# Patient Record
Sex: MALE | Race: WHITE | NOT HISPANIC OR LATINO
[De-identification: names, ages, dates, MRNs, and addresses within clinical notes are randomized per-mention and may not be internally consistent; named-entity substitution may affect disease eponyms.]

---

## 2018-03-14 ENCOUNTER — RESULT REVIEW (OUTPATIENT)
Age: 63
End: 2018-03-14

## 2018-03-14 ENCOUNTER — OUTPATIENT (OUTPATIENT)
Dept: OUTPATIENT SERVICES | Facility: HOSPITAL | Age: 63
LOS: 1 days | End: 2018-03-14
Payer: COMMERCIAL

## 2018-03-14 DIAGNOSIS — K92.2 GASTROINTESTINAL HEMORRHAGE, UNSPECIFIED: ICD-10-CM

## 2018-03-14 PROCEDURE — 88305 TISSUE EXAM BY PATHOLOGIST: CPT

## 2018-03-14 PROCEDURE — 88312 SPECIAL STAINS GROUP 1: CPT

## 2018-03-14 PROCEDURE — 88312 SPECIAL STAINS GROUP 1: CPT | Mod: 26

## 2018-03-14 PROCEDURE — 88305 TISSUE EXAM BY PATHOLOGIST: CPT | Mod: 26

## 2018-03-14 PROCEDURE — 43239 EGD BIOPSY SINGLE/MULTIPLE: CPT

## 2018-03-16 LAB — SURGICAL PATHOLOGY FINAL REPORT - CH: SIGNIFICANT CHANGE UP

## 2018-03-19 ENCOUNTER — OUTPATIENT (OUTPATIENT)
Dept: OUTPATIENT SERVICES | Facility: HOSPITAL | Age: 63
LOS: 1 days | End: 2018-03-19
Payer: COMMERCIAL

## 2018-03-19 ENCOUNTER — RESULT REVIEW (OUTPATIENT)
Age: 63
End: 2018-03-19

## 2018-03-19 DIAGNOSIS — K92.2 GASTROINTESTINAL HEMORRHAGE, UNSPECIFIED: ICD-10-CM

## 2018-03-19 PROCEDURE — 88305 TISSUE EXAM BY PATHOLOGIST: CPT | Mod: 26

## 2018-03-19 PROCEDURE — 45380 COLONOSCOPY AND BIOPSY: CPT

## 2018-03-19 PROCEDURE — 88305 TISSUE EXAM BY PATHOLOGIST: CPT

## 2018-03-21 PROBLEM — Z00.00 ENCOUNTER FOR PREVENTIVE HEALTH EXAMINATION: Status: ACTIVE | Noted: 2018-03-21

## 2018-03-21 LAB — SURGICAL PATHOLOGY FINAL REPORT - CH: SIGNIFICANT CHANGE UP

## 2018-03-26 ENCOUNTER — APPOINTMENT (OUTPATIENT)
Dept: SURGERY | Facility: CLINIC | Age: 63
End: 2018-03-26
Payer: COMMERCIAL

## 2018-03-26 VITALS
WEIGHT: 189 LBS | BODY MASS INDEX: 26.46 KG/M2 | TEMPERATURE: 98.6 F | HEART RATE: 89 BPM | DIASTOLIC BLOOD PRESSURE: 71 MMHG | HEIGHT: 71 IN | SYSTOLIC BLOOD PRESSURE: 117 MMHG

## 2018-03-26 PROCEDURE — 99203 OFFICE O/P NEW LOW 30 MIN: CPT

## 2018-04-02 ENCOUNTER — APPOINTMENT (OUTPATIENT)
Dept: SURGERY | Facility: CLINIC | Age: 63
End: 2018-04-02
Payer: COMMERCIAL

## 2018-04-02 VITALS
SYSTOLIC BLOOD PRESSURE: 112 MMHG | WEIGHT: 187 LBS | TEMPERATURE: 98.6 F | HEART RATE: 98 BPM | BODY MASS INDEX: 26.18 KG/M2 | OXYGEN SATURATION: 95 % | HEIGHT: 71 IN | DIASTOLIC BLOOD PRESSURE: 71 MMHG

## 2018-04-02 PROCEDURE — 99213 OFFICE O/P EST LOW 20 MIN: CPT

## 2018-06-25 ENCOUNTER — APPOINTMENT (OUTPATIENT)
Dept: SURGERY | Facility: CLINIC | Age: 63
End: 2018-06-25
Payer: COMMERCIAL

## 2018-06-25 VITALS
SYSTOLIC BLOOD PRESSURE: 108 MMHG | OXYGEN SATURATION: 95 % | BODY MASS INDEX: 26.18 KG/M2 | HEART RATE: 131 BPM | TEMPERATURE: 98.9 F | HEIGHT: 71 IN | WEIGHT: 187 LBS | DIASTOLIC BLOOD PRESSURE: 69 MMHG

## 2018-06-25 PROCEDURE — 99213 OFFICE O/P EST LOW 20 MIN: CPT

## 2018-07-24 ENCOUNTER — APPOINTMENT (OUTPATIENT)
Dept: SURGICAL ONCOLOGY | Facility: CLINIC | Age: 63
End: 2018-07-24
Payer: COMMERCIAL

## 2018-07-24 VITALS
HEIGHT: 71 IN | HEART RATE: 129 BPM | DIASTOLIC BLOOD PRESSURE: 67 MMHG | SYSTOLIC BLOOD PRESSURE: 112 MMHG | TEMPERATURE: 98.5 F | BODY MASS INDEX: 27.58 KG/M2 | WEIGHT: 197 LBS

## 2018-07-24 DIAGNOSIS — F17.200 NICOTINE DEPENDENCE, UNSPECIFIED, UNCOMPLICATED: ICD-10-CM

## 2018-07-24 DIAGNOSIS — K46.9 UNSPECIFIED ABDOMINAL HERNIA W/OUT OBSTRUCTION OR GANGRENE: ICD-10-CM

## 2018-07-24 DIAGNOSIS — Z83.3 FAMILY HISTORY OF DIABETES MELLITUS: ICD-10-CM

## 2018-07-24 DIAGNOSIS — Z82.49 FAMILY HISTORY OF ISCHEMIC HEART DISEASE AND OTHER DISEASES OF THE CIRCULATORY SYSTEM: ICD-10-CM

## 2018-07-24 DIAGNOSIS — Z81.8 FAMILY HISTORY OF OTHER MENTAL AND BEHAVIORAL DISORDERS: ICD-10-CM

## 2018-07-24 PROCEDURE — 99205 OFFICE O/P NEW HI 60 MIN: CPT

## 2018-08-08 ENCOUNTER — OUTPATIENT (OUTPATIENT)
Dept: OUTPATIENT SERVICES | Facility: HOSPITAL | Age: 63
LOS: 1 days | End: 2018-08-08

## 2018-08-08 VITALS
TEMPERATURE: 98 F | WEIGHT: 199.96 LBS | DIASTOLIC BLOOD PRESSURE: 64 MMHG | HEART RATE: 96 BPM | HEIGHT: 71 IN | RESPIRATION RATE: 18 BRPM | OXYGEN SATURATION: 96 % | SYSTOLIC BLOOD PRESSURE: 104 MMHG

## 2018-08-08 DIAGNOSIS — Z98.890 OTHER SPECIFIED POSTPROCEDURAL STATES: Chronic | ICD-10-CM

## 2018-08-08 DIAGNOSIS — C15.9 MALIGNANT NEOPLASM OF ESOPHAGUS, UNSPECIFIED: ICD-10-CM

## 2018-08-08 LAB
ALBUMIN SERPL ELPH-MCNC: 4.2 G/DL — SIGNIFICANT CHANGE UP (ref 3.3–5)
ALP SERPL-CCNC: 100 U/L — SIGNIFICANT CHANGE UP (ref 40–120)
ALT FLD-CCNC: 13 U/L — SIGNIFICANT CHANGE UP (ref 4–41)
AST SERPL-CCNC: 18 U/L — SIGNIFICANT CHANGE UP (ref 4–40)
BILIRUB SERPL-MCNC: 0.4 MG/DL — SIGNIFICANT CHANGE UP (ref 0.2–1.2)
BLD GP AB SCN SERPL QL: NEGATIVE — SIGNIFICANT CHANGE UP
BUN SERPL-MCNC: 16 MG/DL — SIGNIFICANT CHANGE UP (ref 7–23)
CALCIUM SERPL-MCNC: 9.7 MG/DL — SIGNIFICANT CHANGE UP (ref 8.4–10.5)
CHLORIDE SERPL-SCNC: 101 MMOL/L — SIGNIFICANT CHANGE UP (ref 98–107)
CO2 SERPL-SCNC: 24 MMOL/L — SIGNIFICANT CHANGE UP (ref 22–31)
CREAT SERPL-MCNC: 0.85 MG/DL — SIGNIFICANT CHANGE UP (ref 0.5–1.3)
GLUCOSE SERPL-MCNC: 99 MG/DL — SIGNIFICANT CHANGE UP (ref 70–99)
HCT VFR BLD CALC: 41.8 % — SIGNIFICANT CHANGE UP (ref 39–50)
HGB BLD-MCNC: 13.8 G/DL — SIGNIFICANT CHANGE UP (ref 13–17)
MCHC RBC-ENTMCNC: 30.5 PG — SIGNIFICANT CHANGE UP (ref 27–34)
MCHC RBC-ENTMCNC: 33 % — SIGNIFICANT CHANGE UP (ref 32–36)
MCV RBC AUTO: 92.5 FL — SIGNIFICANT CHANGE UP (ref 80–100)
NRBC # FLD: 0 — SIGNIFICANT CHANGE UP
PLATELET # BLD AUTO: 316 K/UL — SIGNIFICANT CHANGE UP (ref 150–400)
PMV BLD: 9.5 FL — SIGNIFICANT CHANGE UP (ref 7–13)
POTASSIUM SERPL-MCNC: 4.2 MMOL/L — SIGNIFICANT CHANGE UP (ref 3.5–5.3)
POTASSIUM SERPL-SCNC: 4.2 MMOL/L — SIGNIFICANT CHANGE UP (ref 3.5–5.3)
PROT SERPL-MCNC: 7.7 G/DL — SIGNIFICANT CHANGE UP (ref 6–8.3)
RBC # BLD: 4.52 M/UL — SIGNIFICANT CHANGE UP (ref 4.2–5.8)
RBC # FLD: 12.9 % — SIGNIFICANT CHANGE UP (ref 10.3–14.5)
RH IG SCN BLD-IMP: POSITIVE — SIGNIFICANT CHANGE UP
SODIUM SERPL-SCNC: 138 MMOL/L — SIGNIFICANT CHANGE UP (ref 135–145)
WBC # BLD: 7.88 K/UL — SIGNIFICANT CHANGE UP (ref 3.8–10.5)
WBC # FLD AUTO: 7.88 K/UL — SIGNIFICANT CHANGE UP (ref 3.8–10.5)

## 2018-08-08 RX ORDER — SODIUM CHLORIDE 9 MG/ML
1000 INJECTION, SOLUTION INTRAVENOUS
Qty: 0 | Refills: 0 | Status: DISCONTINUED | OUTPATIENT
Start: 2018-08-17 | End: 2018-08-20

## 2018-08-08 NOTE — H&P PST ADULT - LYMPHATIC
posterior cervical L/axillary L/posterior cervical R/anterior cervical L/anterior cervical R/supraclavicular L/supraclavicular R/axillary R

## 2018-08-08 NOTE — H&P PST ADULT - PROBLEM SELECTOR PLAN 1
Patient scheduled robotic assisted JOANNA Raghu esophagogastrectomy and cholecystectomy on 8/17/18.   Pre op and Hibiclens instructions given and explained.  Avoid NSAIDs and OTC supplements.   Patient and verbalized understanding  medical consult appreciated in the chart

## 2018-08-08 NOTE — H&P PST ADULT - HISTORY OF PRESENT ILLNESS
62 y/o male smoker with hx of esophagus CA s/p chemo and radiation on  5/18 presents to PST for scheduled robotic assisted JOANNA Raghu esophagogastrectomy and cholecystectomy on 8/17/18.

## 2018-08-08 NOTE — H&P PST ADULT - RS GEN PE MLT RESP DETAILS PC
airway patent/breath sounds equal airway patent/wheezes/respirations non-labored airway patent/respirations non-labored/hx of wheezes left upper

## 2018-08-08 NOTE — H&P PST ADULT - NEGATIVE GASTROINTESTINAL SYMPTOMS
no diarrhea/no nausea/no abdominal pain/no vomiting/no melena/no constipation no constipation/no diarrhea/no vomiting/abdominal budge/no abdominal pain/no melena/no nausea

## 2018-08-08 NOTE — H&P PST ADULT - MUSCULOSKELETAL
details… detailed exam no joint swelling/no joint erythema/no joint warmth/ROM intact/normal strength

## 2018-08-14 ENCOUNTER — APPOINTMENT (OUTPATIENT)
Dept: THORACIC SURGERY | Facility: CLINIC | Age: 63
End: 2018-08-14
Payer: COMMERCIAL

## 2018-08-14 VITALS
SYSTOLIC BLOOD PRESSURE: 101 MMHG | WEIGHT: 196 LBS | HEIGHT: 71 IN | RESPIRATION RATE: 16 BRPM | BODY MASS INDEX: 27.44 KG/M2 | OXYGEN SATURATION: 100 % | HEART RATE: 98 BPM | DIASTOLIC BLOOD PRESSURE: 65 MMHG

## 2018-08-14 PROBLEM — C15.9 MALIGNANT NEOPLASM OF ESOPHAGUS, UNSPECIFIED: Chronic | Status: ACTIVE | Noted: 2018-08-08

## 2018-08-14 PROCEDURE — 99205 OFFICE O/P NEW HI 60 MIN: CPT

## 2018-08-14 RX ORDER — OXYCODONE AND ACETAMINOPHEN 5; 325 MG/1; MG/1
5-325 TABLET ORAL
Refills: 0 | Status: DISCONTINUED | COMMUNITY
End: 2018-08-14

## 2018-08-14 RX ORDER — ACETAMINOPHEN 325 MG/1
325 TABLET, FILM COATED ORAL
Refills: 0 | Status: ACTIVE | COMMUNITY

## 2018-08-15 ENCOUNTER — APPOINTMENT (OUTPATIENT)
Dept: PULMONOLOGY | Facility: CLINIC | Age: 63
End: 2018-08-15
Payer: COMMERCIAL

## 2018-08-15 ENCOUNTER — TRANSCRIPTION ENCOUNTER (OUTPATIENT)
Age: 63
End: 2018-08-15

## 2018-08-15 PROCEDURE — 94726 PLETHYSMOGRAPHY LUNG VOLUMES: CPT

## 2018-08-15 PROCEDURE — 94060 EVALUATION OF WHEEZING: CPT

## 2018-08-15 PROCEDURE — 94729 DIFFUSING CAPACITY: CPT

## 2018-08-16 ENCOUNTER — TRANSCRIPTION ENCOUNTER (OUTPATIENT)
Age: 63
End: 2018-08-16

## 2018-08-17 ENCOUNTER — RESULT REVIEW (OUTPATIENT)
Age: 63
End: 2018-08-17

## 2018-08-17 ENCOUNTER — APPOINTMENT (OUTPATIENT)
Dept: SURGICAL ONCOLOGY | Facility: HOSPITAL | Age: 63
End: 2018-08-17

## 2018-08-17 ENCOUNTER — APPOINTMENT (OUTPATIENT)
Dept: THORACIC SURGERY | Facility: HOSPITAL | Age: 63
End: 2018-08-17
Payer: COMMERCIAL

## 2018-08-17 ENCOUNTER — INPATIENT (INPATIENT)
Facility: HOSPITAL | Age: 63
LOS: 9 days | Discharge: HOME | End: 2018-08-27
Attending: THORACIC SURGERY (CARDIOTHORACIC VASCULAR SURGERY) | Admitting: THORACIC SURGERY (CARDIOTHORACIC VASCULAR SURGERY)
Payer: COMMERCIAL

## 2018-08-17 VITALS
DIASTOLIC BLOOD PRESSURE: 72 MMHG | HEART RATE: 95 BPM | SYSTOLIC BLOOD PRESSURE: 115 MMHG | WEIGHT: 199.96 LBS | TEMPERATURE: 98 F | HEIGHT: 71 IN | OXYGEN SATURATION: 97 % | RESPIRATION RATE: 16 BRPM

## 2018-08-17 DIAGNOSIS — Z98.890 OTHER SPECIFIED POSTPROCEDURAL STATES: Chronic | ICD-10-CM

## 2018-08-17 DIAGNOSIS — C15.9 MALIGNANT NEOPLASM OF ESOPHAGUS, UNSPECIFIED: ICD-10-CM

## 2018-08-17 LAB
BASE EXCESS BLDA CALC-SCNC: -0.5 MMOL/L — SIGNIFICANT CHANGE UP
BASE EXCESS BLDA CALC-SCNC: -1.1 MMOL/L — SIGNIFICANT CHANGE UP
BASE EXCESS BLDA CALC-SCNC: -1.6 MMOL/L — SIGNIFICANT CHANGE UP
BASE EXCESS BLDA CALC-SCNC: -2.6 MMOL/L — SIGNIFICANT CHANGE UP
BUN SERPL-MCNC: 8 MG/DL — SIGNIFICANT CHANGE UP (ref 7–23)
CA-I BLDA-SCNC: 1.11 MMOL/L — LOW (ref 1.15–1.29)
CA-I BLDA-SCNC: 1.14 MMOL/L — LOW (ref 1.15–1.29)
CA-I BLDA-SCNC: 1.18 MMOL/L — SIGNIFICANT CHANGE UP (ref 1.15–1.29)
CALCIUM SERPL-MCNC: 8 MG/DL — LOW (ref 8.4–10.5)
CHLORIDE SERPL-SCNC: 103 MMOL/L — SIGNIFICANT CHANGE UP (ref 98–107)
CO2 SERPL-SCNC: 20 MMOL/L — LOW (ref 22–31)
CREAT SERPL-MCNC: 0.84 MG/DL — SIGNIFICANT CHANGE UP (ref 0.5–1.3)
GLUCOSE BLDA-MCNC: 134 MG/DL — HIGH (ref 70–99)
GLUCOSE BLDA-MCNC: 163 MG/DL — HIGH (ref 70–99)
GLUCOSE BLDA-MCNC: 171 MG/DL — HIGH (ref 70–99)
GLUCOSE BLDA-MCNC: 173 MG/DL — HIGH (ref 70–99)
GLUCOSE SERPL-MCNC: 175 MG/DL — HIGH (ref 70–99)
HCO3 BLDA-SCNC: 22 MMOL/L — SIGNIFICANT CHANGE UP (ref 22–26)
HCO3 BLDA-SCNC: 23 MMOL/L — SIGNIFICANT CHANGE UP (ref 22–26)
HCO3 BLDA-SCNC: 23 MMOL/L — SIGNIFICANT CHANGE UP (ref 22–26)
HCO3 BLDA-SCNC: 24 MMOL/L — SIGNIFICANT CHANGE UP (ref 22–26)
HCT VFR BLD CALC: 39.9 % — SIGNIFICANT CHANGE UP (ref 39–50)
HCT VFR BLDA CALC: 38.3 % — LOW (ref 39–51)
HCT VFR BLDA CALC: 38.5 % — LOW (ref 39–51)
HCT VFR BLDA CALC: 38.8 % — LOW (ref 39–51)
HCT VFR BLDA CALC: 41.3 % — SIGNIFICANT CHANGE UP (ref 39–51)
HGB BLD-MCNC: 13 G/DL — SIGNIFICANT CHANGE UP (ref 13–17)
HGB BLDA-MCNC: 12.4 G/DL — LOW (ref 13–17)
HGB BLDA-MCNC: 12.5 G/DL — LOW (ref 13–17)
HGB BLDA-MCNC: 12.6 G/DL — LOW (ref 13–17)
HGB BLDA-MCNC: 13.5 G/DL — SIGNIFICANT CHANGE UP (ref 13–17)
MCHC RBC-ENTMCNC: 30.7 PG — SIGNIFICANT CHANGE UP (ref 27–34)
MCHC RBC-ENTMCNC: 32.6 % — SIGNIFICANT CHANGE UP (ref 32–36)
MCV RBC AUTO: 94.3 FL — SIGNIFICANT CHANGE UP (ref 80–100)
NRBC # FLD: 0 — SIGNIFICANT CHANGE UP
PCO2 BLDA: 43 MMHG — SIGNIFICANT CHANGE UP (ref 35–48)
PCO2 BLDA: 44 MMHG — SIGNIFICANT CHANGE UP (ref 35–48)
PCO2 BLDA: 45 MMHG — SIGNIFICANT CHANGE UP (ref 35–48)
PCO2 BLDA: 46 MMHG — SIGNIFICANT CHANGE UP (ref 35–48)
PH BLDA: 7.33 PH — LOW (ref 7.35–7.45)
PH BLDA: 7.33 PH — LOW (ref 7.35–7.45)
PH BLDA: 7.35 PH — SIGNIFICANT CHANGE UP (ref 7.35–7.45)
PH BLDA: 7.35 PH — SIGNIFICANT CHANGE UP (ref 7.35–7.45)
PLATELET # BLD AUTO: 301 K/UL — SIGNIFICANT CHANGE UP (ref 150–400)
PMV BLD: 9.3 FL — SIGNIFICANT CHANGE UP (ref 7–13)
PO2 BLDA: 133 MMHG — HIGH (ref 83–108)
PO2 BLDA: 173 MMHG — HIGH (ref 83–108)
PO2 BLDA: 80 MMHG — LOW (ref 83–108)
PO2 BLDA: 82 MMHG — LOW (ref 83–108)
POTASSIUM BLDA-SCNC: 4 MMOL/L — SIGNIFICANT CHANGE UP (ref 3.4–4.5)
POTASSIUM BLDA-SCNC: 4.2 MMOL/L — SIGNIFICANT CHANGE UP (ref 3.4–4.5)
POTASSIUM BLDA-SCNC: 4.3 MMOL/L — SIGNIFICANT CHANGE UP (ref 3.4–4.5)
POTASSIUM BLDA-SCNC: 4.3 MMOL/L — SIGNIFICANT CHANGE UP (ref 3.4–4.5)
POTASSIUM SERPL-MCNC: 4.7 MMOL/L — SIGNIFICANT CHANGE UP (ref 3.5–5.3)
POTASSIUM SERPL-SCNC: 4.7 MMOL/L — SIGNIFICANT CHANGE UP (ref 3.5–5.3)
RBC # BLD: 4.23 M/UL — SIGNIFICANT CHANGE UP (ref 4.2–5.8)
RBC # FLD: 12.6 % — SIGNIFICANT CHANGE UP (ref 10.3–14.5)
RH IG SCN BLD-IMP: POSITIVE — SIGNIFICANT CHANGE UP
SAO2 % BLDA: 96 % — SIGNIFICANT CHANGE UP (ref 95–99)
SAO2 % BLDA: 96.2 % — SIGNIFICANT CHANGE UP (ref 95–99)
SAO2 % BLDA: 98.7 % — SIGNIFICANT CHANGE UP (ref 95–99)
SAO2 % BLDA: 99.2 % — HIGH (ref 95–99)
SODIUM BLDA-SCNC: 131 MMOL/L — LOW (ref 136–146)
SODIUM BLDA-SCNC: 133 MMOL/L — LOW (ref 136–146)
SODIUM BLDA-SCNC: 134 MMOL/L — LOW (ref 136–146)
SODIUM BLDA-SCNC: 136 MMOL/L — SIGNIFICANT CHANGE UP (ref 136–146)
SODIUM SERPL-SCNC: 136 MMOL/L — SIGNIFICANT CHANGE UP (ref 135–145)
WBC # BLD: 13.19 K/UL — HIGH (ref 3.8–10.5)
WBC # FLD AUTO: 13.19 K/UL — HIGH (ref 3.8–10.5)

## 2018-08-17 PROCEDURE — 47610 REMOVAL OF GALLBLADDER: CPT | Mod: 82

## 2018-08-17 PROCEDURE — 71045 X-RAY EXAM CHEST 1 VIEW: CPT | Mod: 26

## 2018-08-17 PROCEDURE — 88305 TISSUE EXAM BY PATHOLOGIST: CPT | Mod: 26

## 2018-08-17 PROCEDURE — 44015 INSERT NEEDLE CATH BOWEL: CPT

## 2018-08-17 PROCEDURE — 88304 TISSUE EXAM BY PATHOLOGIST: CPT | Mod: 26

## 2018-08-17 PROCEDURE — 88302 TISSUE EXAM BY PATHOLOGIST: CPT | Mod: 26

## 2018-08-17 PROCEDURE — 43118 PARTIAL REMOVAL OF ESOPHAGUS: CPT

## 2018-08-17 PROCEDURE — 38747 REMOVE ABDOMINAL LYMPH NODES: CPT | Mod: 82,59

## 2018-08-17 PROCEDURE — S2900 ROBOTIC SURGICAL SYSTEM: CPT | Mod: NC

## 2018-08-17 PROCEDURE — 43117 PARTIAL REMOVAL OF ESOPHAGUS: CPT

## 2018-08-17 PROCEDURE — 47610 REMOVAL OF GALLBLADDER: CPT

## 2018-08-17 PROCEDURE — 43800 PYLOROPLASTY: CPT

## 2018-08-17 PROCEDURE — 99222 1ST HOSP IP/OBS MODERATE 55: CPT

## 2018-08-17 PROCEDURE — 88309 TISSUE EXAM BY PATHOLOGIST: CPT | Mod: 26

## 2018-08-17 PROCEDURE — 38747 REMOVE ABDOMINAL LYMPH NODES: CPT | Mod: 59

## 2018-08-17 PROCEDURE — 44050 REDUCE BOWEL OBSTRUCTION: CPT | Mod: 82

## 2018-08-17 PROCEDURE — 43800 PYLOROPLASTY: CPT | Mod: 82

## 2018-08-17 PROCEDURE — 31622 DX BRONCHOSCOPE/WASH: CPT

## 2018-08-17 PROCEDURE — 44015 INSERT NEEDLE CATH BOWEL: CPT | Mod: 82

## 2018-08-17 PROCEDURE — 43117 PARTIAL REMOVAL OF ESOPHAGUS: CPT | Mod: 82

## 2018-08-17 RX ORDER — HEPARIN SODIUM 5000 [USP'U]/ML
5000 INJECTION INTRAVENOUS; SUBCUTANEOUS EVERY 12 HOURS
Qty: 0 | Refills: 0 | Status: DISCONTINUED | OUTPATIENT
Start: 2018-08-17 | End: 2018-08-23

## 2018-08-17 RX ORDER — HEPARIN SODIUM 5000 [USP'U]/ML
5000 INJECTION INTRAVENOUS; SUBCUTANEOUS EVERY 8 HOURS
Qty: 0 | Refills: 0 | Status: DISCONTINUED | OUTPATIENT
Start: 2018-08-17 | End: 2018-08-17

## 2018-08-17 RX ORDER — PHENYLEPHRINE HYDROCHLORIDE 10 MG/ML
0.2 INJECTION INTRAVENOUS
Qty: 40 | Refills: 0 | Status: DISCONTINUED | OUTPATIENT
Start: 2018-08-17 | End: 2018-08-20

## 2018-08-17 RX ORDER — SODIUM CHLORIDE 9 MG/ML
250 INJECTION, SOLUTION INTRAVENOUS ONCE
Qty: 0 | Refills: 0 | Status: COMPLETED | OUTPATIENT
Start: 2018-08-17 | End: 2018-08-17

## 2018-08-17 RX ORDER — ONDANSETRON 8 MG/1
4 TABLET, FILM COATED ORAL EVERY 6 HOURS
Qty: 0 | Refills: 0 | Status: DISCONTINUED | OUTPATIENT
Start: 2018-08-17 | End: 2018-08-20

## 2018-08-17 RX ORDER — NALOXONE HYDROCHLORIDE 4 MG/.1ML
0.1 SPRAY NASAL
Qty: 0 | Refills: 0 | Status: DISCONTINUED | OUTPATIENT
Start: 2018-08-17 | End: 2018-08-20

## 2018-08-17 RX ADMIN — SODIUM CHLORIDE 500 MILLILITER(S): 9 INJECTION, SOLUTION INTRAVENOUS at 18:40

## 2018-08-17 RX ADMIN — HEPARIN SODIUM 5000 UNIT(S): 5000 INJECTION INTRAVENOUS; SUBCUTANEOUS at 18:40

## 2018-08-17 RX ADMIN — SODIUM CHLORIDE 30 MILLILITER(S): 9 INJECTION, SOLUTION INTRAVENOUS at 07:27

## 2018-08-17 NOTE — BRIEF OPERATIVE NOTE - POST-OP DX
Calculus of gallbladder without cholecystitis without obstruction  08/17/2018    Shashank Plascencia  Malignant neoplasm of esophagus, unspecified location  08/17/2018    Shashank Plascencia  Umbilical hernia without obstruction or gangrene  08/17/2018    Shashank Plascencia

## 2018-08-17 NOTE — BRIEF OPERATIVE NOTE - POST-OP DX
Calculus of gallbladder without cholecystitis without obstruction  08/17/2018    Shashank Plascencia  Malignant neoplasm of esophagus, unspecified location  08/17/2018    Shashank Plascencia  Umbilical hernia without obstruction or gangrene  08/17/2018    Shashank Plascencia Yes

## 2018-08-17 NOTE — BRIEF OPERATIVE NOTE - PROCEDURE
<<-----Click on this checkbox to enter Procedure Umbilical hernia repair  08/17/2018    Active  SPUPOVAC  Exploratory laparotomy  08/17/2018    Active  SPUPOVAMENA  Pyloroplasty  08/17/2018    Active  SPUPOVAC  Jejunostomy feeding tube placement  08/17/2018    [facility]

## 2018-08-17 NOTE — PROGRESS NOTE ADULT - SUBJECTIVE AND OBJECTIVE BOX
63 smoker M with hx of esophagus CA s/p chemo and radiation on  5/18 presents to [facility] for scheduled robotic assisted MIRZA Raghu esophagogastrectomy and cholecystectomy on 8/17/18.     [id number]:   Mirza Raghu - Ex lap, Umbilical hernia repair, Pyloroplasty, Jejunostomy feeding tube placement     Past Medical History:  Malignant neoplasm of esophagus, unspecified.     Past Surgical History:  History of bilateral inguinal hernia repair.     Social History:  · Marital Status		  · Occupation	musician	  · Lives With	children; spouse	     Substance Use History:  · Substance Use	caffeine  denies street drugs	  · Caffeine Type	tea	  · Caffeine Amount/Frequency	1-2 cups/cans per day	     Alcohol Use History:  · Have you ever consumed alcohol	never	     Tobacco Usage:  · Tobacco Usage: Current every day smoker	  · Tobacco Type: cigarettes	  · Number of Packs per Day: 1	  · Number of yrs: 52	  · Pack yrs: 52	  · Longest Period Tobacco-Free: current 4 cig	     Passive Smoke Exposure:  · Passive Smoke Exposure	No	         Allergies:  	No Known Allergies:     Home Medications:   * Patient Currently Takes Medications as of 08-Aug-2018 13:59 documented in Structured Notes  · 	Percocet 5/325 oral tablet: Last Dose Taken:  , 1 tab(s) orally every 6 hours, As Needed  · 	medical marijuana: Last Dose Taken:  , inhaler(s) inhaled 2 times a day,     MEDICATIONS  (STANDING):  HYDROmorphone (10 MICROgram(s)/mL) + BUpivacaine 0.0625% in 0.9% Sodium Chloride PCEA 250 milliLiter(s) Epidural PCA Continuous  lactated ringers. 1000 milliLiter(s) (30 mL/Hr) IV Continuous <Continuous>    MEDICATIONS  (PRN):  HYDROmorphone (10 MICROgram(s)/mL) + BUpivacaine 0.0625% in 0.9% Sodium Chloride PCEA Rescue Clinician  Bolus 5 milliLiter(s) Epidural every 15 minutes PRN for Pain Score greater than 6  naloxone Injectable 0.1 milliGRAM(s) IV Push every 3 minutes PRN For ANY of the following changes in patient status:  A. RR LESS THAN 10 breaths per minute, B. Oxygen saturation LESS THAN 90%, C. Sedation score of 6  ondansetron Injectable 4 milliGRAM(s) IV Push every 6 hours PRN Nausea    ICU Vital Signs Last 24 Hrs  T(C): 36.6 (17 Aug 2018 06:52), Max: 36.6 (17 Aug 2018 06:52)  T(F): 97.8 (17 Aug 2018 06:52), Max: 97.8 (17 Aug 2018 06:52)  HR: 95 (17 Aug 2018 06:52) (95 - 95)  BP: 115/72 (17 Aug 2018 06:52) (115/72 - 115/72)  RR: 16 (17 Aug 2018 06:52) (16 - 16)  SpO2: 97% (17 Aug 2018 06:52) (97% - 97%)      Physical exam:                                                   Gen:                  WD WN in NAD   Neuro:              Alert & Oriented X 3, no focal deficits                          Cardiovascular:   S1 & S2, regular                           Respiratory:        Good B/L Air entry is fair and decreased on both sides R>L, has bilateral conducted sounds / rhonchi /rales                          GI:                      Hypoactive bowel sounds, Soft, nondistended and nontender,                           Ext:                     No cyanosis or edema,     Labs:                                                                [id number]  WBC            7.88  HH               13.8/ 41.8  PLT               316    Na              138  K                 4.2  Chloride       101  Bicarb           24  BUN              16  Creat             0.85  Ca                 9.7    TP                 7.7  Alb                4.2  Tbil               0.4  Alk Phos        100  AST                18  ALT                13    eGFR              93          ABG - ( 17 Aug 2018 11:35 )  pH, Arterial: 7.35  pH, Blood: x     /  pCO2: 44    /  pO2: 173   / HCO3: 23    / Base Excess: -1.1  /  SaO2: 99.2        Plan:  63 smoker M with hx of esophagus CA s/p chemo and radiation on  5/18 presents to PST for scheduled robotic assisted MIRZA Raghu esophagogastrectomy and cholecystectomy on 8/17/18.   [id number]:   Mirza Raghu - Ex lap, Umbilical hernia repair, Pyloroplasty, Jejunostomy feeding tube placement                               Neuro:                                         Pain control with PCA /Tylenol IV                             Cardiovascular:                                          Continue hemodynamic monitoring.                                         Not on any pressors                                         Continue cardiovascular / antihypertensive medications                            Respiratory:                                         Pt is on  nasal canula                                          Comfortable, not in any distress.                                         Use incentive spirometry ASAP                                         Monitor chest tube output                                         Chest tube to suction	                                         Continue bronchodilators, pulmonary toilet                            GI                                         Continue GI prophylaxis with Protonix                                         Continue Zofran / Reglan for nausea - PRN	                                         NPO                                  Renal:                                         Continue IVF                                         Monitor I/Os and electrolytes                                                 Hematologic / Oncology:                                         SQH & SCDs for VTE prophylaxis                                         Monitor chest tube output. No signs of active bleeding.                                          Follow CBC in AM                           Infectious disease:                                          No signs of infection. Monitor for fever / leukocytosis.                                          All surgical incision / chest tube  sites look clean                            Endocrine:                                           Continue Finger stick glucose checks with coverage    All available pertinent clinical, laboratory, radiographic, hemodynamic, echocardiographic, respiratory data, microbiologic data and chart were reviewed and analyzed frequently. GI and DVT prophylaxis, glycemic control, head of bed elevation and skin care issues were addressed.  Patient seen, examined and plan discussed with CT Surgery / CTICU team during rounds.    I have spent 45 minutes of critical care time with this patient between 5 pm and  7 pm.      Antione Rosenbaum MD

## 2018-08-17 NOTE — BRIEF OPERATIVE NOTE - PROCEDURE
<<-----Click on this checkbox to enter Procedure EGD (esophagogastroduodenoscopy)  08/17/2018    Active  AD  Mirza Raghu esophagectomy  08/17/2018  robotic assisted  Active  AD

## 2018-08-17 NOTE — BRIEF OPERATIVE NOTE - PRE-OP DX
Calculus of gallbladder without cholecystitis without obstruction  08/17/2018    Shashank Plascencia  Malignant neoplasm of esophagus, unspecified location  08/17/2018    Shashank Plascencia  Umbilical hernia without obstruction and without gangrene  08/17/2018    Shashank Plascencia

## 2018-08-18 LAB
ALBUMIN SERPL ELPH-MCNC: 3 G/DL — LOW (ref 3.3–5)
ALP SERPL-CCNC: 67 U/L — SIGNIFICANT CHANGE UP (ref 40–120)
ALT FLD-CCNC: 20 U/L — SIGNIFICANT CHANGE UP (ref 4–41)
APTT BLD: 26.8 SEC — LOW (ref 27.5–37.4)
AST SERPL-CCNC: 41 U/L — HIGH (ref 4–40)
BILIRUB SERPL-MCNC: 0.8 MG/DL — SIGNIFICANT CHANGE UP (ref 0.2–1.2)
BUN SERPL-MCNC: 9 MG/DL — SIGNIFICANT CHANGE UP (ref 7–23)
CA-I BLD-SCNC: 1.1 MMOL/L — SIGNIFICANT CHANGE UP (ref 1.03–1.23)
CALCIUM SERPL-MCNC: 8 MG/DL — LOW (ref 8.4–10.5)
CHLORIDE SERPL-SCNC: 103 MMOL/L — SIGNIFICANT CHANGE UP (ref 98–107)
CO2 SERPL-SCNC: 24 MMOL/L — SIGNIFICANT CHANGE UP (ref 22–31)
CREAT SERPL-MCNC: 0.86 MG/DL — SIGNIFICANT CHANGE UP (ref 0.5–1.3)
GLUCOSE SERPL-MCNC: 120 MG/DL — HIGH (ref 70–99)
HCT VFR BLD CALC: 35.4 % — LOW (ref 39–50)
HGB BLD-MCNC: 11.7 G/DL — LOW (ref 13–17)
INR BLD: 1.14 — SIGNIFICANT CHANGE UP (ref 0.88–1.17)
MAGNESIUM SERPL-MCNC: 1.7 MG/DL — SIGNIFICANT CHANGE UP (ref 1.6–2.6)
MCHC RBC-ENTMCNC: 30.3 PG — SIGNIFICANT CHANGE UP (ref 27–34)
MCHC RBC-ENTMCNC: 33.1 % — SIGNIFICANT CHANGE UP (ref 32–36)
MCV RBC AUTO: 91.7 FL — SIGNIFICANT CHANGE UP (ref 80–100)
NRBC # FLD: 0 — SIGNIFICANT CHANGE UP
PHOSPHATE SERPL-MCNC: 3 MG/DL — SIGNIFICANT CHANGE UP (ref 2.5–4.5)
PLATELET # BLD AUTO: 257 K/UL — SIGNIFICANT CHANGE UP (ref 150–400)
PMV BLD: 9.5 FL — SIGNIFICANT CHANGE UP (ref 7–13)
POTASSIUM SERPL-MCNC: 4 MMOL/L — SIGNIFICANT CHANGE UP (ref 3.5–5.3)
POTASSIUM SERPL-SCNC: 4 MMOL/L — SIGNIFICANT CHANGE UP (ref 3.5–5.3)
PROT SERPL-MCNC: 5.5 G/DL — LOW (ref 6–8.3)
PROTHROM AB SERPL-ACNC: 13.1 SEC — SIGNIFICANT CHANGE UP (ref 9.8–13.1)
RBC # BLD: 3.86 M/UL — LOW (ref 4.2–5.8)
RBC # FLD: 12.4 % — SIGNIFICANT CHANGE UP (ref 10.3–14.5)
SODIUM SERPL-SCNC: 138 MMOL/L — SIGNIFICANT CHANGE UP (ref 135–145)
WBC # BLD: 8.84 K/UL — SIGNIFICANT CHANGE UP (ref 3.8–10.5)
WBC # FLD AUTO: 8.84 K/UL — SIGNIFICANT CHANGE UP (ref 3.8–10.5)

## 2018-08-18 PROCEDURE — 99233 SBSQ HOSP IP/OBS HIGH 50: CPT

## 2018-08-18 PROCEDURE — 71045 X-RAY EXAM CHEST 1 VIEW: CPT | Mod: 26

## 2018-08-18 PROCEDURE — 74018 RADEX ABDOMEN 1 VIEW: CPT | Mod: 26

## 2018-08-18 RX ORDER — MAGNESIUM SULFATE 500 MG/ML
2 VIAL (ML) INJECTION ONCE
Qty: 0 | Refills: 0 | Status: COMPLETED | OUTPATIENT
Start: 2018-08-18 | End: 2018-08-18

## 2018-08-18 RX ORDER — SODIUM CHLORIDE 9 MG/ML
250 INJECTION, SOLUTION INTRAVENOUS
Qty: 0 | Refills: 0 | Status: COMPLETED | OUTPATIENT
Start: 2018-08-18 | End: 2018-08-18

## 2018-08-18 RX ORDER — METOPROLOL TARTRATE 50 MG
2.5 TABLET ORAL ONCE
Qty: 0 | Refills: 0 | Status: COMPLETED | OUTPATIENT
Start: 2018-08-18 | End: 2018-08-18

## 2018-08-18 RX ORDER — ACETAMINOPHEN 500 MG
1000 TABLET ORAL ONCE
Qty: 0 | Refills: 0 | Status: COMPLETED | OUTPATIENT
Start: 2018-08-18 | End: 2018-08-22

## 2018-08-18 RX ADMIN — SODIUM CHLORIDE 1000 MILLILITER(S): 9 INJECTION, SOLUTION INTRAVENOUS at 16:00

## 2018-08-18 RX ADMIN — Medication 2.5 MILLIGRAM(S): at 21:05

## 2018-08-18 RX ADMIN — SODIUM CHLORIDE 125 MILLILITER(S): 9 INJECTION, SOLUTION INTRAVENOUS at 07:15

## 2018-08-18 RX ADMIN — Medication 50 GRAM(S): at 08:25

## 2018-08-18 RX ADMIN — HEPARIN SODIUM 5000 UNIT(S): 5000 INJECTION INTRAVENOUS; SUBCUTANEOUS at 06:57

## 2018-08-18 RX ADMIN — SODIUM CHLORIDE 1000 MILLILITER(S): 9 INJECTION, SOLUTION INTRAVENOUS at 15:00

## 2018-08-18 RX ADMIN — SODIUM CHLORIDE 125 MILLILITER(S): 9 INJECTION, SOLUTION INTRAVENOUS at 19:30

## 2018-08-18 RX ADMIN — HEPARIN SODIUM 5000 UNIT(S): 5000 INJECTION INTRAVENOUS; SUBCUTANEOUS at 18:47

## 2018-08-18 NOTE — PROGRESS NOTE ADULT - SUBJECTIVE AND OBJECTIVE BOX
Anesthesia Pain Management Service: Day __ of Epidural    SUBJECTIVE: Patient doing well with PCEA and no problems.  Pain Scale Score:   Refer to charted pain scores    THERAPY:  [x ] Epidural Bupivacaine 0.0625% and Hydromorphone  		[ ] 10 micrograms/mL	[ ] 5 micrograms/mL  [ ] Epidural Bupivacaine 0.0625% and Fentanyl - 2 micrograms/mL  [ ] Epidural Ropivacaine 0.1% plain – 1 mg/mL  [ ] Patient Controlled Regional Anesthesia (PCRA) Ropivacaine  		[ ] 0.2%			[ ] 0.1%    Demand dose __3_ lockout __15_ (minutes) Continuous Rate ___ Total: _112.0__ Daily      MEDICATIONS  (STANDING):  heparin  Injectable 5000 Unit(s) SubCutaneous every 12 hours  HYDROmorphone (10 MICROgram(s)/mL) + BUpivacaine 0.0625% in 0.9% Sodium Chloride PCEA 250 milliLiter(s) Epidural PCA Continuous  lactated ringers. 1000 milliLiter(s) (125 mL/Hr) IV Continuous <Continuous>  phenylephrine    Infusion 0.2 MICROgram(s)/kG/Min (6.803 mL/Hr) IV Continuous <Continuous>    MEDICATIONS  (PRN):  HYDROmorphone (10 MICROgram(s)/mL) + BUpivacaine 0.0625% in 0.9% Sodium Chloride PCEA Rescue Clinician  Bolus 5 milliLiter(s) Epidural every 15 minutes PRN for Pain Score greater than 6  naloxone Injectable 0.1 milliGRAM(s) IV Push every 3 minutes PRN For ANY of the following changes in patient status:  A. RR LESS THAN 10 breaths per minute, B. Oxygen saturation LESS THAN 90%, C. Sedation score of 6  ondansetron Injectable 4 milliGRAM(s) IV Push every 6 hours PRN Nausea      OBJECTIVE:    Assessment of Catheter Site:	[ ] Left	[ ] Right  [x ] Epidural 	[ ] Femoral	      [ ] Saphenous   [ ] Supraclavicular   [ ] Other:    [x ] Dressing intact	[x ] Site non-tender	[ x] Site without erythema, discharge, edema  [x ] Epidural tubing and connection checked	[x] Gross neurological exam within normal limits  [ ] Catheter removed – tip intact		[x ] Afebrile	[ ] Febrile: ___    PT/INR - ( 18 Aug 2018 04:00 )   PT: 13.1 SEC;   INR: 1.14          PTT - ( 18 Aug 2018 04:00 )  PTT:26.8 SEC                      11.7   8.84  )-----------( 257      ( 18 Aug 2018 04:00 )             35.4     Vital Signs Last 24 Hrs  T(C): 36.9 (08-18-18 @ 08:00), Max: 37.5 (08-18-18 @ 04:00)  T(F): 98.4 (08-18-18 @ 08:00), Max: 99.5 (08-18-18 @ 04:00)  HR: 120 (08-18-18 @ 09:00) (72 - 126)  BP: 122/71 (08-18-18 @ 09:00) (87/62 - 122/71)  BP(mean): 80 (08-18-18 @ 09:00) (67 - 110)  RR: 25 (08-18-18 @ 09:00) (15 - 25)  SpO2: 97% (08-18-18 @ 09:00) (93% - 100%)      Sedation Score:	[x ] Alert	[ ] Drowsy	[ ] Arousable	[ ] Asleep	[ ] Unresponsive    Side Effects:	[x ] None	[ ] Nausea	[ ] Vomiting	[ ] Pruritus  		[ ] Weakness		[ ] Numbness	[ ] Other:    ASSESSMENT/ PLAN:    Therapy to  be:	[x ] Continue   [ ] Discontinued   [ ] Change to prn Analgesics    Documentation and Verification of current medications:  [ X ] Done	[ ] Not done, not eligible, reason:    Comments: Doing OK with epidural and may continue.

## 2018-08-18 NOTE — PROGRESS NOTE ADULT - SUBJECTIVE AND OBJECTIVE BOX
Post op surgery note    Subjective: pt was seen and examined.  Pt reports that pain is managed with medication. No nausea or vomiting.       Objective     Vital Signs Last 24 Hrs  T(C): 37.1 (18 Aug 2018 00:00), Max: 37.1 (18 Aug 2018 00:00)  T(F): 98.7 (18 Aug 2018 00:00), Max: 98.7 (18 Aug 2018 00:00)  HR: 102 (18 Aug 2018 00:00) (72 - 102)  BP: 115/65 (17 Aug 2018 16:35) (87/62 - 115/72)  BP(mean): 77 (17 Aug 2018 16:35) (67 - 77)  RR: 16 (18 Aug 2018 00:00) (15 - 21)  SpO2: 97% (18 Aug 2018 00:00) (95% - 100%)    I&O's Detail    17 Aug 2018 07:01  -  18 Aug 2018 00:45  --------------------------------------------------------  IN:    Enteral Tube Flush: 40 mL    Enteral Tube Flush: 40 mL    Lactated Ringers IV Bolus: 250 mL    lactated ringers.: 1250 mL  Total IN: 1580 mL    OUT:    Chest Tube: 40 mL    Chest Tube: 250 mL    Indwelling Catheter - Urethral: 385 mL    Nasoenteral Tube: 180 mL  Total OUT: 855 mL    Total NET: 725 mL          MEDICATIONS  (STANDING):  heparin  Injectable 5000 Unit(s) SubCutaneous every 12 hours  HYDROmorphone (10 MICROgram(s)/mL) + BUpivacaine 0.0625% in 0.9% Sodium Chloride PCEA 250 milliLiter(s) Epidural PCA Continuous  lactated ringers. 1000 milliLiter(s) (125 mL/Hr) IV Continuous <Continuous>  phenylephrine    Infusion 0.2 MICROgram(s)/kG/Min (6.803 mL/Hr) IV Continuous <Continuous>    MEDICATIONS  (PRN):  HYDROmorphone (10 MICROgram(s)/mL) + BUpivacaine 0.0625% in 0.9% Sodium Chloride PCEA Rescue Clinician  Bolus 5 milliLiter(s) Epidural every 15 minutes PRN for Pain Score greater than 6  naloxone Injectable 0.1 milliGRAM(s) IV Push every 3 minutes PRN For ANY of the following changes in patient status:  A. RR LESS THAN 10 breaths per minute, B. Oxygen saturation LESS THAN 90%, C. Sedation score of 6  ondansetron Injectable 4 milliGRAM(s) IV Push every 6 hours PRN Nausea      LABS:                        13.0   13.19 )-----------( 301      ( 17 Aug 2018 17:00 )             39.9     08-17    136  |  103  |  8   ----------------------------<  175<H>  4.7   |  20<L>  |  0.84    Ca    8.0<L>      17 Aug 2018 17:00          Physical exam  General: pt appears comfortable in bed  Respiratory: non labored breathing  Abdominal: soft

## 2018-08-18 NOTE — PROGRESS NOTE ADULT - SUBJECTIVE AND OBJECTIVE BOX
63 smoker M with hx of esophagus CA s/p chemo and radiation on  5/18 presents to [facility] for scheduled robotic assisted MIRZA Raghu esophagogastrectomy and cholecystectomy on 8/17/18.     [id number]:   Mirza Raghu - Ex lap, Umbilical hernia repair, Pyloroplasty, Jejunostomy feeding tube placement     Past Medical History:  Malignant neoplasm of esophagus, unspecified.     Past Surgical History:  History of bilateral inguinal hernia repair.         Allergies:  	No Known Allergies:     Home Medications:   * Patient Currently Takes Medications as of 08-Aug-2018 13:59 documented in Structured Notes  · 	Percocet 5/325 oral tablet: Last Dose Taken:  , 1 tab(s) orally every 6 hours, As Needed  · 	medical marijuana: Last Dose Taken:  , inhaler(s) inhaled 2 times a day,     MEDICATIONS  (STANDING):  heparin  Injectable 5000 Unit(s) SubCutaneous every 12 hours  HYDROmorphone (10 MICROgram(s)/mL) + BUpivacaine 0.0625% in 0.9% Sodium Chloride PCEA 250 milliLiter(s) Epidural PCA Continuous  lactated ringers. 1000 milliLiter(s) (125 mL/Hr) IV Continuous <Continuous>  phenylephrine    Infusion 0.2 MICROgram(s)/kG/Min (6.803 mL/Hr) IV Continuous <Continuous>    MEDICATIONS  (PRN):  HYDROmorphone (10 MICROgram(s)/mL) + BUpivacaine 0.0625% in 0.9% Sodium Chloride PCEA Rescue Clinician  Bolus 5 milliLiter(s) Epidural every 15 minutes PRN for Pain Score greater than 6  naloxone Injectable 0.1 milliGRAM(s) IV Push every 3 minutes PRN For ANY of the following changes in patient status:  A. RR LESS THAN 10 breaths per minute, B. Oxygen saturation LESS THAN 90%, C. Sedation score of 6  ondansetron Injectable 4 milliGRAM(s) IV Push every 6 hours PRN Nausea    ICU Vital Signs Last 24 Hrs  T(C): 36.9 (18 Aug 2018 08:00), Max: 37.5 (18 Aug 2018 04:00)  T(F): 98.4 (18 Aug 2018 08:00), Max: 99.5 (18 Aug 2018 04:00)  HR: 109 (18 Aug 2018 11:00) (72 - 126)  BP: 110/84 (18 Aug 2018 11:00) (87/62 - 122/71)  BP(mean): 91 (18 Aug 2018 11:00) (67 - 110)  ABP: 104/72 (18 Aug 2018 08:00) (100/55 - 137/70)  ABP(mean): 83 (18 Aug 2018 08:00) (70 - 90)  RR: 20 (18 Aug 2018 11:00) (15 - 25)  SpO2: 96% (18 Aug 2018 11:00) (93% - 100%)      Physical exam:                                                   Gen:                  WD WN in NAD   Neuro:              Alert & Oriented X 3, no focal deficits                          Cardiovascular:   S1 & S2, regular                           Respiratory:        Good B/L Air entry is fair and decreased on both sides R>L, has bilateral conducted sounds / rhonchi /rales                          GI:                      Hypoactive bowel sounds, Soft, nondistended and nontender,                           Ext:                     No cyanosis or edema,       I&O's Summary    17 Aug 2018 07:01  -  18 Aug 2018 07:00  --------------------------------------------------------  IN: 2410 mL / OUT: 1975 mL / NET: 435 mL    18 Aug 2018 07:01  -  18 Aug 2018 11:59  --------------------------------------------------------  IN: 665 mL / OUT: 205 mL / NET: 460 mL    Labs:                                                                           11.7   8.84  )-----------( 257      ( 18 Aug 2018 04:00 )             35.4                            08-18    138  |  103  |  9   ----------------------------<  120<H>  4.0   |  24  |  0.86    Ca    8.0<L>      18 Aug 2018 04:00  Phos  3.0     08-18  Mg     1.7     08-18    TPro  5.5<L>  /  Alb  3.0<L>  /  TBili  0.8  /  DBili  x   /  AST  41<H>  /  ALT  20  /  AlkPhos  67  08-18    LIVER FUNCTIONS - ( 18 Aug 2018 04:00 )  Alb: 3.0 g/dL / Pro: 5.5 g/dL / ALK PHOS: 67 u/L / ALT: 20 u/L / AST: 41 u/L / GGT: x                                PT/INR - ( 18 Aug 2018 04:00 )   PT: 13.1 SEC;   INR: 1.14     PTT - ( 18 Aug 2018 04:00 )  PTT:26.8 SEC          ABG - ( 17 Aug 2018 17:00 )  pH, Arterial: 7.33  pH, Blood: x     /  pCO2: 45    /  pO2: 80    / HCO3: 23    / Base Excess: -1.6  /  SaO2: 96.2      CXR  [id number]   INTERPRETATION:   There are 2 right-sided chest tubes. Enteric tube is seen, with the   distal tip at the level of gastroesophageal junction. Recommend   advancement.  Patient is status post cholecystectomy.  Hypoinflated lungs. Trace left pleural effusion.   Cardiac size cannot be accurately assessed in this projection. Visualized   osseous structures are unremarkable. Degenerative changes of the spine.  There is subcutaneous emphysema in the soft tissues along the right chest   wall, likely related chest tube.  IMPRESSION:  Enteric tube is seen, with the distal tip at the level of   gastroesophageal junction.       Plan:  63 smoker M with hx of esophagus CA s/p chemo and radiation on  5/18 presents to PST for scheduled robotic assisted MIRZA Raghu esophagogastrectomy and cholecystectomy on 8/17/18.   [id number]:   Mirza Raghu - Ex lap, Umbilical hernia repair, Pyloroplasty, Jejunostomy feeding tube placement                            Neuro:                                         Pain control with PCA /Tylenol IV                             Cardiovascular:                                          Continue hemodynamic monitoring.                                         Not on any pressors                                         Continue cardiovascular / antihypertensive medications                            Respiratory:                                         Pt is on  nasal canula                                          Comfortable, not in any distress.                                         Use incentive spirometry ASAP                                         Monitor chest tube output                                         Chest tube to suction	                                         Continue bronchodilators, pulmonary toilet                            GI                                         Continue GI prophylaxis with Protonix                                         Continue Zofran / Reglan for nausea - PRN	                                         NPO     X-ray study to confirm J-tube placement and begin tube feeding if j-tube in place                                   Renal:                                         Continue IVF                                         Monitor I/Os and electrolytes                                                 Hematologic / Oncology:                                         SQH & SCDs for VTE prophylaxis                                         Monitor chest tube output. No signs of active bleeding.                                          Follow CBC in AM                           Infectious disease:                                          No signs of infection. Monitor for fever / leukocytosis.                                          All surgical incision / chest tube  sites look clean                            Endocrine:                                           Continue Finger stick glucose checks with coverage    All available pertinent clinical, laboratory, radiographic, hemodynamic, echocardiographic, respiratory data, microbiologic data and chart were reviewed and analyzed frequently. GI and DVT prophylaxis, glycemic control, head of bed elevation and skin care issues were addressed.  Patient seen, examined and plan discussed with CT Surgery / CTICU team during rounds.    I have spent 45 minutes of critical care time with this patient between 7 am and  2359 pm.    Antione Rosenbaum MD

## 2018-08-18 NOTE — PROGRESS NOTE ADULT - SUBJECTIVE AND OBJECTIVE BOX
CC: esophageal cancer  HPI: 63 M w/esophageal ca s/p CRT and Mirza Raghu Esophagectomy  24/Overnight events: Patient seen and examined on morning rounds, POD1 doing well. Out of bed to chair, pain adequately controlled, breathing and saturating well on room air. Making appropriate urine. Chest tube drainage minimal. Otherwise no acute events overnight.      Objective:  Vital Signs Last 24 Hrs  T(C): 36.9 (18 Aug 2018 08:00), Max: 37.5 (18 Aug 2018 04:00)  T(F): 98.4 (18 Aug 2018 08:00), Max: 99.5 (18 Aug 2018 04:00)  HR: 120 (18 Aug 2018 09:00) (72 - 126)  BP: 122/71 (18 Aug 2018 09:00) (87/62 - 122/71)  BP(mean): 80 (18 Aug 2018 09:00) (67 - 110)  RR: 25 (18 Aug 2018 09:00) (15 - 25)  SpO2: 97% (18 Aug 2018 09:00) (93% - 100%)    Physical Exam:  General: NAD, sitting in chair comfortably  Respiratory: breathing and saturating well on 2L NC  Abdomen: softly distended, appropriately tender; dressing remains covered POD1, clean, with minimal striekthrough      I&O's Detail    17 Aug 2018 07:01  -  18 Aug 2018 07:00  --------------------------------------------------------  IN:    Enteral Tube Flush: 80 mL    Enteral Tube Flush: 80 mL    Lactated Ringers IV Bolus: 250 mL    lactated ringers.: 2000 mL  Total IN: 2410 mL    OUT:    Chest Tube: 150 mL    Chest Tube: 330 mL    Indwelling Catheter - Urethral: 865 mL    Nasoenteral Tube: 630 mL  Total OUT: 1975 mL    Total NET: 435 mL      18 Aug 2018 07:01  -  18 Aug 2018 09:42  --------------------------------------------------------  IN:    Enteral Tube Flush: 20 mL    Enteral Tube Flush: 20 mL    lactated ringers.: 250 mL  Total IN: 290 mL    OUT:    Chest Tube: 30 mL    Chest Tube: 40 mL    Indwelling Catheter - Urethral: 75 mL  Total OUT: 145 mL    Total NET: 145 mL        Daily     Daily     LABS:                        11.7   8.84  )-----------( 257      ( 18 Aug 2018 04:00 )             35.4     08-18    138  |  103  |  9   ----------------------------<  120<H>  4.0   |  24  |  0.86    Ca    8.0<L>      18 Aug 2018 04:00  Phos  3.0     08-18  Mg     1.7     08-18    TPro  5.5<L>  /  Alb  3.0<L>  /  TBili  0.8  /  DBili  x   /  AST  41<H>  /  ALT  20  /  AlkPhos  67  08-18    PT/INR - ( 18 Aug 2018 04:00 )   PT: 13.1 SEC;   INR: 1.14          PTT - ( 18 Aug 2018 04:00 )  PTT:26.8 SEC      RADIOLOGY & ADDITIONAL STUDIES:

## 2018-08-19 LAB
APTT BLD: 26.3 SEC — LOW (ref 27.5–37.4)
BUN SERPL-MCNC: 11 MG/DL — SIGNIFICANT CHANGE UP (ref 7–23)
CA-I BLD-SCNC: 1.07 MMOL/L — SIGNIFICANT CHANGE UP (ref 1.03–1.23)
CALCIUM SERPL-MCNC: 8.3 MG/DL — LOW (ref 8.4–10.5)
CHLORIDE SERPL-SCNC: 106 MMOL/L — SIGNIFICANT CHANGE UP (ref 98–107)
CO2 SERPL-SCNC: 25 MMOL/L — SIGNIFICANT CHANGE UP (ref 22–31)
CREAT SERPL-MCNC: 0.88 MG/DL — SIGNIFICANT CHANGE UP (ref 0.5–1.3)
GLUCOSE SERPL-MCNC: 130 MG/DL — HIGH (ref 70–99)
HCT VFR BLD CALC: 33.4 % — LOW (ref 39–50)
HGB BLD-MCNC: 10.8 G/DL — LOW (ref 13–17)
INR BLD: 1.19 — HIGH (ref 0.88–1.17)
MAGNESIUM SERPL-MCNC: 2.3 MG/DL — SIGNIFICANT CHANGE UP (ref 1.6–2.6)
MCHC RBC-ENTMCNC: 29.9 PG — SIGNIFICANT CHANGE UP (ref 27–34)
MCHC RBC-ENTMCNC: 32.3 % — SIGNIFICANT CHANGE UP (ref 32–36)
MCV RBC AUTO: 92.5 FL — SIGNIFICANT CHANGE UP (ref 80–100)
NRBC # FLD: 0 — SIGNIFICANT CHANGE UP
PHOSPHATE SERPL-MCNC: 1.9 MG/DL — LOW (ref 2.5–4.5)
PLATELET # BLD AUTO: 227 K/UL — SIGNIFICANT CHANGE UP (ref 150–400)
PMV BLD: 9.5 FL — SIGNIFICANT CHANGE UP (ref 7–13)
POTASSIUM SERPL-MCNC: 4 MMOL/L — SIGNIFICANT CHANGE UP (ref 3.5–5.3)
POTASSIUM SERPL-SCNC: 4 MMOL/L — SIGNIFICANT CHANGE UP (ref 3.5–5.3)
PROTHROM AB SERPL-ACNC: 13.3 SEC — HIGH (ref 9.8–13.1)
RBC # BLD: 3.61 M/UL — LOW (ref 4.2–5.8)
RBC # FLD: 12.8 % — SIGNIFICANT CHANGE UP (ref 10.3–14.5)
SODIUM SERPL-SCNC: 140 MMOL/L — SIGNIFICANT CHANGE UP (ref 135–145)
WBC # BLD: 11.43 K/UL — HIGH (ref 3.8–10.5)
WBC # FLD AUTO: 11.43 K/UL — HIGH (ref 3.8–10.5)

## 2018-08-19 PROCEDURE — 71045 X-RAY EXAM CHEST 1 VIEW: CPT | Mod: 26

## 2018-08-19 PROCEDURE — 99233 SBSQ HOSP IP/OBS HIGH 50: CPT

## 2018-08-19 RX ORDER — SODIUM CHLORIDE 9 MG/ML
250 INJECTION, SOLUTION INTRAVENOUS ONCE
Qty: 0 | Refills: 0 | Status: COMPLETED | OUTPATIENT
Start: 2018-08-19 | End: 2018-08-19

## 2018-08-19 RX ADMIN — HEPARIN SODIUM 5000 UNIT(S): 5000 INJECTION INTRAVENOUS; SUBCUTANEOUS at 06:00

## 2018-08-19 RX ADMIN — HEPARIN SODIUM 5000 UNIT(S): 5000 INJECTION INTRAVENOUS; SUBCUTANEOUS at 19:00

## 2018-08-19 RX ADMIN — SODIUM CHLORIDE 750 MILLILITER(S): 9 INJECTION, SOLUTION INTRAVENOUS at 18:58

## 2018-08-19 RX ADMIN — SODIUM CHLORIDE 125 MILLILITER(S): 9 INJECTION, SOLUTION INTRAVENOUS at 18:53

## 2018-08-19 NOTE — PROGRESS NOTE ADULT - SUBJECTIVE AND OBJECTIVE BOX
Team D Surgery Progress Note     SUBJECTIVE / 24H EVENTS  Patient seen and examined on morning rounds. No acute events overnight. Pain well controlled. No nausea / vomiting.     OBJECTIVE:    VITAL SIGNS:  T(C): 37.2 (08-19-18 @ 00:00), Max: 37.2 (08-19-18 @ 00:00)  HR: 100 (08-19-18 @ 02:00) (99 - 128)  BP: 104/55 (08-19-18 @ 02:00) (90/62 - 126/87)  RR: 22 (08-19-18 @ 02:00) (20 - 28)  SpO2: 96% (08-19-18 @ 02:00) (92% - 97%)    PHYSICAL EXAM:  Gen: NAD  LS: Respirations unlabored.   GI: Soft. Appropriately tender    08-18-18 @ 07:01  -  08-19-18 @ 07:00  --------------------------------------------------------  IN:    Enteral Tube Flush: 100 mL    Enteral Tube Flush: 100 mL    lactated ringers.: 2375 mL    ns in tub fed  rdigqo50: 110 mL  Total IN: 2685 mL    OUT:    Chest Tube: 50 mL    Chest Tube: 50 mL    Indwelling Catheter - Urethral: 790 mL  Total OUT: 890 mL    Total NET: 1795 mL      LAB VALUES:  08-19    140  |  106  |  11  ----------------------------<  130<H>  4.0   |  25  |  0.88    Ca    8.3<L>      19 Aug 2018 03:25  Phos  1.9     08-19  Mg     2.3     08-19    TPro  5.5<L>  /  Alb  3.0<L>  /  TBili  0.8  /  DBili  x   /  AST  41<H>  /  ALT  20  /  AlkPhos  67  08-18                               10.8   11.43 )-----------( 227      ( 19 Aug 2018 03:25 )             33.4     LIVER FUNCTIONS - ( 18 Aug 2018 04:00 )  Alb: 3.0 g/dL / Pro: 5.5 g/dL / ALK PHOS: 67 u/L / ALT: 20 u/L / AST: 41 u/L / GGT: x           PT/INR - ( 19 Aug 2018 03:25 )   PT: 13.3 SEC;   INR: 1.19          PTT - ( 19 Aug 2018 03:25 )  PTT:26.3 SEC  ABG - ( 17 Aug 2018 17:00 )  pH, Arterial: 7.33  pH, Blood: x     /  pCO2: 45    /  pO2: 80    / HCO3: 23    / Base Excess: -1.6  /  SaO2: 96.2          MICROBIOLOGY:    No new microbiology data for review.     RADIOLOGY:        MEDICATIONS  (STANDING):  heparin  Injectable 5000 Unit(s) SubCutaneous every 12 hours  HYDROmorphone (10 MICROgram(s)/mL) + BUpivacaine 0.0625% in 0.9% Sodium Chloride PCEA 250 milliLiter(s) Epidural PCA Continuous  lactated ringers. 1000 milliLiter(s) (125 mL/Hr) IV Continuous <Continuous>  phenylephrine    Infusion 0.2 MICROgram(s)/kG/Min (6.803 mL/Hr) IV Continuous <Continuous>    MEDICATIONS  (PRN):  acetaminophen  IVPB. 1000 milliGRAM(s) IV Intermittent once PRN Moderate Pain (4 - 6)  HYDROmorphone (10 MICROgram(s)/mL) + BUpivacaine 0.0625% in 0.9% Sodium Chloride PCEA Rescue Clinician  Bolus 5 milliLiter(s) Epidural every 15 minutes PRN for Pain Score greater than 6  naloxone Injectable 0.1 milliGRAM(s) IV Push every 3 minutes PRN For ANY of the following changes in patient status:  A. RR LESS THAN 10 breaths per minute, B. Oxygen saturation LESS THAN 90%, C. Sedation score of 6  ondansetron Injectable 4 milliGRAM(s) IV Push every 6 hours PRN Nausea Team D Surgery Progress Note     SUBJECTIVE / 24H EVENTS  Patient seen and examined on morning rounds. No acute events overnight. Pain well controlled. No nausea / vomiting.     OBJECTIVE:    VITAL SIGNS:  T(C): 37.2 (08-19-18 @ 00:00), Max: 37.2 (08-19-18 @ 00:00)  HR: 100 (08-19-18 @ 02:00) (99 - 128)  BP: 104/55 (08-19-18 @ 02:00) (90/62 - 126/87)  RR: 22 (08-19-18 @ 02:00) (20 - 28)  SpO2: 96% (08-19-18 @ 02:00) (92% - 97%)    PHYSICAL EXAM:  Gen: NAD  LS: Respirations unlabored.   GI: Soft. Appropriately tender    08-18-18 @ 07:01  -  08-19-18 @ 07:00  --------------------------------------------------------  IN:    Enteral Tube Flush: 100 mL    Enteral Tube Flush: 100 mL    lactated ringers.: 2375 mL    ns in tub fed  [id number]: 110 mL  Total IN: 2685 mL    OUT:    Chest Tube: 50 mL    Chest Tube: 50 mL    Indwelling Catheter - Urethral: 790 mL  Total OUT: 890 mL    Total NET: 1795 mL      LAB VALUES:  08-19    140  |  106  |  11  ----------------------------<  130<H>  4.0   |  25  |  0.88    Ca    8.3<L>      19 Aug 2018 03:25  Phos  1.9     08-19  Mg     2.3     08-19    TPro  5.5<L>  /  Alb  3.0<L>  /  TBili  0.8  /  DBili  x   /  AST  41<H>  /  ALT  20  /  AlkPhos  67  08-18                               10.8   11.43 )-----------( 227      ( 19 Aug 2018 03:25 )             33.4     LIVER FUNCTIONS - ( 18 Aug 2018 04:00 )  Alb: 3.0 g/dL / Pro: 5.5 g/dL / ALK PHOS: 67 u/L / ALT: 20 u/L / AST: 41 u/L / GGT: x           PT/INR - ( 19 Aug 2018 03:25 )   PT: 13.3 SEC;   INR: 1.19          PTT - ( 19 Aug 2018 03:25 )  PTT:26.3 SEC  ABG - ( 17 Aug 2018 17:00 )  pH, Arterial: 7.33  pH, Blood: x     /  pCO2: 45    /  pO2: 80    / HCO3: 23    / Base Excess: -1.6  /  SaO2: 96.2        MICROBIOLOGY:    No new microbiology data for review.     RADIOLOGY:    < from: Xray Abdomen 1 View PORTABLE -Urgent (08.18.18 @ 12:50) >  IMPRESSION:  Contrast is seen within the small bowel. No definite   extravasation.Foci of contrast adjacent to syringe is indeterminate but   likely external to patient.     Left lower quadrant ribbon like opacity is indeterminate and may be   external to the patient.     Repeat radiograph for complete evaluation.    < end of copied text >      MEDICATIONS  (STANDING):  heparin  Injectable 5000 Unit(s) SubCutaneous every 12 hours  HYDROmorphone (10 MICROgram(s)/mL) + BUpivacaine 0.0625% in 0.9% Sodium Chloride PCEA 250 milliLiter(s) Epidural PCA Continuous  lactated ringers. 1000 milliLiter(s) (125 mL/Hr) IV Continuous <Continuous>  phenylephrine    Infusion 0.2 MICROgram(s)/kG/Min (6.803 mL/Hr) IV Continuous <Continuous>    MEDICATIONS  (PRN):  acetaminophen  IVPB. 1000 milliGRAM(s) IV Intermittent once PRN Moderate Pain (4 - 6)  HYDROmorphone (10 MICROgram(s)/mL) + BUpivacaine 0.0625% in 0.9% Sodium Chloride PCEA Rescue Clinician  Bolus 5 milliLiter(s) Epidural every 15 minutes PRN for Pain Score greater than 6  naloxone Injectable 0.1 milliGRAM(s) IV Push every 3 minutes PRN For ANY of the following changes in patient status:  A. RR LESS THAN 10 breaths per minute, B. Oxygen saturation LESS THAN 90%, C. Sedation score of 6  ondansetron Injectable 4 milliGRAM(s) IV Push every 6 hours PRN Nausea

## 2018-08-19 NOTE — PROGRESS NOTE ADULT - SUBJECTIVE AND OBJECTIVE BOX
Anesthesia Pain Management Service: Day 2__ of Epidural    SUBJECTIVE: Patient doing well with PCEA and no problems.  Pain Scale Score:   Refer to charted pain scores    THERAPY:  [x ] Epidural Bupivacaine 0.0625% and Hydromorphone  		[ ] 10 micrograms/mL	[ ] 5 micrograms/mL  [ ] Epidural Bupivacaine 0.0625% and Fentanyl - 2 micrograms/mL  [ ] Epidural Ropivacaine 0.1% plain – 1 mg/mL  [ ] Patient Controlled Regional Anesthesia (PCRA) Ropivacaine  		[ ] 0.2%			[ ] 0.1%    Demand dose __3 lockout __15_ (minutes) Continuous Rate 0___ Total: _98__ Daily      MEDICATIONS  (STANDING):  heparin  Injectable 5000 Unit(s) SubCutaneous every 12 hours  HYDROmorphone (10 MICROgram(s)/mL) + BUpivacaine 0.0625% in 0.9% Sodium Chloride PCEA 250 milliLiter(s) Epidural PCA Continuous  lactated ringers. 1000 milliLiter(s) (125 mL/Hr) IV Continuous <Continuous>  phenylephrine    Infusion 0.2 MICROgram(s)/kG/Min (6.803 mL/Hr) IV Continuous <Continuous>    MEDICATIONS  (PRN):  acetaminophen  IVPB. 1000 milliGRAM(s) IV Intermittent once PRN Moderate Pain (4 - 6)  HYDROmorphone (10 MICROgram(s)/mL) + BUpivacaine 0.0625% in 0.9% Sodium Chloride PCEA Rescue Clinician  Bolus 5 milliLiter(s) Epidural every 15 minutes PRN for Pain Score greater than 6  naloxone Injectable 0.1 milliGRAM(s) IV Push every 3 minutes PRN For ANY of the following changes in patient status:  A. RR LESS THAN 10 breaths per minute, B. Oxygen saturation LESS THAN 90%, C. Sedation score of 6  ondansetron Injectable 4 milliGRAM(s) IV Push every 6 hours PRN Nausea      OBJECTIVE:    Assessment of Catheter Site:	[ ] Left	[ ] Right  [x ] Epidural 	[ ] Femoral	      [ ] Saphenous   [ ] Supraclavicular   [ ] Other:    [x ] Dressing intact	[x ] Site non-tender	[ x] Site without erythema, discharge, edema  [x ] Epidural tubing and connection checked	[x] Gross neurological exam within normal limits  [ ] Catheter removed – tip intact		[x ] Afebrile	[ ] Febrile: ___    PT/INR - ( 19 Aug 2018 03:25 )   PT: 13.3 SEC;   INR: 1.19          PTT - ( 19 Aug 2018 03:25 )  PTT:26.3 SEC                      10.8   11.43 )-----------( 227      ( 19 Aug 2018 03:25 )             33.4     Vital Signs Last 24 Hrs  T(C): 37.2 (08-19-18 @ 00:00), Max: 37.2 (08-19-18 @ 00:00)  T(F): 98.9 (08-19-18 @ 00:00), Max: 98.9 (08-19-18 @ 00:00)  HR: 100 (08-19-18 @ 02:00) (99 - 128)  BP: 104/55 (08-19-18 @ 02:00) (90/62 - 126/87)  BP(mean): 64 (08-19-18 @ 02:00) (60 - 94)  RR: 22 (08-19-18 @ 02:00) (20 - 28)  SpO2: 96% (08-19-18 @ 02:00) (92% - 97%)      Sedation Score:	[x ] Alert	[ ] Drowsy	[ ] Arousable	[ ] Asleep	[ ] Unresponsive    Side Effects:	[x ] None	[ ] Nausea	[ ] Vomiting	[ ] Pruritus  		[ ] Weakness		[ ] Numbness	[ ] Other:    ASSESSMENT/ PLAN:    Therapy to  be:	[x ] Continue   [ ] Discontinued   [ ] Change to prn Analgesics    Documentation and Verification of current medications:  [ X ] Done	[ ] Not done, not eligible, reason:    Comments: Doing OK with epidural and may continue.

## 2018-08-19 NOTE — PROGRESS NOTE ADULT - SUBJECTIVE AND OBJECTIVE BOX
63 smoker M with hx of esophagus CA s/p chemo and radiation on  5/18 presents to [facility] for scheduled robotic assisted MIRZA Raghu esophagogastrectomy and cholecystectomy on 8/17/18.     [id number]:   Mirza Raghu - Ex lap, Umbilical hernia repair, Pyloroplasty, Jejunostomy feeding tube placement     Past Medical History:  Malignant neoplasm of esophagus, unspecified.     Past Surgical History:  History of bilateral inguinal hernia repair.         Allergies:  	No Known Allergies:     Home Medications:   * Patient Currently Takes Medications as of 08-Aug-2018 13:59 documented in Structured Notes  · 	Percocet 5/325 oral tablet: Last Dose Taken:  , 1 tab(s) orally every 6 hours, As Needed  · 	medical marijuana: Last Dose Taken:  , inhaler(s) inhaled 2 times a day,     MEDICATIONS  (STANDING):  heparin  Injectable 5000 Unit(s) SubCutaneous every 12 hours  HYDROmorphone (10 MICROgram(s)/mL) + BUpivacaine 0.0625% in 0.9% Sodium Chloride PCEA 250 milliLiter(s) Epidural PCA Continuous  lactated ringers. 1000 milliLiter(s) (125 mL/Hr) IV Continuous <Continuous>  phenylephrine    Infusion 0.2 MICROgram(s)/kG/Min (6.803 mL/Hr) IV Continuous <Continuous>    MEDICATIONS  (PRN):  acetaminophen  IVPB. 1000 milliGRAM(s) IV Intermittent once PRN Moderate Pain (4 - 6)  HYDROmorphone (10 MICROgram(s)/mL) + BUpivacaine 0.0625% in 0.9% Sodium Chloride PCEA Rescue Clinician  Bolus 5 milliLiter(s) Epidural every 15 minutes PRN for Pain Score greater than 6  naloxone Injectable 0.1 milliGRAM(s) IV Push every 3 minutes PRN For ANY of the following changes in patient status:  A. RR LESS THAN 10 breaths per minute, B. Oxygen saturation LESS THAN 90%, C. Sedation score of 6  ondansetron Injectable 4 milliGRAM(s) IV Push every 6 hours PRN Nausea      ICU Vital Signs Last 24 Hrs  T(C): 37.2 (19 Aug 2018 00:00), Max: 37.2 (19 Aug 2018 00:00)  T(F): 98.9 (19 Aug 2018 00:00), Max: 98.9 (19 Aug 2018 00:00)  HR: 100 (19 Aug 2018 02:00) (99 - 128)  BP: 104/55 (19 Aug 2018 02:00) (90/62 - 126/87)  BP(mean): 64 (19 Aug 2018 02:00) (60 - 94)  RR: 22 (19 Aug 2018 02:00) (20 - 28)  SpO2: 96% (19 Aug 2018 02:00) (92% - 97%)      Physical exam:                                                   Gen:                  WD WN in NAD   Neuro:              Alert & Oriented X 3, no focal deficits                          Cardiovascular:   S1 & S2, regular                           Respiratory:        Good B/L Air entry is fair and decreased on both sides R>L, has bilateral conducted sounds / rhonchi /rales                          GI:                      Hypoactive bowel sounds, Soft, nondistended and nontender,                           Ext:                     No cyanosis or edema,       I&O's Summary    18 Aug 2018 07:01  -  19 Aug 2018 07:00  --------------------------------------------------------  IN: 2685 mL / OUT: 890 mL / NET: 1795 mL        Labs:                                                                           10.8   11.43 )-----------( 227      ( 19 Aug 2018 03:25 )             33.4                            08-19    140  |  106  |  11  ----------------------------<  130<H>  4.0   |  25  |  0.88    Ca    8.3<L>      19 Aug 2018 03:25  Phos  1.9     08-19  Mg     2.3     08-19    TPro  5.5<L>  /  Alb  3.0<L>  /  TBili  0.8  /  DBili  x   /  AST  41<H>  /  ALT  20  /  AlkPhos  67  08-18    LIVER FUNCTIONS - ( 18 Aug 2018 04:00 )  Alb: 3.0 g/dL / Pro: 5.5 g/dL / ALK PHOS: 67 u/L / ALT: 20 u/L / AST: 41 u/L / GGT: x                                PT/INR - ( 19 Aug 2018 03:25 )   PT: 13.3 SEC;   INR: 1.19     PTT - ( 19 Aug 2018 03:25 )  PTT:26.3 SEC          ABG - ( 17 Aug 2018 17:00 )  pH, Arterial: 7.33  pH, Blood: x     /  pCO2: 45    /  pO2: 80    / HCO3: 23    / Base Excess: -1.6  /  SaO2: 96.2        CXR  [id number]  INTERPRETATION:   Patient is status post cholecystectomy. Surgical staples are seen   overlying the mid abdomen.  Contrast is seen within the small bowel.  Nondilated air-filled loops of bowel are visualized. Nonobstructive bowel   gas pattern. Adjacent ribbon like opacity. Visualized osseous structures   demonstrate no acute abnormality. Degenerative changes of the spine.  Left-sided pleural effusion. Otherwise, visualized lung bases are clear.  IMPRESSION:  Contrast is seen within the small bowel. No definite   extravasation. Foci of contrast adjacent to syringe is indeterminate but   likely external to patient.   Left lower quadrant ribbon like opacity is indeterminate and may be   external to the patient.   Repeat radiograph for complete evaluation.    Plan:  63 smoker M with hx of esophagus CA s/p chemo and radiation on  5/18 presents to [facility] for scheduled robotic assisted MIRZA Raghu esophagogastrectomy and cholecystectomy on 8/17/18.   [id number]:   [place] Raghu - Ex lap, Umbilical hernia repair, Pyloroplasty, Jejunostomy feeding tube placement                            Neuro:                                         Pain control                            Cardiovascular:                                          Continue hemodynamic monitoring.                                         Not on any pressors                                         Continue cardiovascular / antihypertensive medications                            Respiratory:                                         Pt is on  nasal canula                                          Comfortable, not in any distress.                                         Use incentive spirometry ASAP                                         Monitor chest tube output                                         Chest tube to suction	                                         Continue bronchodilators, pulmonary toilet                            GI                                         Continue GI prophylaxis with Protonix                                         Continue Zofran / Reglan for nausea - PRN	                                         NPO     X-ray study to confirm J-tube placement and begin tube feeding if j-tube in place                                   Renal:                                         Continue IVF                                         Monitor I/Os and electrolytes                                                 Hematologic / Oncology:                                         SQH & SCDs for VTE prophylaxis                                         Monitor chest tube output. No signs of active bleeding.                                          Follow CBC in AM                           Infectious disease:                                          No signs of infection. Monitor for fever / leukocytosis.                                          All surgical incision / chest tube  sites look clean                            Endocrine:                                           Continue Finger stick glucose checks with coverage    All available pertinent clinical, laboratory, radiographic, hemodynamic, echocardiographic, respiratory data, microbiologic data and chart were reviewed and analyzed frequently. GI and DVT prophylaxis, glycemic control, head of bed elevation and skin care issues were addressed.  Patient seen, examined and plan discussed with CT Surgery / CTICU team during rounds.    Antione Rosenbaum MD

## 2018-08-20 LAB
APTT BLD: 30.2 SEC — SIGNIFICANT CHANGE UP (ref 27.5–37.4)
BUN SERPL-MCNC: 10 MG/DL — SIGNIFICANT CHANGE UP (ref 7–23)
CA-I BLD-SCNC: 1.07 MMOL/L — SIGNIFICANT CHANGE UP (ref 1.03–1.23)
CALCIUM SERPL-MCNC: 9.2 MG/DL — SIGNIFICANT CHANGE UP (ref 8.4–10.5)
CHLORIDE SERPL-SCNC: 104 MMOL/L — SIGNIFICANT CHANGE UP (ref 98–107)
CO2 SERPL-SCNC: 25 MMOL/L — SIGNIFICANT CHANGE UP (ref 22–31)
CREAT SERPL-MCNC: 0.72 MG/DL — SIGNIFICANT CHANGE UP (ref 0.5–1.3)
GLUCOSE SERPL-MCNC: 115 MG/DL — HIGH (ref 70–99)
HCT VFR BLD CALC: 45.1 % — SIGNIFICANT CHANGE UP (ref 39–50)
HGB BLD-MCNC: 14.6 G/DL — SIGNIFICANT CHANGE UP (ref 13–17)
INR BLD: 0.97 — SIGNIFICANT CHANGE UP (ref 0.88–1.17)
MAGNESIUM SERPL-MCNC: 2.3 MG/DL — SIGNIFICANT CHANGE UP (ref 1.6–2.6)
MCHC RBC-ENTMCNC: 29.9 PG — SIGNIFICANT CHANGE UP (ref 27–34)
MCHC RBC-ENTMCNC: 32.4 % — SIGNIFICANT CHANGE UP (ref 32–36)
MCV RBC AUTO: 92.2 FL — SIGNIFICANT CHANGE UP (ref 80–100)
NRBC # FLD: 0 — SIGNIFICANT CHANGE UP
PHOSPHATE SERPL-MCNC: 1.8 MG/DL — LOW (ref 2.5–4.5)
PLATELET # BLD AUTO: 263 K/UL — SIGNIFICANT CHANGE UP (ref 150–400)
PMV BLD: 9.5 FL — SIGNIFICANT CHANGE UP (ref 7–13)
POTASSIUM SERPL-MCNC: 4 MMOL/L — SIGNIFICANT CHANGE UP (ref 3.5–5.3)
POTASSIUM SERPL-SCNC: 4 MMOL/L — SIGNIFICANT CHANGE UP (ref 3.5–5.3)
PROTHROM AB SERPL-ACNC: 11.2 SEC — SIGNIFICANT CHANGE UP (ref 9.8–13.1)
RBC # BLD: 4.89 M/UL — SIGNIFICANT CHANGE UP (ref 4.2–5.8)
RBC # FLD: 12.7 % — SIGNIFICANT CHANGE UP (ref 10.3–14.5)
SODIUM SERPL-SCNC: 143 MMOL/L — SIGNIFICANT CHANGE UP (ref 135–145)
WBC # BLD: 10.54 K/UL — HIGH (ref 3.8–10.5)
WBC # FLD AUTO: 10.54 K/UL — HIGH (ref 3.8–10.5)

## 2018-08-20 PROCEDURE — 71045 X-RAY EXAM CHEST 1 VIEW: CPT | Mod: 26

## 2018-08-20 PROCEDURE — 99233 SBSQ HOSP IP/OBS HIGH 50: CPT

## 2018-08-20 RX ORDER — HYDROMORPHONE HYDROCHLORIDE 2 MG/ML
30 INJECTION INTRAMUSCULAR; INTRAVENOUS; SUBCUTANEOUS
Qty: 0 | Refills: 0 | Status: DISCONTINUED | OUTPATIENT
Start: 2018-08-20 | End: 2018-08-24

## 2018-08-20 RX ORDER — FUROSEMIDE 40 MG
10 TABLET ORAL ONCE
Qty: 0 | Refills: 0 | Status: COMPLETED | OUTPATIENT
Start: 2018-08-20 | End: 2018-08-20

## 2018-08-20 RX ORDER — NALOXONE HYDROCHLORIDE 4 MG/.1ML
0.1 SPRAY NASAL
Qty: 0 | Refills: 0 | Status: DISCONTINUED | OUTPATIENT
Start: 2018-08-20 | End: 2018-08-27

## 2018-08-20 RX ORDER — POTASSIUM PHOSPHATE, MONOBASIC POTASSIUM PHOSPHATE, DIBASIC 236; 224 MG/ML; MG/ML
15 INJECTION, SOLUTION INTRAVENOUS ONCE
Qty: 0 | Refills: 0 | Status: COMPLETED | OUTPATIENT
Start: 2018-08-20 | End: 2018-08-20

## 2018-08-20 RX ORDER — IPRATROPIUM/ALBUTEROL SULFATE 18-103MCG
3 AEROSOL WITH ADAPTER (GRAM) INHALATION EVERY 6 HOURS
Qty: 0 | Refills: 0 | Status: DISCONTINUED | OUTPATIENT
Start: 2018-08-20 | End: 2018-08-21

## 2018-08-20 RX ORDER — ONDANSETRON 8 MG/1
4 TABLET, FILM COATED ORAL EVERY 6 HOURS
Qty: 0 | Refills: 0 | Status: DISCONTINUED | OUTPATIENT
Start: 2018-08-20 | End: 2018-08-27

## 2018-08-20 RX ORDER — DEXTROSE MONOHYDRATE, SODIUM CHLORIDE, AND POTASSIUM CHLORIDE 50; .745; 4.5 G/1000ML; G/1000ML; G/1000ML
1000 INJECTION, SOLUTION INTRAVENOUS
Qty: 0 | Refills: 0 | Status: DISCONTINUED | OUTPATIENT
Start: 2018-08-20 | End: 2018-08-24

## 2018-08-20 RX ORDER — HYDROMORPHONE HYDROCHLORIDE 2 MG/ML
0.5 INJECTION INTRAMUSCULAR; INTRAVENOUS; SUBCUTANEOUS ONCE
Qty: 0 | Refills: 0 | Status: DISCONTINUED | OUTPATIENT
Start: 2018-08-20 | End: 2018-08-20

## 2018-08-20 RX ORDER — SODIUM CHLORIDE 9 MG/ML
250 INJECTION INTRAMUSCULAR; INTRAVENOUS; SUBCUTANEOUS ONCE
Qty: 0 | Refills: 0 | Status: COMPLETED | OUTPATIENT
Start: 2018-08-20 | End: 2018-08-20

## 2018-08-20 RX ADMIN — DEXTROSE MONOHYDRATE, SODIUM CHLORIDE, AND POTASSIUM CHLORIDE 80 MILLILITER(S): 50; .745; 4.5 INJECTION, SOLUTION INTRAVENOUS at 10:00

## 2018-08-20 RX ADMIN — HYDROMORPHONE HYDROCHLORIDE 0.5 MILLIGRAM(S): 2 INJECTION INTRAMUSCULAR; INTRAVENOUS; SUBCUTANEOUS at 09:30

## 2018-08-20 RX ADMIN — HYDROMORPHONE HYDROCHLORIDE 0.5 MILLIGRAM(S): 2 INJECTION INTRAMUSCULAR; INTRAVENOUS; SUBCUTANEOUS at 09:45

## 2018-08-20 RX ADMIN — Medication 10 MILLIGRAM(S): at 13:45

## 2018-08-20 RX ADMIN — HYDROMORPHONE HYDROCHLORIDE 30 MILLILITER(S): 2 INJECTION INTRAMUSCULAR; INTRAVENOUS; SUBCUTANEOUS at 19:38

## 2018-08-20 RX ADMIN — HEPARIN SODIUM 5000 UNIT(S): 5000 INJECTION INTRAVENOUS; SUBCUTANEOUS at 19:04

## 2018-08-20 RX ADMIN — SODIUM CHLORIDE 500 MILLILITER(S): 9 INJECTION INTRAMUSCULAR; INTRAVENOUS; SUBCUTANEOUS at 20:45

## 2018-08-20 RX ADMIN — HYDROMORPHONE HYDROCHLORIDE 30 MILLILITER(S): 2 INJECTION INTRAMUSCULAR; INTRAVENOUS; SUBCUTANEOUS at 16:44

## 2018-08-20 RX ADMIN — HEPARIN SODIUM 5000 UNIT(S): 5000 INJECTION INTRAVENOUS; SUBCUTANEOUS at 05:15

## 2018-08-20 RX ADMIN — POTASSIUM PHOSPHATE, MONOBASIC POTASSIUM PHOSPHATE, DIBASIC 62.5 MILLIMOLE(S): 236; 224 INJECTION, SOLUTION INTRAVENOUS at 09:30

## 2018-08-20 NOTE — DIETITIAN INITIAL EVALUATION ADULT. - OTHER INFO
Nutrition assessment initiated for critical care LOS. Pt. alert, oriented, s/p EGD, [place] Raghu esophagectomy, Umbilical hernia repair, Exploratory Laparotomy, Pyloroplasty, Feeding jejunostomy placement . Pt. reports  loosing 56 # and gaining it prior to admission.

## 2018-08-20 NOTE — PROGRESS NOTE ADULT - SUBJECTIVE AND OBJECTIVE BOX
ANGIE ZAMORA            [id number]         No Known Allergies                 HPI:  62 y/o male smoker with hx of esophagus CA s/p chemo and radiation on  5/18 presents to [facility] for scheduled robotic assisted JOANNA Raghu esophagogastrectomy and cholecystectomy on 8/17/18. (08 Aug 2018 13:51)      Procedure: Nyack Raghu Esophagogastrectomy and cholecystectomy / J-tube   08/17/2018      Issues:   Esophageal Cancer  Postop pain                   PAST MEDICAL & SURGICAL HISTORY:  Malignant neoplasm of esophagus, unspecified  History of bilateral inguinal hernia repair        ICU Vital Signs Last 24 Hrs  T(C): 37.1 (20 Aug 2018 04:00), Max: 37.2 (19 Aug 2018 19:00)  T(F): 98.7 (20 Aug 2018 04:00), Max: 98.9 (19 Aug 2018 19:00)  HR: 102 (20 Aug 2018 07:00) (90 - 114)  BP: 124/63 (20 Aug 2018 06:00) (69/49 - 124/63)  BP(mean): 75 (20 Aug 2018 06:00) (54 - 78)  ABP: --  ABP(mean): --  RR: 21 (20 Aug 2018 07:00) (16 - 26)  SpO2: 97% (20 Aug 2018 07:00) (92% - 98%)    I&O's Detail    19 Aug 2018 07:01  -  20 Aug 2018 07:00  --------------------------------------------------------  IN:    Enteral Tube Flush: 120 mL    Enteral Tube Flush: 120 mL    Lactated Ringers IV Bolus: 250 mL    lactated ringers.: 2750 mL    ns in tub fed  birdhg20: 370 mL  Total IN: 3610 mL    OUT:    Chest Tube: 250 mL    Chest Tube: 80 mL    Nasoenteral Tube: 400 mL    Voided: 1125 mL  Total OUT: 1855 mL    Total NET: 1755 mL        CAPILLARY BLOOD GLUCOSE          Home Medications:  medical marijuana: inhaler(s) inhaled 2 times a day,  (17 Aug 2018 07:07)  Percocet 5/325 oral tablet: 1 tab(s) orally every 6 hours, As Needed (17 Aug 2018 07:07)      MEDICATIONS  (STANDING):  dextrose 5% + sodium chloride 0.45% with potassium chloride 20 mEq/L 1000 milliLiter(s) (80 mL/Hr) IV Continuous <Continuous>  heparin  Injectable 5000 Unit(s) SubCutaneous every 12 hours  HYDROmorphone  Injectable 0.5 milliGRAM(s) IV Push once  HYDROmorphone (10 MICROgram(s)/mL) + BUpivacaine 0.0625% in 0.9% Sodium Chloride PCEA 250 milliLiter(s) Epidural PCA Continuous  phenylephrine    Infusion 0.2 MICROgram(s)/kG/Min (6.803 mL/Hr) IV Continuous <Continuous>  potassium phosphate IVPB 15 milliMole(s) IV Intermittent once    MEDICATIONS  (PRN):  acetaminophen  IVPB. 1000 milliGRAM(s) IV Intermittent once PRN Moderate Pain (4 - 6)  HYDROmorphone (10 MICROgram(s)/mL) + BUpivacaine 0.0625% in 0.9% Sodium Chloride PCEA Rescue Clinician  Bolus 5 milliLiter(s) Epidural every 15 minutes PRN for Pain Score greater than 6  naloxone Injectable 0.1 milliGRAM(s) IV Push every 3 minutes PRN For ANY of the following changes in patient status:  A. RR LESS THAN 10 breaths per minute, B. Oxygen saturation LESS THAN 90%, C. Sedation score of 6  ondansetron Injectable 4 milliGRAM(s) IV Push every 6 hours PRN Nausea          Physical exam:                             General:               Pt is awake, alert, c/o pain, not in any distress                                                  Neuro:                  Nonfocal                             Cardiovascular:   S1 & S2, regular                           Respiratory:         Air entry is fair and equal on both sides, has bilateral conducted sounds                           GI:                          Soft, nondistended and nontender, Bowel sounds absent                            Ext:                        No cyanosis or edema     Labs:                                                                           14.6   10.54 )-----------( 263      ( 20 Aug 2018 04:30 )             45.1             08-20    143  |  104  |  10  ----------------------------<  115<H>  4.0   |  25  |  0.72    Ca    9.2      20 Aug 2018 04:30  Phos  1.8     08-20  Mg     2.3     08-20                    PT/INR - ( 20 Aug 2018 04:30 )   PT: 11.2 SEC;   INR: 0.97          PTT - ( 20 Aug 2018 04:30 )  PTT:30.2 SEC      CXR:    < from: Xray Chest 1 View- PORTABLE-Routine (08.20.18 @ 07:13) >  Heart size and the mediastinum cannot be accurately evaluated on this   projection. Line enteric tube unchanged in position. Right pleural tube   again noted tip obscured by the enteric tube. Previous right chest tube   with tip overlying the apex has been removed. No pneumothorax seen.  Trace right pleural effusion and right subcutaneous emphysema are not   significantly changed. Previous small right midlung opacity has resolved.  Slight increase in left basilar and retrocardiac opacity with poor   definition of left hemidiaphragm. Increased patchy left lower lung   opacity. Left upper lung is clear.  Epidural line seen.          Plan:    General: 63yMale s/p Nyack Raghu Esophagogastrectomy / J-tube on 08/17/2018  progressing well, OOB in chair, experiencing  pain with deep breathing.                             Neuro:                                         Pain control with  PCEA / IV Tylenol                            Cardiovascular:                                          Continue hemodynamic monitoring.                                        Continue IVF D5/.45NS with 20K @80cc/hr                                        Avoid vasopressor agents                            Respiratory:                                         Pt is on 2L  nasal canula,                                           Appears to be in pain but not in distress.                                         Using incentive spirometry                                          Monitor chest tube output                                         Chest tube to suction                                                                Continue bronchodilators, pulmonary toilet                            GI                                         NPO                                         Continue GI prophylaxis with Protonix                                         Continue Zofran / Reglan for nausea - PRN                                         NGT to low continuous wall suction                                            Flush both NGT and J-tube with 20cc of sterile water Q 4hrs.  	                                                                 Renal:                                         Continue  D5/.45NS with 20K @80cc/hr                                         Monitor I/Os and electrolytes                                         Cantu                                                  Hem/ Onc:                                                                                  Monitor chest tube output &  signs of bleeding.                                          Follow CBC in AM                           Infectious disease:                                            No signs of infection. Monitor for fever / leukocytosis.                                          All surgical incision / chest tube  sites look clean                            Endocrine                                             Continue Accu-Checks with coverage    Pt is on SQ Heparin and Venodyne boots for DVT prophylaxis.     Pertinent clinical, laboratory, radiographic, hemodynamic, echocardiographic, respiratory data, microbiologic data and chart were reviewed and analyzed frequently throughout the course of the day and night  Patient seen, examined and plan discussed with CT Surgeon Dr. Hebert / CTICU team during rounds.    Pt's status discussed with family at bedside, updated status                Ilia Morillo MD ANGIE ZAMORA            [id number]         No Known Allergies                 HPI:  62 y/o male smoker with hx of esophagus CA s/p chemo and radiation on  5/18 presents to [facility] for scheduled robotic assisted MIRZA Raghu esophagogastrectomy and cholecystectomy on 8/17/18. (08 Aug 2018 13:51)      Procedure: Hutchins Raghu Esophagogastrectomy and cholecystectomy / J-tube   08/17/2018      Issues:   Shortness of breath  Esophageal Cancer  Postop pain                   PAST MEDICAL & SURGICAL HISTORY:  Malignant neoplasm of esophagus, unspecified  History of bilateral inguinal hernia repair        ICU Vital Signs Last 24 Hrs  T(C): 37.1 (20 Aug 2018 04:00), Max: 37.2 (19 Aug 2018 19:00)  T(F): 98.7 (20 Aug 2018 04:00), Max: 98.9 (19 Aug 2018 19:00)  HR: 102 (20 Aug 2018 07:00) (90 - 114)  BP: 124/63 (20 Aug 2018 06:00) (69/49 - 124/63)  BP(mean): 75 (20 Aug 2018 06:00) (54 - 78)  ABP: --  ABP(mean): --  RR: 21 (20 Aug 2018 07:00) (16 - 26)  SpO2: 97% (20 Aug 2018 07:00) (92% - 98%)    I&O's Detail    19 Aug 2018 07:01  -  20 Aug 2018 07:00  --------------------------------------------------------  IN:    Enteral Tube Flush: 120 mL    Enteral Tube Flush: 120 mL    Lactated Ringers IV Bolus: 250 mL    lactated ringers.: 2750 mL    ns in tub fed  zmdand77: 370 mL  Total IN: 3610 mL    OUT:    Chest Tube: 250 mL    Chest Tube: 80 mL    Nasoenteral Tube: 400 mL    Voided: 1125 mL  Total OUT: 1855 mL    Total NET: 1755 mL        CAPILLARY BLOOD GLUCOSE          Home Medications:  medical marijuana: inhaler(s) inhaled 2 times a day,  (17 Aug 2018 07:07)  Percocet 5/325 oral tablet: 1 tab(s) orally every 6 hours, As Needed (17 Aug 2018 07:07)      MEDICATIONS  (STANDING):  dextrose 5% + sodium chloride 0.45% with potassium chloride 20 mEq/L 1000 milliLiter(s) (80 mL/Hr) IV Continuous <Continuous>  heparin  Injectable 5000 Unit(s) SubCutaneous every 12 hours  HYDROmorphone  Injectable 0.5 milliGRAM(s) IV Push once  HYDROmorphone (10 MICROgram(s)/mL) + BUpivacaine 0.0625% in 0.9% Sodium Chloride PCEA 250 milliLiter(s) Epidural PCA Continuous  phenylephrine    Infusion 0.2 MICROgram(s)/kG/Min (6.803 mL/Hr) IV Continuous <Continuous>  potassium phosphate IVPB 15 milliMole(s) IV Intermittent once    MEDICATIONS  (PRN):  acetaminophen  IVPB. 1000 milliGRAM(s) IV Intermittent once PRN Moderate Pain (4 - 6)  HYDROmorphone (10 MICROgram(s)/mL) + BUpivacaine 0.0625% in 0.9% Sodium Chloride PCEA Rescue Clinician  Bolus 5 milliLiter(s) Epidural every 15 minutes PRN for Pain Score greater than 6  naloxone Injectable 0.1 milliGRAM(s) IV Push every 3 minutes PRN For ANY of the following changes in patient status:  A. RR LESS THAN 10 breaths per minute, B. Oxygen saturation LESS THAN 90%, C. Sedation score of 6  ondansetron Injectable 4 milliGRAM(s) IV Push every 6 hours PRN Nausea          Physical exam:                             General:               Pt is awake, alert, c/o pain, not in any distress                                                  Neuro:                  Nonfocal                             Cardiovascular:   S1 & S2, regular                           Respiratory:         Air entry is fair and equal on both sides, has bilateral conducted sounds                           GI:                          Soft, nondistended and nontender, Bowel sounds absent                            Ext:                        No cyanosis or edema     Labs:                                                                           14.6   10.54 )-----------( 263      ( 20 Aug 2018 04:30 )             45.1             08-20    143  |  104  |  10  ----------------------------<  115<H>  4.0   |  25  |  0.72    Ca    9.2      20 Aug 2018 04:30  Phos  1.8     08-20  Mg     2.3     08-20                    PT/INR - ( 20 Aug 2018 04:30 )   PT: 11.2 SEC;   INR: 0.97          PTT - ( 20 Aug 2018 04:30 )  PTT:30.2 SEC      CXR:    < from: Xray Chest 1 View- PORTABLE-Routine (08.20.18 @ 07:13) >  Heart size and the mediastinum cannot be accurately evaluated on this   projection. Line enteric tube unchanged in position. Right pleural tube   again noted tip obscured by the enteric tube. Previous right chest tube   with tip overlying the apex has been removed. No pneumothorax seen.  Trace right pleural effusion and right subcutaneous emphysema are not   significantly changed. Previous small right midlung opacity has resolved.  Slight increase in left basilar and retrocardiac opacity with poor   definition of left hemidiaphragm. Increased patchy left lower lung   opacity. Left upper lung is clear.  Epidural line seen.          Plan:    General: 63yMale s/p Mirza Raghu Esophagogastrectomy / J-tube on 08/17/2018  progressing well, OOB in chair, experiencing  pain with deep breathing.                             Neuro:                                         Pain control with  PCEA / IV Tylenol                            Cardiovascular:                                          Continue hemodynamic monitoring.                                        Continue IVF D5/.45NS with 20K @80cc/hr                                        Avoid vasopressor agents                            Respiratory:                                         Pt is on 2L  nasal canula, c/o shortness of breath. Continue bronchodilators, pulmonary toilet & Lasix 10mg  x 1                                         Appears to be in pain but not in distress. PCEA got disconnected & d/cd by pain service. Currently on PCA                                         Using incentive spirometry                                          Monitor chest tube output                                         Chest tube to water seal                                                                                           GI                                         NPO                                         Continue GI prophylaxis with Protonix                                         Continue Zofran / Reglan for nausea - PRN                                         NGT to low continuous wall suction                                            Flush both NGT and J-tube with 20cc of sterile water Q 4hrs.  	                                                                 Renal:                                         Continue  D5/.45NS with 20K @80cc/hr                                         Monitor I/Os and electrolytes                                         Cantu                                                  Hem/ Onc:                                                                                  Monitor chest tube output &  signs of bleeding.                                          Follow CBC in AM                           Infectious disease:                                            No signs of infection. Monitor for fever / leukocytosis.                                          All surgical incision / chest tube  sites look clean                            Endocrine                                             Continue Accu-Checks with coverage    Pt is on SQ Heparin and Venodyne boots for DVT prophylaxis.     Pertinent clinical, laboratory, radiographic, hemodynamic, echocardiographic, respiratory data, microbiologic data and chart were reviewed and analyzed frequently throughout the course of the day and night  Patient seen, examined and plan discussed with CT Surgeon Dr. Hebert / CTICU team during rounds.    Pt's status discussed with family at bedside, updated status                Ilia Morillo MD

## 2018-08-20 NOTE — PROGRESS NOTE ADULT - SUBJECTIVE AND OBJECTIVE BOX
Anesthesia Pain Management Service: Day __ of Epidural    SUBJECTIVE:  Epidural disconnected overnight.  Patient reports relief with IV dilaudid when epidural disconnected.  Pain Scale Score:   Refer to charted pain scores    THERAPY:  [x ] Epidural Bupivacaine 0.0625% and Hydromorphone  		[X ] 10 micrograms/mL	[ ] 5 micrograms/mL  [ ] Epidural Bupivacaine 0.0625% and Fentanyl - 2 micrograms/mL  [ ] Epidural Ropivacaine 0.1% plain – 1 mg/mL  [ ] Patient Controlled Regional Anesthesia (PCRA) Ropivacaine  		[ ] 0.2%			[ ] 0.1%    Demand dose __3_ lockout __15_ (minutes) Continuous Rate ___ Total: __110 ml  Daily      MEDICATIONS  (STANDING):  dextrose 5% + sodium chloride 0.45% with potassium chloride 20 mEq/L 1000 milliLiter(s) (80 mL/Hr) IV Continuous <Continuous>  furosemide   Injectable 10 milliGRAM(s) IV Push once  heparin  Injectable 5000 Unit(s) SubCutaneous every 12 hours  HYDROmorphone PCA (1 mG/mL) 30 milliLiter(s) PCA Continuous PCA Continuous  phenylephrine    Infusion 0.2 MICROgram(s)/kG/Min (6.803 mL/Hr) IV Continuous <Continuous>    MEDICATIONS  (PRN):  acetaminophen  IVPB. 1000 milliGRAM(s) IV Intermittent once PRN Moderate Pain (4 - 6)  ALBUTerol/ipratropium for Nebulization 3 milliLiter(s) Nebulizer every 6 hours PRN Shortness of Breath and/or Wheezing  naloxone Injectable 0.1 milliGRAM(s) IV Push every 3 minutes PRN For ANY of the following changes in patient status:  A. RR LESS THAN 10 breaths per minute, B. Oxygen saturation LESS THAN 90%, C. Sedation score of 6  ondansetron Injectable 4 milliGRAM(s) IV Push every 6 hours PRN Nausea      OBJECTIVE:    Assessment of Catheter Site:	[ ] Left	[ ] Right  [x ] Epidural 	[ ] Femoral	      [ ] Saphenous   [ ] Supraclavicular   [ ] Other:    [x ] Dressing intact	[x ] Site non-tender	[ x] Site without erythema, discharge, edema  [x ] Epidural tubing and connection checked	[x] Gross neurological exam within normal limits  [X ] Catheter removed – tip intact		[ ] Afebrile	  [ ] Febrile: ___       [ X] see Temp under VS below)    PT/INR - ( 20 Aug 2018 04:30 )   PT: 11.2 SEC;   INR: 0.97          PTT - ( 20 Aug 2018 04:30 )  PTT:30.2 SEC                      14.6   10.54 )-----------( 263      ( 20 Aug 2018 04:30 )             45.1     Vital Signs Last 24 Hrs  T(C): 36.7 (08-20-18 @ 12:00), Max: 37.2 (08-19-18 @ 19:00)  T(F): 98 (08-20-18 @ 12:00), Max: 98.9 (08-19-18 @ 19:00)  HR: 116 (08-20-18 @ 13:00) (90 - 116)  BP: 144/81 (08-20-18 @ 13:00) (69/49 - 144/81)  BP(mean): 96 (08-20-18 @ 13:00) (54 - 96)  RR: 23 (08-20-18 @ 13:00) (16 - 26)  SpO2: 95% (08-20-18 @ 13:00) (92% - 98%)      Sedation Score:	[x ] Alert	[ ] Drowsy	[ ] Arousable	[ ] Asleep	[ ] Unresponsive    Side Effects:	[x ] None	[ ] Nausea	[ ] Vomiting	[ ] Pruritus  		[ ] Weakness		[ ] Numbness	[ ] Other:    ASSESSMENT/ PLAN:    Therapy to  be:	[ ] Continue   [ X] Discontinued   [ X] Change to prn Analgesics    Documentation and Verification of current medications:  [ X ] Done	[ ] Not done, not eligible, reason:    Comments: Epidural discontinued, removed with tip intact.  Start dilaudid PCA.     Progress Note written now but Patient was seen earlier. Anesthesia Pain Management Service: Day _3_ of Epidural    SUBJECTIVE:  Epidural disconnected overnight.  Patient reports relief with IV dilaudid when epidural disconnected.  Pain Scale Score:   Refer to charted pain scores    THERAPY:  [x ] Epidural Bupivacaine 0.0625% and Hydromorphone  		[X ] 10 micrograms/mL	[ ] 5 micrograms/mL  [ ] Epidural Bupivacaine 0.0625% and Fentanyl - 2 micrograms/mL  [ ] Epidural Ropivacaine 0.1% plain – 1 mg/mL  [ ] Patient Controlled Regional Anesthesia (PCRA) Ropivacaine  		[ ] 0.2%			[ ] 0.1%    Demand dose __3_ lockout __15_ (minutes) Continuous Rate _6__ Total: __110 ml  Daily      MEDICATIONS  (STANDING):  dextrose 5% + sodium chloride 0.45% with potassium chloride 20 mEq/L 1000 milliLiter(s) (80 mL/Hr) IV Continuous <Continuous>  furosemide   Injectable 10 milliGRAM(s) IV Push once  heparin  Injectable 5000 Unit(s) SubCutaneous every 12 hours  HYDROmorphone PCA (1 mG/mL) 30 milliLiter(s) PCA Continuous PCA Continuous  phenylephrine    Infusion 0.2 MICROgram(s)/kG/Min (6.803 mL/Hr) IV Continuous <Continuous>    MEDICATIONS  (PRN):  acetaminophen  IVPB. 1000 milliGRAM(s) IV Intermittent once PRN Moderate Pain (4 - 6)  ALBUTerol/ipratropium for Nebulization 3 milliLiter(s) Nebulizer every 6 hours PRN Shortness of Breath and/or Wheezing  naloxone Injectable 0.1 milliGRAM(s) IV Push every 3 minutes PRN For ANY of the following changes in patient status:  A. RR LESS THAN 10 breaths per minute, B. Oxygen saturation LESS THAN 90%, C. Sedation score of 6  ondansetron Injectable 4 milliGRAM(s) IV Push every 6 hours PRN Nausea      OBJECTIVE:    Assessment of Catheter Site:	[ ] Left	[ ] Right  [x ] Epidural 	[ ] Femoral	      [ ] Saphenous   [ ] Supraclavicular   [ ] Other:    [x ] Dressing intact	[x ] Site non-tender	[ x] Site without erythema, discharge, edema  [x ] Epidural tubing and connection checked	[x] Gross neurological exam within normal limits  [X ] Catheter removed – tip intact		[ ] Afebrile	  [ ] Febrile: ___       [ X] see Temp under VS below)    PT/INR - ( 20 Aug 2018 04:30 )   PT: 11.2 SEC;   INR: 0.97          PTT - ( 20 Aug 2018 04:30 )  PTT:30.2 SEC                      14.6   10.54 )-----------( 263      ( 20 Aug 2018 04:30 )             45.1     Vital Signs Last 24 Hrs  T(C): 36.7 (08-20-18 @ 12:00), Max: 37.2 (08-19-18 @ 19:00)  T(F): 98 (08-20-18 @ 12:00), Max: 98.9 (08-19-18 @ 19:00)  HR: 116 (08-20-18 @ 13:00) (90 - 116)  BP: 144/81 (08-20-18 @ 13:00) (69/49 - 144/81)  BP(mean): 96 (08-20-18 @ 13:00) (54 - 96)  RR: 23 (08-20-18 @ 13:00) (16 - 26)  SpO2: 95% (08-20-18 @ 13:00) (92% - 98%)      Sedation Score:	[x ] Alert	[ ] Drowsy	[ ] Arousable	[ ] Asleep	[ ] Unresponsive    Side Effects:	[x ] None	[ ] Nausea	[ ] Vomiting	[ ] Pruritus  		[ ] Weakness		[ ] Numbness	[ ] Other:    ASSESSMENT/ PLAN:    Therapy to  be:	[ ] Continue   [ X] Discontinued   [ X] Change to prn Analgesics    Documentation and Verification of current medications:  [ X ] Done	[ ] Not done, not eligible, reason:    Comments: Epidural discontinued, removed with tip intact.  Start dilaudid PCA.     Progress Note written now but Patient was seen earlier.

## 2018-08-20 NOTE — DIETITIAN INITIAL EVALUATION ADULT. - DIET TYPE
NPO with tube feedings/Jevity 1.2 @ 10 ml/hr increase 10ml every 24 hrs to the goal of 55 ml/hr 24hrs

## 2018-08-20 NOTE — PROGRESS NOTE ADULT - SUBJECTIVE AND OBJECTIVE BOX
Vital Signs Last 24 Hrs  T(C): 37.1 (20 Aug 2018 04:00), Max: 37.2 (19 Aug 2018 19:00)  T(F): 98.7 (20 Aug 2018 04:00), Max: 98.9 (19 Aug 2018 19:00)  HR: 102 (20 Aug 2018 07:00) (90 - 114)  BP: 124/63 (20 Aug 2018 06:00) (69/49 - 124/63)  BP(mean): 75 (20 Aug 2018 06:00) (54 - 78)  RR: 21 (20 Aug 2018 07:00) (16 - 26)  SpO2: 97% (20 Aug 2018 07:00) (92% - 99%)    I&O's Detail    19 Aug 2018 07:01  -  20 Aug 2018 07:00  --------------------------------------------------------  IN:    Enteral Tube Flush: 120 mL    Enteral Tube Flush: 120 mL    Lactated Ringers IV Bolus: 250 mL    lactated ringers.: 2750 mL    ns in tub fed  lmeusp83: 370 mL  Total IN: 3610 mL    OUT:    Chest Tube: 250 mL    Chest Tube: 80 mL    Nasoenteral Tube: 400 mL    Voided: 1125 mL  Total OUT: 1855 mL    Total NET: 1755 mL                                14.6   10.54 )-----------( 263      ( 20 Aug 2018 04:30 )             45.1       08-20    143  |  104  |  10  ----------------------------<  115<H>  4.0   |  25  |  0.72    Ca    9.2      20 Aug 2018 04:30  Phos  1.8     08-20  Mg     2.3     08-20        PT/INR - ( 20 Aug 2018 04:30 )   PT: 11.2 SEC;   INR: 0.97          PTT - ( 20 Aug 2018 04:30 )  PTT:30.2 SEC    Tolerating tube feedings    PLAN:  Continue pulmonary toilet  continue tube feedings

## 2018-08-21 LAB
ALBUMIN SERPL ELPH-MCNC: 2.7 G/DL — LOW (ref 3.3–5)
ALBUMIN SERPL ELPH-MCNC: 2.8 G/DL — LOW (ref 3.3–5)
ALP SERPL-CCNC: 104 U/L — SIGNIFICANT CHANGE UP (ref 40–120)
ALP SERPL-CCNC: 108 U/L — SIGNIFICANT CHANGE UP (ref 40–120)
ALT FLD-CCNC: 15 U/L — SIGNIFICANT CHANGE UP (ref 4–41)
ALT FLD-CCNC: 17 U/L — SIGNIFICANT CHANGE UP (ref 4–41)
AST SERPL-CCNC: 22 U/L — SIGNIFICANT CHANGE UP (ref 4–40)
AST SERPL-CCNC: 23 U/L — SIGNIFICANT CHANGE UP (ref 4–40)
BILIRUB SERPL-MCNC: 0.8 MG/DL — SIGNIFICANT CHANGE UP (ref 0.2–1.2)
BILIRUB SERPL-MCNC: 0.9 MG/DL — SIGNIFICANT CHANGE UP (ref 0.2–1.2)
BUN SERPL-MCNC: 8 MG/DL — SIGNIFICANT CHANGE UP (ref 7–23)
BUN SERPL-MCNC: 8 MG/DL — SIGNIFICANT CHANGE UP (ref 7–23)
CALCIUM SERPL-MCNC: 8.2 MG/DL — LOW (ref 8.4–10.5)
CALCIUM SERPL-MCNC: 8.3 MG/DL — LOW (ref 8.4–10.5)
CHLORIDE SERPL-SCNC: 102 MMOL/L — SIGNIFICANT CHANGE UP (ref 98–107)
CHLORIDE SERPL-SCNC: 103 MMOL/L — SIGNIFICANT CHANGE UP (ref 98–107)
CK MB BLD-MCNC: 1 — SIGNIFICANT CHANGE UP (ref 0–2.5)
CK MB BLD-MCNC: 2.16 NG/ML — SIGNIFICANT CHANGE UP (ref 1–6.6)
CK SERPL-CCNC: 227 U/L — HIGH (ref 30–200)
CO2 SERPL-SCNC: 26 MMOL/L — SIGNIFICANT CHANGE UP (ref 22–31)
CO2 SERPL-SCNC: 28 MMOL/L — SIGNIFICANT CHANGE UP (ref 22–31)
CREAT SERPL-MCNC: 0.63 MG/DL — SIGNIFICANT CHANGE UP (ref 0.5–1.3)
CREAT SERPL-MCNC: 0.73 MG/DL — SIGNIFICANT CHANGE UP (ref 0.5–1.3)
GLUCOSE SERPL-MCNC: 176 MG/DL — HIGH (ref 70–99)
GLUCOSE SERPL-MCNC: 184 MG/DL — HIGH (ref 70–99)
HCT VFR BLD CALC: 34.9 % — LOW (ref 39–50)
HCT VFR BLD CALC: 36.6 % — LOW (ref 39–50)
HGB BLD-MCNC: 11.3 G/DL — LOW (ref 13–17)
HGB BLD-MCNC: 11.9 G/DL — LOW (ref 13–17)
MAGNESIUM SERPL-MCNC: 2.1 MG/DL — SIGNIFICANT CHANGE UP (ref 1.6–2.6)
MAGNESIUM SERPL-MCNC: 2.2 MG/DL — SIGNIFICANT CHANGE UP (ref 1.6–2.6)
MCHC RBC-ENTMCNC: 30 PG — SIGNIFICANT CHANGE UP (ref 27–34)
MCHC RBC-ENTMCNC: 30.3 PG — SIGNIFICANT CHANGE UP (ref 27–34)
MCHC RBC-ENTMCNC: 32.4 % — SIGNIFICANT CHANGE UP (ref 32–36)
MCHC RBC-ENTMCNC: 32.5 % — SIGNIFICANT CHANGE UP (ref 32–36)
MCV RBC AUTO: 92.6 FL — SIGNIFICANT CHANGE UP (ref 80–100)
MCV RBC AUTO: 93.1 FL — SIGNIFICANT CHANGE UP (ref 80–100)
NRBC # FLD: 0 — SIGNIFICANT CHANGE UP
NRBC # FLD: 0 — SIGNIFICANT CHANGE UP
PHOSPHATE SERPL-MCNC: 2.2 MG/DL — LOW (ref 2.5–4.5)
PHOSPHATE SERPL-MCNC: 2.5 MG/DL — SIGNIFICANT CHANGE UP (ref 2.5–4.5)
PLATELET # BLD AUTO: 266 K/UL — SIGNIFICANT CHANGE UP (ref 150–400)
PLATELET # BLD AUTO: 270 K/UL — SIGNIFICANT CHANGE UP (ref 150–400)
PMV BLD: 9.5 FL — SIGNIFICANT CHANGE UP (ref 7–13)
PMV BLD: 9.5 FL — SIGNIFICANT CHANGE UP (ref 7–13)
POTASSIUM SERPL-MCNC: 3.8 MMOL/L — SIGNIFICANT CHANGE UP (ref 3.5–5.3)
POTASSIUM SERPL-MCNC: 4.1 MMOL/L — SIGNIFICANT CHANGE UP (ref 3.5–5.3)
POTASSIUM SERPL-SCNC: 3.8 MMOL/L — SIGNIFICANT CHANGE UP (ref 3.5–5.3)
POTASSIUM SERPL-SCNC: 4.1 MMOL/L — SIGNIFICANT CHANGE UP (ref 3.5–5.3)
PREALB SERPL-MCNC: 8 MG/DL — LOW (ref 20–40)
PROT SERPL-MCNC: 5.9 G/DL — LOW (ref 6–8.3)
PROT SERPL-MCNC: 6 G/DL — SIGNIFICANT CHANGE UP (ref 6–8.3)
RBC # BLD: 3.77 M/UL — LOW (ref 4.2–5.8)
RBC # BLD: 3.93 M/UL — LOW (ref 4.2–5.8)
RBC # FLD: 12.5 % — SIGNIFICANT CHANGE UP (ref 10.3–14.5)
RBC # FLD: 12.5 % — SIGNIFICANT CHANGE UP (ref 10.3–14.5)
SODIUM SERPL-SCNC: 139 MMOL/L — SIGNIFICANT CHANGE UP (ref 135–145)
SODIUM SERPL-SCNC: 140 MMOL/L — SIGNIFICANT CHANGE UP (ref 135–145)
TROPONIN T, HIGH SENSITIVITY: 13 NG/L — SIGNIFICANT CHANGE UP (ref ?–14)
WBC # BLD: 13.4 K/UL — HIGH (ref 3.8–10.5)
WBC # BLD: 13.64 K/UL — HIGH (ref 3.8–10.5)
WBC # FLD AUTO: 13.4 K/UL — HIGH (ref 3.8–10.5)
WBC # FLD AUTO: 13.64 K/UL — HIGH (ref 3.8–10.5)

## 2018-08-21 PROCEDURE — 99233 SBSQ HOSP IP/OBS HIGH 50: CPT

## 2018-08-21 PROCEDURE — 71045 X-RAY EXAM CHEST 1 VIEW: CPT | Mod: 26

## 2018-08-21 RX ORDER — FUROSEMIDE 40 MG
20 TABLET ORAL ONCE
Qty: 0 | Refills: 0 | Status: COMPLETED | OUTPATIENT
Start: 2018-08-21 | End: 2018-08-21

## 2018-08-21 RX ORDER — METOPROLOL TARTRATE 50 MG
2.5 TABLET ORAL ONCE
Qty: 0 | Refills: 0 | Status: COMPLETED | OUTPATIENT
Start: 2018-08-21 | End: 2018-08-21

## 2018-08-21 RX ORDER — IPRATROPIUM BROMIDE 0.2 MG/ML
500 SOLUTION, NON-ORAL INHALATION EVERY 6 HOURS
Qty: 0 | Refills: 0 | Status: DISCONTINUED | OUTPATIENT
Start: 2018-08-21 | End: 2018-08-27

## 2018-08-21 RX ORDER — ALBUTEROL 90 UG/1
2 AEROSOL, METERED ORAL EVERY 4 HOURS
Qty: 0 | Refills: 0 | Status: DISCONTINUED | OUTPATIENT
Start: 2018-08-21 | End: 2018-08-23

## 2018-08-21 RX ORDER — MAGNESIUM SULFATE 500 MG/ML
2 VIAL (ML) INJECTION ONCE
Qty: 0 | Refills: 0 | Status: COMPLETED | OUTPATIENT
Start: 2018-08-21 | End: 2018-08-21

## 2018-08-21 RX ORDER — FUROSEMIDE 40 MG
10 TABLET ORAL ONCE
Qty: 0 | Refills: 0 | Status: COMPLETED | OUTPATIENT
Start: 2018-08-21 | End: 2018-08-21

## 2018-08-21 RX ORDER — POTASSIUM PHOSPHATE, MONOBASIC POTASSIUM PHOSPHATE, DIBASIC 236; 224 MG/ML; MG/ML
15 INJECTION, SOLUTION INTRAVENOUS ONCE
Qty: 0 | Refills: 0 | Status: COMPLETED | OUTPATIENT
Start: 2018-08-21 | End: 2018-08-21

## 2018-08-21 RX ORDER — DILTIAZEM HCL 120 MG
10 CAPSULE, EXT RELEASE 24 HR ORAL
Qty: 125 | Refills: 0 | Status: DISCONTINUED | OUTPATIENT
Start: 2018-08-21 | End: 2018-08-24

## 2018-08-21 RX ORDER — ACETAMINOPHEN 500 MG
1000 TABLET ORAL ONCE
Qty: 0 | Refills: 0 | Status: DISCONTINUED | OUTPATIENT
Start: 2018-08-21 | End: 2018-08-27

## 2018-08-21 RX ADMIN — HEPARIN SODIUM 5000 UNIT(S): 5000 INJECTION INTRAVENOUS; SUBCUTANEOUS at 05:24

## 2018-08-21 RX ADMIN — HYDROMORPHONE HYDROCHLORIDE 30 MILLILITER(S): 2 INJECTION INTRAMUSCULAR; INTRAVENOUS; SUBCUTANEOUS at 19:16

## 2018-08-21 RX ADMIN — HYDROMORPHONE HYDROCHLORIDE 30 MILLILITER(S): 2 INJECTION INTRAMUSCULAR; INTRAVENOUS; SUBCUTANEOUS at 07:39

## 2018-08-21 RX ADMIN — HEPARIN SODIUM 5000 UNIT(S): 5000 INJECTION INTRAVENOUS; SUBCUTANEOUS at 18:14

## 2018-08-21 RX ADMIN — Medication 2.5 MILLIGRAM(S): at 00:30

## 2018-08-21 RX ADMIN — Medication 0.5 MILLIGRAM(S): at 00:30

## 2018-08-21 RX ADMIN — Medication 50 GRAM(S): at 00:30

## 2018-08-21 RX ADMIN — Medication 20 MILLIGRAM(S): at 21:37

## 2018-08-21 RX ADMIN — Medication 500 MICROGRAM(S): at 09:55

## 2018-08-21 RX ADMIN — POTASSIUM PHOSPHATE, MONOBASIC POTASSIUM PHOSPHATE, DIBASIC 62.5 MILLIMOLE(S): 236; 224 INJECTION, SOLUTION INTRAVENOUS at 03:22

## 2018-08-21 RX ADMIN — Medication 500 MICROGRAM(S): at 15:05

## 2018-08-21 RX ADMIN — DEXTROSE MONOHYDRATE, SODIUM CHLORIDE, AND POTASSIUM CHLORIDE 20 MILLILITER(S): 50; .745; 4.5 INJECTION, SOLUTION INTRAVENOUS at 20:40

## 2018-08-21 RX ADMIN — Medication 10 MILLIGRAM(S): at 12:06

## 2018-08-21 RX ADMIN — Medication 5 MG/HR: at 20:39

## 2018-08-21 RX ADMIN — Medication 500 MICROGRAM(S): at 03:48

## 2018-08-21 RX ADMIN — Medication 500 MICROGRAM(S): at 21:20

## 2018-08-21 NOTE — PROGRESS NOTE ADULT - ASSESSMENT
63 M w/esophageal ca s/p CRT and Mirza Raghu Esophagectomy    - out of bed to chair, ambulate  - pain control  - DVT ppx  - care per CTICU  - recommend J-tube study prior to utilization    Shashank Lira PGY3  Team D, [id number]
63 M w/esophageal ca s/p CRT and Mirza Raghu Esophagectomy now POD2. Patient hemodynamically stable without pressors in the CTICU.     - out of bed to chair, ambulate  - pain control  - DVT ppx  - will f/u with CTICU team regarding possible overnight desaturation  - care per CTICU    URSULA Mosqueda PGY1  Pager: [phone]
63 M w/esophageal ca s/p CRT and Mirza Raghu Esophagectomy now POD2. Patient hemodynamically stable without pressors in the CTICU.     - out of bed to chair, ambulate  - pain control  - DVT ppx  - care per CTICU    Surgery, D-Team  Pager: [phone]

## 2018-08-21 NOTE — PROGRESS NOTE ADULT - SUBJECTIVE AND OBJECTIVE BOX
ANGIE ZAMORA                     [id number]    HPI:  64 y/o male smoker with hx of esophagus CA s/p chemo and radiation on  5/18 presents to [facility] for scheduled robotic assisted JOANNA Raghu esophagogastrectomy and cholecystectomy on 8/17/18. (08 Aug 2018 13:51)    Procedure: Kooskia Raghu Esophagogastrectomy and cholecystectomy / J-tube   08/17/2018      Issues:   Shortness of breath  Esophageal Cancer  Postop pain                     PAST MEDICAL & SURGICAL HISTORY:  Malignant neoplasm of esophagus, unspecified  History of bilateral inguinal hernia repair            VITAL SIGNS:  Vital Signs Last 24 Hrs  T(C): 36.8 (21 Aug 2018 12:00), Max: 37.2 (21 Aug 2018 04:00)  T(F): 98.2 (21 Aug 2018 12:00), Max: 99 (21 Aug 2018 04:00)  HR: 120 (21 Aug 2018 14:00) (95 - 127)  BP: 113/64 (21 Aug 2018 14:00) (95/57 - 139/59)  BP(mean): 74 (21 Aug 2018 14:00) (67 - 88)  RR: 21 (21 Aug 2018 14:00) (12 - 23)  SpO2: 96% (21 Aug 2018 14:00) (84% - 100%)    I/Os:   I&O's Detail    20 Aug 2018 07:01  -  21 Aug 2018 07:00  --------------------------------------------------------  IN:    dextrose 5% + sodium chloride 0.45% with potassium chloride 20 mEq/L: 1760 mL    Enteral Tube Flush: 120 mL    Enteral Tube Flush: 120 mL    IV PiggyBack: 500 mL    lactated ringers.: 250 mL    ns in tub fed  [id number]: 700 mL    Sodium Chloride 0.9% IV Bolus: 250 mL  Total IN: 3700 mL    OUT:    Chest Tube: 220 mL    Nasoenteral Tube: 1000 mL    Voided: 2050 mL  Total OUT: 3270 mL    Total NET: 430 mL      21 Aug 2018 07:01  -  21 Aug 2018 14:53  --------------------------------------------------------  IN:    dextrose 5% + sodium chloride 0.45% with potassium chloride 20 mEq/L: 440 mL    diltiazem Infusion: 15 mL    Enteral Tube Flush: 40 mL    Enteral Tube Flush: 40 mL    ns in tub fed  [id number]: 320 mL  Total IN: 855 mL    OUT:    Chest Tube: 20 mL    Voided: 800 mL  Total OUT: 820 mL    Total NET: 35 mL          CAPILLARY BLOOD GLUCOSE          =======================MEDICATIONS===================  MEDICATIONS  (STANDING):  acetaminophen  IVPB. 1000 milliGRAM(s) IV Intermittent once  dextrose 5% + sodium chloride 0.45% with potassium chloride 20 mEq/L 1000 milliLiter(s) (80 mL/Hr) IV Continuous <Continuous>  diltiazem Infusion 5 mG/Hr (5 mL/Hr) IV Continuous <Continuous>  heparin  Injectable 5000 Unit(s) SubCutaneous every 12 hours  HYDROmorphone PCA (1 mG/mL) 30 milliLiter(s) PCA Continuous PCA Continuous  ipratropium    for Nebulization 500 MICROGram(s) Nebulizer every 6 hours    MEDICATIONS  (PRN):  acetaminophen  IVPB. 1000 milliGRAM(s) IV Intermittent once PRN Moderate Pain (4 - 6)  ALBUTerol    90 MICROgram(s) HFA Inhaler 2 Puff(s) Inhalation every 4 hours PRN Shortness of Breath and/or Wheezing  naloxone Injectable 0.1 milliGRAM(s) IV Push every 3 minutes PRN For ANY of the following changes in patient status:  A. RR LESS THAN 10 breaths per minute, B. Oxygen saturation LESS THAN 90%, C. Sedation score of 6  ondansetron Injectable 4 milliGRAM(s) IV Push every 6 hours PRN Nausea        PHYSICAL EXAM============================  General:                         Awake, alert, not in any distress  Neuro:                            Moving all extremities to commands.   Respiratory:	Air entry fair and  bilateral conducted sounds                                      chest tube to suction-->water seal   CV:		Regular rate and rhythm. Normal S1/S2                                          Distal pulses present.  Abdomen:	                     Soft, non-distended. Bowel sounds present   Skin:		No rash.  Extremities:	Warm, no cyanosis or edema.  Palpable pulses    ============================LABS=========================                        11.9   13.40 )-----------( 270      ( 21 Aug 2018 03:20 )             36.6     08-21    139  |  102  |  8   ----------------------------<  176<H>  4.1   |  28  |  0.73    Ca    8.3<L>      21 Aug 2018 03:20  Phos  2.5     08-21  Mg     2.2     08-21    TPro  6.0  /  Alb  2.7<L>  /  TBili  0.8  /  DBili  x   /  AST  22  /  ALT  15  /  AlkPhos  108  08-21    LIVER FUNCTIONS - ( 21 Aug 2018 03:20 )  Alb: 2.7 g/dL / Pro: 6.0 g/dL / ALK PHOS: 108 u/L / ALT: 15 u/L / AST: 22 u/L / GGT: x           PT/INR - ( 20 Aug 2018 04:30 )   PT: 11.2 SEC;   INR: 0.97          PTT - ( 20 Aug 2018 04:30 )  PTT:30.2 SEC        ============================IMAGING STUDIES=========================  < from: Xray Chest 1 View- PORTABLE-Routine (08.21.18 @ 05:35) >    IMPRESSION:  Right basilar subsegmental atelectasis and trace right   pleural effusion.    Unchanged left basal and retrocardiac opacity which may be due to a small   pleural effusion with passive atelectasis, atelectasis of other cause,   and/or pneumonia in the appropriate clinical context.    A/P:    63yMale s/p Kooskia Raghu Esophagogastrectomy / J-tube on 08/17/2018  progressing well, OOB in chair, experiencing  pain with deep breathing.                             Neuro:                                         Pain control with  PCEA / IV Tylenol                            Cardiovascular:                                          Continue hemodynamic monitoring.                                        Continue IVF D5/.45NS with 20K                                         Avoid vasopressor agents                            Respiratory:                                         Pt is on 2L  nasal canula, c/o shortness of breath. Continue bronchodilators, pulmonary toilet & Lasix 10mg  x 1                                         Appears to be in pain but not in distress. PCEA got disconnected & d/cd by pain service. Currently on PCA                                         Using incentive spirometry                                          Monitor chest tube output                                         Chest tube to water seal                                                                                           GI                                         NPO, cont tube feeds, increase to goal.                                          Continue GI prophylaxis with Protonix                                         Continue Zofran / Reglan for nausea - PRN                                         NGT to low continuous wall suction                                            Flush both NGT and J-tube with 20cc of sterile water Q 4hrs.  	                                                                 Renal:                                         Continue  D5/.45NS with 20K                                          Monitor I/Os and electrolytes                                         Cantu                                                  Hem/ Onc:                                                                                  Monitor chest tube output &  signs of bleeding.                                          Follow CBC in AM                           Infectious disease:                                            No signs of infection. Monitor for fever / leukocytosis.                                          All surgical incision / chest tube  sites look clean                            Endocrine                                             Continue Accu-Checks with coverage    Pt is on SQ Heparin and Venodyne boots for DVT prophylaxis.     Pertinent clinical, laboratory, radiographic, hemodynamic, echocardiographic, respiratory data, microbiologic data and chart were reviewed and analyzed frequently throughout the course of the day and night  Patient seen, examined and plan discussed with CT Surgeon Dr. Hebert / CTICU team during rounds.    Pt's status discussed with family at bedside, updated status            Manny Singh DO, FACEP

## 2018-08-21 NOTE — PROGRESS NOTE ADULT - SUBJECTIVE AND OBJECTIVE BOX
Pt will require continuous tube feedings via pump upon discharge. Tube feedings cannot be given via bolus due to J tube. This is pt's only source of nutrition. Will remain NPO sp esophagectomy. This could be a permanent condition. Length of need with be greater than 3 months. There are no other possible substitutions or alternatives.

## 2018-08-21 NOTE — PROGRESS NOTE ADULT - SUBJECTIVE AND OBJECTIVE BOX
Vital Signs Last 24 Hrs  T(C): 37.2 (21 Aug 2018 04:00), Max: 37.2 (21 Aug 2018 04:00)  T(F): 99 (21 Aug 2018 04:00), Max: 99 (21 Aug 2018 04:00)  HR: 99 (21 Aug 2018 06:00) (93 - 127)  BP: 110/59 (21 Aug 2018 06:00) (95/68 - 144/81)  BP(mean): 72 (21 Aug 2018 06:00) (68 - 96)  RR: 20 (21 Aug 2018 06:00) (17 - 23)  SpO2: 100% (21 Aug 2018 06:00) (84% - 100%)    I&O's Detail    20 Aug 2018 07:01  -  21 Aug 2018 07:00  --------------------------------------------------------  IN:    dextrose 5% + sodium chloride 0.45% with potassium chloride 20 mEq/L: 1760 mL    Enteral Tube Flush: 120 mL    Enteral Tube Flush: 120 mL    IV PiggyBack: 500 mL    lactated ringers.: 250 mL    ns in tub fed  [id number]: 700 mL    Sodium Chloride 0.9% IV Bolus: 250 mL  Total IN: 3700 mL    OUT:    Chest Tube: 220 mL    Nasoenteral Tube: 1000 mL    Voided: 2050 mL  Total OUT: 3270 mL    Total NET: 430 mL                                11.9   13.40 )-----------( 270      ( 21 Aug 2018 03:20 )             36.6       08-21    139  |  102  |  8   ----------------------------<  176<H>  4.1   |  28  |  0.73    Ca    8.3<L>      21 Aug 2018 03:20  Phos  2.5     08-21  Mg     2.2     08-21    TPro  6.0  /  Alb  2.7<L>  /  TBili  0.8  /  DBili  x   /  AST  22  /  ALT  15  /  AlkPhos  108  08-21      PT/INR - ( 20 Aug 2018 04:30 )   PT: 11.2 SEC;   INR: 0.97          PTT - ( 20 Aug 2018 04:30 )  PTT:30.2 SEC    O2 sat 100% on 4 liters nasal O2 this AM    PLAN:    continue tube feedings  Swallow study per thoracic  Pulmonary toilet

## 2018-08-21 NOTE — PROGRESS NOTE ADULT - SUBJECTIVE AND OBJECTIVE BOX
Day _2__ of Anesthesia Pain Management Service        SUBJECTIVE: I'm doing fine     Pain Scale Score	At rest: _4__ 	With Activity: ___ 	[ ] Refer to charted pain scores    THERAPY:    [ ] IV PCA Morphine		[ ] 5 mg/mL	[ ] 1 mg/mL  [X] IV PCA Hydromorphone	[ ] 5 mg/mL	[X] 1 mg/mL  [ ] IV PCA Fentanyl		[ ] 50 micrograms/mL    Demand dose _0.2mg_ lockout _6_ (minutes) Continuous Rate _0_ Total: _4.8__  Daily      MEDICATIONS  (STANDING):  dextrose 5% + sodium chloride 0.45% with potassium chloride 20 mEq/L 1000 milliLiter(s) (80 mL/Hr) IV Continuous <Continuous>  heparin  Injectable 5000 Unit(s) SubCutaneous every 12 hours  HYDROmorphone PCA (1 mG/mL) 30 milliLiter(s) PCA Continuous PCA Continuous  ipratropium    for Nebulization 500 MICROGram(s) Nebulizer every 6 hours    MEDICATIONS  (PRN):  acetaminophen  IVPB. 1000 milliGRAM(s) IV Intermittent once PRN Moderate Pain (4 - 6)  ALBUTerol    90 MICROgram(s) HFA Inhaler 2 Puff(s) Inhalation every 4 hours PRN Shortness of Breath and/or Wheezing  naloxone Injectable 0.1 milliGRAM(s) IV Push every 3 minutes PRN For ANY of the following changes in patient status:  A. RR LESS THAN 10 breaths per minute, B. Oxygen saturation LESS THAN 90%, C. Sedation score of 6  ondansetron Injectable 4 milliGRAM(s) IV Push every 6 hours PRN Nausea      OBJECTIVE: A&Ox3, NAD, sitting up in chair    Sedation Score:	[X] Alert	[ ] Drowsy	[ ] Arousable	[ ] Asleep	[ ] Unresponsive    Side Effects:	[X] None	[ ] Nausea	[ ] Vomiting	[ ] Pruritus  		  [ ] Weakness		[ ] Numbness	[ ] Other:    PT/INR - ( 20 Aug 2018 04:30 )   PT: 11.2 SEC;   INR: 0.97          PTT - ( 20 Aug 2018 04:30 )  PTT:30.2 SEC                          11.9   13.40 )-----------( 270      ( 21 Aug 2018 03:20 )             36.6       08-21    139  |  102  |  8   ----------------------------<  176<H>  4.1   |  28  |  0.73    Ca    8.3<L>      21 Aug 2018 03:20  Phos  2.5     08-21  Mg     2.2     08-21    TPro  6.0  /  Alb  2.7<L>  /  TBili  0.8  /  DBili  x   /  AST  22  /  ALT  15  /  AlkPhos  108  08-21      ASSESSMENT/ PLAN    Therapy to  be:	[X] Continue   [ ] Discontinued   [ ] Change to prn Analgesics    Documentation and Verification of current medications:  [X] Done	[ ] Not done, not eligible  [ ] Not done, reason not given    [ ]  [place]  Reviewed and Copied to Chart    Comments: NPO with NGT

## 2018-08-21 NOTE — PROGRESS NOTE ADULT - SUBJECTIVE AND OBJECTIVE BOX
Anesthesia Pain Management Service    SUBJECTIVE: Pt doing well with IV PCA without problems reported.    Therapy:	  [ X] IV PCA	   [ ] Epidural           [ ] s/p Spinal Opoid              [ ] Postpartum infusion	  [ ] Patient controlled regional anesthesia (PCRA)    [ ] prn Analgesics    Allergies    No Known Allergies    Intolerances      MEDICATIONS  (STANDING):  acetaminophen  IVPB. 1000 milliGRAM(s) IV Intermittent once  dextrose 5% + sodium chloride 0.45% with potassium chloride 20 mEq/L 1000 milliLiter(s) (80 mL/Hr) IV Continuous <Continuous>  diltiazem Infusion 5 mG/Hr (5 mL/Hr) IV Continuous <Continuous>  heparin  Injectable 5000 Unit(s) SubCutaneous every 12 hours  HYDROmorphone PCA (1 mG/mL) 30 milliLiter(s) PCA Continuous PCA Continuous  ipratropium    for Nebulization 500 MICROGram(s) Nebulizer every 6 hours    MEDICATIONS  (PRN):  acetaminophen  IVPB. 1000 milliGRAM(s) IV Intermittent once PRN Moderate Pain (4 - 6)  ALBUTerol    90 MICROgram(s) HFA Inhaler 2 Puff(s) Inhalation every 4 hours PRN Shortness of Breath and/or Wheezing  naloxone Injectable 0.1 milliGRAM(s) IV Push every 3 minutes PRN For ANY of the following changes in patient status:  A. RR LESS THAN 10 breaths per minute, B. Oxygen saturation LESS THAN 90%, C. Sedation score of 6  ondansetron Injectable 4 milliGRAM(s) IV Push every 6 hours PRN Nausea      OBJECTIVE:   [X] No new signs     [ ] Other:    Side Effects:  [X ] None			[ ] Other:    Assessment of Catheter Site:		[ ] Intact		[ ] Other:    ASSESSMENT/PLAN  [ X] Continue current therapy    [ ] Therapy changed to:    [ ] IV PCA       [ ] Epidural     [ ] prn Analgesics     Comments:    Progress Note written now but Patient was seen earlier.

## 2018-08-21 NOTE — PROGRESS NOTE ADULT - SUBJECTIVE AND OBJECTIVE BOX
Team D Surgery Progress Note     SUBJECTIVE / 24H EVENTS  Patient seen and examined on morning rounds. Patient with SOB and apparent desaturation overnight, will f/u with CTICU team for update.    Pain controlled. No nausea / vomiting. OOBTC. TF@40.     OBJECTIVE:    VITAL SIGNS:  T(C): 37.2 (18 @ 04:00), Max: 37.2 (18 @ 04:00)  HR: 99 (18 @ 06:00) (93 - 127)  BP: 110/59 (18 @ 06:00) (95/68 - 144/81)  RR: 20 (18 @ 06:00) (17 - 24)  SpO2: 100% (18 @ 06:00) (84% - 100%)    Daily Weight in k.5 (21 Aug 2018 04:00)      PHYSICAL EXAM:  Gen: NAD  LS: Respirations unlabored.  GI: Soft. Mildly tender, mildly distended. Incision C/D/I.   Ext: Warm, well perfused      18 @ 07:01  -  18 @ 07:00  --------------------------------------------------------  IN:    Enteral Tube Flush: 120 mL    Enteral Tube Flush: 120 mL    Lactated Ringers IV Bolus: 250 mL    lactated ringers.: 2750 mL    ns in tub fed  [id number]: 370 mL  Total IN: 3610 mL    OUT:    Chest Tube: 250 mL    Chest Tube: 80 mL    Nasoenteral Tube: 400 mL    Voided: 1125 mL  Total OUT: 1855 mL    Total NET: 1755 mL      18 @ 07:01  -  18 @ 06:59  --------------------------------------------------------  IN:    dextrose 5% + sodium chloride 0.45% with potassium chloride 20 mEq/L: 1600 mL    Enteral Tube Flush: 120 mL    Enteral Tube Flush: 120 mL    IV PiggyBack: 500 mL    lactated ringers.: 250 mL    ns in tub fed  [id number]: 660 mL    Sodium Chloride 0.9% IV Bolus: 250 mL  Total IN: 3500 mL    OUT:    Chest Tube: 220 mL    Nasoenteral Tube: 1000 mL    Voided: 1800 mL  Total OUT: 3020 mL    Total NET: 480 mL      LAB VALUES:      139  |  102  |  8   ----------------------------<  176<H>  4.1   |  28  |  0.73    Ca    8.3<L>      21 Aug 2018 03:20  Phos  2.5       Mg     2.2         TPro  6.0  /  Alb  2.7<L>  /  TBili  0.8  /  DBili  x   /  AST  22  /  ALT  15  /  AlkPhos  108                                 11.9   13.40 )-----------( 270      ( 21 Aug 2018 03:20 )             36.6     LIVER FUNCTIONS - ( 21 Aug 2018 03:20 )  Alb: 2.7 g/dL / Pro: 6.0 g/dL / ALK PHOS: 108 u/L / ALT: 15 u/L / AST: 22 u/L / GGT: x           PT/INR - ( 20 Aug 2018 04:30 )   PT: 11.2 SEC;   INR: 0.97          PTT - ( 20 Aug 2018 04:30 )  PTT:30.2 SEC    CARDIAC MARKERS ( 21 Aug 2018 00:45 )  x     / x     / 227 u/L / 2.16 ng/mL / x          MICROBIOLOGY:    No new microbiology data for review.     RADIOLOGY:    < from: Xray Chest 1 View- PORTABLE-Routine (18 @ 07:13) >  IMPRESSION:  The right chest tube with the tip overlying the apex is been   removed. No pneumothorax. Other tubes as above.    Unchanged trace right pleural effusion and right subcutaneous emphysema.    Slight increase in left basilar and retrocardiac opacity and increased   patchy left lower lung opacity, findings which may be due to a   combination of a small left pleural effusion with passive atelectasis,   atelectasis of other cause, and/or pneumonia.    < end of copied text >      MEDICATIONS  (STANDING):  dextrose 5% + sodium chloride 0.45% with potassium chloride 20 mEq/L 1000 milliLiter(s) (80 mL/Hr) IV Continuous <Continuous>  heparin  Injectable 5000 Unit(s) SubCutaneous every 12 hours  HYDROmorphone PCA (1 mG/mL) 30 milliLiter(s) PCA Continuous PCA Continuous  ipratropium    for Nebulization 500 MICROGram(s) Nebulizer every 6 hours    MEDICATIONS  (PRN):  acetaminophen  IVPB. 1000 milliGRAM(s) IV Intermittent once PRN Moderate Pain (4 - 6)  ALBUTerol    90 MICROgram(s) HFA Inhaler 2 Puff(s) Inhalation every 4 hours PRN Shortness of Breath and/or Wheezing  naloxone Injectable 0.1 milliGRAM(s) IV Push every 3 minutes PRN For ANY of the following changes in patient status:  A. RR LESS THAN 10 breaths per minute, B. Oxygen saturation LESS THAN 90%, C. Sedation score of 6  ondansetron Injectable 4 milliGRAM(s) IV Push every 6 hours PRN Nausea

## 2018-08-22 ENCOUNTER — TRANSCRIPTION ENCOUNTER (OUTPATIENT)
Age: 63
End: 2018-08-22

## 2018-08-22 LAB
ALBUMIN SERPL ELPH-MCNC: 2.7 G/DL — LOW (ref 3.3–5)
ALP SERPL-CCNC: 108 U/L — SIGNIFICANT CHANGE UP (ref 40–120)
ALT FLD-CCNC: 17 U/L — SIGNIFICANT CHANGE UP (ref 4–41)
AST SERPL-CCNC: 20 U/L — SIGNIFICANT CHANGE UP (ref 4–40)
BASOPHILS # BLD AUTO: 0.05 K/UL — SIGNIFICANT CHANGE UP (ref 0–0.2)
BASOPHILS NFR BLD AUTO: 0.3 % — SIGNIFICANT CHANGE UP (ref 0–2)
BILIRUB SERPL-MCNC: 0.9 MG/DL — SIGNIFICANT CHANGE UP (ref 0.2–1.2)
BUN SERPL-MCNC: 9 MG/DL — SIGNIFICANT CHANGE UP (ref 7–23)
CALCIUM SERPL-MCNC: 8.4 MG/DL — SIGNIFICANT CHANGE UP (ref 8.4–10.5)
CHLORIDE SERPL-SCNC: 98 MMOL/L — SIGNIFICANT CHANGE UP (ref 98–107)
CO2 SERPL-SCNC: 30 MMOL/L — SIGNIFICANT CHANGE UP (ref 22–31)
CREAT SERPL-MCNC: 0.71 MG/DL — SIGNIFICANT CHANGE UP (ref 0.5–1.3)
EOSINOPHIL # BLD AUTO: 0.18 K/UL — SIGNIFICANT CHANGE UP (ref 0–0.5)
EOSINOPHIL NFR BLD AUTO: 1.2 % — SIGNIFICANT CHANGE UP (ref 0–6)
GLUCOSE SERPL-MCNC: 165 MG/DL — HIGH (ref 70–99)
HCT VFR BLD CALC: 34.6 % — LOW (ref 39–50)
HGB BLD-MCNC: 11.4 G/DL — LOW (ref 13–17)
IMM GRANULOCYTES # BLD AUTO: 0.08 # — SIGNIFICANT CHANGE UP
IMM GRANULOCYTES NFR BLD AUTO: 0.5 % — SIGNIFICANT CHANGE UP (ref 0–1.5)
LYMPHOCYTES # BLD AUTO: 0.85 K/UL — LOW (ref 1–3.3)
LYMPHOCYTES # BLD AUTO: 5.5 % — LOW (ref 13–44)
MAGNESIUM SERPL-MCNC: 2 MG/DL — SIGNIFICANT CHANGE UP (ref 1.6–2.6)
MCHC RBC-ENTMCNC: 30.9 PG — SIGNIFICANT CHANGE UP (ref 27–34)
MCHC RBC-ENTMCNC: 32.9 % — SIGNIFICANT CHANGE UP (ref 32–36)
MCV RBC AUTO: 93.8 FL — SIGNIFICANT CHANGE UP (ref 80–100)
MONOCYTES # BLD AUTO: 1.63 K/UL — HIGH (ref 0–0.9)
MONOCYTES NFR BLD AUTO: 10.5 % — SIGNIFICANT CHANGE UP (ref 2–14)
NEUTROPHILS # BLD AUTO: 12.79 K/UL — HIGH (ref 1.8–7.4)
NEUTROPHILS NFR BLD AUTO: 82 % — HIGH (ref 43–77)
NRBC # FLD: 0 — SIGNIFICANT CHANGE UP
PHOSPHATE SERPL-MCNC: 2.8 MG/DL — SIGNIFICANT CHANGE UP (ref 2.5–4.5)
PLATELET # BLD AUTO: 276 K/UL — SIGNIFICANT CHANGE UP (ref 150–400)
PMV BLD: 9.6 FL — SIGNIFICANT CHANGE UP (ref 7–13)
POTASSIUM SERPL-MCNC: 4.1 MMOL/L — SIGNIFICANT CHANGE UP (ref 3.5–5.3)
POTASSIUM SERPL-SCNC: 4.1 MMOL/L — SIGNIFICANT CHANGE UP (ref 3.5–5.3)
PROT SERPL-MCNC: 6 G/DL — SIGNIFICANT CHANGE UP (ref 6–8.3)
RBC # BLD: 3.69 M/UL — LOW (ref 4.2–5.8)
RBC # FLD: 12.6 % — SIGNIFICANT CHANGE UP (ref 10.3–14.5)
SODIUM SERPL-SCNC: 142 MMOL/L — SIGNIFICANT CHANGE UP (ref 135–145)
WBC # BLD: 15.58 K/UL — HIGH (ref 3.8–10.5)
WBC # FLD AUTO: 15.58 K/UL — HIGH (ref 3.8–10.5)

## 2018-08-22 PROCEDURE — 71045 X-RAY EXAM CHEST 1 VIEW: CPT | Mod: 26

## 2018-08-22 PROCEDURE — 99233 SBSQ HOSP IP/OBS HIGH 50: CPT

## 2018-08-22 RX ORDER — SODIUM CHLORIDE 0.65 %
1 AEROSOL, SPRAY (ML) NASAL THREE TIMES A DAY
Qty: 0 | Refills: 0 | Status: DISCONTINUED | OUTPATIENT
Start: 2018-08-22 | End: 2018-08-27

## 2018-08-22 RX ORDER — FUROSEMIDE 40 MG
10 TABLET ORAL ONCE
Qty: 0 | Refills: 0 | Status: COMPLETED | OUTPATIENT
Start: 2018-08-22 | End: 2018-08-22

## 2018-08-22 RX ADMIN — Medication 5 MG/HR: at 08:05

## 2018-08-22 RX ADMIN — Medication 10 MILLIGRAM(S): at 15:36

## 2018-08-22 RX ADMIN — HEPARIN SODIUM 5000 UNIT(S): 5000 INJECTION INTRAVENOUS; SUBCUTANEOUS at 05:06

## 2018-08-22 RX ADMIN — Medication 10 MILLIGRAM(S): at 11:30

## 2018-08-22 RX ADMIN — HEPARIN SODIUM 5000 UNIT(S): 5000 INJECTION INTRAVENOUS; SUBCUTANEOUS at 19:05

## 2018-08-22 RX ADMIN — Medication 1000 MILLIGRAM(S): at 08:30

## 2018-08-22 RX ADMIN — DEXTROSE MONOHYDRATE, SODIUM CHLORIDE, AND POTASSIUM CHLORIDE 20 MILLILITER(S): 50; .745; 4.5 INJECTION, SOLUTION INTRAVENOUS at 08:05

## 2018-08-22 RX ADMIN — Medication 400 MILLIGRAM(S): at 08:06

## 2018-08-22 RX ADMIN — Medication 1 SPRAY(S): at 09:00

## 2018-08-22 RX ADMIN — HYDROMORPHONE HYDROCHLORIDE 30 MILLILITER(S): 2 INJECTION INTRAMUSCULAR; INTRAVENOUS; SUBCUTANEOUS at 07:19

## 2018-08-22 NOTE — PROGRESS NOTE ADULT - SUBJECTIVE AND OBJECTIVE BOX
D team surgery    SUBJECTIVE: Patient seen and examined at bedside, patient without complaints. Patient had IV lasix today with improvement in SOB.     Vital Signs Last 24 Hrs  T(C): 36.7 (22 Aug 2018 12:00), Max: 36.9 (21 Aug 2018 20:00)  T(F): 98 (22 Aug 2018 12:00), Max: 98.4 (21 Aug 2018 20:00)  HR: 83 (22 Aug 2018 13:00) (76 - 128)  BP: 94/52 (22 Aug 2018 13:00) (94/44 - 127/58)  BP(mean): 63 (22 Aug 2018 13:00) (57 - 82)  RR: 14 (22 Aug 2018 13:00) (12 - 22)  SpO2: 97% (22 Aug 2018 13:00) (94% - 100%)  I&O's Detail    21 Aug 2018 07:01  -  22 Aug 2018 07:00  --------------------------------------------------------  IN:    dextrose 5% + sodium chloride 0.45% with potassium chloride 20 mEq/L: 780 mL    diltiazem Infusion: 180 mL    Enteral Tube Flush: 100 mL    Enteral Tube Flush: 100 mL    ns in tub fed  [id number]: 946 mL  Total IN: 2106 mL    OUT:    Chest Tube: 130 mL    Nasoenteral Tube: 700 mL    Voided: 2000 mL  Total OUT: 2830 mL    Total NET: -724 mL      22 Aug 2018 07:01  -  22 Aug 2018 15:19  --------------------------------------------------------  IN:    dextrose 5% + sodium chloride 0.45% with potassium chloride 20 mEq/L: 80 mL    diltiazem Infusion: 40 mL    Enteral Tube Flush: 20 mL    Enteral Tube Flush: 20 mL    IV PiggyBack: 100 mL    ns in tub fed  [id number]: 240 mL  Total IN: 500 mL    OUT:    Chest Tube: 90 mL    Voided: 400 mL  Total OUT: 490 mL    Total NET: 10 mL        MEDICATIONS  (STANDING):  acetaminophen  IVPB. 1000 milliGRAM(s) IV Intermittent once  dextrose 5% + sodium chloride 0.45% with potassium chloride 20 mEq/L 1000 milliLiter(s) (20 mL/Hr) IV Continuous <Continuous>  diltiazem Infusion 5 mG/Hr (5 mL/Hr) IV Continuous <Continuous>  furosemide   Injectable 10 milliGRAM(s) IV Push once  heparin  Injectable 5000 Unit(s) SubCutaneous every 12 hours  HYDROmorphone PCA (1 mG/mL) 30 milliLiter(s) PCA Continuous PCA Continuous  ipratropium    for Nebulization 500 MICROGram(s) Nebulizer every 6 hours    MEDICATIONS  (PRN):  ALBUTerol    90 MICROgram(s) HFA Inhaler 2 Puff(s) Inhalation every 4 hours PRN Shortness of Breath and/or Wheezing  naloxone Injectable 0.1 milliGRAM(s) IV Push every 3 minutes PRN For ANY of the following changes in patient status:  A. RR LESS THAN 10 breaths per minute, B. Oxygen saturation LESS THAN 90%, C. Sedation score of 6  ondansetron Injectable 4 milliGRAM(s) IV Push every 6 hours PRN Nausea  sodium chloride 0.65% Nasal 1 Spray(s) Both Nostrils three times a day PRN Nasal Congestion    PHYSICAL EXAM:  Gen: NAD  LS: Respirations unlabored.  GI: Soft. NT, mildly distended. Incision C/D/I.   Ext: Warm, well perfused    LABS:                        11.4   15.58 )-----------( 276      ( 22 Aug 2018 03:00 )             34.6     08-22    142  |  98  |  9   ----------------------------<  165<H>  4.1   |  30  |  0.71    Ca    8.4      22 Aug 2018 03:00  Phos  2.8     08-22  Mg     2.0     08-22    TPro  6.0  /  Alb  2.7<L>  /  TBili  0.9  /  DBili  x   /  AST  20  /  ALT  17  /  AlkPhos  108  08-22        63 M w/esophageal ca s/p CRT and Putnam Raghu Esophagectomy now POD2. Patient hemodynamically stable without pressors in the CTICU.     - out of bed to chair, ambulate  - pain control  - DVT ppx  - cont cardizem gtt  - care per CTICU  -d/w attending

## 2018-08-22 NOTE — PROGRESS NOTE ADULT - SUBJECTIVE AND OBJECTIVE BOX
ANGIE ZAMORA                     [id number]    HPI:  62 y/o male smoker with hx of esophagus CA s/p chemo and radiation on  5/18 presents to [facility] for scheduled robotic assisted MIRZA Raghu esophagogastrectomy and cholecystectomy on 8/17/18. (08 Aug 2018 13:51)      Procedure: [place] Raghu Esophagogastrectomy and cholecystectomy / J-tube   08/17/2018      Issues:  Rapid A fib, post op, with RVR  Shortness of breath  Esophageal Cancer  Postop pain               drip:  Cardizem       PAST MEDICAL & SURGICAL HISTORY:  Malignant neoplasm of esophagus, unspecified  History of bilateral inguinal hernia repair        VITAL SIGNS:  Vital Signs Last 24 Hrs  T(C): 36.6 (22 Aug 2018 04:00), Max: 36.9 (21 Aug 2018 20:00)  T(F): 97.9 (22 Aug 2018 04:00), Max: 98.4 (21 Aug 2018 20:00)  HR: 91 (22 Aug 2018 09:00) (89 - 128)  BP: 103/60 (22 Aug 2018 09:00) (95/58 - 118/64)  BP(mean): 71 (22 Aug 2018 09:00) (63 - 82)  RR: 14 (22 Aug 2018 09:00) (14 - 22)  SpO2: 95% (22 Aug 2018 09:00) (93% - 100%)    I/Os:   I&O's Detail    21 Aug 2018 07:01  -  22 Aug 2018 07:00  --------------------------------------------------------  IN:    dextrose 5% + sodium chloride 0.45% with potassium chloride 20 mEq/L: 780 mL    diltiazem Infusion: 180 mL    Enteral Tube Flush: 100 mL    Enteral Tube Flush: 100 mL    ns in tub fed  [id number]: 946 mL  Total IN: 2106 mL    OUT:    Chest Tube: 130 mL    Nasoenteral Tube: 700 mL    Voided: 2000 mL  Total OUT: 2830 mL    Total NET: -724 mL      22 Aug 2018 07:01  -  22 Aug 2018 10:33  --------------------------------------------------------  IN:    dextrose 5% + sodium chloride 0.45% with potassium chloride 20 mEq/L: 60 mL    diltiazem Infusion: 45 mL    Enteral Tube Flush: 20 mL    Enteral Tube Flush: 20 mL    IV PiggyBack: 100 mL  Total IN: 245 mL    OUT:    Voided: 200 mL  Total OUT: 200 mL    Total NET: 45 mL          CAPILLARY BLOOD GLUCOSE          =======================MEDICATIONS===================  MEDICATIONS  (STANDING):  acetaminophen  IVPB. 1000 milliGRAM(s) IV Intermittent once  dextrose 5% + sodium chloride 0.45% with potassium chloride 20 mEq/L 1000 milliLiter(s) (20 mL/Hr) IV Continuous <Continuous>  diltiazem Infusion 5 mG/Hr (5 mL/Hr) IV Continuous <Continuous>  heparin  Injectable 5000 Unit(s) SubCutaneous every 12 hours  HYDROmorphone PCA (1 mG/mL) 30 milliLiter(s) PCA Continuous PCA Continuous  ipratropium    for Nebulization 500 MICROGram(s) Nebulizer every 6 hours    MEDICATIONS  (PRN):  ALBUTerol    90 MICROgram(s) HFA Inhaler 2 Puff(s) Inhalation every 4 hours PRN Shortness of Breath and/or Wheezing  naloxone Injectable 0.1 milliGRAM(s) IV Push every 3 minutes PRN For ANY of the following changes in patient status:  A. RR LESS THAN 10 breaths per minute, B. Oxygen saturation LESS THAN 90%, C. Sedation score of 6  ondansetron Injectable 4 milliGRAM(s) IV Push every 6 hours PRN Nausea  sodium chloride 0.65% Nasal 1 Spray(s) Both Nostrils three times a day PRN Nasal Congestion      PHYSICAL EXAM============================  General:                         Awake, alert, not in any distress  Neuro:                            Moving all extremities to commands.   Respiratory:	Air entry fair and  bilateral conducted sounds                                      chest tube to water seal   CV:		Regular rate and rhythm. Normal S1/S2                                          Distal pulses present.  Abdomen:	                     Soft, non-distended. Bowel sounds present   Skin:		No rash.  Extremities:	Warm, no cyanosis or edema.  Palpable pulses    ============================LABS=========================                        11.4   15.58 )-----------( 276      ( 22 Aug 2018 03:00 )             34.6     08-22    142  |  98  |  9   ----------------------------<  165<H>  4.1   |  30  |  0.71    Ca    8.4      22 Aug 2018 03:00  Phos  2.8     08-22  Mg     2.0     08-22    TPro  6.0  /  Alb  2.7<L>  /  TBili  0.9  /  DBili  x   /  AST  20  /  ALT  17  /  AlkPhos  108  08-22    LIVER FUNCTIONS - ( 22 Aug 2018 03:00 )  Alb: 2.7 g/dL / Pro: 6.0 g/dL / ALK PHOS: 108 u/L / ALT: 17 u/L / AST: 20 u/L / GGT: x                   ============================IMAGING STUDIES=========================  < from: Xray Chest 1 View- PORTABLE-Routine (08.22.18 @ 06:50) >    IMPRESSION:  Stable minimal right apical pneumothorax. Possible new   minimal pneumothorax at the lateral right base.    No significant interval change in trace bilateral pleural effusions and   bibasilar subsegmental atelectasis.      A/P:    63yMale s/p [place] Raghu Esophagogastrectomy / J-tube on 08/17/2018  progressing well, OOB in chair, experiencing  pain with deep breathing.                             Neuro:                                         Pain control with  PCEA / IV Tylenol                            Cardiovascular:                                          Continue hemodynamic monitoring.                                         A fib w RVR, post op: Cont cardizdm drip                                        Continue IVF D5/.45NS with 20K                                         Avoid vasopressor agents                            Respiratory:                                         Pt is on 2L  nasal canula, c/o shortness of breath. Continue bronchodilators, pulmonary toilet & Lasix 10mg  x 1                                         Appears to be in pain but not in distress. PCEA got disconnected & d/cd by pain service. Currently on PCA                                         Using incentive spirometry                                          Monitor chest tube output                                         Chest tube to water seal                                                                                           GI            Barium study tomorrow                                         NPO, cont tube feeds, increase to goal.                                          Continue GI prophylaxis with Protonix                                         Continue Zofran / Reglan for nausea - PRN                                         NGT to low continuous wall suction                                            Flush both NGT and J-tube with 20cc of sterile water Q 4hrs.  	                                                                 Renal:                                         Continue  D5/.45NS with 20K                                          Monitor I/Os and electrolytes                                         Cantu                                                  Hem/ Onc:                                                                                  Monitor chest tube output &  signs of bleeding.                                          Follow CBC in AM                           Infectious disease:                                            No signs of infection. Monitor for fever / leukocytosis.                                          All surgical incision / chest tube  sites look clean                            Endocrine                                             Continue Accu-Checks with coverage    Pt is on SQ Heparin and Venodyne boots for DVT prophylaxis.     Pertinent clinical, laboratory, radiographic, hemodynamic, echocardiographic, respiratory data, microbiologic data and chart were reviewed and analyzed frequently throughout the course of the day and night  Patient seen, examined and plan discussed with CT Surgeon Dr. Hebert / CTICU team during rounds.    Pt's status discussed with family at bedside, updated status            Manny Singh DO, FACEP

## 2018-08-22 NOTE — PROGRESS NOTE ADULT - SUBJECTIVE AND OBJECTIVE BOX
Day _3__ of Anesthesia Pain Management Service      SUBJECTIVE: the pump helps     Pain Scale Score	At rest: __5_ 	With Activity: ___ 	[ ] Refer to charted pain scores    THERAPY:    [ ] IV PCA Morphine		[ ] 5 mg/mL	[ ] 1 mg/mL  [X] IV PCA Hydromorphone	[ ] 5 mg/mL	[X] 1 mg/mL  [ ] IV PCA Fentanyl		[ ] 50 micrograms/mL    Demand dose _0.2mg_ lockout _6_ (minutes) Continuous Rate _0_ Total: _7.7mg__  Daily      MEDICATIONS  (STANDING):  acetaminophen  IVPB. 1000 milliGRAM(s) IV Intermittent once  dextrose 5% + sodium chloride 0.45% with potassium chloride 20 mEq/L 1000 milliLiter(s) (20 mL/Hr) IV Continuous <Continuous>  diltiazem Infusion 5 mG/Hr (5 mL/Hr) IV Continuous <Continuous>  heparin  Injectable 5000 Unit(s) SubCutaneous every 12 hours  HYDROmorphone PCA (1 mG/mL) 30 milliLiter(s) PCA Continuous PCA Continuous  ipratropium    for Nebulization 500 MICROGram(s) Nebulizer every 6 hours    MEDICATIONS  (PRN):  ALBUTerol    90 MICROgram(s) HFA Inhaler 2 Puff(s) Inhalation every 4 hours PRN Shortness of Breath and/or Wheezing  naloxone Injectable 0.1 milliGRAM(s) IV Push every 3 minutes PRN For ANY of the following changes in patient status:  A. RR LESS THAN 10 breaths per minute, B. Oxygen saturation LESS THAN 90%, C. Sedation score of 6  ondansetron Injectable 4 milliGRAM(s) IV Push every 6 hours PRN Nausea  sodium chloride 0.65% Nasal 1 Spray(s) Both Nostrils three times a day PRN Nasal Congestion      OBJECTIVE: A&Ox3, NAD, sitting up in chair    Sedation Score:	[X] Alert	[ ] Drowsy	[ ] Arousable	[ ] Asleep	[ ] Unresponsive    Side Effects:	[X] None	[ ] Nausea	[ ] Vomiting	[ ] Pruritus  		  [ ] Weakness		[ ] Numbness	[ ] Other:                              11.4   15.58 )-----------( 276      ( 22 Aug 2018 03:00 )             34.6       08-22    142  |  98  |  9   ----------------------------<  165<H>  4.1   |  30  |  0.71    Ca    8.4      22 Aug 2018 03:00  Phos  2.8     08-22  Mg     2.0     08-22    TPro  6.0  /  Alb  2.7<L>  /  TBili  0.9  /  DBili  x   /  AST  20  /  ALT  17  /  AlkPhos  108  08-22      ASSESSMENT/ PLAN    Therapy to  be:	[X] Continue   [ ] Discontinued   [ ] Change to prn Analgesics    Documentation and Verification of current medications:  [X] Done	[ ] Not done, not eligible  [ ] Not done, reason not given    [ ]  [place]  Reviewed and Copied to Chart    Comments: NPO with NGT, continue current therapy

## 2018-08-22 NOTE — PROGRESS NOTE ADULT - SUBJECTIVE AND OBJECTIVE BOX
Anesthesia Pain Management Service- Attending Addendum    SUBJECTIVE: Pt doing well with IV PCA without problems reported.    Therapy:	  [ X] IV PCA	   [ ] Epidural           [ ] s/p Spinal Opoid              [ ] Postpartum infusion	  [ ] Patient controlled regional anesthesia (PCRA)    [ ] prn Analgesics    Allergies    No Known Allergies    Intolerances      MEDICATIONS  (STANDING):  acetaminophen  IVPB. 1000 milliGRAM(s) IV Intermittent once  dextrose 5% + sodium chloride 0.45% with potassium chloride 20 mEq/L 1000 milliLiter(s) (20 mL/Hr) IV Continuous <Continuous>  diltiazem Infusion 5 mG/Hr (5 mL/Hr) IV Continuous <Continuous>  heparin  Injectable 5000 Unit(s) SubCutaneous every 12 hours  HYDROmorphone PCA (1 mG/mL) 30 milliLiter(s) PCA Continuous PCA Continuous  ipratropium    for Nebulization 500 MICROGram(s) Nebulizer every 6 hours    MEDICATIONS  (PRN):  ALBUTerol    90 MICROgram(s) HFA Inhaler 2 Puff(s) Inhalation every 4 hours PRN Shortness of Breath and/or Wheezing  naloxone Injectable 0.1 milliGRAM(s) IV Push every 3 minutes PRN For ANY of the following changes in patient status:  A. RR LESS THAN 10 breaths per minute, B. Oxygen saturation LESS THAN 90%, C. Sedation score of 6  ondansetron Injectable 4 milliGRAM(s) IV Push every 6 hours PRN Nausea  sodium chloride 0.65% Nasal 1 Spray(s) Both Nostrils three times a day PRN Nasal Congestion      OBJECTIVE:   [X] No new signs     [ ] Other:    Side Effects:  [X ] None			[ ] Other:    Assessment of Catheter Site:		[ ] Intact		[ ] Other:    ASSESSMENT/PLAN  [ X] Continue current therapy    [ ] Therapy changed to:    [ ] IV PCA       [ ] Epidural     [ ] prn Analgesics     Comments:

## 2018-08-23 ENCOUNTER — APPOINTMENT (OUTPATIENT)
Dept: THORACIC SURGERY | Facility: HOSPITAL | Age: 63
End: 2018-08-23
Payer: COMMERCIAL

## 2018-08-23 LAB
BUN SERPL-MCNC: 13 MG/DL — SIGNIFICANT CHANGE UP (ref 7–23)
CALCIUM SERPL-MCNC: 8.7 MG/DL — SIGNIFICANT CHANGE UP (ref 8.4–10.5)
CHLORIDE SERPL-SCNC: 101 MMOL/L — SIGNIFICANT CHANGE UP (ref 98–107)
CO2 SERPL-SCNC: 28 MMOL/L — SIGNIFICANT CHANGE UP (ref 22–31)
CREAT SERPL-MCNC: 0.68 MG/DL — SIGNIFICANT CHANGE UP (ref 0.5–1.3)
GLUCOSE SERPL-MCNC: 126 MG/DL — HIGH (ref 70–99)
GRAM STN FLD: SIGNIFICANT CHANGE UP
HCT VFR BLD CALC: 34.3 % — LOW (ref 39–50)
HGB BLD-MCNC: 11 G/DL — LOW (ref 13–17)
MAGNESIUM SERPL-MCNC: 2.1 MG/DL — SIGNIFICANT CHANGE UP (ref 1.6–2.6)
MCHC RBC-ENTMCNC: 29.6 PG — SIGNIFICANT CHANGE UP (ref 27–34)
MCHC RBC-ENTMCNC: 32.1 % — SIGNIFICANT CHANGE UP (ref 32–36)
MCV RBC AUTO: 92.2 FL — SIGNIFICANT CHANGE UP (ref 80–100)
NRBC # FLD: 0 — SIGNIFICANT CHANGE UP
PHOSPHATE SERPL-MCNC: 2.8 MG/DL — SIGNIFICANT CHANGE UP (ref 2.5–4.5)
PLATELET # BLD AUTO: 311 K/UL — SIGNIFICANT CHANGE UP (ref 150–400)
PMV BLD: 9.9 FL — SIGNIFICANT CHANGE UP (ref 7–13)
POTASSIUM SERPL-MCNC: 4.3 MMOL/L — SIGNIFICANT CHANGE UP (ref 3.5–5.3)
POTASSIUM SERPL-SCNC: 4.3 MMOL/L — SIGNIFICANT CHANGE UP (ref 3.5–5.3)
RBC # BLD: 3.72 M/UL — LOW (ref 4.2–5.8)
RBC # FLD: 12.5 % — SIGNIFICANT CHANGE UP (ref 10.3–14.5)
SODIUM SERPL-SCNC: 140 MMOL/L — SIGNIFICANT CHANGE UP (ref 135–145)
SPECIMEN SOURCE: SIGNIFICANT CHANGE UP
WBC # BLD: 16.54 K/UL — HIGH (ref 3.8–10.5)
WBC # FLD AUTO: 16.54 K/UL — HIGH (ref 3.8–10.5)

## 2018-08-23 PROCEDURE — 71045 X-RAY EXAM CHEST 1 VIEW: CPT | Mod: 26

## 2018-08-23 PROCEDURE — 74220 X-RAY XM ESOPHAGUS 1CNTRST: CPT | Mod: 26

## 2018-08-23 PROCEDURE — 31624 DX BRONCHOSCOPE/LAVAGE: CPT | Mod: 78

## 2018-08-23 PROCEDURE — 71045 X-RAY EXAM CHEST 1 VIEW: CPT | Mod: 26,77

## 2018-08-23 PROCEDURE — 99233 SBSQ HOSP IP/OBS HIGH 50: CPT

## 2018-08-23 PROCEDURE — 43235 EGD DIAGNOSTIC BRUSH WASH: CPT | Mod: 78

## 2018-08-23 RX ORDER — MICAFUNGIN SODIUM 100 MG/1
INJECTION, POWDER, LYOPHILIZED, FOR SOLUTION INTRAVENOUS
Qty: 0 | Refills: 0 | Status: DISCONTINUED | OUTPATIENT
Start: 2018-08-23 | End: 2018-08-27

## 2018-08-23 RX ORDER — VANCOMYCIN HCL 1 G
1000 VIAL (EA) INTRAVENOUS ONCE
Qty: 0 | Refills: 0 | Status: COMPLETED | OUTPATIENT
Start: 2018-08-23 | End: 2018-08-23

## 2018-08-23 RX ORDER — MICAFUNGIN SODIUM 100 MG/1
100 INJECTION, POWDER, LYOPHILIZED, FOR SOLUTION INTRAVENOUS ONCE
Qty: 0 | Refills: 0 | Status: COMPLETED | OUTPATIENT
Start: 2018-08-23 | End: 2018-08-23

## 2018-08-23 RX ORDER — ERTAPENEM SODIUM 1 G/1
1000 INJECTION, POWDER, LYOPHILIZED, FOR SOLUTION INTRAMUSCULAR; INTRAVENOUS EVERY 24 HOURS
Qty: 0 | Refills: 0 | Status: DISCONTINUED | OUTPATIENT
Start: 2018-08-23 | End: 2018-08-27

## 2018-08-23 RX ORDER — PIPERACILLIN AND TAZOBACTAM 4; .5 G/20ML; G/20ML
3.38 INJECTION, POWDER, LYOPHILIZED, FOR SOLUTION INTRAVENOUS EVERY 8 HOURS
Qty: 0 | Refills: 0 | Status: DISCONTINUED | OUTPATIENT
Start: 2018-08-23 | End: 2018-08-23

## 2018-08-23 RX ORDER — VANCOMYCIN HCL 1 G
1000 VIAL (EA) INTRAVENOUS EVERY 12 HOURS
Qty: 0 | Refills: 0 | Status: DISCONTINUED | OUTPATIENT
Start: 2018-08-23 | End: 2018-08-23

## 2018-08-23 RX ORDER — VANCOMYCIN HCL 1 G
VIAL (EA) INTRAVENOUS
Qty: 0 | Refills: 0 | Status: DISCONTINUED | OUTPATIENT
Start: 2018-08-23 | End: 2018-08-23

## 2018-08-23 RX ORDER — MICAFUNGIN SODIUM 100 MG/1
100 INJECTION, POWDER, LYOPHILIZED, FOR SOLUTION INTRAVENOUS EVERY 24 HOURS
Qty: 0 | Refills: 0 | Status: DISCONTINUED | OUTPATIENT
Start: 2018-08-24 | End: 2018-08-27

## 2018-08-23 RX ORDER — DORNASE ALFA 1 MG/ML
2.5 SOLUTION RESPIRATORY (INHALATION)
Qty: 0 | Refills: 0 | Status: DISCONTINUED | OUTPATIENT
Start: 2018-08-23 | End: 2018-08-27

## 2018-08-23 RX ORDER — SODIUM CHLORIDE 9 MG/ML
250 INJECTION INTRAMUSCULAR; INTRAVENOUS; SUBCUTANEOUS ONCE
Qty: 0 | Refills: 0 | Status: COMPLETED | OUTPATIENT
Start: 2018-08-23 | End: 2018-08-23

## 2018-08-23 RX ORDER — PANTOPRAZOLE SODIUM 20 MG/1
40 TABLET, DELAYED RELEASE ORAL DAILY
Qty: 0 | Refills: 0 | Status: DISCONTINUED | OUTPATIENT
Start: 2018-08-23 | End: 2018-08-27

## 2018-08-23 RX ORDER — PIPERACILLIN AND TAZOBACTAM 4; .5 G/20ML; G/20ML
3.38 INJECTION, POWDER, LYOPHILIZED, FOR SOLUTION INTRAVENOUS ONCE
Qty: 0 | Refills: 0 | Status: COMPLETED | OUTPATIENT
Start: 2018-08-23 | End: 2018-08-23

## 2018-08-23 RX ADMIN — MICAFUNGIN SODIUM 105 MILLIGRAM(S): 100 INJECTION, POWDER, LYOPHILIZED, FOR SOLUTION INTRAVENOUS at 21:58

## 2018-08-23 RX ADMIN — DORNASE ALFA 2.5 MILLIGRAM(S): 1 SOLUTION RESPIRATORY (INHALATION) at 21:25

## 2018-08-23 RX ADMIN — Medication 250 MILLIGRAM(S): at 12:45

## 2018-08-23 RX ADMIN — ERTAPENEM SODIUM 120 MILLIGRAM(S): 1 INJECTION, POWDER, LYOPHILIZED, FOR SOLUTION INTRAMUSCULAR; INTRAVENOUS at 21:58

## 2018-08-23 RX ADMIN — Medication 500 MICROGRAM(S): at 15:54

## 2018-08-23 RX ADMIN — DEXTROSE MONOHYDRATE, SODIUM CHLORIDE, AND POTASSIUM CHLORIDE 20 MILLILITER(S): 50; .745; 4.5 INJECTION, SOLUTION INTRAVENOUS at 09:36

## 2018-08-23 RX ADMIN — PIPERACILLIN AND TAZOBACTAM 200 GRAM(S): 4; .5 INJECTION, POWDER, LYOPHILIZED, FOR SOLUTION INTRAVENOUS at 11:12

## 2018-08-23 RX ADMIN — HYDROMORPHONE HYDROCHLORIDE 30 MILLILITER(S): 2 INJECTION INTRAMUSCULAR; INTRAVENOUS; SUBCUTANEOUS at 19:46

## 2018-08-23 RX ADMIN — Medication 5 MG/HR: at 09:35

## 2018-08-23 RX ADMIN — HYDROMORPHONE HYDROCHLORIDE 30 MILLILITER(S): 2 INJECTION INTRAMUSCULAR; INTRAVENOUS; SUBCUTANEOUS at 07:18

## 2018-08-23 RX ADMIN — SODIUM CHLORIDE 250 MILLILITER(S): 9 INJECTION INTRAMUSCULAR; INTRAVENOUS; SUBCUTANEOUS at 16:00

## 2018-08-23 RX ADMIN — HEPARIN SODIUM 5000 UNIT(S): 5000 INJECTION INTRAVENOUS; SUBCUTANEOUS at 06:47

## 2018-08-23 RX ADMIN — PANTOPRAZOLE SODIUM 40 MILLIGRAM(S): 20 TABLET, DELAYED RELEASE ORAL at 12:15

## 2018-08-23 NOTE — PROGRESS NOTE ADULT - SUBJECTIVE AND OBJECTIVE BOX
Anesthesia Pain Management Service    SUBJECTIVE: Patient is doing well with IV PCA and no significant problems reported.    Pain Scale Score	At rest: ___ 	With Activity: ___ 	[X ] Refer to charted pain scores    THERAPY:    [ ] IV PCA Morphine		[ ] 5 mg/mL	[ ] 1 mg/mL  [X ] IV PCA Hydromorphone	[ ] 5 mg/mL	[X ] 1 mg/mL  [ ] IV PCA Fentanyl		[ ] 50 micrograms/mL    Demand dose __0.2_ lockout __6_ (minutes) Continuous Rate _0__ Total: _9.5__  mg used (in past 24 hours)      MEDICATIONS  (STANDING):  acetaminophen  IVPB. 1000 milliGRAM(s) IV Intermittent once  dextrose 5% + sodium chloride 0.45% with potassium chloride 20 mEq/L 1000 milliLiter(s) (20 mL/Hr) IV Continuous <Continuous>  diltiazem Infusion 5 mG/Hr (5 mL/Hr) IV Continuous <Continuous>  heparin  Injectable 5000 Unit(s) SubCutaneous every 12 hours  HYDROmorphone PCA (1 mG/mL) 30 milliLiter(s) PCA Continuous PCA Continuous  ipratropium    for Nebulization 500 MICROGram(s) Nebulizer every 6 hours  pantoprazole  Injectable 40 milliGRAM(s) IV Push daily  piperacillin/tazobactam IVPB. 3.375 Gram(s) IV Intermittent once  piperacillin/tazobactam IVPB. 3.375 Gram(s) IV Intermittent every 8 hours    MEDICATIONS  (PRN):  ALBUTerol    90 MICROgram(s) HFA Inhaler 2 Puff(s) Inhalation every 4 hours PRN Shortness of Breath and/or Wheezing  naloxone Injectable 0.1 milliGRAM(s) IV Push every 3 minutes PRN For ANY of the following changes in patient status:  A. RR LESS THAN 10 breaths per minute, B. Oxygen saturation LESS THAN 90%, C. Sedation score of 6  ondansetron Injectable 4 milliGRAM(s) IV Push every 6 hours PRN Nausea  sodium chloride 0.65% Nasal 1 Spray(s) Both Nostrils three times a day PRN Nasal Congestion      OBJECTIVE:    Sedation Score:	[ X] Alert	[ ] Drowsy 	[ ] Arousable	[ ] Asleep	[ ] Unresponsive    Side Effects:	[X ] None	[ ] Nausea	[ ] Vomiting	[ ] Pruritus  		[ ] Other:    Vital Signs Last 24 Hrs  T(C): 36.9 (23 Aug 2018 08:00), Max: 37.3 (22 Aug 2018 16:00)  T(F): 98.5 (23 Aug 2018 08:00), Max: 99.2 (22 Aug 2018 16:00)  HR: 107 (23 Aug 2018 09:00) (76 - 129)  BP: 110/66 (23 Aug 2018 09:00) (92/66 - 144/57)  BP(mean): 74 (23 Aug 2018 09:00) (57 - 102)  RR: 20 (23 Aug 2018 09:00) (12 - 24)  SpO2: 96% (23 Aug 2018 09:00) (88% - 100%)    ASSESSMENT/ PLAN    Therapy to  be:	[ X] Continue   [ ] Discontinued   [ ] Change to prn Analgesics    Documentation and Verification of current medications:   [X] Done	[ ] Not done, not elligible    Comments: Pt NPO now.    Progress Note written now but Patient was seen earlier.

## 2018-08-23 NOTE — BRIEF OPERATIVE NOTE - OPERATION/FINDINGS
Normal anatomy
Exploratory laparotomy. Umbilical hernia repair. Gastroepiploics preserved, left gastric ligated. The hiatus was widened and esophagus mobilized into the mediastinum. Stomach tubularized, preserving greater curvature. Open cholecystectomy. Jejunum located, 20cm distal to ligament of Treitz; Witzel procedure. Silk stay sutures placed in jejunum, brought to abdominal wall, red rubber catheter placed into lumen. The remainder of [place] Raghu performed by Thoracic surgery.
Robotic assisted prashant madi esophagectomy, EGD done and NGT placed under direct visualization

## 2018-08-23 NOTE — BRIEF OPERATIVE NOTE - PROCEDURE
<<-----Click on this checkbox to enter Procedure Flexible bronchoscopy by cardiothoracic surgery  08/23/2018    Active  [id number]  EGD (esophagogastroduodenoscopy)  08/17/2018    Active  Michaelle Damon

## 2018-08-23 NOTE — CONSULT NOTE ADULT - SUBJECTIVE AND OBJECTIVE BOX
HPI:   Patient is a 63y male with esophogeal cancer who underwent chemo/ RT and then on 8/17 had planner [place] Madi esophogectomy, cholecystectomy and repair of umbilical hernia. He had an esophogram today showing a small leak and then a bronchoscopy. HE still has in a chest tube. We are called regarding antimicrobics for mediastinitis. Patient has a j tube and is getting tube feeds. He overall feels fine.     REVIEW OF SYSTEMS:  All other review of systems negative (Comprehensive ROS)    PAST MEDICAL & SURGICAL HISTORY:  Malignant neoplasm of esophagus, unspecified  History of bilateral inguinal hernia repair      Allergies    No Known Allergies    Intolerances        Antimicrobials Day #  :1  piperacillin/tazobactam IVPB. 3.375 Gram(s) IV Intermittent every 8 hours  vancomycin  IVPB 1000 milliGRAM(s) IV Intermittent every 12 hours  vancomycin  IVPB        Other Medications:  acetaminophen  IVPB. 1000 milliGRAM(s) IV Intermittent once  dextrose 5% + sodium chloride 0.45% with potassium chloride 20 mEq/L 1000 milliLiter(s) IV Continuous <Continuous>  diltiazem Infusion 5 mG/Hr IV Continuous <Continuous>  HYDROmorphone PCA (1 mG/mL) 30 milliLiter(s) PCA Continuous PCA Continuous  ipratropium    for Nebulization 500 MICROGram(s) Nebulizer every 6 hours  naloxone Injectable 0.1 milliGRAM(s) IV Push every 3 minutes PRN  ondansetron Injectable 4 milliGRAM(s) IV Push every 6 hours PRN  pantoprazole  Injectable 40 milliGRAM(s) IV Push daily  sodium chloride 0.65% Nasal 1 Spray(s) Both Nostrils three times a day PRN      FAMILY HISTORY:      SOCIAL HISTORY:  Smoking: [ ]xYes [ ]No  ETOH: [ ]Yes [ ]xNo  Drug Use: [ ]Yes [ ]xNo   [x ] Single[ ]    T(F): 98 (08-23-18 @ 18:02), Max: 98.6 (08-23-18 @ 12:00)  HR: 98 (08-23-18 @ 18:02)  BP: 103/54 (08-23-18 @ 18:02)  RR: 18 (08-23-18 @ 18:02)  SpO2: 100% (08-23-18 @ 18:02)  Wt(kg): --    PHYSICAL EXAM:  General: alert, no acute distress  Eyes:  anicteric, no conjunctival injection, no discharge  Oropharynx: no lesions or injection 	  Neck: supple, without adenopathy  Lungs: bronchial bs and egophony with rales right base to auscultation  Heart: regular rate and rhythm; no murmur, rubs or gallops  Abdomen: soft, distended, nontender, without mass or organomegaly. wound ok, j tube  right chest tube  Skin: no lesions  Extremities: no clubbing, cyanosis, or edema  Neurologic: alert, oriented, moves all extremities    LAB RESULTS:                        11.0   16.54 )-----------( 311      ( 23 Aug 2018 04:00 )             34.3     08-23    140  |  101  |  13  ----------------------------<  126<H>  4.3   |  28  |  0.68    Ca    8.7      23 Aug 2018 04:00  Phos  2.8     08-23  Mg     2.1     08-23    TPro  6.0  /  Alb  2.7<L>  /  TBili  0.9  /  DBili  x   /  AST  20  /  ALT  17  /  AlkPhos  108  08-22    LIVER FUNCTIONS - ( 22 Aug 2018 03:00 )  Alb: 2.7 g/dL / Pro: 6.0 g/dL / ALK PHOS: 108 u/L / ALT: 17 u/L / AST: 20 u/L / GGT: x               MICROBIOLOGY:  RECENT CULTURES:        RADIOLOGY REVIEWED:  < from: Xray Chest 1 View- PORTABLE-Urgent (08.23.18 @ 14:34) >  EXAM:  XR CHEST PORTABLE URGENT 1V        PROCEDURE DATE:  Aug 23 2018         INTERPRETATION:  TIME OF EXAM: August 23, 2018 at 2:20 PM    CLINICAL INFORMATION: Post esophagogastrectomy and endoscopy and   bronchoscopy.    TECHNIQUE:   Portable chest    INTERPRETATION:     No complications post endoscopy and bronchoscopy. Heart is not enlarged.   No pneumothorax. No focal consolidations. Enteric tube and mediastinal   drain have been removed.    COMPARISON:  August 23 at 6:21 AM      IMPRESSION:  Status post esophagectomy.        < end of copied text >  Impression:    Patient s/p prashant madi esophogectomy for esophogeal cancer and had a swallow study which showed a small leak. He has to be assumed to have mediastinitis so will cover with antibiotics and antifungal.   Recommendations:  cover with ertapenem and micafungin(on diltiazem and can interact with azole)   for discharge will need a picc line for about 4 weeks of tx since will be hard to give enteral antimicrobics via j tube  favor ct chest before discharge  chest tube hopefully will help control the leak

## 2018-08-23 NOTE — PROGRESS NOTE ADULT - SUBJECTIVE AND OBJECTIVE BOX
ANGIE ZAMORA          MRN-[id number]    HPI:  64 y/o male smoker with hx of esophagus CA s/p chemo and radiation on  5/18 presents to [facility] for scheduled robotic assisted JOANNA Raghu esophagogastrectomy and cholecystectomy on 8/17/18. (08 Aug 2018 13:51)      Procedure:  POD # :     Issues:        Interval/Overnight Events/ ROS  Pt remained hemodynamically stable overnight, not on any pressors or inotropes. OOB to chair, breathing comfortably with minimal pain. Ambulated several times . Denies pain, no SOB, no palpitations, no nausea/ no vomiting, no dizziness  A-line and fuentes d/artemio         PAST MEDICAL & SURGICAL HISTORY:  Malignant neoplasm of esophagus, unspecified  History of bilateral inguinal hernia repair    Allergies    No Known Allergies    Intolerances            ***VITAL SIGNS:  Vital Signs Last 24 Hrs  T(C): 36.9 (23 Aug 2018 04:00), Max: 37.3 (22 Aug 2018 16:00)  T(F): 98.5 (23 Aug 2018 04:00), Max: 99.2 (22 Aug 2018 16:00)  HR: 115 (23 Aug 2018 04:00) (76 - 123)  BP: 116/52 (23 Aug 2018 03:00) (92/66 - 144/57)  BP(mean): 67 (23 Aug 2018 03:00) (57 - 82)  RR: 18 (23 Aug 2018 04:00) (12 - 24)  SpO2: 98% (23 Aug 2018 04:00) (88% - 100%)    I/Os:   I&O's Detail    21 Aug 2018 07:01  -  22 Aug 2018 07:00  --------------------------------------------------------  IN:    dextrose 5% + sodium chloride 0.45% with potassium chloride 20 mEq/L: 780 mL    diltiazem Infusion: 180 mL    Enteral Tube Flush: 100 mL    Enteral Tube Flush: 100 mL    ns in tub fed  [id number]: 946 mL  Total IN: 2106 mL    OUT:    Chest Tube: 130 mL    Nasoenteral Tube: 700 mL    Voided: 2000 mL  Total OUT: 2830 mL    Total NET: -724 mL      22 Aug 2018 07:01  -  23 Aug 2018 05:13  --------------------------------------------------------  IN:    dextrose 5% + sodium chloride 0.45% with potassium chloride 20 mEq/L: 360 mL    diltiazem Infusion: 110 mL    Enteral Tube Flush: 100 mL    Enteral Tube Flush: 100 mL    IV PiggyBack: 100 mL    ns in tub fed  [id number]: 1248 mL  Total IN: 2018 mL    OUT:    Chest Tube: 180 mL    Nasoenteral Tube: 50 mL    Voided: 1000 mL  Total OUT: 1230 mL    Total NET: 788 mL          CAPILLARY BLOOD GLUCOSE          =======================  MEDICATIONS  ===================  MEDICATIONS  (STANDING):  acetaminophen  IVPB. 1000 milliGRAM(s) IV Intermittent once  dextrose 5% + sodium chloride 0.45% with potassium chloride 20 mEq/L 1000 milliLiter(s) (20 mL/Hr) IV Continuous <Continuous>  diltiazem Infusion 5 mG/Hr (5 mL/Hr) IV Continuous <Continuous>  heparin  Injectable 5000 Unit(s) SubCutaneous every 12 hours  HYDROmorphone PCA (1 mG/mL) 30 milliLiter(s) PCA Continuous PCA Continuous  ipratropium    for Nebulization 500 MICROGram(s) Nebulizer every 6 hours    MEDICATIONS  (PRN):  ALBUTerol    90 MICROgram(s) HFA Inhaler 2 Puff(s) Inhalation every 4 hours PRN Shortness of Breath and/or Wheezing  naloxone Injectable 0.1 milliGRAM(s) IV Push every 3 minutes PRN For ANY of the following changes in patient status:  A. RR LESS THAN 10 breaths per minute, B. Oxygen saturation LESS THAN 90%, C. Sedation score of 6  ondansetron Injectable 4 milliGRAM(s) IV Push every 6 hours PRN Nausea  sodium chloride 0.65% Nasal 1 Spray(s) Both Nostrils three times a day PRN Nasal Congestion      ======================VENTILATOR SETTINGS  ==============      =================== PATIENT CARE ACCESS DEVICES ==========  Peripheral IV  Central Venous Line	R	L	IJ	Fem	SC			Placed:   Arterial Line	R	L	PT	DP	Fem	Rad	Ax	Placed:   Midline:				  Urinary Catheter, Date Placed:   Necessity of urinary, arterial, and venous catheters discussed    ======================= PHYSICAL EXAM===================  General:                         Comfortable, Awake, alert, not in any distress  Neuro:                            Moving all extremities to commands. No focal deficits	  HEENT:                           SANKET/ ETT/ NGT/ trach  Respiratory:	Lungs clear on auscultation bilaterally with good aeration.                                           No rales, rhonchi, no wheezing. Effort even and unlabored.  CV:		Regular rate and rhythm. Normal S1/S2. No murmurs  Abdomen:	                     Soft,  nontender, not-distended. Bowel sounds present / absent.   Skin:		No rash.  Extremities:	Warm, no cyanosis or edema.  Palpable pulses    ============================ LABS =======================                        11.0   16.54 )-----------( 311      ( 23 Aug 2018 04:00 )             34.3     08-23    140  |  101  |  13  ----------------------------<  126<H>  4.3   |  28  |  0.68    Ca    8.7      23 Aug 2018 04:00  Phos  2.8     08-23  Mg     2.1     08-23    TPro  6.0  /  Alb  2.7<L>  /  TBili  0.9  /  DBili  x   /  AST  20  /  ALT  17  /  AlkPhos  108  08-22    LIVER FUNCTIONS - ( 22 Aug 2018 03:00 )  Alb: 2.7 g/dL / Pro: 6.0 g/dL / ALK PHOS: 108 u/L / ALT: 17 u/L / AST: 20 u/L / GGT: x                     ===================== IMAGING STUDIES ===================  Radiology personally reviewed.    ====================ASSESSMENT AND PLAN ================      ====================== NEUROLOGY=======================  Pain control with PCA / PCEA / Tylenol IV / Toradol / Percocet  Pt is on Precedex for agitation  Pt is sedated with Propofol / Fentanyl    ==================== RESPIRATORY========================  Pt is on            L nasal canula / Face tent____% FiO2  Comfortable, no evidence of distress.  Using incentive spirometry & doing                ml  Monitor chest tube output  Chest tube to suction / water seal	    Mechanical Ventilation:    Mechanical ventilator status assessed & settings reviewed  Continue bronchodilators, pulmonary toilet  Head of bed elevation to 30-40 degrees    ====================CARDIOVASCULAR=====================  Continue hemodynamic monitoring/ telemetry  Not on any pressors  Continue cardiovascular / antihypertensive medications    ===================== RENAL ============================  Continue LR 30CC/hr      D/C IVF  Monitor I/Os, BUN/ Cr  and electrolytes  D/C Fuentes      Keep Fuentes for UO monitoring  BPH: Continue Flomax/ Finasteride      ==================== GASTROINTESTINAL===================  On regular diet, tolerating well  Continue GI prophylaxis with Pepcid / Protonix  Continue Zofran / Reglan for nausea - PRN	  NPO    =======================    ENDOCRIN  =====================  Glycemic monitoring  F/S with coverage  ===================HEMATOLOGIC/ONCOLOGIC =============  Monitor chest tube output. No signs of active bleeding.   Follow CBC, coags  in AM  DVT prophylaxis with SCD, sc Heparin    ========================INFECTIOUS DISEASE===============  No signs of infection. Monitor for fever / leukocytosis.  All surgical incision / chest tube  sites look clean  D/C Fuentes      Pertinent clinical, laboratory, radiographic, hemodynamic, echocardiographic, respiratory data, microbiologic data and chart were reviewed and analyzed frequently throughout the course of the day and night. GI and DVT prophylaxis, glycemic control, head of bed elevation and skin care issues were addressed.  Patient seen, examined and plan discussed with CT Surgery / CTICU team during rounds.  Pt remains critically ill in imminent risk of  deterioration and requires very careful cardio- pulmonary monitoring and support.    I have spent               minutes of critical care time with this pt between            am/pm    and               am/ pm         minutes spent on total encounter; more than 50% of the visit was spent counseling and/or coordinating care by the attending physician.        FUNMI Perera MD ANGIE ZAMORA          [id number]    HPI:  64 y/o male smoker with hx of esophagus CA s/p chemo and radiation on  5/18 admitted for robotic assisted JOANNA Raghu esophagogastrectomy and cholecystectomy on 8/17/18. (08 Aug 2018 13:51)      Procedure: [place] Raghu Esophagogastrectomy and cholecystectomy / J-tube   08/17/2018      Issues:  Rapid A fib, post op, with RVR  Shortness of breath  Esophageal Cancer  Postop pain               drip:  Cardizem       Interval/Overnight Events/ ROS  Pt remained hemodynamically stable overnight, not on any pressors or inotropes. OOB to chair, breathing comfortably with minimal pain. Ambulated several times . Denies pain, no SOB, no palpitations, no nausea/ no vomiting, no dizziness  A-line and fuentes d/artemio         PAST MEDICAL & SURGICAL HISTORY:  Malignant neoplasm of esophagus, unspecified  History of bilateral inguinal hernia repair    Allergies    No Known Allergies    Intolerances            ***VITAL SIGNS:  Vital Signs Last 24 Hrs  T(C): 36.9 (23 Aug 2018 04:00), Max: 37.3 (22 Aug 2018 16:00)  T(F): 98.5 (23 Aug 2018 04:00), Max: 99.2 (22 Aug 2018 16:00)  HR: 115 (23 Aug 2018 04:00) (76 - 123)  BP: 116/52 (23 Aug 2018 03:00) (92/66 - 144/57)  BP(mean): 67 (23 Aug 2018 03:00) (57 - 82)  RR: 18 (23 Aug 2018 04:00) (12 - 24)  SpO2: 98% (23 Aug 2018 04:00) (88% - 100%)    I/Os:   I&O's Detail    21 Aug 2018 07:01  -  22 Aug 2018 07:00  --------------------------------------------------------  IN:    dextrose 5% + sodium chloride 0.45% with potassium chloride 20 mEq/L: 780 mL    diltiazem Infusion: 180 mL    Enteral Tube Flush: 100 mL    Enteral Tube Flush: 100 mL    ns in tub fed  [id number]: 946 mL  Total IN: 2106 mL    OUT:    Chest Tube: 130 mL    Nasoenteral Tube: 700 mL    Voided: 2000 mL  Total OUT: 2830 mL    Total NET: -724 mL      22 Aug 2018 07:01  -  23 Aug 2018 05:13  --------------------------------------------------------  IN:    dextrose 5% + sodium chloride 0.45% with potassium chloride 20 mEq/L: 360 mL    diltiazem Infusion: 110 mL    Enteral Tube Flush: 100 mL    Enteral Tube Flush: 100 mL    IV PiggyBack: 100 mL    ns in tub fed  [id number]: 1248 mL  Total IN: 2018 mL    OUT:    Chest Tube: 180 mL    Nasoenteral Tube: 50 mL    Voided: 1000 mL  Total OUT: 1230 mL    Total NET: 788 mL          CAPILLARY BLOOD GLUCOSE          =======================  MEDICATIONS  ===================  MEDICATIONS  (STANDING):  acetaminophen  IVPB. 1000 milliGRAM(s) IV Intermittent once  dextrose 5% + sodium chloride 0.45% with potassium chloride 20 mEq/L 1000 milliLiter(s) (20 mL/Hr) IV Continuous <Continuous>  diltiazem Infusion 5 mG/Hr (5 mL/Hr) IV Continuous <Continuous>  heparin  Injectable 5000 Unit(s) SubCutaneous every 12 hours  HYDROmorphone PCA (1 mG/mL) 30 milliLiter(s) PCA Continuous PCA Continuous  ipratropium    for Nebulization 500 MICROGram(s) Nebulizer every 6 hours    MEDICATIONS  (PRN):  ALBUTerol    90 MICROgram(s) HFA Inhaler 2 Puff(s) Inhalation every 4 hours PRN Shortness of Breath and/or Wheezing  naloxone Injectable 0.1 milliGRAM(s) IV Push every 3 minutes PRN For ANY of the following changes in patient status:  A. RR LESS THAN 10 breaths per minute, B. Oxygen saturation LESS THAN 90%, C. Sedation score of 6  ondansetron Injectable 4 milliGRAM(s) IV Push every 6 hours PRN Nausea  sodium chloride 0.65% Nasal 1 Spray(s) Both Nostrils three times a day PRN Nasal Congestion      ======================VENTILATOR SETTINGS  ==============      =================== PATIENT CARE ACCESS DEVICES ==========  Peripheral IV  Central Venous Line	R	L	IJ	Fem	SC			Placed:   Arterial Line	R	L	PT	DP	Fem	Rad	Ax	Placed:   Midline:				  Urinary Catheter, Date Placed:   Necessity of urinary, arterial, and venous catheters discussed    ======================= PHYSICAL EXAM===================  General:                         Comfortable, Awake, alert, not in any distress  Neuro:                            Moving all extremities to commands. No focal deficits	  HEENT:                           SANKET/ ETT/ NGT/ trach  Respiratory:	Lungs clear on auscultation bilaterally with good aeration.                                           No rales, rhonchi, no wheezing. Effort even and unlabored.  CV:		Regular rate and rhythm. Normal S1/S2. No murmurs  Abdomen:	                     Soft,  nontender, not-distended. Bowel sounds present / absent.   Skin:		No rash.  Extremities:	Warm, no cyanosis or edema.  Palpable pulses    ============================ LABS =======================                        11.0   16.54 )-----------( 311      ( 23 Aug 2018 04:00 )             34.3     08-23    140  |  101  |  13  ----------------------------<  126<H>  4.3   |  28  |  0.68    Ca    8.7      23 Aug 2018 04:00  Phos  2.8     08-23  Mg     2.1     08-23    TPro  6.0  /  Alb  2.7<L>  /  TBili  0.9  /  DBili  x   /  AST  20  /  ALT  17  /  AlkPhos  108  08-22    LIVER FUNCTIONS - ( 22 Aug 2018 03:00 )  Alb: 2.7 g/dL / Pro: 6.0 g/dL / ALK PHOS: 108 u/L / ALT: 17 u/L / AST: 20 u/L / GGT: x                     ===================== IMAGING STUDIES ===================  Radiology personally reviewed.    ====================ASSESSMENT AND PLAN ================      ====================== NEUROLOGY=======================  Pain control with PCA / PCEA / Tylenol IV / Toradol / Percocet  Pt is on Precedex for agitation  Pt is sedated with Propofol / Fentanyl    ==================== RESPIRATORY========================  Pt is on            L nasal canula / Face tent____% FiO2  Comfortable, no evidence of distress.  Using incentive spirometry & doing                ml  Monitor chest tube output  Chest tube to suction / water seal	    Mechanical Ventilation:    Mechanical ventilator status assessed & settings reviewed  Continue bronchodilators, pulmonary toilet  Head of bed elevation to 30-40 degrees    ====================CARDIOVASCULAR=====================  Continue hemodynamic monitoring/ telemetry  Not on any pressors  Continue cardiovascular / antihypertensive medications    ===================== RENAL ============================  Continue LR 30CC/hr      D/C IVF  Monitor I/Os, BUN/ Cr  and electrolytes  D/C Fuentes      Keep Fuentes for UO monitoring  BPH: Continue Flomax/ Finasteride      ==================== GASTROINTESTINAL===================  On regular diet, tolerating well  Continue GI prophylaxis with Pepcid / Protonix  Continue Zofran / Reglan for nausea - PRN	  NPO    =======================    ENDOCRIN  =====================  Glycemic monitoring  F/S with coverage  ===================HEMATOLOGIC/ONCOLOGIC =============  Monitor chest tube output. No signs of active bleeding.   Follow CBC, coags  in AM  DVT prophylaxis with SCD, sc Heparin    ========================INFECTIOUS DISEASE===============  No signs of infection. Monitor for fever / leukocytosis.  All surgical incision / chest tube  sites look clean  D/C Fuentes      Pertinent clinical, laboratory, radiographic, hemodynamic, echocardiographic, respiratory data, microbiologic data and chart were reviewed and analyzed frequently throughout the course of the day and night. GI and DVT prophylaxis, glycemic control, head of bed elevation and skin care issues were addressed.  Patient seen, examined and plan discussed with CT Surgery / CTICU team during rounds.  Pt remains critically ill in imminent risk of  deterioration and requires very careful cardio- pulmonary monitoring and support.    I have spent               minutes of critical care time with this pt between            am/pm    and               am/ pm         minutes spent on total encounter; more than 50% of the visit was spent counseling and/or coordinating care by the attending physician.        FUNMI Perera MD            PAST MEDICAL & SURGICAL HISTORY:  Malignant neoplasm of esophagus, unspecified  History of bilateral inguinal hernia repair        VITAL SIGNS:  Vital Signs Last 24 Hrs  T(C): 36.6 (22 Aug 2018 04:00), Max: 36.9 (21 Aug 2018 20:00)  T(F): 97.9 (22 Aug 2018 04:00), Max: 98.4 (21 Aug 2018 20:00)  HR: 91 (22 Aug 2018 09:00) (89 - 128)  BP: 103/60 (22 Aug 2018 09:00) (95/58 - 118/64)  BP(mean): 71 (22 Aug 2018 09:00) (63 - 82)  RR: 14 (22 Aug 2018 09:00) (14 - 22)  SpO2: 95% (22 Aug 2018 09:00) (93% - 100%)    I/Os:   I&O's Detail    21 Aug 2018 07:01  -  22 Aug 2018 07:00  --------------------------------------------------------  IN:    dextrose 5% + sodium chloride 0.45% with potassium chloride 20 mEq/L: 780 mL    diltiazem Infusion: 180 mL    Enteral Tube Flush: 100 mL    Enteral Tube Flush: 100 mL    ns in tub fed  [id number]: 946 mL  Total IN: 2106 mL    OUT:    Chest Tube: 130 mL    Nasoenteral Tube: 700 mL    Voided: 2000 mL  Total OUT: 2830 mL    Total NET: -724 mL      22 Aug 2018 07:01  -  22 Aug 2018 10:33  --------------------------------------------------------  IN:    dextrose 5% + sodium chloride 0.45% with potassium chloride 20 mEq/L: 60 mL    diltiazem Infusion: 45 mL    Enteral Tube Flush: 20 mL    Enteral Tube Flush: 20 mL    IV PiggyBack: 100 mL  Total IN: 245 mL    OUT:    Voided: 200 mL  Total OUT: 200 mL    Total NET: 45 mL          CAPILLARY BLOOD GLUCOSE          =======================MEDICATIONS===================  MEDICATIONS  (STANDING):  acetaminophen  IVPB. 1000 milliGRAM(s) IV Intermittent once  dextrose 5% + sodium chloride 0.45% with potassium chloride 20 mEq/L 1000 milliLiter(s) (20 mL/Hr) IV Continuous <Continuous>  diltiazem Infusion 5 mG/Hr (5 mL/Hr) IV Continuous <Continuous>  heparin  Injectable 5000 Unit(s) SubCutaneous every 12 hours  HYDROmorphone PCA (1 mG/mL) 30 milliLiter(s) PCA Continuous PCA Continuous  ipratropium    for Nebulization 500 MICROGram(s) Nebulizer every 6 hours    MEDICATIONS  (PRN):  ALBUTerol    90 MICROgram(s) HFA Inhaler 2 Puff(s) Inhalation every 4 hours PRN Shortness of Breath and/or Wheezing  naloxone Injectable 0.1 milliGRAM(s) IV Push every 3 minutes PRN For ANY of the following changes in patient status:  A. RR LESS THAN 10 breaths per minute, B. Oxygen saturation LESS THAN 90%, C. Sedation score of 6  ondansetron Injectable 4 milliGRAM(s) IV Push every 6 hours PRN Nausea  sodium chloride 0.65% Nasal 1 Spray(s) Both Nostrils three times a day PRN Nasal Congestion      PHYSICAL EXAM============================  General:                         Awake, alert, not in any distress  Neuro:                            Moving all extremities to commands.   Respiratory:	Air entry fair and  bilateral conducted sounds                                      chest tube to water seal   CV:		Regular rate and rhythm. Normal S1/S2                                          Distal pulses present.  Abdomen:	                     Soft, non-distended. Bowel sounds present   Skin:		No rash.  Extremities:	Warm, no cyanosis or edema.  Palpable pulses    ============================LABS=========================                        11.4   15.58 )-----------( 276      ( 22 Aug 2018 03:00 )             34.6     08-22    142  |  98  |  9   ----------------------------<  165<H>  4.1   |  30  |  0.71    Ca    8.4      22 Aug 2018 03:00  Phos  2.8     08-22  Mg     2.0     08-22    TPro  6.0  /  Alb  2.7<L>  /  TBili  0.9  /  DBili  x   /  AST  20  /  ALT  17  /  AlkPhos  108  08-22    LIVER FUNCTIONS - ( 22 Aug 2018 03:00 )  Alb: 2.7 g/dL / Pro: 6.0 g/dL / ALK PHOS: 108 u/L / ALT: 17 u/L / AST: 20 u/L / GGT: x                   ============================IMAGING STUDIES=========================  < from: Xray Chest 1 View- PORTABLE-Routine (08.22.18 @ 06:50) >    IMPRESSION:  Stable minimal right apical pneumothorax. Possible new   minimal pneumothorax at the lateral right base.    No significant interval change in trace bilateral pleural effusions and   bibasilar subsegmental atelectasis.      A/P:    63yMale s/p [place] Raghu Esophagogastrectomy / J-tube on 08/17/2018  progressing well, OOB in chair, experiencing  pain with deep breathing.                             Neuro:                                         Pain control with  PCEA / IV Tylenol                            Cardiovascular:                                          Continue hemodynamic monitoring.                                         A fib w RVR, post op: Cont cardizdm drip                                        Continue IVF D5/.45NS with 20K                                         Avoid vasopressor agents                            Respiratory:                                         Pt is on 2L  nasal canula, c/o shortness of breath. Continue bronchodilators, pulmonary toilet & Lasix 10mg  x 1                                         Appears to be in pain but not in distress. PCEA got disconnected & d/cd by pain service. Currently on PCA                                         Using incentive spirometry                                          Monitor chest tube output                                         Chest tube to water seal                                                                                           GI            Barium study tomorrow                                         NPO, cont tube feeds, increase to goal.                                          Continue GI prophylaxis with Protonix                                         Continue Zofran / Reglan for nausea - PRN                                         NGT to low continuous wall suction                                            Flush both NGT and J-tube with 20cc of sterile water Q 4hrs.  	                                                                 Renal:                                         Continue  D5/.45NS with 20K                                          Monitor I/Os and electrolytes                                         Fuentes                                                  Hem/ Onc:                                                                                  Monitor chest tube output &  signs of bleeding.                                          Follow CBC in AM                           Infectious disease:                                            No signs of infection. Monitor for fever / leukocytosis.                                          All surgical incision / chest tube  sites look clean                            Endocrine                                             Continue Accu-Checks with coverage    Pt is on SQ Heparin and Venodyne boots for DVT prophylaxis.     Pertinent clinical, laboratory, radiographic, hemodynamic, echocardiographic, respiratory data, microbiologic data and chart were reviewed and analyzed frequently throughout the course of the day and night  Patient seen, examined and plan discussed with CT Surgeon Dr. Hebert / CTICU team during rounds.    Pt's status discussed with family at bedside, updated status ANGIE ZAMORA          MRN-[id number]    HPI:  64 y/o male smoker with hx of esophagus CA s/p chemo and radiation on  5/18 admitted for robotic assisted MIR[place] Raghu esophagogastrectomy and cholecystectomy on 8/17/18. (08 Aug 2018 13:51)    Procedure: [place] Raghu Esophagogastrectomy and cholecystectomy / J-tube   08/17/2018    Issues:  Rapid A fib, post op, with RVR  Shortness of breath  Esophageal Cancer  right chest tube - jose in place  Postop pain               drip:  Cardizem       Interval/Overnight Events/ ROS  Pt remained on Cardizem drip overnight for HR control, not on any pressors or inotropes. OOB to chair, breathing comfortably with minimal pain. Ambulated several times yesterday . Denies pain, no SOB, no palpitations, no nausea/ no vomiting, no dizziness  Plan for barium swallow today    PAST MEDICAL & SURGICAL HISTORY:  Malignant neoplasm of esophagus, unspecified  History of bilateral inguinal hernia repair    Allergies  No Known Allergies      ***VITAL SIGNS:  Vital Signs Last 24 Hrs  T(C): 36.9 (23 Aug 2018 04:00), Max: 37.3 (22 Aug 2018 16:00)  T(F): 98.5 (23 Aug 2018 04:00), Max: 99.2 (22 Aug 2018 16:00)  HR: 115 (23 Aug 2018 04:00) (76 - 123)  BP: 116/52 (23 Aug 2018 03:00) (92/66 - 144/57)  BP(mean): 67 (23 Aug 2018 03:00) (57 - 82)  RR: 18 (23 Aug 2018 04:00) (12 - 24)  SpO2: 98% (23 Aug 2018 04:00) (88% - 100%)    I/Os:   I&O's Detail    21 Aug 2018 07:01  -  22 Aug 2018 07:00  --------------------------------------------------------  IN:    dextrose 5% + sodium chloride 0.45% with potassium chloride 20 mEq/L: 780 mL    diltiazem Infusion: 180 mL    Enteral Tube Flush: 100 mL    Enteral Tube Flush: 100 mL    ns in tub fed  [id number]: 946 mL  Total IN: 2106 mL    OUT:    Chest Tube: 130 mL    Nasoenteral Tube: 700 mL    Voided: 2000 mL  Total OUT: 2830 mL    Total NET: -724 mL      22 Aug 2018 07:01  -  23 Aug 2018 05:13  --------------------------------------------------------  IN:    dextrose 5% + sodium chloride 0.45% with potassium chloride 20 mEq/L: 360 mL    diltiazem Infusion: 110 mL    Enteral Tube Flush: 100 mL    Enteral Tube Flush: 100 mL    IV PiggyBack: 100 mL    ns in tub fed  [id number]: 1248 mL  Total IN: 2018 mL    OUT:    Chest Tube: 180 mL    Nasoenteral Tube: 50 mL    Voided: 1000 mL  Total OUT: 1230 mL    Total NET: 788 mL    =======================  MEDICATIONS  ===================  MEDICATIONS  (STANDING):  acetaminophen  IVPB. 1000 milliGRAM(s) IV Intermittent once  dextrose 5% + sodium chloride 0.45% with potassium chloride 20 mEq/L 1000 milliLiter(s) (20 mL/Hr) IV Continuous <Continuous>  diltiazem Infusion 5 mG/Hr (5 mL/Hr) IV Continuous <Continuous>  heparin  Injectable 5000 Unit(s) SubCutaneous every 12 hours  HYDROmorphone PCA (1 mG/mL) 30 milliLiter(s) PCA Continuous PCA Continuous  ipratropium    for Nebulization 500 MICROGram(s) Nebulizer every 6 hours    MEDICATIONS  (PRN):  ALBUTerol    90 MICROgram(s) HFA Inhaler 2 Puff(s) Inhalation every 4 hours PRN Shortness of Breath and/or Wheezing  naloxone Injectable 0.1 milliGRAM(s) IV Push every 3 minutes PRN For ANY of the following changes in patient status:  A. RR LESS THAN 10 breaths per minute, B. Oxygen saturation LESS THAN 90%, C. Sedation score of 6  ondansetron Injectable 4 milliGRAM(s) IV Push every 6 hours PRN Nausea  sodium chloride 0.65% Nasal 1 Spray(s) Both Nostrils three times a day PRN Nasal Congestion        =================== PATIENT CARE ACCESS DEVICES ==========  Peripheral IV (+)      ======================= PHYSICAL EXAM===================  General:           Comfortable, Awake, alert, not in any distress  Neuro:             Moving all extremities to commands. No focal deficits	  HEENT:                           SANKET/ ETT/ NGT/ trach  Respiratory:	Lungs clear on auscultation bilaterally with good aeration.                                           No rales, rhonchi, no wheezing. Effort even and unlabored.  CV:		Regular rate and rhythm. Normal S1/S2. No murmurs  Abdomen:	                     Soft,  nontender, not-distended. Bowel sounds present / absent.   Skin:		No rash.  Extremities:	Warm, no cyanosis or edema.  Palpable pulses    ============================ LABS =======================                        11.0   16.54 )-----------( 311      ( 23 Aug 2018 04:00 )             34.3     08-23    140  |  101  |  13  ----------------------------<  126<H>  4.3   |  28  |  0.68    Ca    8.7      23 Aug 2018 04:00  Phos  2.8     08-23  Mg     2.1     08-23    TPro  6.0  /  Alb  2.7<L>  /  TBili  0.9  /  DBili  x   /  AST  20  /  ALT  17  /  AlkPhos  108  08-22    LIVER FUNCTIONS - ( 22 Aug 2018 03:00 )  Alb: 2.7 g/dL / Pro: 6.0 g/dL / ALK PHOS: 108 u/L / ALT: 17 u/L / AST: 20 u/L / GGT: x                     ===================== IMAGING STUDIES ===================  Radiology personally reviewed.    ====================ASSESSMENT AND PLAN ================      ====================== NEUROLOGY=======================  Pain control with PCA / PCEA / Tylenol IV / Toradol / Percocet  Pt is on Precedex for agitation  Pt is sedated with Propofol / Fentanyl    ==================== RESPIRATORY========================  Pt is on            L nasal canula / Face tent____% FiO2  Comfortable, no evidence of distress.  Using incentive spirometry & doing                ml  Monitor chest tube output  Chest tube to suction / water seal	    Mechanical Ventilation:    Mechanical ventilator status assessed & settings reviewed  Continue bronchodilators, pulmonary toilet  Head of bed elevation to 30-40 degrees    ====================CARDIOVASCULAR=====================  Continue hemodynamic monitoring/ telemetry  Not on any pressors  Continue cardiovascular / antihypertensive medications    ===================== RENAL ============================  Continue LR 30CC/hr      D/C IVF  Monitor I/Os, BUN/ Cr  and electrolytes  D/C Cantu      Keep Cantu for UO monitoring  BPH: Continue Flomax/ Finasteride      ==================== GASTROINTESTINAL===================  On regular diet, tolerating well  Continue GI prophylaxis with Pepcid / Protonix  Continue Zofran / Reglan for nausea - PRN	  NPO    =======================    ENDOCRIN  =====================  Glycemic monitoring  F/S with coverage  ===================HEMATOLOGIC/ONCOLOGIC =============  Monitor chest tube output. No signs of active bleeding.   Follow CBC, coags  in AM  DVT prophylaxis with SCD, sc Heparin    ========================INFECTIOUS DISEASE===============  No signs of infection. Monitor for fever / leukocytosis.  All surgical incision / chest tube  sites look clean  D/C Cantu      Pertinent clinical, laboratory, radiographic, hemodynamic, echocardiographic, respiratory data, microbiologic data and chart were reviewed and analyzed frequently throughout the course of the day and night. GI and DVT prophylaxis, glycemic control, head of bed elevation and skin care issues were addressed.  Patient seen, examined and plan discussed with CT Surgery / CTICU team during rounds.  Pt remains critically ill in imminent risk of  deterioration and requires very careful cardio- pulmonary monitoring and support.    I have spent               minutes of critical care time with this pt between            am/pm    and               am/ pm         minutes spent on total encounter; more than 50% of the visit was spent counseling and/or coordinating care by the attending physician.        FUNMI Perera MD            PAST MEDICAL & SURGICAL HISTORY:  Malignant neoplasm of esophagus, unspecified  History of bilateral inguinal hernia repair        VITAL SIGNS:  Vital Signs Last 24 Hrs  T(C): 36.6 (22 Aug 2018 04:00), Max: 36.9 (21 Aug 2018 20:00)  T(F): 97.9 (22 Aug 2018 04:00), Max: 98.4 (21 Aug 2018 20:00)  HR: 91 (22 Aug 2018 09:00) (89 - 128)  BP: 103/60 (22 Aug 2018 09:00) (95/58 - 118/64)  BP(mean): 71 (22 Aug 2018 09:00) (63 - 82)  RR: 14 (22 Aug 2018 09:00) (14 - 22)  SpO2: 95% (22 Aug 2018 09:00) (93% - 100%)    I/Os:   I&O's Detail    21 Aug 2018 07:01  -  22 Aug 2018 07:00  --------------------------------------------------------  IN:    dextrose 5% + sodium chloride 0.45% with potassium chloride 20 mEq/L: 780 mL    diltiazem Infusion: 180 mL    Enteral Tube Flush: 100 mL    Enteral Tube Flush: 100 mL    ns in tub fed  [id number]: 946 mL  Total IN: 2106 mL    OUT:    Chest Tube: 130 mL    Nasoenteral Tube: 700 mL    Voided: 2000 mL  Total OUT: 2830 mL    Total NET: -724 mL      22 Aug 2018 07:01  -  22 Aug 2018 10:33  --------------------------------------------------------  IN:    dextrose 5% + sodium chloride 0.45% with potassium chloride 20 mEq/L: 60 mL    diltiazem Infusion: 45 mL    Enteral Tube Flush: 20 mL    Enteral Tube Flush: 20 mL    IV PiggyBack: 100 mL  Total IN: 245 mL    OUT:    Voided: 200 mL  Total OUT: 200 mL    Total NET: 45 mL          CAPILLARY BLOOD GLUCOSE          =======================MEDICATIONS===================  MEDICATIONS  (STANDING):  acetaminophen  IVPB. 1000 milliGRAM(s) IV Intermittent once  dextrose 5% + sodium chloride 0.45% with potassium chloride 20 mEq/L 1000 milliLiter(s) (20 mL/Hr) IV Continuous <Continuous>  diltiazem Infusion 5 mG/Hr (5 mL/Hr) IV Continuous <Continuous>  heparin  Injectable 5000 Unit(s) SubCutaneous every 12 hours  HYDROmorphone PCA (1 mG/mL) 30 milliLiter(s) PCA Continuous PCA Continuous  ipratropium    for Nebulization 500 MICROGram(s) Nebulizer every 6 hours    MEDICATIONS  (PRN):  ALBUTerol    90 MICROgram(s) HFA Inhaler 2 Puff(s) Inhalation every 4 hours PRN Shortness of Breath and/or Wheezing  naloxone Injectable 0.1 milliGRAM(s) IV Push every 3 minutes PRN For ANY of the following changes in patient status:  A. RR LESS THAN 10 breaths per minute, B. Oxygen saturation LESS THAN 90%, C. Sedation score of 6  ondansetron Injectable 4 milliGRAM(s) IV Push every 6 hours PRN Nausea  sodium chloride 0.65% Nasal 1 Spray(s) Both Nostrils three times a day PRN Nasal Congestion      PHYSICAL EXAM============================  General:                         Awake, alert, not in any distress  Neuro:                            Moving all extremities to commands.   Respiratory:	Air entry fair and  bilateral conducted sounds                                      chest tube to water seal   CV:		Regular rate and rhythm. Normal S1/S2                                          Distal pulses present.  Abdomen:	                     Soft, non-distended. Bowel sounds present   Skin:		No rash.  Extremities:	Warm, no cyanosis or edema.  Palpable pulses    ============================LABS=========================                        11.4   15.58 )-----------( 276      ( 22 Aug 2018 03:00 )             34.6     08-22    142  |  98  |  9   ----------------------------<  165<H>  4.1   |  30  |  0.71    Ca    8.4      22 Aug 2018 03:00  Phos  2.8     08-22  Mg     2.0     08-22    TPro  6.0  /  Alb  2.7<L>  /  TBili  0.9  /  DBili  x   /  AST  20  /  ALT  17  /  AlkPhos  108  08-22    LIVER FUNCTIONS - ( 22 Aug 2018 03:00 )  Alb: 2.7 g/dL / Pro: 6.0 g/dL / ALK PHOS: 108 u/L / ALT: 17 u/L / AST: 20 u/L / GGT: x                   ============================IMAGING STUDIES=========================  < from: Xray Chest 1 View- PORTABLE-Routine (08.22.18 @ 06:50) >    IMPRESSION:  Stable minimal right apical pneumothorax. Possible new   minimal pneumothorax at the lateral right base.    No significant interval change in trace bilateral pleural effusions and   bibasilar subsegmental atelectasis.      A/P:    63yMale s/p [facility] Raghu Esophagogastrectomy / J-tube on 08/17/2018  progressing well, OOB in chair, experiencing  pain with deep breathing.                             Neuro:                                         Pain control with  PCEA / IV Tylenol                            Cardiovascular:                                          Continue hemodynamic monitoring.                                         A fib w RVR, post op: Cont cardizdm drip                                        Continue IVF D5/.45NS with 20K                                         Avoid vasopressor agents                            Respiratory:                                         Pt is on 2L  nasal canula, c/o shortness of breath. Continue bronchodilators, pulmonary toilet & Lasix 10mg  x 1                                         Appears to be in pain but not in distress. PCEA got disconnected & d/cd by pain service. Currently on PCA                                         Using incentive spirometry                                          Monitor chest tube output                                         Chest tube to water seal                                                                                           GI            Barium study tomorrow                                         NPO, cont tube feeds, increase to goal.                                          Continue GI prophylaxis with Protonix                                         Continue Zofran / Reglan for nausea - PRN                                         NGT to low continuous wall suction                                            Flush both NGT and J-tube with 20cc of sterile water Q 4hrs.  	                                                                 Renal:                                         Continue  D5/.45NS with 20K                                          Monitor I/Os and electrolytes                                         Cantu                                                  Hem/ Onc:                                                                                  Monitor chest tube output &  signs of bleeding.                                          Follow CBC in AM                           Infectious disease:                                            No signs of infection. Monitor for fever / leukocytosis.                                          All surgical incision / chest tube  sites look clean                            Endocrine                                             Continue Accu-Checks with coverage    Pt is on SQ Heparin and Venodyne boots for DVT prophylaxis.     Pertinent clinical, laboratory, radiographic, hemodynamic, echocardiographic, respiratory data, microbiologic data and chart were reviewed and analyzed frequently throughout the course of the day and night  Patient seen, examined and plan discussed with CT Surgeon Dr. Hebert / CTICU team during rounds.    Pt's status discussed with family at bedside, updated status ANGIE ZAMORA          MRN-[id number]    HPI:  64 y/o male smoker with hx of esophagus CA s/p chemo and radiation on  5/18 admitted for robotic assisted MIRZA Raghu esophagogastrectomy and cholecystectomy on 8/17/18. (08 Aug 2018 13:51)    Procedure: Mirza Raghu Esophagogastrectomy and cholecystectomy / J-tube   08/17/2018    Issues:  Rapid A fib, post op, with RVR  Shortness of breath  Esophageal Cancer  right chest tube - jose in place  Postop pain               drip:  Cardizem       Interval/Overnight Events/ ROS  Pt remained on Cardizem drip overnight for HR control, not on any pressors or inotropes. OOB to chair, breathing comfortably with minimal pain. Ambulated several times yesterday . Denies pain, no SOB, no palpitations, no nausea/ no vomiting, no dizziness  Plan for barium swallow today    PAST MEDICAL & SURGICAL HISTORY:  Malignant neoplasm of esophagus, unspecified  History of bilateral inguinal hernia repair    Allergies  No Known Allergies      ***VITAL SIGNS:  Vital Signs Last 24 Hrs  T(C): 36.9 (23 Aug 2018 04:00), Max: 37.3 (22 Aug 2018 16:00)  T(F): 98.5 (23 Aug 2018 04:00), Max: 99.2 (22 Aug 2018 16:00)  HR: 115 (23 Aug 2018 04:00) (76 - 123)  BP: 116/52 (23 Aug 2018 03:00) (92/66 - 144/57)  BP(mean): 67 (23 Aug 2018 03:00) (57 - 82)  RR: 18 (23 Aug 2018 04:00) (12 - 24)  SpO2: 98% (23 Aug 2018 04:00) (88% - 100%)    I/Os:   I&O's Detail    21 Aug 2018 07:01  -  22 Aug 2018 07:00  --------------------------------------------------------  IN:    dextrose 5% + sodium chloride 0.45% with potassium chloride 20 mEq/L: 780 mL    diltiazem Infusion: 180 mL    Enteral Tube Flush: 100 mL    Enteral Tube Flush: 100 mL    ns in tub fed  [id number]: 946 mL  Total IN: 2106 mL    OUT:    Chest Tube: 130 mL    Nasoenteral Tube: 700 mL    Voided: 2000 mL  Total OUT: 2830 mL    Total NET: -724 mL      22 Aug 2018 07:01  -  23 Aug 2018 05:13  --------------------------------------------------------  IN:    dextrose 5% + sodium chloride 0.45% with potassium chloride 20 mEq/L: 360 mL    diltiazem Infusion: 110 mL    Enteral Tube Flush: 100 mL    Enteral Tube Flush: 100 mL    IV PiggyBack: 100 mL    ns in tub fed  [id number]: 1248 mL  Total IN: 2018 mL    OUT:    Chest Tube: 180 mL    Nasoenteral Tube: 50 mL    Voided: 1000 mL  Total OUT: 1230 mL    Total NET: 788 mL    =======================  MEDICATIONS  ===================  MEDICATIONS  (STANDING):  acetaminophen  IVPB. 1000 milliGRAM(s) IV Intermittent once  dextrose 5% + sodium chloride 0.45% with potassium chloride 20 mEq/L 1000 milliLiter(s) (20 mL/Hr) IV Continuous <Continuous>  diltiazem Infusion 5 mG/Hr (5 mL/Hr) IV Continuous <Continuous>  heparin  Injectable 5000 Unit(s) SubCutaneous every 12 hours  HYDROmorphone PCA (1 mG/mL) 30 milliLiter(s) PCA Continuous PCA Continuous  ipratropium    for Nebulization 500 MICROGram(s) Nebulizer every 6 hours    MEDICATIONS  (PRN):  ALBUTerol    90 MICROgram(s) HFA Inhaler 2 Puff(s) Inhalation every 4 hours PRN Shortness of Breath and/or Wheezing  naloxone Injectable 0.1 milliGRAM(s) IV Push every 3 minutes PRN For ANY of the following changes in patient status:  A. RR LESS THAN 10 breaths per minute, B. Oxygen saturation LESS THAN 90%, C. Sedation score of 6  ondansetron Injectable 4 milliGRAM(s) IV Push every 6 hours PRN Nausea  sodium chloride 0.65% Nasal 1 Spray(s) Both Nostrils three times a day PRN Nasal Congestion    =================== PATIENT CARE ACCESS DEVICES ==========  Peripheral IV (+)    ======================= PHYSICAL EXAM===================  General:           Comfortable, Awake, alert, not in any distress  Neuro:             Moving all extremities to commands. No focal deficits	  HEENT:                           SANKET/ NGT  Respiratory:	Lungs clear on auscultation bilaterally with good aeration.                            No rales, rhonchi, no wheezing. Effort even and unlabored.  CV:		Regular rate and rhythm. Normal S1/S2. Tachyx=c  Abdomen:	                     Soft,  nontender, not-distended. Bowel sounds present / absent.   Skin:		No rash.  Extremities:	Warm, no cyanosis or edema.  Palpable pulses    ============================ LABS =======================                        11.0   16.54 )-----------( 311      ( 23 Aug 2018 04:00 )             34.3     08-23    140  |  101  |  13  ----------------------------<  126<H>  4.3   |  28  |  0.68    Ca    8.7      23 Aug 2018 04:00  Phos  2.8     08-23  Mg     2.1     08-23    TPro  6.0  /  Alb  2.7<L>  /  TBili  0.9  /  DBili  x   /  AST  20  /  ALT  17  /  AlkPhos  108  08-22    LIVER FUNCTIONS - ( 22 Aug 2018 03:00 )  Alb: 2.7 g/dL / Pro: 6.0 g/dL / ALK PHOS: 108 u/L / ALT: 17 u/L / AST: 20 u/L / GGT: x                     ===================== IMAGING STUDIES ===================  Radiology personally reviewed.    ====================ASSESSMENT AND PLAN ================      ====================== NEUROLOGY=======================  Pain control with PCA / PCEA / Tylenol IV / Toradol / Percocet  Pt is on Precedex for agitation  Pt is sedated with Propofol / Fentanyl    ==================== RESPIRATORY========================  Pt is on            L nasal canula / Face tent____% FiO2  Comfortable, no evidence of distress.  Using incentive spirometry & doing                ml  Monitor chest tube output  Chest tube to suction / water seal	    Mechanical Ventilation:    Mechanical ventilator status assessed & settings reviewed  Continue bronchodilators, pulmonary toilet  Head of bed elevation to 30-40 degrees    ====================CARDIOVASCULAR=====================  Continue hemodynamic monitoring/ telemetry  Not on any pressors  Continue cardiovascular / antihypertensive medications    ===================== RENAL ============================  Continue LR 30CC/hr      D/C IVF  Monitor I/Os, BUN/ Cr  and electrolytes  D/C Cantu      Keep Cantu for UO monitoring  BPH: Continue Flomax/ Finasteride      ==================== GASTROINTESTINAL===================  On regular diet, tolerating well  Continue GI prophylaxis with Pepcid / Protonix  Continue Zofran / Reglan for nausea - PRN	  NPO    =======================    ENDOCRIN  =====================  Glycemic monitoring  F/S with coverage  ===================HEMATOLOGIC/ONCOLOGIC =============  Monitor chest tube output. No signs of active bleeding.   Follow CBC, coags  in AM  DVT prophylaxis with SCD, sc Heparin    ========================INFECTIOUS DISEASE===============  No signs of infection. Monitor for fever / leukocytosis.  All surgical incision / chest tube  sites look clean  D/C Cantu      Pertinent clinical, laboratory, radiographic, hemodynamic, echocardiographic, respiratory data, microbiologic data and chart were reviewed and analyzed frequently throughout the course of the day and night. GI and DVT prophylaxis, glycemic control, head of bed elevation and skin care issues were addressed.  Patient seen, examined and plan discussed with CT Surgery / CTICU team during rounds.  Pt remains critically ill in imminent risk of  deterioration and requires very careful cardio- pulmonary monitoring and support.    I have spent               minutes of critical care time with this pt between            am/pm    and               am/ pm         minutes spent on total encounter; more than 50% of the visit was spent counseling and/or coordinating care by the attending physician.        FUNMI Perera MD            PAST MEDICAL & SURGICAL HISTORY:  Malignant neoplasm of esophagus, unspecified  History of bilateral inguinal hernia repair        VITAL SIGNS:  Vital Signs Last 24 Hrs  T(C): 36.6 (22 Aug 2018 04:00), Max: 36.9 (21 Aug 2018 20:00)  T(F): 97.9 (22 Aug 2018 04:00), Max: 98.4 (21 Aug 2018 20:00)  HR: 91 (22 Aug 2018 09:00) (89 - 128)  BP: 103/60 (22 Aug 2018 09:00) (95/58 - 118/64)  BP(mean): 71 (22 Aug 2018 09:00) (63 - 82)  RR: 14 (22 Aug 2018 09:00) (14 - 22)  SpO2: 95% (22 Aug 2018 09:00) (93% - 100%)    I/Os:   I&O's Detail    21 Aug 2018 07:01  -  22 Aug 2018 07:00  --------------------------------------------------------  IN:    dextrose 5% + sodium chloride 0.45% with potassium chloride 20 mEq/L: 780 mL    diltiazem Infusion: 180 mL    Enteral Tube Flush: 100 mL    Enteral Tube Flush: 100 mL    ns in tub fed  [id number]: 946 mL  Total IN: 2106 mL    OUT:    Chest Tube: 130 mL    Nasoenteral Tube: 700 mL    Voided: 2000 mL  Total OUT: 2830 mL    Total NET: -724 mL      22 Aug 2018 07:01  -  22 Aug 2018 10:33  --------------------------------------------------------  IN:    dextrose 5% + sodium chloride 0.45% with potassium chloride 20 mEq/L: 60 mL    diltiazem Infusion: 45 mL    Enteral Tube Flush: 20 mL    Enteral Tube Flush: 20 mL    IV PiggyBack: 100 mL  Total IN: 245 mL    OUT:    Voided: 200 mL  Total OUT: 200 mL    Total NET: 45 mL          CAPILLARY BLOOD GLUCOSE          =======================MEDICATIONS===================  MEDICATIONS  (STANDING):  acetaminophen  IVPB. 1000 milliGRAM(s) IV Intermittent once  dextrose 5% + sodium chloride 0.45% with potassium chloride 20 mEq/L 1000 milliLiter(s) (20 mL/Hr) IV Continuous <Continuous>  diltiazem Infusion 5 mG/Hr (5 mL/Hr) IV Continuous <Continuous>  heparin  Injectable 5000 Unit(s) SubCutaneous every 12 hours  HYDROmorphone PCA (1 mG/mL) 30 milliLiter(s) PCA Continuous PCA Continuous  ipratropium    for Nebulization 500 MICROGram(s) Nebulizer every 6 hours    MEDICATIONS  (PRN):  ALBUTerol    90 MICROgram(s) HFA Inhaler 2 Puff(s) Inhalation every 4 hours PRN Shortness of Breath and/or Wheezing  naloxone Injectable 0.1 milliGRAM(s) IV Push every 3 minutes PRN For ANY of the following changes in patient status:  A. RR LESS THAN 10 breaths per minute, B. Oxygen saturation LESS THAN 90%, C. Sedation score of 6  ondansetron Injectable 4 milliGRAM(s) IV Push every 6 hours PRN Nausea  sodium chloride 0.65% Nasal 1 Spray(s) Both Nostrils three times a day PRN Nasal Congestion      PHYSICAL EXAM============================  General:                         Awake, alert, not in any distress  Neuro:                            Moving all extremities to commands.   Respiratory:	Air entry fair and  bilateral conducted sounds                                      chest tube to water seal   CV:		Regular rate and rhythm. Normal S1/S2                                          Distal pulses present.  Abdomen:	                     Soft, non-distended. Bowel sounds present   Skin:		No rash.  Extremities:	Warm, no cyanosis or edema.  Palpable pulses    ============================LABS=========================                        11.4   15.58 )-----------( 276      ( 22 Aug 2018 03:00 )             34.6     08-22    142  |  98  |  9   ----------------------------<  165<H>  4.1   |  30  |  0.71    Ca    8.4      22 Aug 2018 03:00  Phos  2.8     08-22  Mg     2.0     08-22    TPro  6.0  /  Alb  2.7<L>  /  TBili  0.9  /  DBili  x   /  AST  20  /  ALT  17  /  AlkPhos  108  08-22    LIVER FUNCTIONS - ( 22 Aug 2018 03:00 )  Alb: 2.7 g/dL / Pro: 6.0 g/dL / ALK PHOS: 108 u/L / ALT: 17 u/L / AST: 20 u/L / GGT: x                   ============================IMAGING STUDIES=========================  < from: Xray Chest 1 View- PORTABLE-Routine (08.22.18 @ 06:50) >    IMPRESSION:  Stable minimal right apical pneumothorax. Possible new   minimal pneumothorax at the lateral right base.    No significant interval change in trace bilateral pleural effusions and   bibasilar subsegmental atelectasis.      A/P:    63yMale s/p Mirza Raghu Esophagogastrectomy / J-tube on 08/17/2018  progressing well, OOB in chair, experiencing  pain with deep breathing.                             Neuro:                                         Pain control with  PCEA / IV Tylenol                            Cardiovascular:                                          Continue hemodynamic monitoring.                                         A fib w RVR, post op: Cont cardizdm drip                                        Continue IVF D5/.45NS with 20K                                         Avoid vasopressor agents                            Respiratory:                                         Pt is on 2L  nasal canula, c/o shortness of breath. Continue bronchodilators, pulmonary toilet & Lasix 10mg  x 1                                         Appears to be in pain but not in distress. PCEA got disconnected & d/cd by pain service. Currently on PCA                                         Using incentive spirometry                                          Monitor chest tube output                                         Chest tube to water seal                                                                                           GI            Barium study tomorrow                                         NPO, cont tube feeds, increase to goal.                                          Continue GI prophylaxis with Protonix                                         Continue Zofran / Reglan for nausea - PRN                                         NGT to low continuous wall suction                                            Flush both NGT and J-tube with 20cc of sterile water Q 4hrs.  	                                                                 Renal:                                         Continue  D5/.45NS with 20K                                          Monitor I/Os and electrolytes                                         Cantu                                                  Hem/ Onc:                                                                                  Monitor chest tube output &  signs of bleeding.                                          Follow CBC in AM                           Infectious disease:                                            No signs of infection. Monitor for fever / leukocytosis.                                          All surgical incision / chest tube  sites look clean                            Endocrine                                             Continue Accu-Checks with coverage    Pt is on SQ Heparin and Venodyne boots for DVT prophylaxis.     Pertinent clinical, laboratory, radiographic, hemodynamic, echocardiographic, respiratory data, microbiologic data and chart were reviewed and analyzed frequently throughout the course of the day and night  Patient seen, examined and plan discussed with CT Surgeon Dr. Hebert / CTICU team during rounds.    Pt's status discussed with family at bedside, updated status ANGIE ZAMORA          [id number]    HPI:  64 y/o male smoker with hx of esophagus CA s/p chemo and radiation on  5/18 admitted for robotic assisted MIRZA Raghu esophagogastrectomy and cholecystectomy on 8/17/18. (08 Aug 2018 13:51)    Procedure: Mirza Raghu Esophagogastrectomy and cholecystectomy / J-tube   08/17/2018    Issues:  Rapid A fib, post op, with RVR  Shortness of breath  Esophageal Cancer  right chest tube - jose in place  Postop pain               drip:  Cardizem       Interval/Overnight Events/ ROS  Pt remained on Cardizem drip overnight for HR control, not on any pressors or inotropes. OOB to chair, breathing comfortably with minimal pain. Ambulated several times yesterday . Denies pain, no SOB, no palpitations, no nausea/ no vomiting, no dizziness  Plan for barium swallow today    PAST MEDICAL & SURGICAL HISTORY:  Malignant neoplasm of esophagus, unspecified  History of bilateral inguinal hernia repair    Allergies  No Known Allergies      ***VITAL SIGNS:  Vital Signs Last 24 Hrs  T(C): 36.9 (23 Aug 2018 04:00), Max: 37.3 (22 Aug 2018 16:00)  T(F): 98.5 (23 Aug 2018 04:00), Max: 99.2 (22 Aug 2018 16:00)  HR: 115 (23 Aug 2018 04:00) (76 - 123)  BP: 116/52 (23 Aug 2018 03:00) (92/66 - 144/57)  BP(mean): 67 (23 Aug 2018 03:00) (57 - 82)  RR: 18 (23 Aug 2018 04:00) (12 - 24)  SpO2: 98% (23 Aug 2018 04:00) (88% - 100%)    I/Os:   I&O's Detail    21 Aug 2018 07:01  -  22 Aug 2018 07:00  --------------------------------------------------------  IN:    dextrose 5% + sodium chloride 0.45% with potassium chloride 20 mEq/L: 780 mL    diltiazem Infusion: 180 mL    Enteral Tube Flush: 100 mL    Enteral Tube Flush: 100 mL    ns in tub fed  [id number]: 946 mL  Total IN: 2106 mL    OUT:    Chest Tube: 130 mL    Nasoenteral Tube: 700 mL    Voided: 2000 mL  Total OUT: 2830 mL    Total NET: -724 mL      22 Aug 2018 07:01  -  23 Aug 2018 05:13  --------------------------------------------------------  IN:    dextrose 5% + sodium chloride 0.45% with potassium chloride 20 mEq/L: 360 mL    diltiazem Infusion: 110 mL    Enteral Tube Flush: 100 mL    Enteral Tube Flush: 100 mL    IV PiggyBack: 100 mL    ns in tub fed  [id number]: 1248 mL  Total IN: 2018 mL    OUT:    Chest Tube: 180 mL    Nasoenteral Tube: 50 mL    Voided: 1000 mL  Total OUT: 1230 mL    Total NET: 788 mL    =======================  MEDICATIONS  ===================  MEDICATIONS  (STANDING):  acetaminophen  IVPB. 1000 milliGRAM(s) IV Intermittent once  dextrose 5% + sodium chloride 0.45% with potassium chloride 20 mEq/L 1000 milliLiter(s) (20 mL/Hr) IV Continuous <Continuous>  diltiazem Infusion 5 mG/Hr (5 mL/Hr) IV Continuous <Continuous>  heparin  Injectable 5000 Unit(s) SubCutaneous every 12 hours  HYDROmorphone PCA (1 mG/mL) 30 milliLiter(s) PCA Continuous PCA Continuous  ipratropium    for Nebulization 500 MICROGram(s) Nebulizer every 6 hours  Protonix IV  MEDICATIONS  (PRN):  ALBUTerol    90 MICROgram(s) HFA Inhaler 2 Puff(s) Inhalation every 4 hours PRN Shortness of Breath and/or Wheezing  naloxone Injectable 0.1 milliGRAM(s) IV Push every 3 minutes PRN For ANY of the following changes in patient status:  A. RR LESS THAN 10 breaths per minute, B. Oxygen saturation LESS THAN 90%, C. Sedation score of 6  ondansetron Injectable 4 milliGRAM(s) IV Push every 6 hours PRN Nausea  sodium chloride 0.65% Nasal 1 Spray(s) Both Nostrils three times a day PRN Nasal Congestion    =================== PATIENT CARE ACCESS DEVICES ==========  Peripheral IV (+)    ======================= PHYSICAL EXAM===================  General:           Comfortable, Awake, alert, not in any distress  Neuro:             Moving all extremities to commands. No focal deficits	  HEENT:                           SANKET/ NGT  Respiratory:	Lungs clear on auscultation bilaterally with good aeration.                            Few basal rales, no rhonchi, no wheezing. Effort even and unlabored.  CV:		Irregular rate and rhythm. (+) S1/S2  Abdomen:         Soft,  nontender, not-distended. Bowel sounds present   Skin:		No rash.  Extremities:	Warm, no cyanosis or edema.  Palpable pulses    ============================ LABS =======================                        11.0   16.54 )-----------( 311      ( 23 Aug 2018 04:00 )             34.3     08-23    140  |  101  |  13  ----------------------------<  126<H>  4.3   |  28  |  0.68    Ca    8.7      23 Aug 2018 04:00  Phos  2.8     08-23  Mg     2.1     08-23    TPro  6.0  /  Alb  2.7<L>  /  TBili  0.9  /  DBili  x   /  AST  20  /  ALT  17  /  AlkPhos  108  08-22    LIVER FUNCTIONS - ( 22 Aug 2018 03:00 )  Alb: 2.7 g/dL / Pro: 6.0 g/dL / ALK PHOS: 108 u/L / ALT: 17 u/L / AST: 20 u/L / GGT: x           ===================== IMAGING STUDIES ===================  Radiology personally reviewed.    < from: Xray Chest 1 View- PORTABLE-Routine (08.22.18 @ 06:50) >    INTERPRETATION:  TIME OF EXAM: August 21, 2018 at 10:13 PM.  CLINICAL INFORMATION: Status post esophagectomy. Evaluate for   pneumothorax.    COMPARISON:  August 21, 2018 at 5:19 AM    INTERPRETATION:     Heart size and the mediastinum cannot be accurately evaluated on this   projection.   Enteric tube and right pleural tube unchanged in position.  There is a new minimal right apical pneumothorax. No left pneumothorax   seen.  There is no significant interval change in trace bilateral pleural   effusions and bibasilar subsegmental atelectasis.    AP portable chest x-ray from August 22, 2018 at 6:24 AM:    There may be an additional minimal pneumothorax at the lateral right   base. Other findings are unchanged.    IMPRESSION:  Stable minimal right apical pneumothorax. Possible new   minimal pneumothorax at the lateral right base.    No significant interval change in trace bilateral pleural effusions and   bibasilar subsegmental atelectasis.      < end of copied text >    ====================ASSESSMENT AND PLAN ================  64 y/o male smoker with hx of esophagus CA s/p chemo and radiation on  5/18 admitted for robotic assisted MIRZA Raghu esophagogastrectomy and cholecystectomy on 8/17/18. (08 Aug 2018 13:51)    Procedure: Mirza Raghu Esophagogastrectomy and cholecystectomy / J-tube   08/17/2018    Issues:  Rapid A fib, post op, with RVR  Shortness of breath  Esophageal Cancer  right chest tube - jose in place  Postop pain    ====================== NEUROLOGY=======================  Pain control with PCA / PCEA / Tylenol IV / Toradol / Percocet  Pt is on Precedex for agitation  Pt is sedated with Propofol / Fentanyl    ==================== RESPIRATORY========================  Pt is on        3    L nasal canula   Comfortable, no evidence of distress.  Using incentive spirometry & doing    750-950  ml  Monitor chest tube output  Chest tube to water seal	  Continue bronchodilators, pulmonary toilet  Head of bed elevation to 30-40 degrees  OOB, ambulation    ====================CARDIOVASCULAR=====================   Continue hemodynamic monitoring.  Paroxysmal  A fib w RVR, post op: Cont cardizem  drip for HR control  Continue IVF D5/.45NS with 20K    Avoid vasopressor agents  ===================== RENAL ============================  Continue dextrose 5% + sodium chloride 0.45% with potassium chloride 20 mEq/L 1000 milliLiter(s) (20 mL/Hr)  Monitor I/Os, BUN/ Cr  and electrolytes  ==================== GASTROINTESTINAL===================  On J tube Jevity feeding , tolerating well  Continue GI prophylaxis with Pepcid / Protonix  Continue Zofran / Reglan for nausea - PRN	  NPO   NGT to low continuous wall suction                                           Flush both NGT and J-tube with 20cc of sterile water Q 4hrs.  BArium swallow today    =======================    ENDOCRIN  =====================  Glycemic monitoring  F/S with coverage  ===================HEMATOLOGIC/ONCOLOGIC =============  Monitor chest tube output. No signs of active bleeding.   Follow CBC, coags  in AM  DVT prophylaxis with SCD, sc Heparin    ========================INFECTIOUS DISEASE===============  No signs of infection. Monitor for fever / leukocytosis.  All surgical incision / chest tube  sites look clean      Pertinent clinical, laboratory, radiographic, hemodynamic, echocardiographic, respiratory data, microbiologic data and chart were reviewed and analyzed frequently throughout the course of the day and night. GI and DVT prophylaxis, glycemic control, head of bed elevation and skin care issues were addressed.  Patient seen, examined and plan discussed with CT Surgery / CTICU team during rounds.  Pt remains critically ill in imminent risk of  deterioration and requires very careful cardio- pulmonary monitoring and support.    I have spent     35  minutes of critical care time with this pt between     12  am  and     8  am         minutes spent on total encounter; more than 50% of the visit was spent counseling and/or coordinating care by the attending physician.        FUNMI Perera MD ANGIE ZAMORA          [id number]    HPI:  64 y/o male smoker with hx of esophagus CA s/p chemo and radiation on  5/18 admitted for robotic assisted MIRZA Raghu esophagogastrectomy and cholecystectomy on 8/17/18. (08 Aug 2018 13:51)    Procedure: Mirza Raghu Esophagogastrectomy and cholecystectomy / J-tube   08/17/2018    Issues:  Rapid A fib, post op, with RVR  Shortness of breath  Esophageal Cancer  right chest tube - jose in place  Postop pain               drip:  Cardizem       Interval/Overnight Events/ ROS  Pt remained on Cardizem drip overnight for HR control, not on any pressors or inotropes. OOB to chair, breathing comfortably with minimal pain. Ambulated several times yesterday . Denies pain, no SOB, no palpitations, no nausea/ no vomiting, no dizziness  Plan for barium swallow today    PAST MEDICAL & SURGICAL HISTORY:  Malignant neoplasm of esophagus, unspecified  History of bilateral inguinal hernia repair    Allergies  No Known Allergies      ***VITAL SIGNS:  Vital Signs Last 24 Hrs  T(C): 36.9 (23 Aug 2018 04:00), Max: 37.3 (22 Aug 2018 16:00)  T(F): 98.5 (23 Aug 2018 04:00), Max: 99.2 (22 Aug 2018 16:00)  HR: 115 (23 Aug 2018 04:00) (76 - 123)  BP: 116/52 (23 Aug 2018 03:00) (92/66 - 144/57)  BP(mean): 67 (23 Aug 2018 03:00) (57 - 82)  RR: 18 (23 Aug 2018 04:00) (12 - 24)  SpO2: 98% (23 Aug 2018 04:00) (88% - 100%)    I/Os:   I&O's Detail    21 Aug 2018 07:01  -  22 Aug 2018 07:00  --------------------------------------------------------  IN:    dextrose 5% + sodium chloride 0.45% with potassium chloride 20 mEq/L: 780 mL    diltiazem Infusion: 180 mL    Enteral Tube Flush: 100 mL    Enteral Tube Flush: 100 mL    ns in tub fed  [id number]: 946 mL  Total IN: 2106 mL    OUT:    Chest Tube: 130 mL    Nasoenteral Tube: 700 mL    Voided: 2000 mL  Total OUT: 2830 mL    Total NET: -724 mL      22 Aug 2018 07:01  -  23 Aug 2018 05:13  --------------------------------------------------------  IN:    dextrose 5% + sodium chloride 0.45% with potassium chloride 20 mEq/L: 360 mL    diltiazem Infusion: 110 mL    Enteral Tube Flush: 100 mL    Enteral Tube Flush: 100 mL    IV PiggyBack: 100 mL    ns in tub fed  [id number]: 1248 mL  Total IN: 2018 mL    OUT:    Chest Tube: 180 mL    Nasoenteral Tube: 50 mL    Voided: 1000 mL  Total OUT: 1230 mL    Total NET: 788 mL    =======================  MEDICATIONS  ===================  MEDICATIONS  (STANDING):  acetaminophen  IVPB. 1000 milliGRAM(s) IV Intermittent once  dextrose 5% + sodium chloride 0.45% with potassium chloride 20 mEq/L 1000 milliLiter(s) (20 mL/Hr) IV Continuous <Continuous>  diltiazem Infusion 5 mG/Hr (5 mL/Hr) IV Continuous <Continuous>  heparin  Injectable 5000 Unit(s) SubCutaneous every 12 hours  HYDROmorphone PCA (1 mG/mL) 30 milliLiter(s) PCA Continuous PCA Continuous  ipratropium    for Nebulization 500 MICROGram(s) Nebulizer every 6 hours  Protonix IV  MEDICATIONS  (PRN):  ALBUTerol    90 MICROgram(s) HFA Inhaler 2 Puff(s) Inhalation every 4 hours PRN Shortness of Breath and/or Wheezing  naloxone Injectable 0.1 milliGRAM(s) IV Push every 3 minutes PRN For ANY of the following changes in patient status:  A. RR LESS THAN 10 breaths per minute, B. Oxygen saturation LESS THAN 90%, C. Sedation score of 6  ondansetron Injectable 4 milliGRAM(s) IV Push every 6 hours PRN Nausea  sodium chloride 0.65% Nasal 1 Spray(s) Both Nostrils three times a day PRN Nasal Congestion    =================== PATIENT CARE ACCESS DEVICES ==========  Peripheral IV (+)    ======================= PHYSICAL EXAM===================  General:           Comfortable, Awake, alert, not in any distress  Neuro:             Moving all extremities to commands. No focal deficits	  HEENT:                           SANKET/ NGT  Respiratory:	Lungs clear on auscultation bilaterally with good aeration.                            Few basal rales, no rhonchi, no wheezing. Effort even and unlabored.  CV:		Irregular rate and rhythm. (+) S1/S2  Abdomen:         Soft,  nontender, not-distended. Bowel sounds present   Skin:		No rash.  Extremities:	Warm, no cyanosis or edema.  Palpable pulses    ============================ LABS =======================                        11.0   16.54 )-----------( 311      ( 23 Aug 2018 04:00 )             34.3     08-23    140  |  101  |  13  ----------------------------<  126<H>  4.3   |  28  |  0.68    Ca    8.7      23 Aug 2018 04:00  Phos  2.8     08-23  Mg     2.1     08-23    TPro  6.0  /  Alb  2.7<L>  /  TBili  0.9  /  DBili  x   /  AST  20  /  ALT  17  /  AlkPhos  108  08-22    LIVER FUNCTIONS - ( 22 Aug 2018 03:00 )  Alb: 2.7 g/dL / Pro: 6.0 g/dL / ALK PHOS: 108 u/L / ALT: 17 u/L / AST: 20 u/L / GGT: x           ===================== IMAGING STUDIES ===================  Radiology personally reviewed.    < from: Xray Chest 1 View- PORTABLE-Routine (08.22.18 @ 06:50) >    INTERPRETATION:  TIME OF EXAM: August 21, 2018 at 10:13 PM.  CLINICAL INFORMATION: Status post esophagectomy. Evaluate for   pneumothorax.    COMPARISON:  August 21, 2018 at 5:19 AM    INTERPRETATION:     Heart size and the mediastinum cannot be accurately evaluated on this   projection.   Enteric tube and right pleural tube unchanged in position.  There is a new minimal right apical pneumothorax. No left pneumothorax   seen.  There is no significant interval change in trace bilateral pleural   effusions and bibasilar subsegmental atelectasis.    AP portable chest x-ray from August 22, 2018 at 6:24 AM:    There may be an additional minimal pneumothorax at the lateral right   base. Other findings are unchanged.    IMPRESSION:  Stable minimal right apical pneumothorax. Possible new   minimal pneumothorax at the lateral right base.    No significant interval change in trace bilateral pleural effusions and   bibasilar subsegmental atelectasis.      < end of copied text >    ====================ASSESSMENT AND PLAN ================  64 y/o male smoker with hx of esophagus CA s/p chemo and radiation on  5/18 admitted for robotic assisted MIRZA Raghu esophagogastrectomy and cholecystectomy on 8/17/18. (08 Aug 2018 13:51)    Procedure: Mirza Raghu Esophagogastrectomy and cholecystectomy / J-tube   08/17/2018    Issues:  Rapid A fib, post op, with RVR  Shortness of breath  Esophageal Cancer  right chest tube - jose in place              Postop pain    ====================== NEUROLOGY=======================  Pain control with PCA / PCEA / Tylenol IV / Toradol / Percocet  Pt is on Precedex for agitation  Pt is sedated with Propofol / Fentanyl    ==================== RESPIRATORY========================  Pt is on  4    L nasal canula   Comfortable, no evidence of distress.  Using incentive spirometry & doing    750-950  ml  Monitor chest tube output  Chest tube to water seal	  Continue bronchodilators, pulmonary toilet  Head of bed elevation to 30-40 degrees  OOB, ambulation    ====================CARDIOVASCULAR=====================   Continue hemodynamic monitoring.  Paroxysmal  A fib w RVR, post op: Cont cardizem  drip for HR control  Continue IVF D5/.45NS with 20K    Avoid vasopressor agents  ===================== RENAL ============================  Continue dextrose 5% + sodium chloride 0.45% with potassium chloride 20 mEq/L 1000 milliLiter(s) (20 mL/Hr)  Monitor I/Os, BUN/ Cr  and electrolytes  ==================== GASTROINTESTINAL===================  On J tube Jevity feeding at goal, tolerating well  Continue GI prophylaxis with Protonix   Zofran / Reglan for nausea - PRN	  NPO   NGT to low continuous wall suction  , d/c NGT before barium study                                  Flush both NGT and J-tube with 20cc of sterile water Q 4hrs.  BArium swallow today    =======================    ENDOCRIN  =====================  Glycemic monitoring  F/S with coverage  ===================HEMATOLOGIC/ONCOLOGIC =============  Monitor chest tube output. No signs of active bleeding.   Follow CBC, coags  in AM  DVT prophylaxis with SCD, sc Heparin    ========================INFECTIOUS DISEASE===============  No signs of infection. Monitor for fever / leukocytosis.  All surgical incision / chest tube  sites look clean      Pertinent clinical, laboratory, radiographic, hemodynamic, echocardiographic, respiratory data, microbiologic data and chart were reviewed and analyzed frequently throughout the course of the day and night. GI and DVT prophylaxis, glycemic control, head of bed elevation and skin care issues were addressed.  Patient seen, examined and plan discussed with CT Surgery / CTICU team during rounds.  Pt remains critically ill in imminent risk of  deterioration and requires very careful cardio- pulmonary monitoring and support.    I have spent     35  minutes of critical care time with this pt between     12  am  and     8  am         minutes spent on total encounter; more than 50% of the visit was spent counseling and/or coordinating care by the attending physician.        FUNMI Perera MD

## 2018-08-23 NOTE — BRIEF OPERATIVE NOTE - PRE-OP DX
Calculus of gallbladder without cholecystitis without obstruction  08/17/2018    Shashank Plascencia  Esophagectomy, anastomotic leak  08/23/2018    Active  Diego Cabrales  Malignant neoplasm of esophagus, unspecified location  08/17/2018    Active  Shashank Lira  Umbilical hernia without obstruction and without gangrene  08/17/2018    Active  Shashank Lira

## 2018-08-23 NOTE — BRIEF OPERATIVE NOTE - POST-OP DX
Calculus of gallbladder without cholecystitis without obstruction  08/17/2018    Active  Shashank Lira  Esophagectomy, anastomotic leak  08/23/2018    Active  Diego Cabrales  Malignant neoplasm of esophagus, unspecified location  08/17/2018    Active  Shashank Lira  Umbilical hernia without obstruction or gangrene  08/17/2018    Active  Shashank Lira

## 2018-08-24 LAB
AMYLASE FLD-CCNC: 12 U/L — SIGNIFICANT CHANGE UP
APTT BLD: 27.5 SEC — SIGNIFICANT CHANGE UP (ref 27.5–37.4)
BUN SERPL-MCNC: 15 MG/DL — SIGNIFICANT CHANGE UP (ref 7–23)
CALCIUM SERPL-MCNC: 8.7 MG/DL — SIGNIFICANT CHANGE UP (ref 8.4–10.5)
CHLORIDE SERPL-SCNC: 102 MMOL/L — SIGNIFICANT CHANGE UP (ref 98–107)
CO2 SERPL-SCNC: 30 MMOL/L — SIGNIFICANT CHANGE UP (ref 22–31)
CREAT SERPL-MCNC: 0.69 MG/DL — SIGNIFICANT CHANGE UP (ref 0.5–1.3)
GLUCOSE SERPL-MCNC: 191 MG/DL — HIGH (ref 70–99)
HCT VFR BLD CALC: 36.1 % — LOW (ref 39–50)
HGB BLD-MCNC: 11.7 G/DL — LOW (ref 13–17)
INR BLD: 1.04 — SIGNIFICANT CHANGE UP (ref 0.88–1.17)
MCHC RBC-ENTMCNC: 30.5 PG — SIGNIFICANT CHANGE UP (ref 27–34)
MCHC RBC-ENTMCNC: 32.4 % — SIGNIFICANT CHANGE UP (ref 32–36)
MCV RBC AUTO: 94.3 FL — SIGNIFICANT CHANGE UP (ref 80–100)
NRBC # FLD: 0 — SIGNIFICANT CHANGE UP
PLATELET # BLD AUTO: 339 K/UL — SIGNIFICANT CHANGE UP (ref 150–400)
PMV BLD: 9.6 FL — SIGNIFICANT CHANGE UP (ref 7–13)
POTASSIUM SERPL-MCNC: 4.5 MMOL/L — SIGNIFICANT CHANGE UP (ref 3.5–5.3)
POTASSIUM SERPL-SCNC: 4.5 MMOL/L — SIGNIFICANT CHANGE UP (ref 3.5–5.3)
PROTHROM AB SERPL-ACNC: 11.6 SEC — SIGNIFICANT CHANGE UP (ref 9.8–13.1)
RBC # BLD: 3.83 M/UL — LOW (ref 4.2–5.8)
RBC # FLD: 12.9 % — SIGNIFICANT CHANGE UP (ref 10.3–14.5)
SODIUM SERPL-SCNC: 140 MMOL/L — SIGNIFICANT CHANGE UP (ref 135–145)
SPECIMEN SOURCE: SIGNIFICANT CHANGE UP
WBC # BLD: 8.61 K/UL — SIGNIFICANT CHANGE UP (ref 3.8–10.5)
WBC # FLD AUTO: 8.61 K/UL — SIGNIFICANT CHANGE UP (ref 3.8–10.5)

## 2018-08-24 RX ORDER — ACETAMINOPHEN 500 MG
650 TABLET ORAL EVERY 6 HOURS
Qty: 0 | Refills: 0 | Status: DISCONTINUED | OUTPATIENT
Start: 2018-08-24 | End: 2018-08-27

## 2018-08-24 RX ORDER — LEVALBUTEROL 1.25 MG/.5ML
0.63 SOLUTION, CONCENTRATE RESPIRATORY (INHALATION) EVERY 6 HOURS
Qty: 0 | Refills: 0 | Status: DISCONTINUED | OUTPATIENT
Start: 2018-08-24 | End: 2018-08-27

## 2018-08-24 RX ORDER — DILTIAZEM HCL 120 MG
5 CAPSULE, EXT RELEASE 24 HR ORAL
Qty: 125 | Refills: 0 | Status: DISCONTINUED | OUTPATIENT
Start: 2018-08-24 | End: 2018-08-26

## 2018-08-24 RX ORDER — HEPARIN SODIUM 5000 [USP'U]/ML
5000 INJECTION INTRAVENOUS; SUBCUTANEOUS EVERY 8 HOURS
Qty: 0 | Refills: 0 | Status: DISCONTINUED | OUTPATIENT
Start: 2018-08-24 | End: 2018-08-27

## 2018-08-24 RX ORDER — OXYCODONE HYDROCHLORIDE 5 MG/1
5 TABLET ORAL EVERY 6 HOURS
Qty: 0 | Refills: 0 | Status: DISCONTINUED | OUTPATIENT
Start: 2018-08-24 | End: 2018-08-27

## 2018-08-24 RX ORDER — OXYCODONE HYDROCHLORIDE 5 MG/1
10 TABLET ORAL EVERY 6 HOURS
Qty: 0 | Refills: 0 | Status: DISCONTINUED | OUTPATIENT
Start: 2018-08-24 | End: 2018-08-27

## 2018-08-24 RX ADMIN — HEPARIN SODIUM 5000 UNIT(S): 5000 INJECTION INTRAVENOUS; SUBCUTANEOUS at 13:36

## 2018-08-24 RX ADMIN — Medication 10 MG/HR: at 17:47

## 2018-08-24 RX ADMIN — LEVALBUTEROL 0.63 MILLIGRAM(S): 1.25 SOLUTION, CONCENTRATE RESPIRATORY (INHALATION) at 22:48

## 2018-08-24 RX ADMIN — HYDROMORPHONE HYDROCHLORIDE 30 MILLILITER(S): 2 INJECTION INTRAMUSCULAR; INTRAVENOUS; SUBCUTANEOUS at 07:19

## 2018-08-24 RX ADMIN — DORNASE ALFA 2.5 MILLIGRAM(S): 1 SOLUTION RESPIRATORY (INHALATION) at 22:52

## 2018-08-24 RX ADMIN — HEPARIN SODIUM 5000 UNIT(S): 5000 INJECTION INTRAVENOUS; SUBCUTANEOUS at 21:40

## 2018-08-24 RX ADMIN — DORNASE ALFA 2.5 MILLIGRAM(S): 1 SOLUTION RESPIRATORY (INHALATION) at 09:48

## 2018-08-24 RX ADMIN — PANTOPRAZOLE SODIUM 40 MILLIGRAM(S): 20 TABLET, DELAYED RELEASE ORAL at 12:28

## 2018-08-24 RX ADMIN — ERTAPENEM SODIUM 120 MILLIGRAM(S): 1 INJECTION, POWDER, LYOPHILIZED, FOR SOLUTION INTRAMUSCULAR; INTRAVENOUS at 20:09

## 2018-08-24 RX ADMIN — OXYCODONE HYDROCHLORIDE 10 MILLIGRAM(S): 5 TABLET ORAL at 18:07

## 2018-08-24 RX ADMIN — DEXTROSE MONOHYDRATE, SODIUM CHLORIDE, AND POTASSIUM CHLORIDE 20 MILLILITER(S): 50; .745; 4.5 INJECTION, SOLUTION INTRAVENOUS at 08:06

## 2018-08-24 RX ADMIN — OXYCODONE HYDROCHLORIDE 10 MILLIGRAM(S): 5 TABLET ORAL at 18:43

## 2018-08-24 RX ADMIN — Medication 10 MG/HR: at 21:21

## 2018-08-24 RX ADMIN — LEVALBUTEROL 0.63 MILLIGRAM(S): 1.25 SOLUTION, CONCENTRATE RESPIRATORY (INHALATION) at 16:09

## 2018-08-24 RX ADMIN — MICAFUNGIN SODIUM 105 MILLIGRAM(S): 100 INJECTION, POWDER, LYOPHILIZED, FOR SOLUTION INTRAVENOUS at 17:46

## 2018-08-24 RX ADMIN — Medication 10 MG/HR: at 08:07

## 2018-08-24 NOTE — PROGRESS NOTE ADULT - SUBJECTIVE AND OBJECTIVE BOX
CC: f/u for  mediastinitis, prashant madi leak  Patient reports  feels well today, chest tube is out  REVIEW OF SYSTEMS:  All other review of systems negative (Comprehensive ROS)    Antimicrobials Day #  :2  ertapenem  IVPB 1000 milliGRAM(s) IV Intermittent every 24 hours  micafungin IVPB      micafungin IVPB 100 milliGRAM(s) IV Intermittent every 24 hours    Other Medications Reviewed    T(F): 98.2 (08-24-18 @ 12:30), Max: 98.3 (08-23-18 @ 16:00)  HR: 99 (08-24-18 @ 12:30)  BP: 118/58 (08-24-18 @ 12:30)  RR: 20 (08-24-18 @ 12:30)  SpO2: 94% (08-24-18 @ 12:30)  Wt(kg): --    PHYSICAL EXAM:  General: alert, no acute distress  Eyes:  anicteric, no conjunctival injection, no discharge  Oropharynx: no lesions or injection 	  Neck: supple, without adenopathy  Lungs: course bs  to auscultation  Heart: regular rate and rhythm; no murmur, rubs or gallops  Abdomen: soft, nondistended, nontender, without mass or organomegaly. j tube in place  Skin: no lesions  Extremities: no clubbing, cyanosis, or edema  Neurologic: alert, oriented, moves all extremities    LAB RESULTS:                        11.7   8.61  )-----------( 339      ( 24 Aug 2018 10:04 )             36.1     08-24    140  |  102  |  15  ----------------------------<  191<H>  4.5   |  30  |  0.69    Ca    8.7      24 Aug 2018 10:04  Phos  2.8     08-23  Mg     2.1     08-23          MICROBIOLOGY:  RECENT CULTURES:  08-23 @ 14:33 PLEURAL FLUID       NOS^No Organisms Seen  WBC^White Blood Cells  QNTY CELLS IN GRAM STAIN: RARE (1+)        RADIOLOGY REVIEWED:  < from: Xray Chest 1 View- PORTABLE-Urgent (08.23.18 @ 14:34) >    IMPRESSION:  Status post esophagectomy.          < end of copied text >    Assessment:  Patient s/p prashant madi esophogectomy, cholecystectomy, j tube and hernia repair with very small esophogeal leak so needs antibiotics.  so far cultures from pleural space negative.   Plan:  continue ertapenem and micafungin  patient to take diltiazem so will not use fluconazole   will anticipate 4 weeks of antimicrobics  follow up final cultures   place picc  r/w Dr. Hebert

## 2018-08-24 NOTE — PROGRESS NOTE ADULT - SUBJECTIVE AND OBJECTIVE BOX
Subjective: "I cough up a lot of foam,"  no other acute complaints, tolerating tube feeds, OOB in chair accompanied by wife, he wants to go for walk today    Vital Signs:  Vital Signs Last 24 Hrs  T(C): 36.7 (08-24-18 @ 08:08), Max: 37 (08-23-18 @ 12:00)  T(F): 98 (08-24-18 @ 08:08), Max: 98.6 (08-23-18 @ 12:00)  HR: 100 (08-24-18 @ 09:50) (90 - 150) sinus rhythm   BP: 115/64 (08-24-18 @ 08:08) (96/55 - 125/85)  RR: 22 (08-24-18 @ 08:08) (16 - 32)  SpO2: 82% (08-24-18 @ 07:45) (82% - 100%) on (O2)    Telemetry/Alarms:  General: WN/WD NAD  Neurology: Awake, nonfocal, SAPP x 4  Eyes: Scleras clear, PERRLA/ EOMI, Gross vision intact  ENT:Gross hearing intact, grossly patent pharynx, no stridor  Neck: Neck supple, trachea midline, No JVD,   Respiratory: CTA B/L, No wheezing, rales, rhonchi  CV: RRR, S1S2, no murmurs, rubs or gallops  Abdominal: Soft, NT, ND +BS,   Extremities: No edema, + peripheral pulses  Skin: No Rashes, Hematoma, Ecchymosis  Lymphatic: No Neck, axilla, groin LAD  Psych: Oriented x 3, normal affect  Incisions: c,d,i  Tubes: jose drained 60cc/24h no air leak,  pleural fluid amylase level 12  Relevant labs, radiology and Medications reviewed                        11.7   8.61  )-----------( 339      ( 24 Aug 2018 10:04 )             36.1     08-24    140  |  102  |  15  ----------------------------<  191<H>  4.5   |  30  |  0.69    Ca    8.7      24 Aug 2018 10:04  Phos  2.8     08-23  Mg     2.1     08-23      PT/INR - ( 24 Aug 2018 10:04 )   PT: 11.6 SEC;   INR: 1.04          PTT - ( 24 Aug 2018 10:04 )  PTT:27.5 SEC  MEDICATIONS  (STANDING):  acetaminophen  IVPB. 1000 milliGRAM(s) IV Intermittent once  diltiazem Infusion 10 mG/Hr (10 mL/Hr) IV Continuous <Continuous>  dornase renetta Solution 2.5 milliGRAM(s) Inhalation two times a day  ertapenem  IVPB 1000 milliGRAM(s) IV Intermittent every 24 hours  heparin  Injectable 5000 Unit(s) SubCutaneous every 8 hours  ipratropium    for Nebulization 500 MICROGram(s) Nebulizer every 6 hours  levalbuterol Inhalation 0.63 milliGRAM(s) Inhalation every 6 hours  micafungin IVPB      micafungin IVPB 100 milliGRAM(s) IV Intermittent every 24 hours  pantoprazole  Injectable 40 milliGRAM(s) IV Push daily    MEDICATIONS  (PRN):  acetaminophen    Suspension. 650 milliGRAM(s) Oral every 6 hours PRN Mild Pain (1 - 3)  naloxone Injectable 0.1 milliGRAM(s) IV Push every 3 minutes PRN For ANY of the following changes in patient status:  A. RR LESS THAN 10 breaths per minute, B. Oxygen saturation LESS THAN 90%, C. Sedation score of 6  ondansetron Injectable 4 milliGRAM(s) IV Push every 6 hours PRN Nausea  oxyCODONE    Solution 5 milliGRAM(s) Oral every 6 hours PRN Moderate Pain (4 - 6)  oxyCODONE    Solution 10 milliGRAM(s) Oral every 6 hours PRN Severe Pain (7 - 10)  sodium chloride 0.65% Nasal 1 Spray(s) Both Nostrils three times a day PRN Nasal Congestion    Pertinent Physical Exam  I&O's Summary    23 Aug 2018 07:01  -  24 Aug 2018 07:00  --------------------------------------------------------  IN: 992 mL / OUT: 80 mL / NET: 912 mL    24 Aug 2018 07:01  -  24 Aug 2018 10:55  --------------------------------------------------------  IN: 0 mL / OUT: 300 mL / NET: -300 mL        Assessment  63y Male  w/ PAST MEDICAL & SURGICAL HISTORY:  Malignant neoplasm of esophagus, unspecified  History of bilateral inguinal hernia repair  admitted with complaints of Patient is a 63y old  Male who presents with a chief complaint of gallbladder removal, and removal of mass in esophagus (08 Aug 2018 13:51)  .  On (8/17/18), patient underwent Flexible bronchoscopy by cardiothoracic surgery  Mirza Raghu esophagectomy  EGD (esophagogastroduodenoscopy)  Jejunostomy feeding tube placement  Pyloroplasty  Exploratory laparotomy  Umbilical hernia repair  . Postoperative course/issues: anastomatic leak    PLAN  Neuro: Pain management  Pulm: Encourage coughing, deep breathing and use of incentive spirometry. Wean off supplemental oxygen as able. Daily CXR. added xopenex  Cardio: Monitor telemetry/alarms, postop afib now in sinus rhythm on cardizem gtt  GI: NPO w tube feeds  Renal: monitor urine output, supplement electrolytes as needed  Vasc: Heparin SC/SCDs for DVT prophylaxis  Heme: Stable H/H. .   ID: for PICC line today, ertepenem and micafungin through 9/20/18  Therapy: OOB/ambulate  Tubes: will removed jose today  Disposition: Aim to D/C to home next Monday with nocturnal tube feeds and IV antibiotics  Discussed with Cardiothoracic Team at AM rounds.

## 2018-08-24 NOTE — PROGRESS NOTE ADULT - SUBJECTIVE AND OBJECTIVE BOX
Vital Signs Last 24 Hrs  T(C): 36.7 (24 Aug 2018 08:08), Max: 37 (23 Aug 2018 12:00)  T(F): 98 (24 Aug 2018 08:08), Max: 98.6 (23 Aug 2018 12:00)  HR: 106 (24 Aug 2018 08:08) (90 - 150)  BP: 115/64 (24 Aug 2018 08:08) (96/55 - 125/85)  BP(mean): 75 (23 Aug 2018 17:00) (65 - 91)  RR: 22 (24 Aug 2018 08:08) (16 - 32)  SpO2: 82% (24 Aug 2018 07:45) (82% - 100%)    I&O's Detail    23 Aug 2018 07:01  -  24 Aug 2018 07:00  --------------------------------------------------------  IN:    dextrose 5% + sodium chloride 0.45% with potassium chloride 20 mEq/L: 140 mL    diltiazem Infusion: 60 mL    Enteral Tube Flush: 20 mL    Enteral Tube Flush: 80 mL    IV PiggyBack: 350 mL    ns in tub fed  izgtho22: 342 mL  Total IN: 992 mL    OUT:    Chest Tube: 60 mL    Nasoenteral Tube: 20 mL  Total OUT: 80 mL    Total NET: 912 mL                                11.0   16.54 )-----------( 311      ( 23 Aug 2018 04:00 )             34.3       08-23    140  |  101  |  13  ----------------------------<  126<H>  4.3   |  28  |  0.68    Ca    8.7      23 Aug 2018 04:00  Phos  2.8     08-23  Mg     2.1     08-23    small contained anastamotic leak            PLAN:  Continue NPO and tube feeding for one week and then restudy anastamosis

## 2018-08-24 NOTE — PROGRESS NOTE ADULT - SUBJECTIVE AND OBJECTIVE BOX
Anesthesia Pain Management Service    SUBJECTIVE: Patient is doing well with IV PCA and no significant problems reported.    Pain Scale Score	At rest: __3_ 	With Activity: ___ 	[X ] Refer to charted pain scores    THERAPY:    [ ] IV PCA Morphine		[ ] 5 mg/mL	[ ] 1 mg/mL  [X ] IV PCA Hydromorphone	[ ] 5 mg/mL	[X ] 1 mg/mL  [ ] IV PCA Fentanyl		[ ] 50 micrograms/mL    Demand dose __0.2_ lockout __6_ (minutes) Continuous Rate _0__ Total: _4__   mg used (in past 24 hrs)      MEDICATIONS  (STANDING):  acetaminophen  IVPB. 1000 milliGRAM(s) IV Intermittent once  diltiazem Infusion 10 mG/Hr (10 mL/Hr) IV Continuous <Continuous>  dornase renetta Solution 2.5 milliGRAM(s) Inhalation two times a day  ertapenem  IVPB 1000 milliGRAM(s) IV Intermittent every 24 hours  heparin  Injectable 5000 Unit(s) SubCutaneous every 8 hours  ipratropium    for Nebulization 500 MICROGram(s) Nebulizer every 6 hours  levalbuterol Inhalation 0.63 milliGRAM(s) Inhalation every 6 hours  micafungin IVPB      micafungin IVPB 100 milliGRAM(s) IV Intermittent every 24 hours  pantoprazole  Injectable 40 milliGRAM(s) IV Push daily    MEDICATIONS  (PRN):  acetaminophen    Suspension. 650 milliGRAM(s) Oral every 6 hours PRN Mild Pain (1 - 3)  naloxone Injectable 0.1 milliGRAM(s) IV Push every 3 minutes PRN For ANY of the following changes in patient status:  A. RR LESS THAN 10 breaths per minute, B. Oxygen saturation LESS THAN 90%, C. Sedation score of 6  ondansetron Injectable 4 milliGRAM(s) IV Push every 6 hours PRN Nausea  oxyCODONE    Solution 5 milliGRAM(s) Oral every 6 hours PRN Moderate Pain (4 - 6)  oxyCODONE    Solution 10 milliGRAM(s) Oral every 6 hours PRN Severe Pain (7 - 10)  sodium chloride 0.65% Nasal 1 Spray(s) Both Nostrils three times a day PRN Nasal Congestion      OBJECTIVE: sitting in chair     Sedation Score:	[ X] Alert	[ ] Drowsy 	[ ] Arousable	[ ] Asleep	[ ] Unresponsive    Side Effects:	[X ] None	[ ] Nausea	[ ] Vomiting	[ ] Pruritus  		[ ] Other:    Vital Signs Last 24 Hrs  T(C): 36.7 (24 Aug 2018 08:08), Max: 37 (23 Aug 2018 12:00)  T(F): 98 (24 Aug 2018 08:08), Max: 98.6 (23 Aug 2018 12:00)  HR: 100 (24 Aug 2018 09:50) (90 - 150)  BP: 115/64 (24 Aug 2018 08:08) (96/55 - 125/85)  BP(mean): 75 (23 Aug 2018 17:00) (65 - 91)  RR: 22 (24 Aug 2018 08:08) (16 - 32)  SpO2: 82% (24 Aug 2018 07:45) (82% - 100%)    ASSESSMENT/ PLAN    Therapy to  be:	[ ] Continue   [ X] Discontinued   [X ] Change to prn Analgesics    Documentation and Verification of current medications:   [X] Done	[ ] Not done, not elligible    Comments: PRN j tubel/IV opioids and/or Adjuvant medication to be ordered at this point.

## 2018-08-24 NOTE — PROGRESS NOTE ADULT - SUBJECTIVE AND OBJECTIVE BOX
ANESTHESIA POSTOP CHECK    63y Male POSTOP DAY 1 S/P     [ X] NO APPARENT ANESTHESIA COMPLICATIONS      Comments:

## 2018-08-24 NOTE — PROGRESS NOTE ADULT - SUBJECTIVE AND OBJECTIVE BOX
Anesthesia Pain Management Service- Attending Addendum    SUBJECTIVE: Patient's pain control adequate    Therapy:	  [ X] IV PCA	   [ ] Epidural           [ ] s/p Spinal Opoid              [ ] Postpartum infusion	  [ ] Patient controlled regional anesthesia (PCRA)    [ ] prn Analgesics    Allergies    No Known Allergies    Intolerances      MEDICATIONS  (STANDING):  acetaminophen  IVPB. 1000 milliGRAM(s) IV Intermittent once  diltiazem Infusion 10 mG/Hr (10 mL/Hr) IV Continuous <Continuous>  dornase renetta Solution 2.5 milliGRAM(s) Inhalation two times a day  ertapenem  IVPB 1000 milliGRAM(s) IV Intermittent every 24 hours  heparin  Injectable 5000 Unit(s) SubCutaneous every 8 hours  ipratropium    for Nebulization 500 MICROGram(s) Nebulizer every 6 hours  levalbuterol Inhalation 0.63 milliGRAM(s) Inhalation every 6 hours  micafungin IVPB      micafungin IVPB 100 milliGRAM(s) IV Intermittent every 24 hours  pantoprazole  Injectable 40 milliGRAM(s) IV Push daily    MEDICATIONS  (PRN):  acetaminophen    Suspension. 650 milliGRAM(s) Oral every 6 hours PRN Mild Pain (1 - 3)  naloxone Injectable 0.1 milliGRAM(s) IV Push every 3 minutes PRN For ANY of the following changes in patient status:  A. RR LESS THAN 10 breaths per minute, B. Oxygen saturation LESS THAN 90%, C. Sedation score of 6  ondansetron Injectable 4 milliGRAM(s) IV Push every 6 hours PRN Nausea  oxyCODONE    Solution 5 milliGRAM(s) Oral every 6 hours PRN Moderate Pain (4 - 6)  oxyCODONE    Solution 10 milliGRAM(s) Oral every 6 hours PRN Severe Pain (7 - 10)  sodium chloride 0.65% Nasal 1 Spray(s) Both Nostrils three times a day PRN Nasal Congestion      OBJECTIVE:   [X] No new signs     [ ] Other:    Side Effects:  [X ] None			[ ] Other:      ASSESSMENT/PLAN  -Discontinue current therapy    [ ] Therapy changed to:    [ ] IV PCA       [ ] Epidural     [ X] prn Analgesics     Comments: Pain management per primary team, APS to sign off

## 2018-08-25 LAB
BUN SERPL-MCNC: 16 MG/DL — SIGNIFICANT CHANGE UP (ref 7–23)
CALCIUM SERPL-MCNC: 8.9 MG/DL — SIGNIFICANT CHANGE UP (ref 8.4–10.5)
CHLORIDE SERPL-SCNC: 101 MMOL/L — SIGNIFICANT CHANGE UP (ref 98–107)
CO2 SERPL-SCNC: 28 MMOL/L — SIGNIFICANT CHANGE UP (ref 22–31)
CREAT SERPL-MCNC: 0.74 MG/DL — SIGNIFICANT CHANGE UP (ref 0.5–1.3)
GLUCOSE SERPL-MCNC: 161 MG/DL — HIGH (ref 70–99)
HCT VFR BLD CALC: 36.6 % — LOW (ref 39–50)
HGB BLD-MCNC: 11.8 G/DL — LOW (ref 13–17)
MCHC RBC-ENTMCNC: 30.3 PG — SIGNIFICANT CHANGE UP (ref 27–34)
MCHC RBC-ENTMCNC: 32.2 % — SIGNIFICANT CHANGE UP (ref 32–36)
MCV RBC AUTO: 93.8 FL — SIGNIFICANT CHANGE UP (ref 80–100)
NRBC # FLD: 0 — SIGNIFICANT CHANGE UP
PLATELET # BLD AUTO: 405 K/UL — HIGH (ref 150–400)
PMV BLD: 9.9 FL — SIGNIFICANT CHANGE UP (ref 7–13)
POTASSIUM SERPL-MCNC: 4.2 MMOL/L — SIGNIFICANT CHANGE UP (ref 3.5–5.3)
POTASSIUM SERPL-SCNC: 4.2 MMOL/L — SIGNIFICANT CHANGE UP (ref 3.5–5.3)
RBC # BLD: 3.9 M/UL — LOW (ref 4.2–5.8)
RBC # FLD: 12.8 % — SIGNIFICANT CHANGE UP (ref 10.3–14.5)
SODIUM SERPL-SCNC: 139 MMOL/L — SIGNIFICANT CHANGE UP (ref 135–145)
WBC # BLD: 7.57 K/UL — SIGNIFICANT CHANGE UP (ref 3.8–10.5)
WBC # FLD AUTO: 7.57 K/UL — SIGNIFICANT CHANGE UP (ref 3.8–10.5)

## 2018-08-25 PROCEDURE — 71045 X-RAY EXAM CHEST 1 VIEW: CPT | Mod: 26

## 2018-08-25 RX ADMIN — MICAFUNGIN SODIUM 105 MILLIGRAM(S): 100 INJECTION, POWDER, LYOPHILIZED, FOR SOLUTION INTRAVENOUS at 17:00

## 2018-08-25 RX ADMIN — HEPARIN SODIUM 5000 UNIT(S): 5000 INJECTION INTRAVENOUS; SUBCUTANEOUS at 22:19

## 2018-08-25 RX ADMIN — Medication 500 MICROGRAM(S): at 10:15

## 2018-08-25 RX ADMIN — LEVALBUTEROL 0.63 MILLIGRAM(S): 1.25 SOLUTION, CONCENTRATE RESPIRATORY (INHALATION) at 21:30

## 2018-08-25 RX ADMIN — ERTAPENEM SODIUM 120 MILLIGRAM(S): 1 INJECTION, POWDER, LYOPHILIZED, FOR SOLUTION INTRAMUSCULAR; INTRAVENOUS at 22:19

## 2018-08-25 RX ADMIN — Medication 5 MG/HR: at 12:32

## 2018-08-25 RX ADMIN — Medication 500 MICROGRAM(S): at 21:30

## 2018-08-25 RX ADMIN — Medication 10 MG/HR: at 07:28

## 2018-08-25 RX ADMIN — DORNASE ALFA 2.5 MILLIGRAM(S): 1 SOLUTION RESPIRATORY (INHALATION) at 21:51

## 2018-08-25 RX ADMIN — PANTOPRAZOLE SODIUM 40 MILLIGRAM(S): 20 TABLET, DELAYED RELEASE ORAL at 13:11

## 2018-08-25 RX ADMIN — HEPARIN SODIUM 5000 UNIT(S): 5000 INJECTION INTRAVENOUS; SUBCUTANEOUS at 13:11

## 2018-08-25 RX ADMIN — OXYCODONE HYDROCHLORIDE 10 MILLIGRAM(S): 5 TABLET ORAL at 00:55

## 2018-08-25 RX ADMIN — LEVALBUTEROL 0.63 MILLIGRAM(S): 1.25 SOLUTION, CONCENTRATE RESPIRATORY (INHALATION) at 17:56

## 2018-08-25 RX ADMIN — OXYCODONE HYDROCHLORIDE 10 MILLIGRAM(S): 5 TABLET ORAL at 14:10

## 2018-08-25 RX ADMIN — LEVALBUTEROL 0.63 MILLIGRAM(S): 1.25 SOLUTION, CONCENTRATE RESPIRATORY (INHALATION) at 10:15

## 2018-08-25 RX ADMIN — OXYCODONE HYDROCHLORIDE 10 MILLIGRAM(S): 5 TABLET ORAL at 13:11

## 2018-08-25 RX ADMIN — HEPARIN SODIUM 5000 UNIT(S): 5000 INJECTION INTRAVENOUS; SUBCUTANEOUS at 05:56

## 2018-08-25 RX ADMIN — OXYCODONE HYDROCHLORIDE 10 MILLIGRAM(S): 5 TABLET ORAL at 00:25

## 2018-08-25 RX ADMIN — OXYCODONE HYDROCHLORIDE 10 MILLIGRAM(S): 5 TABLET ORAL at 18:02

## 2018-08-25 RX ADMIN — Medication 5 MG/HR: at 19:49

## 2018-08-25 NOTE — PROGRESS NOTE ADULT - SUBJECTIVE AND OBJECTIVE BOX
Subjective: "I'm really hoping I can eventually eat and drink again" pt. states productive cough w occasional wheezing/SOB w exertion. Requiring supplemental O2 at times. Pt denies CP, abd pain, n/v/d. TOlerating TF. NPO. Amb w minimal assist in hallway    Vital Signs:  Vital Signs Last 24 Hrs  T(C): 36.7 (08-25-18 @ 11:19), Max: 36.8 (08-24-18 @ 12:30)  T(F): 98 (08-25-18 @ 11:19), Max: 98.2 (08-24-18 @ 12:30)  HR: 96 (08-25-18 @ 11:19) (83 - 103)  BP: 104/63 (08-25-18 @ 11:19) (102/50 - 118/58)  RR: 20 (08-25-18 @ 11:19) (18 - 20)  SpO2: 94% (08-25-18 @ 11:19) (92% - 96%) on (O2)    Telemetry/Alarms: Tele SR 80s x multiple days  General: WN/WD NAD  Neurology: Awake, nonfocal, SAPP x 4  Eyes: Scleras clear, PERRLA/ EOMI, Gross vision intact  ENT:Gross hearing intact, grossly patent pharynx, no stridor  Neck: Neck supple, trachea midline, No JVD,   Respiratory: coarse expir wheeze bilat UL, prod cough.   CV: RRR, S1S2, no murmurs, rubs or gallops  Abdominal: Soft, NT, ND +BS, +soft BM today. NPO. TF nocturnal at goal.   Extremities: 1+ UE edema, + peripheral pulses  Skin: No Rashes, Hematoma, Ecchymosis  Lymphatic: No Neck, axilla, groin LAD  Psych: Oriented x 3, normal affect  Incisions: Abd incision w staples CHRISTINA and c/d/i. Rt. VATs site c/d/i.   Tubes: J tube site c/d/i.   Relevant labs, radiology and Medications reviewed           CXR stable, minimal effusion             11.8   7.57  )-----------( 405      ( 25 Aug 2018 06:22 )             36.6     08-25    139  |  101  |  16  ----------------------------<  161<H>  4.2   |  28  |  0.74    Ca    8.9      25 Aug 2018 06:22      PT/INR - ( 24 Aug 2018 10:04 )   PT: 11.6 SEC;   INR: 1.04          PTT - ( 24 Aug 2018 10:04 )  PTT:27.5 SEC  MEDICATIONS  (STANDING):  acetaminophen  IVPB. 1000 milliGRAM(s) IV Intermittent once  diltiazem Infusion 5 mG/Hr (5 mL/Hr) IV Continuous <Continuous>  dornase renetta Solution 2.5 milliGRAM(s) Inhalation two times a day  ertapenem  IVPB 1000 milliGRAM(s) IV Intermittent every 24 hours  heparin  Injectable 5000 Unit(s) SubCutaneous every 8 hours  ipratropium    for Nebulization 500 MICROGram(s) Nebulizer every 6 hours  levalbuterol Inhalation 0.63 milliGRAM(s) Inhalation every 6 hours  micafungin IVPB      micafungin IVPB 100 milliGRAM(s) IV Intermittent every 24 hours  pantoprazole  Injectable 40 milliGRAM(s) IV Push daily    MEDICATIONS  (PRN):  acetaminophen    Suspension. 650 milliGRAM(s) Oral every 6 hours PRN Mild Pain (1 - 3)  naloxone Injectable 0.1 milliGRAM(s) IV Push every 3 minutes PRN For ANY of the following changes in patient status:  A. RR LESS THAN 10 breaths per minute, B. Oxygen saturation LESS THAN 90%, C. Sedation score of 6  ondansetron Injectable 4 milliGRAM(s) IV Push every 6 hours PRN Nausea  oxyCODONE    Solution 5 milliGRAM(s) Oral every 6 hours PRN Moderate Pain (4 - 6)  oxyCODONE    Solution 10 milliGRAM(s) Oral every 6 hours PRN Severe Pain (7 - 10)  sodium chloride 0.65% Nasal 1 Spray(s) Both Nostrils three times a day PRN Nasal Congestion    Pertinent Physical Exam  I&O's Summary    24 Aug 2018 07:01  -  25 Aug 2018 07:00  --------------------------------------------------------  IN: 1450 mL / OUT: 1475 mL / NET: -25 mL    25 Aug 2018 07:01  -  25 Aug 2018 12:28  --------------------------------------------------------  IN: 250 mL / OUT: 0 mL / NET: 250 mL        Assessment  63y Male  w/ PAST MEDICAL & SURGICAL HISTORY:  Malignant neoplasm of esophagus, unspecified  History of bilateral inguinal hernia repair  admitted with complaints of Patient is a 63y old  Male who presents with a chief complaint of gallbladder removal, and removal of mass in esophagus (08 Aug 2018 13:51)  HPI:  64 y/o male smoker with hx of esophagus CA s/p chemo and radiation on  5/18 admitted for robotic assisted JOANNA Raghu esophagogastrectomy and cholecystectomy on 8/17/18. (08 Aug 2018 13:51)    Procedure: [place] Raghu Esophagogastrectomy and cholecystectomy / J-tube   08/17/2018    Issues:  Rapid A fib, post op, with RVR  Shortness of breath  Esophageal Cancer  right chest tube - jose in place  Postop pain               drip:  Cardizem for Afib.   On 8/23 pt w leukocytosis. Barium swallow showed small leak. Pt then went for EGD w Dr. Hebert, anastamosis site intact. ID consult called for mediastinitis. Pt. to have 4wks IV antibx, pending PICC line.     PLAN  Neuro: Pain management  Pulm: Encourage coughing, deep breathing and use of incentive spirometry. Wean off supplemental oxygen as able. Daily CXR. Cont aggressive chest PT. Pt recent smoker. May need home O2 if remains hypoxic.   Cardio: Monitor telemetry/alarms. Remains on Card gtt but no Afib x 48hrs. No CCB available in [facility]. Will wean off Card gtt per Dr. Sanders and observe over weekend for recurrent Afib.   GI: Will cont NPO. Cont TF at goal.   Renal: monitor urine output, supplement electrolytes as needed  Vasc: Heparin SC/SCDs for DVT prophylaxis  Heme: Stable H/H. .   ID: Leukocytosis resolved. Cont current Antibx> Pending Picc line  Therapy: OOB/ambulate  Tubes: Monitor Chest tube output  Disposition: Aim to D/C to home on Monday once PICC placed and home care arranged.   Discussed with Cardiothoracic Team at AM rounds.

## 2018-08-26 ENCOUNTER — TRANSCRIPTION ENCOUNTER (OUTPATIENT)
Age: 63
End: 2018-08-26

## 2018-08-26 LAB
BUN SERPL-MCNC: 14 MG/DL — SIGNIFICANT CHANGE UP (ref 7–23)
CALCIUM SERPL-MCNC: 8.6 MG/DL — SIGNIFICANT CHANGE UP (ref 8.4–10.5)
CHLORIDE SERPL-SCNC: 99 MMOL/L — SIGNIFICANT CHANGE UP (ref 98–107)
CO2 SERPL-SCNC: 27 MMOL/L — SIGNIFICANT CHANGE UP (ref 22–31)
CREAT SERPL-MCNC: 0.74 MG/DL — SIGNIFICANT CHANGE UP (ref 0.5–1.3)
GLUCOSE SERPL-MCNC: 130 MG/DL — HIGH (ref 70–99)
HCT VFR BLD CALC: 32.8 % — LOW (ref 39–50)
HGB BLD-MCNC: 10.5 G/DL — LOW (ref 13–17)
MCHC RBC-ENTMCNC: 29.8 PG — SIGNIFICANT CHANGE UP (ref 27–34)
MCHC RBC-ENTMCNC: 32 % — SIGNIFICANT CHANGE UP (ref 32–36)
MCV RBC AUTO: 93.2 FL — SIGNIFICANT CHANGE UP (ref 80–100)
NRBC # FLD: 0 — SIGNIFICANT CHANGE UP
PLATELET # BLD AUTO: 408 K/UL — HIGH (ref 150–400)
PMV BLD: 10 FL — SIGNIFICANT CHANGE UP (ref 7–13)
POTASSIUM SERPL-MCNC: 4.3 MMOL/L — SIGNIFICANT CHANGE UP (ref 3.5–5.3)
POTASSIUM SERPL-SCNC: 4.3 MMOL/L — SIGNIFICANT CHANGE UP (ref 3.5–5.3)
RBC # BLD: 3.52 M/UL — LOW (ref 4.2–5.8)
RBC # FLD: 13 % — SIGNIFICANT CHANGE UP (ref 10.3–14.5)
SODIUM SERPL-SCNC: 137 MMOL/L — SIGNIFICANT CHANGE UP (ref 135–145)
WBC # BLD: 8.19 K/UL — SIGNIFICANT CHANGE UP (ref 3.8–10.5)
WBC # FLD AUTO: 8.19 K/UL — SIGNIFICANT CHANGE UP (ref 3.8–10.5)

## 2018-08-26 PROCEDURE — 71045 X-RAY EXAM CHEST 1 VIEW: CPT | Mod: 26

## 2018-08-26 RX ORDER — AER TRAVELER 0.5 G/1
1 SOLUTION RECTAL; TOPICAL
Qty: 0 | Refills: 0 | Status: DISCONTINUED | OUTPATIENT
Start: 2018-08-26 | End: 2018-08-27

## 2018-08-26 RX ADMIN — LEVALBUTEROL 0.63 MILLIGRAM(S): 1.25 SOLUTION, CONCENTRATE RESPIRATORY (INHALATION) at 10:17

## 2018-08-26 RX ADMIN — OXYCODONE HYDROCHLORIDE 10 MILLIGRAM(S): 5 TABLET ORAL at 06:29

## 2018-08-26 RX ADMIN — OXYCODONE HYDROCHLORIDE 10 MILLIGRAM(S): 5 TABLET ORAL at 08:00

## 2018-08-26 RX ADMIN — OXYCODONE HYDROCHLORIDE 10 MILLIGRAM(S): 5 TABLET ORAL at 19:00

## 2018-08-26 RX ADMIN — DORNASE ALFA 2.5 MILLIGRAM(S): 1 SOLUTION RESPIRATORY (INHALATION) at 21:45

## 2018-08-26 RX ADMIN — DORNASE ALFA 2.5 MILLIGRAM(S): 1 SOLUTION RESPIRATORY (INHALATION) at 10:17

## 2018-08-26 RX ADMIN — LEVALBUTEROL 0.63 MILLIGRAM(S): 1.25 SOLUTION, CONCENTRATE RESPIRATORY (INHALATION) at 03:53

## 2018-08-26 RX ADMIN — OXYCODONE HYDROCHLORIDE 10 MILLIGRAM(S): 5 TABLET ORAL at 00:59

## 2018-08-26 RX ADMIN — OXYCODONE HYDROCHLORIDE 10 MILLIGRAM(S): 5 TABLET ORAL at 00:29

## 2018-08-26 RX ADMIN — MICAFUNGIN SODIUM 105 MILLIGRAM(S): 100 INJECTION, POWDER, LYOPHILIZED, FOR SOLUTION INTRAVENOUS at 17:47

## 2018-08-26 RX ADMIN — LEVALBUTEROL 0.63 MILLIGRAM(S): 1.25 SOLUTION, CONCENTRATE RESPIRATORY (INHALATION) at 21:24

## 2018-08-26 RX ADMIN — HEPARIN SODIUM 5000 UNIT(S): 5000 INJECTION INTRAVENOUS; SUBCUTANEOUS at 06:21

## 2018-08-26 RX ADMIN — Medication 500 MICROGRAM(S): at 03:53

## 2018-08-26 RX ADMIN — OXYCODONE HYDROCHLORIDE 10 MILLIGRAM(S): 5 TABLET ORAL at 17:46

## 2018-08-26 RX ADMIN — HEPARIN SODIUM 5000 UNIT(S): 5000 INJECTION INTRAVENOUS; SUBCUTANEOUS at 22:09

## 2018-08-26 RX ADMIN — PANTOPRAZOLE SODIUM 40 MILLIGRAM(S): 20 TABLET, DELAYED RELEASE ORAL at 12:03

## 2018-08-26 RX ADMIN — HEPARIN SODIUM 5000 UNIT(S): 5000 INJECTION INTRAVENOUS; SUBCUTANEOUS at 12:03

## 2018-08-26 RX ADMIN — ERTAPENEM SODIUM 120 MILLIGRAM(S): 1 INJECTION, POWDER, LYOPHILIZED, FOR SOLUTION INTRAMUSCULAR; INTRAVENOUS at 22:09

## 2018-08-26 NOTE — DISCHARGE NOTE ADULT - MEDICATION SUMMARY - MEDICATIONS TO TAKE
I will START or STAY ON the medications listed below when I get home from the hospital:    medical marijuana  -- inhaler(s) inhaled 2 times a day,   -- Indication: For Home med    acetaminophen 160 mg/5 mL oral suspension  -- 650 milliliter(s) by mouth every 6 hours, As Needed - for mild pain control  -- Indication: For pain control    oxyCODONE 5 mg/5 mL oral solution  -- 5 milliliter(s) by mouth every 6 hours, As needed, Moderate Pain (4 - 6) MDD:30  -- Indication: For pain control    micafungin  -- 100 milligram(s) intravenous once a day until 9/20/18  -- Indication: For antibiotic    ertapenem 1 g injection  -- 1 gram(s) intravenous once a day until 9/20/18  -- Indication: For antibiotic

## 2018-08-26 NOTE — DISCHARGE NOTE ADULT - CARE PROVIDERS DIRECT ADDRESSES
[email or web address]

## 2018-08-26 NOTE — DISCHARGE NOTE ADULT - NS AS ACTIVITY OBS
No Heavy lifting/straining/Showering allowed/Do not drive or operate machinery/Walking-Indoors allowed/Stairs allowed/Walking-Outdoors allowed/Sex allowed

## 2018-08-26 NOTE — DISCHARGE NOTE ADULT - ADDITIONAL INSTRUCTIONS
See Dr Hebert in 2 weeks.  Call for an appointment.  See your PCP as well See Dr Hebert in 2 weeks.  Call  for an appointment.  See your PCP as well

## 2018-08-26 NOTE — DISCHARGE NOTE ADULT - INSTRUCTIONS
Tube feeds only- Jevity at 90 mL/h for 18 hours each night with water flushed of 250 mL every 6 hours.  Also flush the tube after use. strict NPO, keep head of bead 30* at all times  Tube feeds only- Jevity at 90 mL/h for 18 hours each night with water flushed of 250 mL every 6 hours.    Also flush the tube after use to maintain patency  only tube feeds through J tube and solution medications Any temperature greater than 100.4, severe pain not relieved with medications, or shortness of breath not relieved with rest please call your surgeon immediately or call 911.

## 2018-08-26 NOTE — DISCHARGE NOTE ADULT - CONDITIONS AT DISCHARGE
Pt is alert and orientedx4. Denies pain at this time. Midline abdominal incision is c/d/i. Left PICC line in place c/d/i. IV removed. Pt and family educated on J tube maintenance.

## 2018-08-26 NOTE — DISCHARGE NOTE ADULT - CARE PROVIDER_API CALL
Glen Hebert), Surgery; Thoracic Surgery  [address] [place] [address]  Phone: [phone]  Fax: [phone]    Nolan Livingston), Medicine  [address] [place] [address]  Phone: [phone]  Fax: [phone]

## 2018-08-26 NOTE — DISCHARGE NOTE ADULT - HOSPITAL COURSE
62 y/o male smoker with hx of esophagus CA underwent chemo and radiation and then on 8/17/18 underwent a scheduled robotic assisted JOANNA Raghu esophagogastrectomy.  A Post-op Barium swallow on 8/23 showed a leak so a FB and an EGD were performed.  The pt was kept NPO.  ID was called for tx of mediastinitis and 4 weeks of Ertapenem and Micafungin were ordered.  A PICC line was placed for that.  Additionally, the pt had ROSI and was treated with a cardizem gtt.  This was stopped on 8/26/18 fr discharge 8/27.

## 2018-08-26 NOTE — PROGRESS NOTE ADULT - SUBJECTIVE AND OBJECTIVE BOX
Subjective: No acute complaints. Requiring intermittent supplemental o2.     Vital Signs:  Vital Signs Last 24 Hrs  T(C): 36.5 (08-26-18 @ 08:34), Max: 36.8 (08-26-18 @ 00:24)  T(F): 97.7 (08-26-18 @ 08:34), Max: 98.2 (08-26-18 @ 00:24)  HR: 101 (08-26-18 @ 08:34) (83 - 103)  BP: 126/60 (08-26-18 @ 08:34) (100/63 - 142/74)  RR: 18 (08-26-18 @ 08:34) (18 - 20)  SpO2: 95% (08-26-18 @ 08:34) (92% - 98%) on (O2)    Telemetry/Alarms:  General: WN/WD NAD  Neurology: Awake, nonfocal, SAPP x 4  Eyes: Scleras clear, PERRLA/ EOMI, Gross vision intact  ENT:Gross hearing intact, grossly patent pharynx, no stridor  Neck: Neck supple, trachea midline, No JVD,   Respiratory: CTA B/L, No wheezing, rales, rhonchi  CV: RRR, S1S2, no murmurs, rubs or gallops  Abdominal: Soft, NT, ND +BS. NPO. TF nocturnal at goal.   Extremities: No edema, + peripheral pulses  Skin: No Rashes, Hematoma, Ecchymosis  Lymphatic: No Neck, axilla, groin LAD  Psych: Oriented x 3, normal affect  Incisions: Abd incision w staples CHRISTINA and c/d/i. Rt. VATs site c/d/i.   Tubes: J tube site c/d/i.   Relevant labs, radiology and Medications reviewed                        10.5   8.19  )-----------( 408      ( 26 Aug 2018 06:07 )             32.8     08-26    137  |  99  |  14  ----------------------------<  130<H>  4.3   |  27  |  0.74    Ca    8.6      26 Aug 2018 06:07      PT/INR - ( 24 Aug 2018 10:04 )   PT: 11.6 SEC;   INR: 1.04          PTT - ( 24 Aug 2018 10:04 )  PTT:27.5 SEC  MEDICATIONS  (STANDING):  acetaminophen  IVPB. 1000 milliGRAM(s) IV Intermittent once  dornase renetta Solution 2.5 milliGRAM(s) Inhalation two times a day  ertapenem  IVPB 1000 milliGRAM(s) IV Intermittent every 24 hours  heparin  Injectable 5000 Unit(s) SubCutaneous every 8 hours  ipratropium    for Nebulization 500 MICROGram(s) Nebulizer every 6 hours  levalbuterol Inhalation 0.63 milliGRAM(s) Inhalation every 6 hours  micafungin IVPB      micafungin IVPB 100 milliGRAM(s) IV Intermittent every 24 hours  pantoprazole  Injectable 40 milliGRAM(s) IV Push daily    MEDICATIONS  (PRN):  acetaminophen    Suspension. 650 milliGRAM(s) Oral every 6 hours PRN Mild Pain (1 - 3)  naloxone Injectable 0.1 milliGRAM(s) IV Push every 3 minutes PRN For ANY of the following changes in patient status:  A. RR LESS THAN 10 breaths per minute, B. Oxygen saturation LESS THAN 90%, C. Sedation score of 6  ondansetron Injectable 4 milliGRAM(s) IV Push every 6 hours PRN Nausea  oxyCODONE    Solution 5 milliGRAM(s) Oral every 6 hours PRN Moderate Pain (4 - 6)  oxyCODONE    Solution 10 milliGRAM(s) Oral every 6 hours PRN Severe Pain (7 - 10)  sodium chloride 0.65% Nasal 1 Spray(s) Both Nostrils three times a day PRN Nasal Congestion    Pertinent Physical Exam  I&O's Summary    25 Aug 2018 07:01  -  26 Aug 2018 07:00  --------------------------------------------------------  IN: 1550 mL / OUT: 450 mL / NET: 1100 mL        Assessment  63y Male  w/ PAST MEDICAL & SURGICAL HISTORY:  Malignant neoplasm of esophagus, unspecified  History of bilateral inguinal hernia repair  admitted with complaints of Patient is a 63y old  Male who presents with a chief complaint of gallbladder removal, and removal of mass in esophagus (08 Aug 2018 13:51)    64 y/o male smoker with hx of esophagus CA s/p chemo and radiation on 5/18 admitted for robotic assisted MIRZA Raghu esophagogastrectomy and cholecystectomy on 8/17/18. (08 Aug 2018 13:51)    Procedure: Mirza Raghu Esophagogastrectomy and cholecystectomy / J-tube   08/17/2018    Issues:  Rapid A fib, post op, with RVR  Shortness of breath  Esophageal Cancer   Postop pain               On 8/23 pt w leukocytosis. Barium swallow showed small leak. Pt then went for EGD w Dr. Hebert, anastamosis site intact. ID consult called for mediastinitis. Pt. to have 4wks IV antibx, pending PICC line.     PLAN  Neuro: Pain management  Pulm: Encourage coughing, deep breathing and use of incentive spirometry. Wean off supplemental oxygen as able. Daily CXR. Chest PT. May need home O2 if persistently hypoxic.   Cardio: Monitor telemetry/alarms. Off Cardizem gtt since 2am. Remains in NSR.   GI: Will cont NPO. Cont TF at goal.   Renal: monitor urine output, supplement electrolytes as needed  Vasc: Heparin SC/SCDs for DVT prophylaxis  Heme: Stable H/H. .   ID: Leukocytosis resolved. Cont current Antibx. Pending Picc line 8/27   Therapy: OOB/ambulate  Disposition: Aim to D/C to home after PICC placed and home care arranged.   Discussed with Cardiothoracic Team at AM rounds.

## 2018-08-26 NOTE — DISCHARGE NOTE ADULT - PATIENT PORTAL LINK FT
You can access the [facility] Patient Portal, offered by [facility], by registering with the following website: [email or web address]

## 2018-08-26 NOTE — DISCHARGE NOTE ADULT - CARE PLAN
Principal Discharge DX:	Malignant neoplasm of esophagus, unspecified  Goal:	Wound healing and healing of the anastomosis.  Assessment and plan of treatment:	Stable for outpatient follow up

## 2018-08-27 VITALS
HEART RATE: 110 BPM | RESPIRATION RATE: 18 BRPM | DIASTOLIC BLOOD PRESSURE: 43 MMHG | OXYGEN SATURATION: 98 % | SYSTOLIC BLOOD PRESSURE: 120 MMHG | TEMPERATURE: 98 F

## 2018-08-27 LAB
BUN SERPL-MCNC: 13 MG/DL — SIGNIFICANT CHANGE UP (ref 7–23)
CALCIUM SERPL-MCNC: 8.8 MG/DL — SIGNIFICANT CHANGE UP (ref 8.4–10.5)
CHLORIDE SERPL-SCNC: 100 MMOL/L — SIGNIFICANT CHANGE UP (ref 98–107)
CO2 SERPL-SCNC: 27 MMOL/L — SIGNIFICANT CHANGE UP (ref 22–31)
CREAT SERPL-MCNC: 0.78 MG/DL — SIGNIFICANT CHANGE UP (ref 0.5–1.3)
GLUCOSE SERPL-MCNC: 180 MG/DL — HIGH (ref 70–99)
HCT VFR BLD CALC: 35.7 % — LOW (ref 39–50)
HGB BLD-MCNC: 11.7 G/DL — LOW (ref 13–17)
MCHC RBC-ENTMCNC: 30.2 PG — SIGNIFICANT CHANGE UP (ref 27–34)
MCHC RBC-ENTMCNC: 32.8 % — SIGNIFICANT CHANGE UP (ref 32–36)
MCV RBC AUTO: 92 FL — SIGNIFICANT CHANGE UP (ref 80–100)
NRBC # FLD: 0 — SIGNIFICANT CHANGE UP
PLATELET # BLD AUTO: 453 K/UL — HIGH (ref 150–400)
PMV BLD: 9.7 FL — SIGNIFICANT CHANGE UP (ref 7–13)
POTASSIUM SERPL-MCNC: 4.6 MMOL/L — SIGNIFICANT CHANGE UP (ref 3.5–5.3)
POTASSIUM SERPL-SCNC: 4.6 MMOL/L — SIGNIFICANT CHANGE UP (ref 3.5–5.3)
RBC # BLD: 3.88 M/UL — LOW (ref 4.2–5.8)
RBC # FLD: 12.9 % — SIGNIFICANT CHANGE UP (ref 10.3–14.5)
SODIUM SERPL-SCNC: 138 MMOL/L — SIGNIFICANT CHANGE UP (ref 135–145)
WBC # BLD: 9.28 K/UL — SIGNIFICANT CHANGE UP (ref 3.8–10.5)
WBC # FLD AUTO: 9.28 K/UL — SIGNIFICANT CHANGE UP (ref 3.8–10.5)

## 2018-08-27 PROCEDURE — 76937 US GUIDE VASCULAR ACCESS: CPT | Mod: 26

## 2018-08-27 PROCEDURE — 77001 FLUOROGUIDE FOR VEIN DEVICE: CPT | Mod: 26,GC

## 2018-08-27 PROCEDURE — 36569 INSJ PICC 5 YR+ W/O IMAGING: CPT

## 2018-08-27 PROCEDURE — 71045 X-RAY EXAM CHEST 1 VIEW: CPT | Mod: 26

## 2018-08-27 RX ORDER — OXYCODONE HYDROCHLORIDE 5 MG/1
5 TABLET ORAL
Qty: 150 | Refills: 0
Start: 2018-08-27

## 2018-08-27 RX ORDER — ERTAPENEM SODIUM 1 G/1
1 INJECTION, POWDER, LYOPHILIZED, FOR SOLUTION INTRAMUSCULAR; INTRAVENOUS
Qty: 0 | Refills: 0 | COMMUNITY
Start: 2018-08-27

## 2018-08-27 RX ORDER — MICAFUNGIN SODIUM 100 MG/1
100 INJECTION, POWDER, LYOPHILIZED, FOR SOLUTION INTRAVENOUS EVERY 24 HOURS
Qty: 0 | Refills: 0 | Status: DISCONTINUED | OUTPATIENT
Start: 2018-08-27 | End: 2018-08-27

## 2018-08-27 RX ORDER — MICAFUNGIN SODIUM 100 MG/1
100 INJECTION, POWDER, LYOPHILIZED, FOR SOLUTION INTRAVENOUS
Qty: 0 | Refills: 0 | DISCHARGE
Start: 2018-08-27

## 2018-08-27 RX ORDER — ACETAMINOPHEN 500 MG
650 TABLET ORAL
Qty: 0 | Refills: 0 | DISCHARGE
Start: 2018-08-27

## 2018-08-27 RX ORDER — ERTAPENEM SODIUM 1 G/1
1000 INJECTION, POWDER, LYOPHILIZED, FOR SOLUTION INTRAMUSCULAR; INTRAVENOUS EVERY 24 HOURS
Qty: 0 | Refills: 0 | Status: DISCONTINUED | OUTPATIENT
Start: 2018-08-27 | End: 2018-08-27

## 2018-08-27 RX ORDER — ERTAPENEM SODIUM 1 G/1
1 INJECTION, POWDER, LYOPHILIZED, FOR SOLUTION INTRAMUSCULAR; INTRAVENOUS
Qty: 0 | Refills: 0 | DISCHARGE
Start: 2018-08-27

## 2018-08-27 RX ORDER — MICAFUNGIN SODIUM 100 MG/1
100 INJECTION, POWDER, LYOPHILIZED, FOR SOLUTION INTRAVENOUS
Qty: 0 | Refills: 0 | COMMUNITY
Start: 2018-08-27

## 2018-08-27 RX ADMIN — LEVALBUTEROL 0.63 MILLIGRAM(S): 1.25 SOLUTION, CONCENTRATE RESPIRATORY (INHALATION) at 10:39

## 2018-08-27 RX ADMIN — ERTAPENEM SODIUM 120 MILLIGRAM(S): 1 INJECTION, POWDER, LYOPHILIZED, FOR SOLUTION INTRAMUSCULAR; INTRAVENOUS at 18:31

## 2018-08-27 RX ADMIN — OXYCODONE HYDROCHLORIDE 10 MILLIGRAM(S): 5 TABLET ORAL at 18:31

## 2018-08-27 RX ADMIN — LEVALBUTEROL 0.63 MILLIGRAM(S): 1.25 SOLUTION, CONCENTRATE RESPIRATORY (INHALATION) at 14:51

## 2018-08-27 RX ADMIN — HEPARIN SODIUM 5000 UNIT(S): 5000 INJECTION INTRAVENOUS; SUBCUTANEOUS at 14:41

## 2018-08-27 RX ADMIN — LEVALBUTEROL 0.63 MILLIGRAM(S): 1.25 SOLUTION, CONCENTRATE RESPIRATORY (INHALATION) at 03:50

## 2018-08-27 RX ADMIN — DORNASE ALFA 2.5 MILLIGRAM(S): 1 SOLUTION RESPIRATORY (INHALATION) at 10:40

## 2018-08-27 RX ADMIN — MICAFUNGIN SODIUM 105 MILLIGRAM(S): 100 INJECTION, POWDER, LYOPHILIZED, FOR SOLUTION INTRAVENOUS at 17:31

## 2018-08-27 RX ADMIN — HEPARIN SODIUM 5000 UNIT(S): 5000 INJECTION INTRAVENOUS; SUBCUTANEOUS at 05:06

## 2018-08-27 RX ADMIN — PANTOPRAZOLE SODIUM 40 MILLIGRAM(S): 20 TABLET, DELAYED RELEASE ORAL at 12:00

## 2018-08-27 NOTE — CHART NOTE - NSCHARTNOTEFT_GEN_A_CORE
IR pre-PROCEDURE NOTE    Pt for PICC line today for long term IV antibiotics  He is aware of procedure and consentable  No anticoagulants, normal labs, coag profile and platelets; on H    Please contact JAKE [place] [phone] with concerns

## 2018-08-27 NOTE — PROGRESS NOTE ADULT - SUBJECTIVE AND OBJECTIVE BOX
Vital Signs Last 24 Hrs  T(C): 36.4 (27 Aug 2018 12:02), Max: 36.8 (27 Aug 2018 05:04)  T(F): 97.5 (27 Aug 2018 12:02), Max: 98.3 (27 Aug 2018 05:04)  HR: 105 (27 Aug 2018 12:02) (93 - 115)  BP: 114/64 (27 Aug 2018 12:02) (97/61 - 114/64)  BP(mean): 75 (27 Aug 2018 12:02) (75 - 76)  RR: 18 (27 Aug 2018 12:02) (18 - 20)  SpO2: 95% (27 Aug 2018 12:02) (93% - 98%)    I&O's Detail    26 Aug 2018 07:01  -  27 Aug 2018 07:00  --------------------------------------------------------  IN:    Enteral Tube Flush: 500 mL  Total IN: 500 mL    OUT:    Voided: 2150 mL  Total OUT: 2150 mL    Total NET: -1650 mL      27 Aug 2018 07:01  -  27 Aug 2018 14:37  --------------------------------------------------------  IN:    Enteral Tube Flush: 30 mL  Total IN: 30 mL    OUT:    Voided: 600 mL  Total OUT: 600 mL    Total NET: -570 mL                                11.7   9.28  )-----------( 453      ( 27 Aug 2018 06:10 )             35.7       08-27    138  |  100  |  13  ----------------------------<  180<H>  4.6   |  27  |  0.78    Ca    8.8      27 Aug 2018 06:10    Respiratory status much improved            PLAN:  Awaiting insurance approval for home tube feedings

## 2018-08-27 NOTE — PROGRESS NOTE ADULT - PROVIDER SPECIALTY LIST ADULT
Anesthesia
CT Surgery
CT Surgery
Critical Care
Infectious Disease
Pain Medicine
Surgery
Thoracic Surgery
Thoracic Surgery
Critical Care
Critical Care
Pain Medicine
Pain Medicine
Surgery
Surgery

## 2018-08-28 LAB
BACTERIA BLD CULT: SIGNIFICANT CHANGE UP
SURGICAL PATHOLOGY STUDY: SIGNIFICANT CHANGE UP

## 2018-08-29 LAB — BACTERIA FLD CULT: SIGNIFICANT CHANGE UP

## 2018-09-04 ENCOUNTER — APPOINTMENT (OUTPATIENT)
Dept: THORACIC SURGERY | Facility: CLINIC | Age: 63
End: 2018-09-04
Payer: COMMERCIAL

## 2018-09-04 VITALS
WEIGHT: 180 LBS | HEART RATE: 107 BPM | DIASTOLIC BLOOD PRESSURE: 78 MMHG | SYSTOLIC BLOOD PRESSURE: 111 MMHG | HEIGHT: 71 IN | OXYGEN SATURATION: 98 % | TEMPERATURE: 98.4 F | BODY MASS INDEX: 25.2 KG/M2 | RESPIRATION RATE: 18 BRPM

## 2018-09-04 PROCEDURE — 99024 POSTOP FOLLOW-UP VISIT: CPT

## 2018-09-11 ENCOUNTER — APPOINTMENT (OUTPATIENT)
Dept: SURGICAL ONCOLOGY | Facility: CLINIC | Age: 63
End: 2018-09-11
Payer: COMMERCIAL

## 2018-09-11 ENCOUNTER — APPOINTMENT (OUTPATIENT)
Dept: RADIOLOGY | Facility: HOSPITAL | Age: 63
End: 2018-09-11
Payer: COMMERCIAL

## 2018-09-11 ENCOUNTER — OUTPATIENT (OUTPATIENT)
Dept: OUTPATIENT SERVICES | Facility: HOSPITAL | Age: 63
LOS: 1 days | End: 2018-09-11

## 2018-09-11 VITALS
WEIGHT: 179 LBS | HEIGHT: 71 IN | TEMPERATURE: 98.1 F | BODY MASS INDEX: 25.06 KG/M2 | HEART RATE: 107 BPM | OXYGEN SATURATION: 97 % | DIASTOLIC BLOOD PRESSURE: 69 MMHG | SYSTOLIC BLOOD PRESSURE: 99 MMHG

## 2018-09-11 DIAGNOSIS — Z98.890 OTHER SPECIFIED POSTPROCEDURAL STATES: Chronic | ICD-10-CM

## 2018-09-11 DIAGNOSIS — C15.9 MALIGNANT NEOPLASM OF ESOPHAGUS, UNSPECIFIED: ICD-10-CM

## 2018-09-11 PROCEDURE — 74220 X-RAY XM ESOPHAGUS 1CNTRST: CPT | Mod: 26

## 2018-09-11 PROCEDURE — 99214 OFFICE O/P EST MOD 30 MIN: CPT | Mod: 24

## 2018-09-13 ENCOUNTER — APPOINTMENT (OUTPATIENT)
Dept: THORACIC SURGERY | Facility: CLINIC | Age: 63
End: 2018-09-13

## 2018-09-18 ENCOUNTER — APPOINTMENT (OUTPATIENT)
Dept: SURGICAL ONCOLOGY | Facility: CLINIC | Age: 63
End: 2018-09-18
Payer: COMMERCIAL

## 2018-09-18 VITALS
BODY MASS INDEX: 25.2 KG/M2 | DIASTOLIC BLOOD PRESSURE: 75 MMHG | HEIGHT: 71 IN | WEIGHT: 180 LBS | HEART RATE: 114 BPM | OXYGEN SATURATION: 98 % | SYSTOLIC BLOOD PRESSURE: 112 MMHG

## 2018-09-18 PROCEDURE — 99214 OFFICE O/P EST MOD 30 MIN: CPT | Mod: 24

## 2018-12-11 ENCOUNTER — APPOINTMENT (OUTPATIENT)
Dept: SURGICAL ONCOLOGY | Facility: CLINIC | Age: 63
End: 2018-12-11
Payer: COMMERCIAL

## 2018-12-11 VITALS
BODY MASS INDEX: 24.36 KG/M2 | TEMPERATURE: 98.4 F | HEIGHT: 71 IN | SYSTOLIC BLOOD PRESSURE: 91 MMHG | WEIGHT: 174 LBS | HEART RATE: 128 BPM | DIASTOLIC BLOOD PRESSURE: 67 MMHG

## 2018-12-11 PROCEDURE — 99214 OFFICE O/P EST MOD 30 MIN: CPT

## 2018-12-12 LAB
ALBUMIN SERPL ELPH-MCNC: 3.8 G/DL
ALP BLD-CCNC: 127 U/L
ALT SERPL-CCNC: 13 U/L
AST SERPL-CCNC: 22 U/L
BILIRUB DIRECT SERPL-MCNC: 0.1 MG/DL
BILIRUB INDIRECT SERPL-MCNC: 0.4 MG/DL
BILIRUB SERPL-MCNC: 0.5 MG/DL
CANCER AG19-9 SERPL-ACNC: 33 U/ML
CEA SERPL-MCNC: 4.2 NG/ML
PROT SERPL-MCNC: 7 G/DL

## 2019-03-26 ENCOUNTER — APPOINTMENT (OUTPATIENT)
Dept: SURGICAL ONCOLOGY | Facility: CLINIC | Age: 64
End: 2019-03-26

## 2019-03-30 ENCOUNTER — APPOINTMENT (OUTPATIENT)
Age: 64
End: 2019-03-30
Payer: COMMERCIAL

## 2019-03-30 ENCOUNTER — OUTPATIENT (OUTPATIENT)
Dept: OUTPATIENT SERVICES | Facility: HOSPITAL | Age: 64
LOS: 1 days | End: 2019-03-30
Payer: COMMERCIAL

## 2019-03-30 DIAGNOSIS — Z98.890 OTHER SPECIFIED POSTPROCEDURAL STATES: Chronic | ICD-10-CM

## 2019-03-30 DIAGNOSIS — Z00.8 ENCOUNTER FOR OTHER GENERAL EXAMINATION: ICD-10-CM

## 2019-03-30 PROCEDURE — 82565 ASSAY OF CREATININE: CPT

## 2019-03-30 PROCEDURE — 71260 CT THORAX DX C+: CPT

## 2019-03-30 PROCEDURE — 74177 CT ABD & PELVIS W/CONTRAST: CPT | Mod: 26

## 2019-03-30 PROCEDURE — 71260 CT THORAX DX C+: CPT | Mod: 26

## 2019-03-30 PROCEDURE — 74177 CT ABD & PELVIS W/CONTRAST: CPT

## 2019-04-30 ENCOUNTER — APPOINTMENT (OUTPATIENT)
Dept: SURGICAL ONCOLOGY | Facility: CLINIC | Age: 64
End: 2019-04-30
Payer: COMMERCIAL

## 2019-04-30 VITALS
SYSTOLIC BLOOD PRESSURE: 110 MMHG | WEIGHT: 168 LBS | DIASTOLIC BLOOD PRESSURE: 78 MMHG | TEMPERATURE: 98.2 F | BODY MASS INDEX: 23.52 KG/M2 | HEIGHT: 71 IN | HEART RATE: 91 BPM

## 2019-04-30 PROCEDURE — 99214 OFFICE O/P EST MOD 30 MIN: CPT

## 2019-04-30 NOTE — ASSESSMENT
[FreeTextEntry1] : IMP:\par S/p [place]-Raghu esophago-gastrectomy 8/17/18.\par No residual carcinoma after crispin-adjuvant\par \par PLAN:\par Bloodwork today\par RTO q 3 months with bloodwork\par CT q 6months ( 9/2019)\par

## 2019-04-30 NOTE — PHYSICAL EXAM
[Normal] : supple, no neck mass and thyroid not enlarged [Normal Supraclavicular Lymph Nodes] : normal supraclavicular lymph nodes [Normal Groin Lymph Nodes] : normal groin lymph nodes [Normal] : oriented to person, place and time, with appropriate affect [de-identified] : Vertical midline incision well healed with no appreciable or hernia. [de-identified] : parathesias left ulnar distribution in hand- improving

## 2019-04-30 NOTE — HISTORY OF PRESENT ILLNESS
[de-identified] : 63 year-old male returns for follow up, status post [place] Raghu Esophagogastrectomy, cholecystectomy, umbilical hernia repair on 8/17/18 along with Dr. Hebert.  Final pathology showed no residual carcinoma, 12 negative lymph nodes and negative margins.  Postop barium swallow on 8/23/18 showed a leak so a flexible bronchoscopy and EGD were performed and he was kept NPO.  ID was called for treatment of mediastinitis and a PICC was placed in anticipation of 4 weeks of Ertapenem and Micafungin. \par \par A barium swallow performed on 9/11/18 showed resolution of the anastomotic leak.  \par \par Most recent CT of the chest, abdomen and pelvis in March 2019 demonstrated multiple mildly prominent mediastinal lymph nodes, non specific, and a 4 mm right lower lobe pulmonary nodule.\par \par Labs 12/2018:\par LFT: WNL\par CEA: 4.2 ng/ml\par CA 19-9: 33 U/ml\par \par \par His weight and appetite are preserved and he denies dysphagia, nausea/vomiting in general.  He vomits occasionally if he eats something too heavy.  He is moving his bowels without difficulty and he denies abdominal pain, fever or chills.   Paresthesias in the ulnar distribution of his left hand continues to improve. \par \par Previous pertinent history is as follows:\par \par Sánchez was initially referred by Dr. Brandon Gil.  He began to experience a persistent cough and appetite loss.  He was referred for a CT of the chest, abdomen and pelvis in March 2018.  Study demonstrated severe circumferential soft tissue thickening at the distal esophagus with associated thoracic, milly esophageal and portacaval lymphadenopathy.  There were non specific pulmonary findings, possibly reflecting pulmonary fibrosis, with associated emphysematous changes, and indication of a recent right 8th rib fracture.  \par \par He was referred for an endoscopy on 3/14/18 at [facility].   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  He had a colonoscopy as well which was unremarkable. \par \par He completed neoadjuvant chemoradiation on May 23, 2018 with Dr. Briones and Dr. Pickett.\par \par He completed a restaging CT of the chest, abdomen and pelvis on 6/25/18, which demonstrated few scattered mediastinal paratracheal lymph nodes.  The previously described posterior esophageal dominant lymph node with necrosis is decreased in size.  There is mild thickening of the esophageal wall. \par \par His past medical history also includes HLD and COPD (he has smoked for the past 51 years).  Prior surgeries include bilateral inguinal hernia repairs.  He also has a large umbilical hernia with no associated obstructive symptoms.\par \par PCP: Dr. Francisco Sure

## 2019-05-03 LAB
ALBUMIN SERPL ELPH-MCNC: 4 G/DL
ALP BLD-CCNC: 133 U/L
ALT SERPL-CCNC: 19 U/L
AST SERPL-CCNC: 29 U/L
BILIRUB DIRECT SERPL-MCNC: 0.1 MG/DL
BILIRUB INDIRECT SERPL-MCNC: 0.5 MG/DL
BILIRUB SERPL-MCNC: 0.6 MG/DL
CANCER AG19-9 SERPL-ACNC: 18 U/ML
CEA SERPL-MCNC: 5.6 NG/ML
PROT SERPL-MCNC: 7.3 G/DL

## 2019-08-05 NOTE — HISTORY OF PRESENT ILLNESS
[de-identified] : 64 year-old male returns for follow up, status post [place] Raghu Esophagogastrectomy, cholecystectomy, umbilical hernia repair on 8/17/18 along with Dr. Hebert.  Final pathology showed no residual carcinoma, 12 negative lymph nodes and negative margins.  Postop barium swallow on 8/23/18 showed a leak so a flexible bronchoscopy and EGD were performed and he was kept NPO.  ID was called for treatment of mediastinitis and a PICC was placed in anticipation of 4 weeks of Ertapenem and Micafungin. \par \par A barium swallow performed on 9/11/18 showed resolution of the anastomotic leak.  \par \par Most recent CT of the chest, abdomen and pelvis in March 2019 demonstrated multiple mildly prominent mediastinal lymph nodes, non specific, and a 4 mm right lower lobe pulmonary nodule.\par \par Labs 4/2019:\par LFT: WNL (alk phos slightly elevated at 133)\par CEA: 5.6 ng/ml (previously 4.2 ng/ml 12/2018)\par CA 19-9: 18 U/ml\par \par His weight and appetite are preserved and he denies dysphagia, nausea/vomiting in general.  He vomits occasionally if he eats something too heavy.  He is moving his bowels without difficulty and he denies abdominal pain, fever or chills.   Paresthesias in the ulnar distribution of his left hand continues to improve. \par \par Previous pertinent history is as follows:\par \par Sánchez was initially referred by Dr. Brandon Gil.  He began to experience a persistent cough and appetite loss.  He was referred for a CT of the chest, abdomen and pelvis in March 2018.  Study demonstrated severe circumferential soft tissue thickening at the distal esophagus with associated thoracic, milly esophageal and portacaval lymphadenopathy.  There were non specific pulmonary findings, possibly reflecting pulmonary fibrosis, with associated emphysematous changes, and indication of a recent right 8th rib fracture.  \par \par He was referred for an endoscopy on 3/14/18 at [facility].   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  He had a colonoscopy as well which was unremarkable. \par \par He completed neoadjuvant chemoradiation on May 23, 2018 with Dr. Briones and Dr. Pickett.\par \par He completed a restaging CT of the chest, abdomen and pelvis on 6/25/18, which demonstrated few scattered mediastinal paratracheal lymph nodes.  The previously described posterior esophageal dominant lymph node with necrosis is decreased in size.  There is mild thickening of the esophageal wall. \par \par His past medical history also includes HLD and COPD (he has smoked for the past 51 years).  Prior surgeries include bilateral inguinal hernia repairs.  He also has a large umbilical hernia with no associated obstructive symptoms.\par \par PCP: Dr. Francisco Sure

## 2019-08-05 NOTE — ASSESSMENT
[FreeTextEntry1] : IMP:\par S/p [place]-Raghu esophago-gastrectomy 8/17/18.\par No residual carcinoma after crispin-adjuvant\par Elevated CEA\par \par PLAN:\par Bloodwork today\par RTO q 4 months with bloodwork\par CT q 6months ( 9/2019)\par

## 2019-08-06 ENCOUNTER — APPOINTMENT (OUTPATIENT)
Dept: SURGICAL ONCOLOGY | Facility: CLINIC | Age: 64
End: 2019-08-06

## 2019-10-23 ENCOUNTER — RESULT REVIEW (OUTPATIENT)
Age: 64
End: 2019-10-23

## 2019-10-23 ENCOUNTER — OUTPATIENT (OUTPATIENT)
Dept: OUTPATIENT SERVICES | Facility: HOSPITAL | Age: 64
LOS: 1 days | End: 2019-10-23
Payer: COMMERCIAL

## 2019-10-23 DIAGNOSIS — Z98.890 OTHER SPECIFIED POSTPROCEDURAL STATES: Chronic | ICD-10-CM

## 2019-10-23 DIAGNOSIS — K92.2 GASTROINTESTINAL HEMORRHAGE, UNSPECIFIED: ICD-10-CM

## 2019-10-23 PROCEDURE — 88312 SPECIAL STAINS GROUP 1: CPT | Mod: 26

## 2019-10-23 PROCEDURE — 88305 TISSUE EXAM BY PATHOLOGIST: CPT

## 2019-10-23 PROCEDURE — 88305 TISSUE EXAM BY PATHOLOGIST: CPT | Mod: 26

## 2019-10-23 PROCEDURE — 88312 SPECIAL STAINS GROUP 1: CPT

## 2019-10-23 PROCEDURE — 43239 EGD BIOPSY SINGLE/MULTIPLE: CPT

## 2019-11-01 LAB — SURGICAL PATHOLOGY STUDY: SIGNIFICANT CHANGE UP

## 2020-01-07 ENCOUNTER — APPOINTMENT (OUTPATIENT)
Dept: INFUSION THERAPY | Facility: HOSPITAL | Age: 65
End: 2020-01-07

## 2020-06-18 DIAGNOSIS — Z01.818 ENCOUNTER FOR OTHER PREPROCEDURAL EXAMINATION: ICD-10-CM

## 2020-06-19 ENCOUNTER — APPOINTMENT (OUTPATIENT)
Dept: DISASTER EMERGENCY | Facility: CLINIC | Age: 65
End: 2020-06-19

## 2020-06-20 LAB — SARS-COV-2 N GENE NPH QL NAA+PROBE: NOT DETECTED

## 2020-06-24 ENCOUNTER — OUTPATIENT (OUTPATIENT)
Dept: OUTPATIENT SERVICES | Facility: HOSPITAL | Age: 65
LOS: 1 days | End: 2020-06-24
Payer: COMMERCIAL

## 2020-06-24 ENCOUNTER — RESULT REVIEW (OUTPATIENT)
Age: 65
End: 2020-06-24

## 2020-06-24 DIAGNOSIS — K92.2 GASTROINTESTINAL HEMORRHAGE, UNSPECIFIED: ICD-10-CM

## 2020-06-24 DIAGNOSIS — Z98.890 OTHER SPECIFIED POSTPROCEDURAL STATES: Chronic | ICD-10-CM

## 2020-06-24 PROCEDURE — 88305 TISSUE EXAM BY PATHOLOGIST: CPT

## 2020-06-24 PROCEDURE — 88305 TISSUE EXAM BY PATHOLOGIST: CPT | Mod: 26

## 2020-06-24 PROCEDURE — 43239 EGD BIOPSY SINGLE/MULTIPLE: CPT

## 2020-06-24 PROCEDURE — 88312 SPECIAL STAINS GROUP 1: CPT

## 2020-06-24 PROCEDURE — 88312 SPECIAL STAINS GROUP 1: CPT | Mod: 26

## 2020-06-24 NOTE — PROGRESS NOTE ADULT - SUBJECTIVE AND OBJECTIVE BOX
Esophagogastroduodenoscopy Report  Indication: Abdominal pain, coffee ground emesis, weight loss  Referring MD:   Mauricio Esposito MD  Instrument:  #  [id number]  Anesthesia: MAC  Consent:  Informed consent was obtained from the patient after providing any opportunity for questions  Procedure: The gastroscope was gently passed through the incisoral orifice into the oral cavity and under direct visualization the esophagus was intubated. The endoscope was passed down the esophagus, through the stomach and into proximal jejunum. Color, texture, mucosa and anatomy of the esophagus, stomach, and duodenum were carefully examined with the scope. The patient tolerated the procedure well. After completion of the examination, the patient was transferred to the recovery room.   Preparation: NPO   Findings:   Oropharynx	Normal  Esophagus	S/p partial resection, Mucosal erythea  EG-junction	S/p resection  Cardia	S/p resection  Body	Patchy mucosal erythema   Antrum	Patchy mucosal erythema (Bxed)  Pylorus	Normal.  Duodenal Bulb	Normal   2nd portion	Normal  3rd portion	Normal.     EBL:0  Impression:   1. Gastritis  2. Esophagitis  3. S/p partial esophageal resection  4. S/p Partial gastric resection    Plan:  1. Follow up path  2. Avoid NSAID  3. Protonix 40mg daily  4. Anti reflux measure  5. Treat for H. Pylori if positive       Procedure Start Time: 15:39  Procedure End Time:   15:42    Attending:       Mauricio Esposito M.D.

## 2020-06-26 LAB — SURGICAL PATHOLOGY STUDY: SIGNIFICANT CHANGE UP

## 2020-11-09 ENCOUNTER — INPATIENT (INPATIENT)
Facility: HOSPITAL | Age: 65
LOS: 1 days | Discharge: ANOTHER HOSPITAL | DRG: 305 | End: 2020-11-11
Attending: INTERNAL MEDICINE | Admitting: INTERNAL MEDICINE
Payer: COMMERCIAL

## 2020-11-09 VITALS
SYSTOLIC BLOOD PRESSURE: 149 MMHG | WEIGHT: 160.06 LBS | HEART RATE: 64 BPM | OXYGEN SATURATION: 98 % | RESPIRATION RATE: 16 BRPM | HEIGHT: 71 IN | TEMPERATURE: 98 F | DIASTOLIC BLOOD PRESSURE: 84 MMHG

## 2020-11-09 DIAGNOSIS — Z90.49 ACQUIRED ABSENCE OF OTHER SPECIFIED PARTS OF DIGESTIVE TRACT: Chronic | ICD-10-CM

## 2020-11-09 DIAGNOSIS — R07.89 OTHER CHEST PAIN: ICD-10-CM

## 2020-11-09 DIAGNOSIS — Z98.890 OTHER SPECIFIED POSTPROCEDURAL STATES: Chronic | ICD-10-CM

## 2020-11-09 DIAGNOSIS — Z29.9 ENCOUNTER FOR PROPHYLACTIC MEASURES, UNSPECIFIED: ICD-10-CM

## 2020-11-09 DIAGNOSIS — C15.9 MALIGNANT NEOPLASM OF ESOPHAGUS, UNSPECIFIED: ICD-10-CM

## 2020-11-09 DIAGNOSIS — J44.9 CHRONIC OBSTRUCTIVE PULMONARY DISEASE, UNSPECIFIED: ICD-10-CM

## 2020-11-09 DIAGNOSIS — R91.8 OTHER NONSPECIFIC ABNORMAL FINDING OF LUNG FIELD: ICD-10-CM

## 2020-11-09 LAB
ALBUMIN SERPL ELPH-MCNC: 3.2 G/DL — LOW (ref 3.5–5)
ALP SERPL-CCNC: 104 U/L — SIGNIFICANT CHANGE UP (ref 40–120)
ALT FLD-CCNC: 21 U/L DA — SIGNIFICANT CHANGE UP (ref 10–60)
ANION GAP SERPL CALC-SCNC: 5 MMOL/L — SIGNIFICANT CHANGE UP (ref 5–17)
AST SERPL-CCNC: 22 U/L — SIGNIFICANT CHANGE UP (ref 10–40)
BASOPHILS # BLD AUTO: 0.07 K/UL — SIGNIFICANT CHANGE UP (ref 0–0.2)
BASOPHILS NFR BLD AUTO: 0.8 % — SIGNIFICANT CHANGE UP (ref 0–2)
BILIRUB SERPL-MCNC: 0.9 MG/DL — SIGNIFICANT CHANGE UP (ref 0.2–1.2)
BUN SERPL-MCNC: 13 MG/DL — SIGNIFICANT CHANGE UP (ref 7–18)
CALCIUM SERPL-MCNC: 8.8 MG/DL — SIGNIFICANT CHANGE UP (ref 8.4–10.5)
CHLORIDE SERPL-SCNC: 109 MMOL/L — HIGH (ref 96–108)
CO2 SERPL-SCNC: 27 MMOL/L — SIGNIFICANT CHANGE UP (ref 22–31)
CREAT SERPL-MCNC: 0.81 MG/DL — SIGNIFICANT CHANGE UP (ref 0.5–1.3)
D DIMER BLD IA.RAPID-MCNC: <150 NG/ML DDU — SIGNIFICANT CHANGE UP
EOSINOPHIL # BLD AUTO: 0.14 K/UL — SIGNIFICANT CHANGE UP (ref 0–0.5)
EOSINOPHIL NFR BLD AUTO: 1.7 % — SIGNIFICANT CHANGE UP (ref 0–6)
GLUCOSE SERPL-MCNC: 88 MG/DL — SIGNIFICANT CHANGE UP (ref 70–99)
HCT VFR BLD CALC: 41.1 % — SIGNIFICANT CHANGE UP (ref 39–50)
HGB BLD-MCNC: 13.4 G/DL — SIGNIFICANT CHANGE UP (ref 13–17)
IMM GRANULOCYTES NFR BLD AUTO: 0.2 % — SIGNIFICANT CHANGE UP (ref 0–1.5)
LIDOCAIN IGE QN: 64 U/L — LOW (ref 73–393)
LYMPHOCYTES # BLD AUTO: 1.39 K/UL — SIGNIFICANT CHANGE UP (ref 1–3.3)
LYMPHOCYTES # BLD AUTO: 16.5 % — SIGNIFICANT CHANGE UP (ref 13–44)
MCHC RBC-ENTMCNC: 29.8 PG — SIGNIFICANT CHANGE UP (ref 27–34)
MCHC RBC-ENTMCNC: 32.6 GM/DL — SIGNIFICANT CHANGE UP (ref 32–36)
MCV RBC AUTO: 91.3 FL — SIGNIFICANT CHANGE UP (ref 80–100)
MONOCYTES # BLD AUTO: 0.54 K/UL — SIGNIFICANT CHANGE UP (ref 0–0.9)
MONOCYTES NFR BLD AUTO: 6.4 % — SIGNIFICANT CHANGE UP (ref 2–14)
NEUTROPHILS # BLD AUTO: 6.25 K/UL — SIGNIFICANT CHANGE UP (ref 1.8–7.4)
NEUTROPHILS NFR BLD AUTO: 74.4 % — SIGNIFICANT CHANGE UP (ref 43–77)
NRBC # BLD: 0 /100 WBCS — SIGNIFICANT CHANGE UP (ref 0–0)
PLATELET # BLD AUTO: 303 K/UL — SIGNIFICANT CHANGE UP (ref 150–400)
POTASSIUM SERPL-MCNC: 4.6 MMOL/L — SIGNIFICANT CHANGE UP (ref 3.5–5.3)
POTASSIUM SERPL-SCNC: 4.6 MMOL/L — SIGNIFICANT CHANGE UP (ref 3.5–5.3)
PROT SERPL-MCNC: 6.6 G/DL — SIGNIFICANT CHANGE UP (ref 6–8.3)
RBC # BLD: 4.5 M/UL — SIGNIFICANT CHANGE UP (ref 4.2–5.8)
RBC # FLD: 13.6 % — SIGNIFICANT CHANGE UP (ref 10.3–14.5)
SARS-COV-2 RNA SPEC QL NAA+PROBE: SIGNIFICANT CHANGE UP
SODIUM SERPL-SCNC: 141 MMOL/L — SIGNIFICANT CHANGE UP (ref 135–145)
TROPONIN I SERPL-MCNC: <0.015 NG/ML — SIGNIFICANT CHANGE UP (ref 0–0.04)
WBC # BLD: 8.41 K/UL — SIGNIFICANT CHANGE UP (ref 3.8–10.5)
WBC # FLD AUTO: 8.41 K/UL — SIGNIFICANT CHANGE UP (ref 3.8–10.5)

## 2020-11-09 PROCEDURE — 99285 EMERGENCY DEPT VISIT HI MDM: CPT

## 2020-11-09 PROCEDURE — 71275 CT ANGIOGRAPHY CHEST: CPT | Mod: 26

## 2020-11-09 RX ORDER — ENOXAPARIN SODIUM 100 MG/ML
40 INJECTION SUBCUTANEOUS DAILY
Refills: 0 | Status: DISCONTINUED | OUTPATIENT
Start: 2020-11-09 | End: 2020-11-11

## 2020-11-09 RX ORDER — BUDESONIDE AND FORMOTEROL FUMARATE DIHYDRATE 160; 4.5 UG/1; UG/1
2 AEROSOL RESPIRATORY (INHALATION)
Refills: 0 | Status: DISCONTINUED | OUTPATIENT
Start: 2020-11-09 | End: 2020-11-11

## 2020-11-09 RX ORDER — KETOROLAC TROMETHAMINE 30 MG/ML
30 SYRINGE (ML) INJECTION ONCE
Refills: 0 | Status: DISCONTINUED | OUTPATIENT
Start: 2020-11-09 | End: 2020-11-09

## 2020-11-09 RX ORDER — ACETAMINOPHEN 500 MG
650 TABLET ORAL EVERY 6 HOURS
Refills: 0 | Status: DISCONTINUED | OUTPATIENT
Start: 2020-11-09 | End: 2020-11-10

## 2020-11-09 RX ORDER — PANTOPRAZOLE SODIUM 20 MG/1
40 TABLET, DELAYED RELEASE ORAL
Refills: 0 | Status: DISCONTINUED | OUTPATIENT
Start: 2020-11-09 | End: 2020-11-11

## 2020-11-09 RX ORDER — OXYCODONE AND ACETAMINOPHEN 5; 325 MG/1; MG/1
1 TABLET ORAL EVERY 6 HOURS
Refills: 0 | Status: DISCONTINUED | OUTPATIENT
Start: 2020-11-09 | End: 2020-11-10

## 2020-11-09 RX ORDER — OXYCODONE AND ACETAMINOPHEN 5; 325 MG/1; MG/1
1 TABLET ORAL ONCE
Refills: 0 | Status: DISCONTINUED | OUTPATIENT
Start: 2020-11-09 | End: 2020-11-09

## 2020-11-09 RX ADMIN — Medication 30 MILLIGRAM(S): at 18:34

## 2020-11-09 RX ADMIN — Medication 30 MILLIGRAM(S): at 18:36

## 2020-11-09 RX ADMIN — OXYCODONE AND ACETAMINOPHEN 1 TABLET(S): 5; 325 TABLET ORAL at 19:24

## 2020-11-09 RX ADMIN — OXYCODONE AND ACETAMINOPHEN 1 TABLET(S): 5; 325 TABLET ORAL at 19:39

## 2020-11-09 NOTE — CONSULT NOTE ADULT - NEGATIVE OPHTHALMOLOGIC SYMPTOMS
no irritation L/no loss of vision R/no lacrimation L/no pain R/no blurred vision R/no lacrimation R/no blurred vision L/no discharge L/no diplopia/no photophobia/no scleral injection R/no discharge R/no pain L/no irritation R/no loss of vision L

## 2020-11-09 NOTE — ED PROVIDER NOTE - CLINICAL SUMMARY MEDICAL DECISION MAKING FREE TEXT BOX
66 y/o M presents with rib and back pain. Will check EKG, labs including troponin and CTA to rule out dangerous cause.

## 2020-11-09 NOTE — ED ADULT NURSE NOTE - NSIMPLEMENTINTERV_GEN_ALL_ED
Implemented All Universal Safety Interventions:  [place] to call system. Call bell, personal items and telephone within reach. Instruct patient to call for assistance. Room bathroom lighting operational. Non-slip footwear when patient is off stretcher. Physically safe environment: no spills, clutter or unnecessary equipment. Stretcher in lowest position, wheels locked, appropriate side rails in place.

## 2020-11-09 NOTE — H&P ADULT - ASSESSMENT
Patient is a 65 year old male from home AAO x3, smoker with PMH of esophageal cancer s/p chemo and radiation in 5/2018 s/p esophagogastrectomy and cholecystectomy on 8/17/2018 and COPD, who presented to the ED due to rib and back pain.     D-dimer normal. Troponin negative x1. EKG showing NSR. CTA chest negative for PE. Showing new enlarged prevertebral/paraesophageal LN and numerous 5mm and smaller lung nodules suspicious for metastasis. Given dose of toradol and percocet in ED.

## 2020-11-09 NOTE — CONSULT NOTE ADULT - NEGATIVE NEUROLOGICAL SYMPTOMS
no difficulty walking/no loss of consciousness/no vertigo/no tremors/no loss of sensation/no headache/no syncope

## 2020-11-09 NOTE — CONSULT NOTE ADULT - NEGATIVE ENMT SYMPTOMS
Problems with primary support/Economic problems
no nose bleeds/no dry mouth/no throat pain/no dysphagia/no nasal discharge/no vertigo/no nasal congestion/no nasal obstruction/no hearing difficulty/no ear pain/no tinnitus/no sinus symptoms/no gum bleeding

## 2020-11-09 NOTE — H&P ADULT - NSICDXPASTMEDICALHX_GEN_ALL_CORE_FT
PAST MEDICAL HISTORY:  Chronic obstructive pulmonary disease     Malignant neoplasm of esophagus, unspecified

## 2020-11-09 NOTE — H&P ADULT - PROBLEM SELECTOR PLAN 2
CTA chest showing new enlarged prevertebral/paraesophageal LN and numerous 5mm and smaller lung nodules suspicious for metastasis  plan as above  monitor O2 sats  goal SpO2 between 88-92% in setting of COPD

## 2020-11-09 NOTE — ED PROVIDER NOTE - OBJECTIVE STATEMENT
66 y/o M with a significant PMHx of malignant neoplasm of esophagus and a significant PSHx of cholecystectomy presents to the ED with severe rib and back pain. Patient states he had a fractured rib approximately 2 years ago and was diagnosed with esophageal cancer. Patient called his GI doctor today and was referred to the ED. Patient also with decreased appetite. Wife states patient had an endoscopy in 5/2020. Patient reports taking 3 ibuprofen for pain. Patient notes mild cough.

## 2020-11-09 NOTE — H&P ADULT - NSICDXPASTSURGICALHX_GEN_ALL_CORE_FT
PAST SURGICAL HISTORY:  History of bilateral inguinal hernia repair     History of cholecystectomy

## 2020-11-09 NOTE — H&P ADULT - HISTORY OF PRESENT ILLNESS
Patient is a 65 year old male from home AAO x3, smoker with PMH of esophageal cancer s/p chemo and radiation in 5/2018 s/p esophagogastrectomy and cholecystectomy on 8/17/2018 and COPD, who presented to the ED due to rib and back pain. Patient states that the pain had started about a week ago and comes and goes. Describes the pain as wrapping around his ribs to his back and that different parts of the ribs and back hurt at different times. Patient has tried tylenol with minimal relief. Patient has also been taking ibuprofen 3 tabs every 6 hours or so which also minimally relieves the pain. Currently, patient denies any chest pain, shortness of breath, nausea, vomiting, abdominal pain, headache or dizziness.

## 2020-11-09 NOTE — ED ADULT NURSE NOTE - OBJECTIVE STATEMENT
Patient came to the ED a/o x 3 ambulates c/o upper chest pain radiating to the back x 1 week. No SOB, no dizziness.

## 2020-11-09 NOTE — CONSULT NOTE ADULT - GASTROINTESTINAL DETAILS
no guarding/no distention/no masses palpable/nontender/no bruit/no rebound tenderness/soft/bowel sounds normal/no rigidity

## 2020-11-09 NOTE — H&P ADULT - PROBLEM SELECTOR PLAN 3
history of esophageal cancer s/p partial esophageal and partial gastric resection   possible mets based on CTA findings  heme/onc Dr. Briones consulted   GI, Dr. Esposito

## 2020-11-09 NOTE — H&P ADULT - PROBLEM SELECTOR PLAN 4
continue home med of symbicort for COPD  goal SpO2 88-92%   monitor O2 sats  patient states he has reaction to albuterol

## 2020-11-09 NOTE — ED PROVIDER NOTE - PROGRESS NOTE DETAILS
Pt with continued pain despite parenteral medication, new ct findings, discussed with Dr Esposito, Dr Livingston and Dr Briones, will admit for further testing and pain control.

## 2020-11-09 NOTE — H&P ADULT - NSHPSOCIALHISTORY_GEN_ALL_CORE
Patient lives at home with his wife. States he smokes about one pack of cigarettes every 72 hours. Denies any alcohol use. Marijuana user.

## 2020-11-09 NOTE — H&P ADULT - PROBLEM SELECTOR PLAN 1
patient presented to the ED due to pain in ribs wrapping around to back  CTA chest done showing new enlarged prevertebral/paraesophageal LN and numerous 5mm and smaller lung nodules suspicious for metastasis  patient has history of esophageal cancer back in 2018  GI Dr. Esposito consulted  heme/onc Dr. Briones consulted  palliative Dr. Motley consulted  pain management

## 2020-11-09 NOTE — CONSULT NOTE ADULT - SUBJECTIVE AND OBJECTIVE BOX
[  ] STAT REQUEST              [  ]ROUTINE REQUEST    Patient is a 65y old  Male who presents with a chief complaint of     HPI:      PAIN MANAGEMENT:  Pain Scale:                 /10  Pain Location:      Prior Colonoscopy:    PAST MEDICAL HISTORY  Malignant neoplasm of esophagus, unspecified        PAST SURGICAL HISTORY  History of bilateral inguinal hernia repair        Allergies    No Known Allergies    Intolerances        HOME MEDICATIONS    MEDICATIONS  (STANDING):    MEDICATIONS  (PRN):      SOCIAL HISTORY  Advanced Directives:       [  ] Full Code       [  ] DNR  Marital Status:         [  ] M      [  ] S      [  ] D       [  ] W  Children:       [  ] Yes      [  ] No  Occupation:        [  ] Employed       [  ] Unemployed       [  ] Retired  Diet:       [  ] Regular       [  ] PEG feeding          [  ] NG tube feeding  Drug Use:           [  ] Patient denied          [  ] Yes  Alcohol:           [  ] No             [  ] Yes (socially)         [  ] Yes (chronic)  Tobacco:           [  ] Yes           [  ] No    FAMILY HISTORY  [  ] Heart Disease            [  ] Diabetes             [  ] HTN             [  ] Colon Cancer             [  ] Stomach Cancer              [  ] Pancreatic Cancer    VITAL SIGNS   Vital Signs Last 24 Hrs  T(C): 37.1 (09 Nov 2020 16:55), Max: 37.1 (09 Nov 2020 16:55)  T(F): 98.8 (09 Nov 2020 16:55), Max: 98.8 (09 Nov 2020 16:55)  HR: 87 (09 Nov 2020 16:55) (64 - 87)  BP: 121/76 (09 Nov 2020 16:55) (121/76 - 149/84)   RR: 18 (09 Nov 2020 16:55) (16 - 18)  SpO2: 97% (09 Nov 2020 16:55) (97% - 98%)  Daily Height in cm: 180.34 (09 Nov 2020 13:24)           CBC Full  -  ( 09 Nov 2020 15:10 )  WBC Count : 8.41 K/uL  RBC Count : 4.50 M/uL  Hemoglobin : 13.4 g/dL  Hematocrit : 41.1 %  Platelet Count - Automated : 303 K/uL  Mean Cell Volume : 91.3 fl  Mean Cell Hemoglobin : 29.8 pg  Mean Cell Hemoglobin Concentration : 32.6 gm/dL  Auto Neutrophil # : 6.25 K/uL  Auto Lymphocyte # : 1.39 K/uL  Auto Monocyte # : 0.54 K/uL  Auto Eosinophil # : 0.14 K/uL  Auto Basophil # : 0.07 K/uL  Auto Neutrophil % : 74.4 %  Auto Lymphocyte % : 16.5 %  Auto Monocyte % : 6.4 %  Auto Eosinophil % : 1.7 %  Auto Basophil % : 0.8 %      11-09    141  |  109<H>  |  13  ----------------------------<  88  4.6   |  27  |  0.81    Ca    8.8      09 Nov 2020 15:10    TPro  6.6  /  Alb  3.2<L>  /  TBili  0.9  /  DBili  x   /  AST  22  /  ALT  21  /  AlkPhos  104  11-09      Lipase, Serum: 64 U/L (11-09 @ 15:10)         RADIOLOGY/IMAGING                EXAM:  CT ANGIO CHEST (W)AW IC                            PROCEDURE DATE:  11/09/2020          INTERPRETATION:  INDICATION, CLINICAL INFORMATION: Shortness of breath, history of esophageal cancer    TECHNIQUE: CT pulmonary angiogram of the chest was performed. Coronal and sagittal images were reconstructed. Maximum intensity projection images were generated. Images were obtained after the uneventful administration of 90 cc nonionic intravenous Omnipaque 350. 10 cc of Omnipaque 350 was discarded.      COMPARISON: CT chest 3/30/2019    FINDINGS:    PULMONARY VESSELS: No pulmonary embolus. Main pulmonary artery normal in diameter.    HEART AND VASCULATURE: Normal heart size without pericardial effusion. No aortic aneurysm or dissection.    LUNGS, AIRWAYS, PLEURA: Patent central airways. Mild bronchial wall thickening. Emphysema. Numerous 5 mm and smaller lung nodules new from prior for example right upper lobe (series 5 image 41); (upper lobe (series 5 image 70-72).    No pleural effusionsor pneumothorax.    MEDIASTINUM: Again noted, the patient is status post esophagectomy and gastric pull-through. 3.1 x 1.7 cm 2.7 x 2 cm prevertebral/paraesophageal nodule (series 5 images 66 and 81) are new from prior. The remaining subcentimeter mediastinal and hilar lymph nodes are stable.    UPPER ABDOMEN: Cholecystectomy. 2 mm nonobstructing left renal stone.    BONES AND SOFT TISSUES: Increased fracture deformity of T7, T9 and T10 vertebral bodies.    LOWER NECK: Within normal limits.    IMPRESSION:    No pulmonary embolus.    New enlarged prevertebral/paraesophageal lymph nodes suspicious for metastatic disease.    Numerous 5 mm and smaller lung nodules also suspicious for metastatic disease.           [  ] STAT REQUEST              [ X ]ROUTINE REQUEST    Patient is a 65 year old male with h/o esophageal cancer with chect pain and abnormal CT-Scan finding. GI consulted to evaluate.        HPI:  Patient is a 65 year old male from home AAO x3, smoker with PMH of esophageal cancer s/p chemo and radiation in 5/2018 s/p esophagogastrectomy and cholecystectomy on 8/17/2018 and COPD, who presented to the ED due to rib and back pain. Patient states that the pain had started about a week ago and comes and goes. Describes the pain as wrapping around his ribs to his back and that different parts of the ribs and back hurt at different times. Patient has tried tylenol with minimal relief. Patient has also been taking ibuprofen 3 tabs every 6 hours or so which also minimally relieves the pain. Currently, patient denies any chest pain, shortness of breath, nausea, vomiting, abdominal pain, headache or dizziness.        PAIN MANAGEMENT:  Pain Scale:                5-6 /10  Pain Location:  Chest and epigastric pain    Prior Colonoscopy:  2 years ago5-65-6    PAST MEDICAL HISTORY   COPD  Esophageal cancer        PAST SURGICAL HISTORY  Hernia repair  Cholecystectomy  Esophagectomy        Allergies    No Known Allergies    Intolerances  None      HOME MEDICATIONS     Omeprazole 40mg daily      SOCIAL HISTORY  Advanced Directives:       [ X ] Full Code       [  ] DNR  Marital Status:         [X ] M      [  ] S      [  ] D       [  ] W  Children:       [  ] Yes      [ X ] No  Occupation:        [  ] Employed       [ X ] Unemployed       [  ] Retired  Diet:       [X  ] Regular       [  ] PEG feeding          [  ] NG tube feeding  Drug Use:           [X ] Patient denied          [  ] Yes  Alcohol:           [X  ] No             [  ] Yes (socially)         [  ] Yes (chronic)  Tobacco:           [  ] Yes           [ X ] No    FAMILY HISTORY  [X  ] Heart Disease            [X  ] Diabetes             [ X ] HTN             [  ] Colon Cancer             [  ] Stomach Cancer              [  ] Pancreatic Cancer    VITAL SIGNS   Vital Signs Last 24 Hrs  T(C): 37.1 (09 Nov 2020 16:55), Max: 37.1 (09 Nov 2020 16:55)  T(F): 98.8 (09 Nov 2020 16:55), Max: 98.8 (09 Nov 2020 16:55)  HR: 87 (09 Nov 2020 16:55) (64 - 87)  BP: 121/76 (09 Nov 2020 16:55) (121/76 - 149/84)   RR: 18 (09 Nov 2020 16:55) (16 - 18)  SpO2: 97% (09 Nov 2020 16:55) (97% - 98%)  Daily Height in cm: 180.34 (09 Nov 2020 13:24)           CBC Full  -  ( 09 Nov 2020 15:10 )  WBC Count : 8.41 K/uL  RBC Count : 4.50 M/uL  Hemoglobin : 13.4 g/dL  Hematocrit : 41.1 %  Platelet Count - Automated : 303 K/uL  Mean Cell Volume : 91.3 fl  Mean Cell Hemoglobin : 29.8 pg  Mean Cell Hemoglobin Concentration : 32.6 gm/dL  Auto Neutrophil # : 6.25 K/uL  Auto Lymphocyte # : 1.39 K/uL  Auto Monocyte # : 0.54 K/uL  Auto Eosinophil # : 0.14 K/uL  Auto Basophil # : 0.07 K/uL  Auto Neutrophil % : 74.4 %  Auto Lymphocyte % : 16.5 %  Auto Monocyte % : 6.4 %  Auto Eosinophil % : 1.7 %  Auto Basophil % : 0.8 %      11-09    141  |  109<H>  |  13  ----------------------------<  88  4.6   |  27  |  0.81    Ca    8.8      09 Nov 2020 15:10    TPro  6.6  /  Alb  3.2<L>  /  TBili  0.9  /  DBili  x   /  AST  22  /  ALT  21  /  AlkPhos  104  11-09      Lipase, Serum: 64 U/L (11-09 @ 15:10)         RADIOLOGY/IMAGING                EXAM:  CT ANGIO CHEST (W)AW IC                            PROCEDURE DATE:  11/09/2020          INTERPRETATION:  INDICATION, CLINICAL INFORMATION: Shortness of breath, history of esophageal cancer    TECHNIQUE: CT pulmonary angiogram of the chest was performed. Coronal and sagittal images were reconstructed. Maximum intensity projection images were generated. Images were obtained after the uneventful administration of 90 cc nonionic intravenous Omnipaque 350. 10 cc of Omnipaque 350 was discarded.      COMPARISON: CT chest 3/30/2019    FINDINGS:    PULMONARY VESSELS: No pulmonary embolus. Main pulmonary artery normal in diameter.    HEART AND VASCULATURE: Normal heart size without pericardial effusion. No aortic aneurysm or dissection.    LUNGS, AIRWAYS, PLEURA: Patent central airways. Mild bronchial wall thickening. Emphysema. Numerous 5 mm and smaller lung nodules new from prior for example right upper lobe (series 5 image 41); (upper lobe (series 5 image 70-72).    No pleural effusionsor pneumothorax.    MEDIASTINUM: Again noted, the patient is status post esophagectomy and gastric pull-through. 3.1 x 1.7 cm 2.7 x 2 cm prevertebral/paraesophageal nodule (series 5 images 66 and 81) are new from prior. The remaining subcentimeter mediastinal and hilar lymph nodes are stable.    UPPER ABDOMEN: Cholecystectomy. 2 mm nonobstructing left renal stone.    BONES AND SOFT TISSUES: Increased fracture deformity of T7, T9 and T10 vertebral bodies.    LOWER NECK: Within normal limits.    IMPRESSION:    No pulmonary embolus.    New enlarged prevertebral/paraesophageal lymph nodes suspicious for metastatic disease.    Numerous 5 mm and smaller lung nodules also suspicious for metastatic disease.

## 2020-11-10 ENCOUNTER — TRANSCRIPTION ENCOUNTER (OUTPATIENT)
Age: 65
End: 2020-11-10

## 2020-11-10 DIAGNOSIS — M54.6 PAIN IN THORACIC SPINE: ICD-10-CM

## 2020-11-10 DIAGNOSIS — Z02.9 ENCOUNTER FOR ADMINISTRATIVE EXAMINATIONS, UNSPECIFIED: ICD-10-CM

## 2020-11-10 DIAGNOSIS — M54.5 LOW BACK PAIN: ICD-10-CM

## 2020-11-10 DIAGNOSIS — G95.20 UNSPECIFIED CORD COMPRESSION: ICD-10-CM

## 2020-11-10 LAB
24R-OH-CALCIDIOL SERPL-MCNC: 33.8 NG/ML — SIGNIFICANT CHANGE UP (ref 30–80)
A1C WITH ESTIMATED AVERAGE GLUCOSE RESULT: 5.3 % — SIGNIFICANT CHANGE UP (ref 4–5.6)
ANION GAP SERPL CALC-SCNC: 8 MMOL/L — SIGNIFICANT CHANGE UP (ref 5–17)
APTT BLD: 28.2 SEC — SIGNIFICANT CHANGE UP (ref 27.5–35.5)
BASOPHILS # BLD AUTO: 0.08 K/UL — SIGNIFICANT CHANGE UP (ref 0–0.2)
BASOPHILS NFR BLD AUTO: 1.1 % — SIGNIFICANT CHANGE UP (ref 0–2)
BUN SERPL-MCNC: 14 MG/DL — SIGNIFICANT CHANGE UP (ref 7–18)
CALCIUM SERPL-MCNC: 8.8 MG/DL — SIGNIFICANT CHANGE UP (ref 8.4–10.5)
CHLORIDE SERPL-SCNC: 106 MMOL/L — SIGNIFICANT CHANGE UP (ref 96–108)
CHOLEST SERPL-MCNC: 158 MG/DL — SIGNIFICANT CHANGE UP
CO2 SERPL-SCNC: 27 MMOL/L — SIGNIFICANT CHANGE UP (ref 22–31)
CREAT SERPL-MCNC: 0.9 MG/DL — SIGNIFICANT CHANGE UP (ref 0.5–1.3)
EOSINOPHIL # BLD AUTO: 0.3 K/UL — SIGNIFICANT CHANGE UP (ref 0–0.5)
EOSINOPHIL NFR BLD AUTO: 3.9 % — SIGNIFICANT CHANGE UP (ref 0–6)
ESTIMATED AVERAGE GLUCOSE: 105 MG/DL — SIGNIFICANT CHANGE UP (ref 68–114)
GLUCOSE BLDC GLUCOMTR-MCNC: 102 MG/DL — HIGH (ref 70–99)
GLUCOSE BLDC GLUCOMTR-MCNC: 130 MG/DL — HIGH (ref 70–99)
GLUCOSE SERPL-MCNC: 75 MG/DL — SIGNIFICANT CHANGE UP (ref 70–99)
HCT VFR BLD CALC: 41.3 % — SIGNIFICANT CHANGE UP (ref 39–50)
HCV AB S/CO SERPL IA: 0.09 S/CO — SIGNIFICANT CHANGE UP (ref 0–0.99)
HCV AB SERPL-IMP: SIGNIFICANT CHANGE UP
HDLC SERPL-MCNC: 35 MG/DL — LOW
HGB BLD-MCNC: 13.3 G/DL — SIGNIFICANT CHANGE UP (ref 13–17)
IMM GRANULOCYTES NFR BLD AUTO: 0.3 % — SIGNIFICANT CHANGE UP (ref 0–1.5)
INR BLD: 1.06 RATIO — SIGNIFICANT CHANGE UP (ref 0.88–1.16)
LIPID PNL WITH DIRECT LDL SERPL: 102 MG/DL — HIGH
LYMPHOCYTES # BLD AUTO: 2.12 K/UL — SIGNIFICANT CHANGE UP (ref 1–3.3)
LYMPHOCYTES # BLD AUTO: 27.9 % — SIGNIFICANT CHANGE UP (ref 13–44)
MAGNESIUM SERPL-MCNC: 2.2 MG/DL — SIGNIFICANT CHANGE UP (ref 1.6–2.6)
MCHC RBC-ENTMCNC: 29.6 PG — SIGNIFICANT CHANGE UP (ref 27–34)
MCHC RBC-ENTMCNC: 32.2 GM/DL — SIGNIFICANT CHANGE UP (ref 32–36)
MCV RBC AUTO: 91.8 FL — SIGNIFICANT CHANGE UP (ref 80–100)
MONOCYTES # BLD AUTO: 0.71 K/UL — SIGNIFICANT CHANGE UP (ref 0–0.9)
MONOCYTES NFR BLD AUTO: 9.3 % — SIGNIFICANT CHANGE UP (ref 2–14)
NEUTROPHILS # BLD AUTO: 4.37 K/UL — SIGNIFICANT CHANGE UP (ref 1.8–7.4)
NEUTROPHILS NFR BLD AUTO: 57.5 % — SIGNIFICANT CHANGE UP (ref 43–77)
NON HDL CHOLESTEROL: 123 MG/DL — SIGNIFICANT CHANGE UP
NRBC # BLD: 0 /100 WBCS — SIGNIFICANT CHANGE UP (ref 0–0)
PHOSPHATE SERPL-MCNC: 3.3 MG/DL — SIGNIFICANT CHANGE UP (ref 2.5–4.5)
PLATELET # BLD AUTO: 302 K/UL — SIGNIFICANT CHANGE UP (ref 150–400)
POTASSIUM SERPL-MCNC: 3.9 MMOL/L — SIGNIFICANT CHANGE UP (ref 3.5–5.3)
POTASSIUM SERPL-SCNC: 3.9 MMOL/L — SIGNIFICANT CHANGE UP (ref 3.5–5.3)
PROTHROM AB SERPL-ACNC: 12.6 SEC — SIGNIFICANT CHANGE UP (ref 10.6–13.6)
RBC # BLD: 4.5 M/UL — SIGNIFICANT CHANGE UP (ref 4.2–5.8)
RBC # FLD: 13.4 % — SIGNIFICANT CHANGE UP (ref 10.3–14.5)
SODIUM SERPL-SCNC: 141 MMOL/L — SIGNIFICANT CHANGE UP (ref 135–145)
TRIGL SERPL-MCNC: 103 MG/DL — SIGNIFICANT CHANGE UP
TSH SERPL-MCNC: 1.42 UU/ML — SIGNIFICANT CHANGE UP (ref 0.34–4.82)
VIT B12 SERPL-MCNC: 1107 PG/ML — SIGNIFICANT CHANGE UP (ref 232–1245)
WBC # BLD: 7.6 K/UL — SIGNIFICANT CHANGE UP (ref 3.8–10.5)
WBC # FLD AUTO: 7.6 K/UL — SIGNIFICANT CHANGE UP (ref 3.8–10.5)

## 2020-11-10 PROCEDURE — 99222 1ST HOSP IP/OBS MODERATE 55: CPT

## 2020-11-10 PROCEDURE — 72146 MRI CHEST SPINE W/O DYE: CPT | Mod: 26

## 2020-11-10 RX ORDER — LIDOCAINE 4 G/100G
1 CREAM TOPICAL DAILY
Refills: 0 | Status: DISCONTINUED | OUTPATIENT
Start: 2020-11-10 | End: 2020-11-11

## 2020-11-10 RX ORDER — DEXAMETHASONE 0.5 MG/5ML
10 ELIXIR ORAL ONCE
Refills: 0 | Status: DISCONTINUED | OUTPATIENT
Start: 2020-11-10 | End: 2020-11-10

## 2020-11-10 RX ORDER — OXYCODONE HYDROCHLORIDE 5 MG/1
5 TABLET ORAL EVERY 4 HOURS
Refills: 0 | Status: DISCONTINUED | OUTPATIENT
Start: 2020-11-10 | End: 2020-11-11

## 2020-11-10 RX ORDER — DEXAMETHASONE 0.5 MG/5ML
6 ELIXIR ORAL ONCE
Refills: 0 | Status: DISCONTINUED | OUTPATIENT
Start: 2020-11-10 | End: 2020-11-10

## 2020-11-10 RX ORDER — POLYETHYLENE GLYCOL 3350 17 G/17G
17 POWDER, FOR SOLUTION ORAL DAILY
Refills: 0 | Status: DISCONTINUED | OUTPATIENT
Start: 2020-11-10 | End: 2020-11-11

## 2020-11-10 RX ORDER — SENNA PLUS 8.6 MG/1
2 TABLET ORAL AT BEDTIME
Refills: 0 | Status: DISCONTINUED | OUTPATIENT
Start: 2020-11-10 | End: 2020-11-11

## 2020-11-10 RX ORDER — DEXAMETHASONE 0.5 MG/5ML
10 ELIXIR ORAL ONCE
Refills: 0 | Status: COMPLETED | OUTPATIENT
Start: 2020-11-10 | End: 2020-11-10

## 2020-11-10 RX ORDER — DEXAMETHASONE 0.5 MG/5ML
4 ELIXIR ORAL EVERY 6 HOURS
Refills: 0 | Status: DISCONTINUED | OUTPATIENT
Start: 2020-11-10 | End: 2020-11-11

## 2020-11-10 RX ORDER — MORPHINE SULFATE 50 MG/1
2 CAPSULE, EXTENDED RELEASE ORAL ONCE
Refills: 0 | Status: DISCONTINUED | OUTPATIENT
Start: 2020-11-10 | End: 2020-11-10

## 2020-11-10 RX ORDER — INFLUENZA VIRUS VACCINE 15; 15; 15; 15 UG/.5ML; UG/.5ML; UG/.5ML; UG/.5ML
0.5 SUSPENSION INTRAMUSCULAR ONCE
Refills: 0 | Status: DISCONTINUED | OUTPATIENT
Start: 2020-11-10 | End: 2020-11-11

## 2020-11-10 RX ADMIN — Medication 375 MILLIGRAM(S): at 13:28

## 2020-11-10 RX ADMIN — Medication 102 MILLIGRAM(S): at 16:35

## 2020-11-10 RX ADMIN — BUDESONIDE AND FORMOTEROL FUMARATE DIHYDRATE 2 PUFF(S): 160; 4.5 AEROSOL RESPIRATORY (INHALATION) at 11:51

## 2020-11-10 RX ADMIN — Medication 375 MILLIGRAM(S): at 14:00

## 2020-11-10 RX ADMIN — MORPHINE SULFATE 2 MILLIGRAM(S): 50 CAPSULE, EXTENDED RELEASE ORAL at 11:53

## 2020-11-10 RX ADMIN — LIDOCAINE 1 PATCH: 4 CREAM TOPICAL at 12:13

## 2020-11-10 RX ADMIN — LIDOCAINE 1 PATCH: 4 CREAM TOPICAL at 19:23

## 2020-11-10 RX ADMIN — MORPHINE SULFATE 2 MILLIGRAM(S): 50 CAPSULE, EXTENDED RELEASE ORAL at 11:14

## 2020-11-10 RX ADMIN — OXYCODONE AND ACETAMINOPHEN 1 TABLET(S): 5; 325 TABLET ORAL at 01:30

## 2020-11-10 RX ADMIN — BUDESONIDE AND FORMOTEROL FUMARATE DIHYDRATE 2 PUFF(S): 160; 4.5 AEROSOL RESPIRATORY (INHALATION) at 22:02

## 2020-11-10 RX ADMIN — OXYCODONE AND ACETAMINOPHEN 1 TABLET(S): 5; 325 TABLET ORAL at 02:00

## 2020-11-10 RX ADMIN — OXYCODONE HYDROCHLORIDE 5 MILLIGRAM(S): 5 TABLET ORAL at 22:31

## 2020-11-10 RX ADMIN — PANTOPRAZOLE SODIUM 40 MILLIGRAM(S): 20 TABLET, DELAYED RELEASE ORAL at 06:29

## 2020-11-10 RX ADMIN — ENOXAPARIN SODIUM 40 MILLIGRAM(S): 100 INJECTION SUBCUTANEOUS at 11:51

## 2020-11-10 RX ADMIN — SENNA PLUS 2 TABLET(S): 8.6 TABLET ORAL at 22:01

## 2020-11-10 RX ADMIN — OXYCODONE HYDROCHLORIDE 5 MILLIGRAM(S): 5 TABLET ORAL at 22:01

## 2020-11-10 NOTE — DISCHARGE NOTE PROVIDER - NSDCMRMEDTOKEN_GEN_ALL_CORE_FT
omeprazole 40 mg oral delayed release capsule: 1 cap(s) orally once a day  Symbicort 160 mcg-4.5 mcg/inh inhalation aerosol: 2 puff(s) inhaled once a day (at bedtime)

## 2020-11-10 NOTE — PROGRESS NOTE ADULT - PROBLEM SELECTOR PLAN 3
history of esophageal cancer s/p partial esophageal and partial gastric resection   possible mets based on CTA findings  heme/onc Dr. Briones consulted   GI, Dr. Esposito
CTA chest showing new enlarged prevertebral/paraesophageal LN and smaller lung nodules suspicious for metastasis.  PE negative  Sating 97-99% RA  Hx of COPD, goal SpO2 between 88-92%   Monitor O2 sat

## 2020-11-10 NOTE — PROGRESS NOTE ADULT - PROBLEM SELECTOR PLAN 4
Hx of esophageal Ca with esophagogastrectomy  C/w supportive care  Heme/onc Dr ORVILLE Esposito  Palliative  Dr Motley Hx of esophageal Ca with esophagogastrectomy  C/w supportive care  GI Cate  Palliative  Dr Motley

## 2020-11-10 NOTE — CONSULT NOTE ADULT - SUBJECTIVE AND OBJECTIVE BOX
Patient is a 65y old  Male who presents with a chief complaint of rib and back pain (09 Nov 2020 19:48)      HPI:  Patient is a 65 year old male from home AAO x3, smoker with PMH of esophageal cancer s/p chemo and radiation in 5/2018 s/p esophagogastrectomy and cholecystectomy on 8/17/2018 and COPD, who presented to the ED due to rib and back pain. Patient states that the pain had started about a week ago and comes and goes. Describes the pain as wrapping around his ribs to his back and that different parts of the ribs and back hurt at different times. Patient has tried tylenol with minimal relief. Patient has also been taking ibuprofen 3 tabs every 6 hours or so which also minimally relieves the pain. Currently, patient denies any chest pain, shortness of breath, nausea, vomiting, abdominal pain, headache or dizziness.   (09 Nov 2020 19:48)       ROS:  Negative except for:    PAST MEDICAL & SURGICAL HISTORY:  Chronic obstructive pulmonary disease    Malignant neoplasm of esophagus, unspecified    History of cholecystectomy    History of bilateral inguinal hernia repair        SOCIAL HISTORY:    FAMILY HISTORY:      MEDICATIONS  (STANDING):  budesonide 160 MICROgram(s)/formoterol 4.5 MICROgram(s) Inhaler 2 Puff(s) Inhalation two times a day  enoxaparin Injectable 40 milliGRAM(s) SubCutaneous daily  influenza   Vaccine 0.5 milliLiter(s) IntraMuscular once  pantoprazole    Tablet 40 milliGRAM(s) Oral before breakfast    MEDICATIONS  (PRN):  acetaminophen   Tablet .. 650 milliGRAM(s) Oral every 6 hours PRN Temp greater or equal to 38C (100.4F), Mild Pain (1 - 3)  oxycodone    5 mG/acetaminophen 325 mG 1 Tablet(s) Oral every 6 hours PRN Severe Pain (7 - 10)      Allergies    albuterol (Other (Mild))    Intolerances        Vital Signs Last 24 Hrs  T(C): 36.4 (10 Nov 2020 06:00), Max: 37.1 (09 Nov 2020 16:55)  T(F): 97.5 (10 Nov 2020 06:00), Max: 98.8 (09 Nov 2020 16:55)  HR: 84 (10 Nov 2020 06:00) (62 - 88)  BP: 128/67 (10 Nov 2020 06:00) (120/67 - 149/84)  BP(mean): --  RR: 17 (10 Nov 2020 06:00) (16 - 18)  SpO2: 97% (10 Nov 2020 06:00) (97% - 100%)    PHYSICAL EXAM  General: adult in NAD  HEENT: clear oropharynx, anicteric sclera, pink conjunctiva  Neck: supple  CV: normal S1/S2 with no murmur rubs or gallops  Lungs: positive air movement b/l ant lungs,clear to auscultation, no wheezes, no rales  Abdomen: soft non-tender non-distended, no hepatosplenomegaly  Ext: no clubbing cyanosis or edema  Skin: no rashes and no petechiae  Neuro: alert and oriented X 4, no focal deficits      LABS:                          13.3   7.60  )-----------( 302      ( 10 Nov 2020 06:53 )             41.3         Mean Cell Volume : 91.8 fl  Mean Cell Hemoglobin : 29.6 pg  Mean Cell Hemoglobin Concentration : 32.2 gm/dL  Auto Neutrophil # : 4.37 K/uL  Auto Lymphocyte # : 2.12 K/uL  Auto Monocyte # : 0.71 K/uL  Auto Eosinophil # : 0.30 K/uL  Auto Basophil # : 0.08 K/uL  Auto Neutrophil % : 57.5 %  Auto Lymphocyte % : 27.9 %  Auto Monocyte % : 9.3 %  Auto Eosinophil % : 3.9 %  Auto Basophil % : 1.1 %      < from: CT Angio Chest w/ IV Cont (11.09.20 @ 18:09) >  COMPARISON: CT chest 3/30/2019    FINDINGS:    PULMONARY VESSELS: No pulmonary embolus. Main pulmonary artery normal in diameter.    HEART AND VASCULATURE: Normal heart size without pericardial effusion. No aortic aneurysm or dissection.    LUNGS, AIRWAYS, PLEURA: Patent central airways. Mild bronchial wall thickening. Emphysema. Numerous 5 mm and smaller lung nodules new from prior for example right upper lobe (series 5 image 41); (upper lobe (series 5 image 70-72).    No pleural effusionsor pneumothorax.    MEDIASTINUM: Again noted, the patient is status post esophagectomy and gastric pull-through. 3.1 x 1.7 cm 2.7 x 2 cm prevertebral/paraesophageal nodule (series 5 images 66 and 81) are new from prior. The remaining subcentimeter mediastinal and hilar lymph nodes are stable.    UPPER ABDOMEN: Cholecystectomy. 2 mm nonobstructing left renal stone.    BONES AND SOFT TISSUES: Increased fracture deformity of T7, T9 and T10 vertebral bodies.    LOWER NECK: Within normal limits.    IMPRESSION:    No pulmonary embolus.    New enlarged prevertebral/paraesophageal lymph nodes suspicious for metastatic disease.    Numerous 5 mm and smaller lung nodules also suspicious for metastatic disease.      < end of copied text >  < from: CT Angio Chest w/ IV Cont (11.09.20 @ 18:09) >  COMPARISON: CT chest 3/30/2019    FINDINGS:    PULMONARY VESSELS: No pulmonary embolus. Main pulmonary artery normal in diameter.    HEART AND VASCULATURE: Normal heart size without pericardial effusion. No aortic aneurysm or dissection.    LUNGS, AIRWAYS, PLEURA: Patent central airways. Mild bronchial wall thickening. Emphysema. Numerous 5 mm and smaller lung nodules new from prior for example right upper lobe (series 5 image 41); (upper lobe (series 5 image 70-72).    No pleural effusionsor pneumothorax.    MEDIASTINUM: Again noted, the patient is status post esophagectomy and gastric pull-through. 3.1 x 1.7 cm 2.7 x 2 cm prevertebral/paraesophageal nodule (series 5 images 66 and 81) are new from prior. The remaining subcentimeter mediastinal and hilar lymph nodes are stable.    UPPER ABDOMEN: Cholecystectomy. 2 mm nonobstructing left renal stone.    BONES AND SOFT TISSUES: Increased fracture deformity of T7, T9 and T10 vertebral bodies.    LOWER NECK: Within normal limits.    IMPRESSION:    No pulmonary embolus.    New enlarged prevertebral/paraesophageal lymph nodes suspicious for metastatic disease.    Numerous 5 mm and smaller lung nodules also suspicious for metastatic disease.      < end of copied text >  Serial CBC's  11-10 @ 06:53  Hct-41.3 / Hgb-13.3 / Plat-302 / RBC-4.50 / WBC-7.60  Serial CBC's  11-09 @ 15:10  Hct-41.1 / Hgb-13.4 / Plat-303 / RBC-4.50 / WBC-8.41      11-10    141  |  106  |  14  ----------------------------<  75  3.9   |  27  |  0.90    Ca    8.8      10 Nov 2020 06:53  Phos  3.3     11-10  Mg     2.2     11-10    TPro  6.6  /  Alb  3.2<L>  /  TBili  0.9  /  DBili  x   /  AST  22  /  ALT  21  /  AlkPhos  104  11-09      PT/INR - ( 10 Nov 2020 06:53 )   PT: 12.6 sec;   INR: 1.06 ratio         PTT - ( 10 Nov 2020 06:53 )  PTT:28.2 sec                BLOOD SMEAR INTERPRETATION:       RADIOLOGY & ADDITIONAL STUDIES:

## 2020-11-10 NOTE — ACUTE INTERFACILITY TRANSFER NOTE - SECONDARY DIAGNOSIS.
Acute right-sided low back pain without sciatica Cord compression Lung nodules Malignant neoplasm of esophagus, unspecified Thoracic back pain Chronic obstructive pulmonary disease

## 2020-11-10 NOTE — DISCHARGE NOTE PROVIDER - CARE PROVIDER_API CALL
Nolan Livingston  [address], [address]  [place] [address]  Phone: [phone]  Fax: [phone]  Follow Up Time: Routine

## 2020-11-10 NOTE — DISCHARGE NOTE PROVIDER - NSDCCPCAREPLAN_GEN_ALL_CORE_FT
PRINCIPAL DISCHARGE DIAGNOSIS  Diagnosis: Chest wall pain  Assessment and Plan of Treatment: You were hospitalized because you were experiencing rib and back pain. An MRI was done and it showed an epidural tumor and spinal canal narrowing suggesting cord deformity or compression. You were seen by an heme/onc and was transferred to [facility] to begin radiation.      SECONDARY DISCHARGE DIAGNOSES  Diagnosis: Lung nodules  Assessment and Plan of Treatment: A CT was done and showed small lung nodules suggesting metastatic disease. You were seen by a hematologist/oncologist and was recommended to have a PET scan as outpatient.    Diagnosis: Malignant neoplasm of esophagus, unspecified  Assessment and Plan of Treatment: Please follow up with your oncologist to have a PET scan as outpatient.    Diagnosis: Thoracic back pain  Assessment and Plan of Treatment: MRI was performed and an epidural tumor was found. You were transferred to [facility] for radiation therapy.    Diagnosis: Chronic obstructive pulmonary disease  Assessment and Plan of Treatment: Call your Health Care provider upon arrival home to make a follow up appointment within one week.  Take all inhalers as prescribed by your Health Care Provider.  Take steroids as prescribed by your Health Care Provider.  If your cough increases infrequency and severity and/or you have shortness of breath or increased shortness of breath call your Health Care Provider.  If you develop fever, chills, night sweats, malaise, and/or change in mental status call your Health care Provider.  Nutrition is very important.  Eat small frequent meals.  Increase your activity as tolerated.  Do not stay in bed all day.

## 2020-11-10 NOTE — PROGRESS NOTE ADULT - PROBLEM SELECTOR PLAN 6
Subjective:   Beau Jackson is a 6 y.o. female who complains of congestion and sore throat for 2 days. She denies a history of shortness of breath and wheezing. She has a slight cough; decreased appetite, but that hasn't returned since her surgery. She also just started a new anxiety medication and her appetite has been curbed    She has had constipation recently but she is beginning to respond to colace with stool production. No vomiting. Mother is unsure if she has had a fever because she doesn't have a thermometer. Activity has been decreased. 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    HX OTHER MEDICAL     subglottic sgtenosis    HX OTHER MEDICAL     kamron danlos    HX OTHER MEDICAL     immune difficiency    Immune disorder ([facility] [address]     Malignant hyperthermia due to anesthesia     FAMILY HISTORY - MOTHER OF PATIENT    Mononucleosis     Other ill-defined conditions(799.89)     stenosis of airway below voicebox    Other ill-defined conditions(799.89)     Kamron-Danlos syndrome    RSV (acute bronchiolitis due to respiratory syncytial virus)     Second hand smoke exposure     Seizures (HCC)     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 Objective:      Visit Vitals    /42 (BP 1 Location: Right arm, BP Patient Position: Sitting)    Pulse 95    Temp 99 °F (37.2 °C) (Oral)      Appears alert, well appearing, and in no distress and in mild to moderate distress.   Ears: bilateral TM's and external ear canals normal  Nose: clear rhinorrhea  Oropharynx: post nasal drainage noted with mild erythema  Neck: bilateral symmetric anterior adenopathy  Lungs: clear to auscultation, no wheezes, rales or rhonchi, symmetric air entry  The abdomen is soft without tenderness or hepatosplenomegaly. Rapid Strep test is negative    Assessment/Plan:   viral upper respiratory illness  Per orders. Call if other family members develop similar symptoms. See prn. ICD-10-CM ICD-9-CM    1.  Acute pharyngitis, unspecified etiology J02.9 [id number] AMB POC RAPID STREP A      UPPER RESPIRATORY CULTURE      CANCELED: AMB POC RAPID STREP A     Orders Placed This Encounter    UPPER RESPIRATORY CULTURE    AMB POC RAPID STREP A    BIOGAIA 100 million cell chew    guanFACINE IR (TENEX) 1 mg IR tablet    ALPRAZolam (XANAX) 1 mg tablet     Visit time: 15 minutes    Bunny SinghkeMD [facility] GI PPx : Protonix  DVT PPx: Lovenox

## 2020-11-10 NOTE — PROGRESS NOTE ADULT - PROBLEM SELECTOR PLAN 7
Discharge disposition likely transfer for radiation , Heme/onc, Dr Briones following Discharge disposition likely transfer to another facility for radiation. Follow- up Dr Brioens recommendation.

## 2020-11-10 NOTE — ACUTE INTERFACILITY TRANSFER NOTE - HOSPITAL COURSE
65 year old male from home alert and oriented x3, smoker with PMH of COPD, esophageal cancer s/p chemo and radiation in 5/2018, S/p esophagogastrectomy and cholecystectomy on 8/17/2018, who presented to the ED due to on/off rib and back pain for a week. On CT, Pt found to have new enlarged prevertebral paraesophageal lymph nodes and small lung nodules suspicious for metastatic disease, negative for PE. Admitted to medicine for worsening back.  11/10 MRI suggesting epidural tumor with  multilevel spinal canal narrowing, most severe at the T5 and T6 levels where there is cord deformity suggesting cord compression. IV steroids started Heme/Onc, GI, and  Neurology followed. Pt to transfer to [facility] for radiation therapy. 65 year old male from home alert and oriented x3, smoker with PMH of COPD, esophageal cancer s/p chemo and radiation in 5/2018, S/p esophagogastrectomy and cholecystectomy on 8/17/2018, who presented to the ED due to on/off rib and back pain for a week. On CT, Pt found to have new enlarged prevertebral paraesophageal lymph nodes and small lung nodules suspicious for metastatic disease, negative for PE. Admitted to medicine for worsening back pain. MRI was performed on 11/10 suggesting epidural tumor with  multilevel spinal canal narrowing, most severe at the T5 and T6 levels where there is cord deformity suggesting cord compression. IV steroids was started. Heme/Onc, GI, and  Neurology followed. Pt to transfer to [facility] for radiation therapy.

## 2020-11-10 NOTE — PROGRESS NOTE ADULT - SUBJECTIVE AND OBJECTIVE BOX
Patient is a 65y old  Male who presents with a chief complaint of rib and back pain (10 Nov 2020 12:27)    INTERVAL HPI/OVERNIGHT EVENTS: no new complaints.    REVIEW OF SYSTEMS:  CONSTITUTIONAL: No fever, chills  ENMT:  No difficulty hearing, no change in vision  NECK: No pain or stiffness  RESPIRATORY: No cough, SOB  CARDIOVASCULAR: No chest pain, palpitations, c/o pain in thoracic  area  GASTROINTESTINAL: No abdominal pain. No nausea, vomiting, or diarrhea  GENITOURINARY: No dysuria  NEUROLOGICAL: No HA  SKIN: No itching, burning, rashes, or lesions   LYMPH NODES: No enlarged glands  ENDOCRINE: No heat or cold intolerance; No hair loss  MUSCULOSKELETAL: No joint pain or swelling; C/o of back pain, no extremity pain  PSYCHIATRIC: No depression, anxiety  HEME/LYMPH: No easy bruising, or bleeding gums    T(C): 36.7 (11-10-20 @ 14:07), Max: 37.1 (11-09-20 @ 16:55)  HR: 87 (11-10-20 @ 14:07) (62 - 88)  BP: 109/68 (11-10-20 @ 14:07) (109/68 - 128/67)  RR: 18 (11-10-20 @ 14:07) (17 - 18)  SpO2: 99% (11-10-20 @ 14:07) (97% - 100%)  Wt(kg): --Vital Signs Last 24 Hrs  T(C): 36.7 (10 Nov 2020 14:07), Max: 37.1 (09 Nov 2020 16:55)  T(F): 98 (10 Nov 2020 14:07), Max: 98.8 (09 Nov 2020 16:55)  HR: 87 (10 Nov 2020 14:07) (62 - 88)  BP: 109/68 (10 Nov 2020 14:07) (109/68 - 128/67)  BP(mean): --  RR: 18 (10 Nov 2020 14:07) (17 - 18)  SpO2: 99% (10 Nov 2020 14:07) (97% - 100%)    MEDICATIONS  (STANDING):  budesonide 160 MICROgram(s)/formoterol 4.5 MICROgram(s) Inhaler 2 Puff(s) Inhalation two times a day  dexAMETHasone  Injectable 4 milliGRAM(s) IV Push every 6 hours  dexAMETHasone  Injectable 10 milliGRAM(s) IV Push once  enoxaparin Injectable 40 milliGRAM(s) SubCutaneous daily  influenza   Vaccine 0.5 milliLiter(s) IntraMuscular once  lidocaine   Patch 1 Patch Transdermal daily  naproxen 375 milliGRAM(s) Oral every 8 hours  pantoprazole    Tablet 40 milliGRAM(s) Oral before breakfast  polyethylene glycol 3350 17 Gram(s) Oral daily  senna 2 Tablet(s) Oral at bedtime    MEDICATIONS  (PRN):  oxyCODONE    IR 5 milliGRAM(s) Oral every 4 hours PRN Severe Pain (7 - 10)    PHYSICAL EXAM:  GENERAL: NAD  EYES: clear conjunctiva; EOMI  ENMT: Moist mucous membranes  NECK: Supple, No JVD, Normal thyroid  CHEST/LUNG: Clear to auscultation bilaterally; No rales, rhonchi, wheezing, or rubs  HEART: S1, S2, Regular rate and rhythm  ABDOMEN: Soft, Nontender, Nondistended; Bowel sounds present  NEURO: Alert & Oriented X3  EXTREMITIES: No LE edema, no calf tenderness  LYMPH: No lymphadenopathy  SKIN: No rashes or lesions    Consultant(s) Notes Reviewed:  [x ] YES  [ ] NO  Care Discussed with Consultants/Other Providers [ x] YES  [ ] NO    LABS:                        13.3   7.60  )-----------( 302      ( 10 Nov 2020 06:53 )             41.3     11-10    141  |  106  |  14  ----------------------------<  75  3.9   |  27  |  0.90    Ca    8.8      10 Nov 2020 06:53  Phos  3.3     11-10  Mg     2.2     11-10    TPro  6.6  /  Alb  3.2<L>  /  TBili  0.9  /  DBili  x   /  AST  22  /  ALT  21  /  AlkPhos  104  11-09    PT/INR - ( 10 Nov 2020 06:53 )   PT: 12.6 sec;   INR: 1.06 ratio      PTT - ( 10 Nov 2020 06:53 )  PTT:28.2 sec  CAPILLARY BLOOD GLUCOSE    RADIOLOGY & ADDITIONAL TESTS:    Imaging Personally Reviewed:  [x ] YES  [ ] NO  from: MR Thoracic Spine No Cont (11.10.20 @ 11:58)  EXAM:  MR SPINE THORACIC                            PROCEDURE DATE:  11/10/2020      INTERPRETATION:  CLINICAL INDICATION: Pain    r/o mets. esophageal ca. ADMDIAG1: R07.89 OTHER CHEST PAIN/.    TECHNIQUE: Multiplanar multisequence noncontrast thoracic spine MRI.    COMPARISON: CT chest 11/9/2020    FINDINGS:    Compression deformities of the T7, T9, and T10 vertebral bodies without significant bony retropulsion; tumor involvement cannot be excluded. T1 hypointense, STIR hyperintense lesions in the T5 vertebral body suggesting metastases.    Abnormal epidural soft tissue at the T4-T8 levels suggesting epidural tumor. This results in multilevel spinal canal narrowing, most severe at the T5 and T6 levels where there is cord deformity suggesting cord compression. Epidural tumor extends into multiple neural foramina including at the right T4-T5, right T5-T6, bilateral T6-T7, and right T8-T9 levels.    Bilateral T9-T10 and T10-T11 neuroforaminal narrowing. Multilevel intervertebral disc desiccation.    Paraesophageal/prevertebral lymphadenopathy again noted.      IMPRESSION:  1.  Abnormal epidural soft tissue at the T4-T8 levels suggesting epidural tumor. This results in multilevel spinal canal narrowing, most severe at the T5 and F2bifynp where there is cord deformity suggesting cord compression. Recommend MRI thoracic spine with contrast if there is no contraindication.  2.  Compression deformities of the T7, T9, and T10 vertebral bodies without significant bony retropulsion; tumor involvement cannot be excluded. T1 hypointense, STIR hyperintense lesions in the T5 vertebral body suggesting metastases.    These findings were discussed with ELIZA BABB [id number] at 11/10/2020 1:43 PM by Dr. Ra Ambriz with read back confirmation.    RA AMBRIZ MD; Attending Radiologist  This document has been electronically signed. Nov 10 2020  1:51PM    CT Angio Chest w/ IV Cont (11.09.20 @ 18:09)   EXAM:  CT ANGIO CHEST (W)AW IC                            PROCEDURE DATE:  11/09/2020      INTERPRETATION:  INDICATION, CLINICAL INFORMATION: Shortness of breath, history of esophageal cancer    TECHNIQUE: CT pulmonary angiogram of the chest was performed. Coronal and sagittal images were reconstructed. Maximum intensity projection images were generated. Images were obtained after the uneventful administration of 90 cc nonionic intravenous Omnipaque 350. 10 cc of Omnipaque 350 was discarded.    COMPARISON: CT chest 3/30/2019    FINDINGS:    PULMONARY VESSELS: No pulmonary embolus. Main pulmonary artery normal in diameter.    HEART AND VASCULATURE: Normal heart size without pericardial effusion. No aortic aneurysm or dissection.    LUNGS, AIRWAYS, PLEURA: Patent central airways. Mild bronchial wall thickening. Emphysema. Numerous 5 mm and smaller lung nodules new from prior for example right upper lobe (series 5 image 41); (upper lobe (series 5 image 70-72).    No pleural effusionsor pneumothorax.    MEDIASTINUM: Again noted, the patient is status post esophagectomy and gastric pull-through. 3.1 x 1.7 cm 2.7 x 2 cm prevertebral/paraesophageal nodule (series 5 images 66 and 81) are new from prior. The remaining subcentimeter mediastinal and hilar lymph nodes are stable.    UPPER ABDOMEN: Cholecystectomy. 2 mm nonobstructing left renal stone.    BONES AND SOFT TISSUES: Increased fracture deformity of T7, T9 and T10 vertebral bodies.    LOWER NECK: Within normal limits.    IMPRESSION:    No pulmonary embolus.    New enlarged prevertebral/paraesophageal lymph nodes suspicious for metastatic disease.    Numerous 5 mm and smaller lung nodules also suspicious for metastatic disease.    GEORGIANA CONTRERAS MD; Attending Radiologist  This document has been electronically signed. Nov 9 2020  6:28PM

## 2020-11-10 NOTE — PROGRESS NOTE ADULT - PROBLEM SELECTOR PLAN 1
patient presented to the ED due to pain in ribs wrapping around to back  CTA chest done showing new enlarged prevertebral/paraesophageal LN and numerous 5mm and smaller lung nodules suspicious for metastasis  patient has history of esophageal cancer back in 2018  GI Dr. Esposito consulted  heme/onc Dr. Briones consulted  palliative Dr. Motley consulted  pain management
Pt presented with on/off thoracic back pain for a week.  On CT, Pt found to have new enlarged prevertebral paraesophageal LN and small lung nodules suspicious for metastatic disease.  MRI suggesting epidural tumor, suspicious for cord compression. ( see MRI result above).   Started in IV steroids   C/w NSAID, Oxycodone, and Lido patch  Neuro check   Heme/Onc Dr JACINTO GI  Neurology Dr Maxwell consulted  Pain management following

## 2020-11-10 NOTE — ACUTE INTERFACILITY TRANSFER NOTE - PLAN OF CARE
Radiation therapy, pain management Radiation therapy at [facility] Radiation therapy at [facility] Radiation therapy at [facility] Radiation therapy at [facility] Radiation therapy at [facility] Radiation therapy at [facility] Not in exacerbation

## 2020-11-10 NOTE — DISCHARGE NOTE PROVIDER - HOSPITAL COURSE
65 year old male from home alert and oriented x3, smoker with PMH of COPD, esophageal cancer s/p chemo and radiation in 5/2018, S/p esophagogastrectomy and cholecystectomy on 8/17/2018, who presented to the ED due to on/off rib and back pain for a week. On CT, pt found to have new enlarged prevertebral paraesophageal lymph nodes and small lung nodules suspicious for metastatic disease, negative for PE. Admitted to medicine for worsening back pain. MRI was done on 11/10 suggesting epidural tumor with multilevel spinal canal narrowing, most severe at the T5 and T6 levels where there is cord deformity suggesting cord compression. Heme/onc, GI, and Neurology followed and IV steroids was started. Decision was made to transfer pt to [facility] for radiation therapy. Pt is hemodynamically stable and optimized for inter facility transfer.            65 year old male from home alert and oriented x3, smoker with PMH of COPD, esophageal cancer s/p chemo and radiation in 5/2018, S/p esophagogastrectomy and cholecystectomy on 8/17/2018, who presented to the ED due to on/off rib and back pain for a week. On CT, pt found to have new enlarged prevertebral paraesophageal lymph nodes and small lung nodules suspicious for metastatic disease, negative for PE. Admitted to medicine for worsening back pain. MRI was done on 11/10 suggesting epidural tumor with multilevel spinal canal narrowing, most severe at the T5 and T6 levels where there is cord deformity suggesting cord compression. Heme/onc, GI, and Neurology followed and IV steroids was started. Decision was made to transfer pt to [facility] for radiation therapy. Pt is hemodynamically stable and optimized for inter facility transfer.     Please note; that this is brief summary of pt's hospital course. Refer to physical chart and progress notes for a full overview of pt's hospitalization.      65 year old male from home alert and oriented x3, smoker with PMH of COPD, esophageal cancer s/p chemo and radiation in 5/2018, S/p esophagogastrectomy and cholecystectomy on 8/17/2018, who presented to the ED due to on/off rib and back pain for a week. On CT, pt found to have new enlarged prevertebral paraesophageal lymph nodes and small lung nodules suspicious for metastatic disease, negative for PE. Admitted to medicine for worsening back pain. MRI was done on 11/10 suggesting epidural tumor with multilevel spinal canal narrowing, most severe at the T5 and T6 levels where there is cord deformity suggesting cord compression. Heme/onc, GI, and Neurology followed and IV steroids was started. Decision was made to transfer pt to [facility] for radiation therapy. Pt is hemodynamically stable and optimized for inter facility transfer.     Addendum: please see acute inter facility transfer note. Pt transferred.     Please note; that this is brief summary of pt's hospital course. Refer to physical chart and progress notes for a full overview of pt's hospitalization.

## 2020-11-10 NOTE — PROGRESS NOTE ADULT - SUBJECTIVE AND OBJECTIVE BOX
Patient was seen and examined  Patient is a 65y old  Male who presents with a chief complaint of rib and back pain (10 Nov 2020 11:00)      INTERVAL HPI/OVERNIGHT EVENTS:  T(C): 36.4 (11-10-20 @ 06:00), Max: 37.1 (11-09-20 @ 16:55)  HR: 84 (11-10-20 @ 06:00) (62 - 88)  BP: 128/67 (11-10-20 @ 06:00) (120/67 - 149/84)  RR: 17 (11-10-20 @ 06:00) (16 - 18)  SpO2: 97% (11-10-20 @ 06:00) (97% - 100%)  Wt(kg): --  I&O's Summary      LABS:                        13.3   7.60  )-----------( 302      ( 10 Nov 2020 06:53 )             41.3     11-10    141  |  106  |  14  ----------------------------<  75  3.9   |  27  |  0.90    Ca    8.8      10 Nov 2020 06:53  Phos  3.3     11-10  Mg     2.2     11-10    TPro  6.6  /  Alb  3.2<L>  /  TBili  0.9  /  DBili  x   /  AST  22  /  ALT  21  /  AlkPhos  104  11-09    PT/INR - ( 10 Nov 2020 06:53 )   PT: 12.6 sec;   INR: 1.06 ratio         PTT - ( 10 Nov 2020 06:53 )  PTT:28.2 sec    CAPILLARY BLOOD GLUCOSE        LIPID PANEL  Cholesterol 158  LDL --  HDL 35  RATIO HDL/Total Cholesterol --  Triglyceride 103            MEDICATIONS  (STANDING):  budesonide 160 MICROgram(s)/formoterol 4.5 MICROgram(s) Inhaler 2 Puff(s) Inhalation two times a day  enoxaparin Injectable 40 milliGRAM(s) SubCutaneous daily  influenza   Vaccine 0.5 milliLiter(s) IntraMuscular once  lidocaine   Patch 1 Patch Transdermal daily  naproxen 375 milliGRAM(s) Oral every 8 hours  pantoprazole    Tablet 40 milliGRAM(s) Oral before breakfast  polyethylene glycol 3350 17 Gram(s) Oral daily  senna 2 Tablet(s) Oral at bedtime    MEDICATIONS  (PRN):  oxyCODONE    IR 5 milliGRAM(s) Oral every 4 hours PRN Severe Pain (7 - 10)      RADIOLOGY & ADDITIONAL TESTS:    Imaging Personally Reviewed:  [ ] YES  [ ] NO    REVIEW OF SYSTEMS:  CONSTITUTIONAL: No fever, weight loss, or fatigue  EYES: No eye pain, visual disturbances, or discharge  ENMT:  No difficulty hearing, tinnitus, vertigo; No sinus or throat pain  NECK: No pain or stiffness  BREASTS: No pain, masses, or nipple discharge  RESPIRATORY: No cough, wheezing, chills or hemoptysis; No shortness of breath  CARDIOVASCULAR: No chest pain, palpitations, dizziness, or leg swelling  GASTROINTESTINAL: No abdominal or epigastric pain. No nausea, vomiting, or hematemesis; No diarrhea or constipation. No melena or hematochezia.  GENITOURINARY: No dysuria, frequency, hematuria, or incontinence  NEUROLOGICAL: No headaches, memory loss, loss of strength, numbness, or tremors  SKIN: No itching, burning, rashes, or lesions   LYMPH NODES: No enlarged glands  ENDOCRINE: No heat or cold intolerance; No hair loss  MUSCULOSKELETAL: No joint pain or swelling; No muscle, back, or extremity pain  PSYCHIATRIC: No depression, anxiety, mood swings, or difficulty sleeping  HEME/LYMPH: No easy bruising, or bleeding gums  ALLERY AND IMMUNOLOGIC: No hives or eczema      Consultant(s) Notes Reviewed:  [ ] YES  [ ] NO    PHYSICAL EXAM:  GENERAL: NAD, well-groomed, well-developed  HEAD:  Atraumatic, Normocephalic  EYES: EOMI, PERRLA, conjunctiva and sclera clear  ENMT: No tonsillar erythema, exudates, or enlargement; Moist mucous membranes, Good dentition, No lesions  NECK: Supple, No JVD, Normal thyroid  NERVOUS SYSTEM:  Alert & Oriented X3, Good concentration; Motor Strength 5/5 B/L upper and lower extremities; DTRs 2+ intact and symmetric  CHEST/LUNG: Clear to percussion bilaterally; No rales, rhonchi, wheezing, or rubs  HEART: Regular rate and rhythm; No murmurs, rubs, or gallops  ABDOMEN: Soft, Nontender, Nondistended; Bowel sounds present  EXTREMITIES:  2+ Peripheral Pulses, No clubbing, cyanosis, or edema  LYMPH: No lymphadenopathy noted  SKIN: No rashes or lesions    Care Discussed with Consultants/Other Providers [ x] YES  [ ] NO

## 2020-11-10 NOTE — CONSULT NOTE ADULT - SUBJECTIVE AND OBJECTIVE BOX
Source of information: , Chart review, patient  Patient language: English  : n/a    CC: Patient is a 65y old  Male who presents with a chief complaint of rib and back pain (10 Nov 2020 08:42)      HPI:  Patient is a 65 year old male from home AAO x3, smoker with PMH of esophageal cancer s/p chemo and radiation in 5/2018 s/p esophagogastrectomy and cholecystectomy on 8/17/2018 and COPD, who presented to the ED due to rib and back pain. Patient states that the pain had started about a week ago and comes and goes. Describes the pain as wrapping around his ribs to his back and that different parts of the ribs and back hurt at different times. Patient has tried tylenol with minimal relief. Patient has also been taking ibuprofen 3 tabs every 6 hours or so which also minimally relieves the pain. Currently, patient denies any chest pain, shortness of breath, nausea, vomiting, abdominal pain, headache or dizziness.   (09 Nov 2020 19:48)    65 year old male with history of esophageal cancer s/p radiation and chemo. Pt complaining of back pain - thoracic and pain that radiates to ribs.  Pain worsens with movement.  Pt states that pain does move to different areas in the back and ribs.  No nausea or vomiting.  CT angio - no pe but possible mets.    PAIN SCORE:  4/10     SCALE USED: (1-10 VNRS)    PAST MEDICAL & SURGICAL HISTORY:  Chronic obstructive pulmonary disease    Malignant neoplasm of esophagus, unspecified    History of cholecystectomy    History of bilateral inguinal hernia repair        FAMILY HISTORY:        SOCIAL HISTORY:  [x ] Denies Smoking, Alcohol, or Drug Use    Allergies    albuterol (Other (Mild))    Intolerances        MEDICATIONS:    MEDICATIONS  (STANDING):  budesonide 160 MICROgram(s)/formoterol 4.5 MICROgram(s) Inhaler 2 Puff(s) Inhalation two times a day  enoxaparin Injectable 40 milliGRAM(s) SubCutaneous daily  influenza   Vaccine 0.5 milliLiter(s) IntraMuscular once  morphine  - Injectable 2 milliGRAM(s) IV Push once  pantoprazole    Tablet 40 milliGRAM(s) Oral before breakfast    MEDICATIONS  (PRN):  acetaminophen   Tablet .. 650 milliGRAM(s) Oral every 6 hours PRN Temp greater or equal to 38C (100.4F), Mild Pain (1 - 3)  oxycodone    5 mG/acetaminophen 325 mG 1 Tablet(s) Oral every 6 hours PRN Severe Pain (7 - 10)      Vital Signs Last 24 Hrs  T(C): 36.4 (10 Nov 2020 06:00), Max: 37.1 (09 Nov 2020 16:55)  T(F): 97.5 (10 Nov 2020 06:00), Max: 98.8 (09 Nov 2020 16:55)  HR: 84 (10 Nov 2020 06:00) (62 - 88)  BP: 128/67 (10 Nov 2020 06:00) (120/67 - 149/84)  BP(mean): --  RR: 17 (10 Nov 2020 06:00) (16 - 18)  SpO2: 97% (10 Nov 2020 06:00) (97% - 100%)    LABS:                          13.3   7.60  )-----------( 302      ( 10 Nov 2020 06:53 )             41.3     11-10    141  |  106  |  14  ----------------------------<  75  3.9   |  27  |  0.90    Ca    8.8      10 Nov 2020 06:53  Phos  3.3     11-10  Mg     2.2     11-10    TPro  6.6  /  Alb  3.2<L>  /  TBili  0.9  /  DBili  x   /  AST  22  /  ALT  21  /  AlkPhos  104  11-09    PT/INR - ( 10 Nov 2020 06:53 )   PT: 12.6 sec;   INR: 1.06 ratio         PTT - ( 10 Nov 2020 06:53 )  PTT:28.2 sec  LIVER FUNCTIONS - ( 09 Nov 2020 15:10 )  Alb: 3.2 g/dL / Pro: 6.6 g/dL / ALK PHOS: 104 U/L / ALT: 21 U/L DA / AST: 22 U/L / GGT: x             CAPILLARY BLOOD GLUCOSE          REVIEW OF SYSTEMS:  CONSTITUTIONAL: No fever or fatigue  RESPIRATORY: No cough, wheezing, chills or hemoptysis; No shortness of breath  CARDIOVASCULAR: No chest pain, palpitations, dizziness, or leg swelling  GASTROINTESTINAL: No abdominal or epigastric pain. No nausea, vomiting; No diarrhea or constipation.   GENITOURINARY: No dysuria, frequency, hematuria, retention or incontinence  MUSCULOSKELETAL: + rib pain + back pain  NEURO: No weakness, no numbness   PSYCHIATRIC: No depression, anxiety, mood swings, or difficulty sleeping    PHYSICAL EXAM:  GENERAL:  Alert & Oriented X3, NAD, Good concentration  CHEST/LUNG: Clear to auscultation bilaterally; No rales, rhonchi, wheezing, or rubs + rib pain - thoracic  HEART: Regular rate and rhythm; No murmurs, rubs, or gallops  ABDOMEN: Soft, Nontender, Nondistended; Bowel sounds present  : no incontinence, no flank pain  EXTREMITIES:  2+ Peripheral Pulses, No cyanosis, or edema  MUSCULOSKELETAL: +decreased rom + thoracic back pain  NEUROLOGICAL: awake and alert and oriented   SKIN: No rashes or lesions    Radiology:  < from: CT Angio Chest w/ IV Cont (11.09.20 @ 18:09) >    EXAM:  CT ANGIO CHEST (W)AW IC                            PROCEDURE DATE:  11/09/2020          INTERPRETATION:  INDICATION, CLINICAL INFORMATION: Shortness of breath, history of esophageal cancer    TECHNIQUE: CT pulmonary angiogram of the chest was performed. Coronal and sagittal images were reconstructed. Maximum intensity projection images were generated. Images were obtained after the uneventful administration of 90 cc nonionic intravenous Omnipaque 350. 10 cc of Omnipaque 350 was discarded.      COMPARISON: CT chest 3/30/2019    FINDINGS:    PULMONARY VESSELS: No pulmonary embolus. Main pulmonary artery normal in diameter.    HEART AND VASCULATURE: Normal heart size without pericardial effusion. No aortic aneurysm or dissection.    LUNGS, AIRWAYS, PLEURA: Patent central airways. Mild bronchial wall thickening. Emphysema. Numerous 5 mm and smaller lung nodules new from prior for example right upper lobe (series 5 image 41); (upper lobe (series 5 image 70-72).    No pleural effusionsor pneumothorax.    MEDIASTINUM: Again noted, the patient is status post esophagectomy and gastric pull-through. 3.1 x 1.7 cm 2.7 x 2 cm prevertebral/paraesophageal nodule (series 5 images 66 and 81) are new from prior. The remaining subcentimeter mediastinal and hilar lymph nodes are stable.    UPPER ABDOMEN: Cholecystectomy. 2 mm nonobstructing left renal stone.    BONES AND SOFT TISSUES: Increased fracture deformity of T7, T9 and T10 vertebral bodies.    LOWER NECK: Within normal limits.    IMPRESSION:    No pulmonary embolus.    New enlarged prevertebral/paraesophageal lymph nodes suspicious for metastatic disease.    Numerous 5 mm and smaller lung nodules also suspicious for metastatic disease.      < end of copied text >      Drug Screen:  enoxaparin Injectable 40 milliGRAM(s) SubCutaneous daily          ORT Score   Family Hx of substance abuse	Female	Male  Alcohol 	                                              1              3  Illegal drugs	                                      2              3  Rx drugs                                               4	      4    Personal Hx of substance abuse		  Alcohol 	                                               3	      3  Illegal drugs                                  	       4	      4  Rx drugs                                                5	      5  Age between 16- 45 years	               1             1  hx preadolescent sexual abuse	       3	      0  Psychological disease		  ADD, OCD, bipolar, schizophrenia	2	      2  Depression                                    	1	      1  Score totals 	: 0	  		  a score of 3 or lower indicates low risk for opioid abuse		  a score of 4-7 indicates moderate risk for opioid abuse		  a score of 8 or higher indicates high risk for opioid abuse	    Risk factors associated with adverse outcomes related to opioid treatment  [ ]  Concurrent benzodiazepine use  [ ]  History/ Active substance use or alcohol use disorder  [ ] Psychiatric co-morbidity  [ ] Sleep apnea  [ ] COPD  [ ] BMI> 35  [ ] Liver dysfunction  [ ] Renal dysfunction  [ ] CHF  [ ] Smoker  [x ]  Age > 60 years      [x ]  [place]  Reviewed and Copied to Chart. See below.

## 2020-11-10 NOTE — PROGRESS NOTE ADULT - PROBLEM SELECTOR PLAN 2
CTA chest showing new enlarged prevertebral/paraesophageal LN and numerous 5mm and smaller lung nodules suspicious for metastasis  plan as above  monitor O2 sats  goal SpO2 between 88-92% in setting of COPD
See plan as above  Plan on transfer for radiation Tx   Heme/on Dr Briones  Neuro Dr Maxwell

## 2020-11-10 NOTE — CHART NOTE - NSCHARTNOTEFT_GEN_A_CORE
Notified by RN, transfer center called inquiring about potential transfer to [facility] vs. tomorrow. Transfer center is able to hold pt's bed until tomorrow. Dr. Livingston, Attending, was made aware and is in agreement for inter facility transfer in the AM. Notified by RN, transfer center called inquiring about potential transfer to [facility] vs. tomorrow. Transfer center is able to hold pt's bed until tomorrow. Dr. Livingston, Attending, was made aware and is in agreement for inter facility transfer in the AM. Pt is aware and in agreement. Notified by RN, transfer center called inquiring about potential transfer to [facility] vs. tomorrow. Transfer center is able to hold pt's bed until tomorrow. Dr. Livingston, Attending, was made aware and is in agreement for inter facility transfer in the AM. Pt is aware and in agreement.    Addendum: Please see acute inter facility transfer note.

## 2020-11-10 NOTE — CONSULT NOTE ADULT - PROBLEM SELECTOR RECOMMENDATION 9
- Pt with rib pain and back pain. New enlarged prevertebral/paraesophageal lymph nodes suspicious for metastatic disease.  Numerous 5 mm and smaller lung nodules also suspicious for metastatic disease.  - MRI today  - naproxen 375mg po q 8 hours  - lidocaine patch daily  bowel regimen - senna and miralax  Oncology follow up  - severe pain - oxycodone 5mg po q 4 hours prn

## 2020-11-10 NOTE — CONSULT NOTE ADULT - PROBLEM SELECTOR RECOMMENDATION 2
like a belt around rib cage  will do MRI  of T spine to see any spine lesion  mediastinal nodes should not cause this pain

## 2020-11-10 NOTE — PROGRESS NOTE ADULT - PROBLEM SELECTOR PLAN 5
lovenox for DVT prophylaxis
Not in exacerbation  O2 sat 97-99 RA  Goal Spo2 (88-92%)  C/w budesonide

## 2020-11-10 NOTE — PROGRESS NOTE ADULT - ASSESSMENT
65 year old male from home alert and oriented x3, smoker with PMH of COPD, esophageal cancer s/p chemo and radiation in 5/2018, S/p esophagogastrectomy and cholecystectomy on 8/17/2018, who presented to the ED due to on/off rib and back pain for a week. On CT, Pt found to have new enlarged prevertebral paraesophageal lymph nodes and small lung nodules suspicious for metastatic disease, negative for PE. Admitted to medicine for worsening back.  11/10 MRI suggesting epidural tumor with  multilevel spinal canal narrowing, most severe at the T5 and T6 levels where there is cord deformity suggesting cord compression. Heme/Onc, GI, and  Neurology following.       65 year old male from home alert and oriented x3, smoker with PMH of COPD, esophageal cancer s/p chemo and radiation in 5/2018, S/p esophagogastrectomy and cholecystectomy on 8/17/2018, who presented to the ED due to on/off rib and back pain for a week. On CT, Pt found to have new enlarged prevertebral paraesophageal lymph nodes and small lung nodules suspicious for metastatic disease, negative for PE. Admitted to medicine for worsening back.  11/10 MRI suggesting epidural tumor with  multilevel spinal canal narrowing, most severe at the T5 and T6 levels where there is cord deformity suggesting cord compression. IV steroids started Heme/Onc, GI, and  Neurology following.

## 2020-11-10 NOTE — CONSULT NOTE ADULT - ASSESSMENT
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  He presented with pain around the rib for 1 week.  No sob, fever, chills.
Others' Prescriptions  Patient Name: Sánchez Ferrer   YOB: 1955   Address: [address] [place] [address]   Sex: Male   Rx Written	Rx Dispensed	Drug	Quantity	Days Supply	Prescriber Name		  12/01/2019	02/28/2020	vireo red (19:1) 2.375mg thc and 0.125mg cbd/2-sec puff vape 	2	20	Nolan Livingston		  Payment Method Cash   Dispenser [email or web address] [facility]		  12/01/2019	01/26/2020	vireo red (19:1) 2.375mg thc and 0.125mg cbd/2-sec puff vape 	2	20	Nolan Livingston		  Payment Method Cash   Dispenser [email or web address] [facility]		  07/26/2019	12/08/2019	vireo red (19:1) 2.375mg thc and 0.125mg cbd/2-sec puff vape 	2	7	Nolan Livingston		  Payment Method Cash   Dispenser [email or web address] [facility]		    
1. Paraesophageal adenopathy  2. Pulmonary nodules  3. R/o recurrent esophageal cancer  4. Post partial esophagectomy  5. S/p partial gastrectomy    Suggestions:    1. Pain control  2. Oncology evaluation  3. Pulmonary evaluation  4. Avoid NSAID  5. Protonix 40mg daily  6. DVT prophylaxis

## 2020-11-10 NOTE — CONSULT NOTE ADULT - PROBLEM SELECTOR RECOMMENDATION 9
he had good response to chemoRT   now there are some enlarged mediastinal lymph nodes and pulmonary nodules  ?recurrence, will need to do a PET scan as outpt

## 2020-11-11 ENCOUNTER — TRANSCRIPTION ENCOUNTER (OUTPATIENT)
Age: 65
End: 2020-11-11

## 2020-11-11 ENCOUNTER — INPATIENT (INPATIENT)
Facility: HOSPITAL | Age: 65
LOS: 1 days | Discharge: ANOTHER HOSPITAL | End: 2020-11-13
Attending: HOSPITALIST | Admitting: HOSPITALIST
Payer: COMMERCIAL

## 2020-11-11 VITALS
HEIGHT: 67 IN | WEIGHT: 154.1 LBS | RESPIRATION RATE: 18 BRPM | DIASTOLIC BLOOD PRESSURE: 72 MMHG | TEMPERATURE: 98 F | SYSTOLIC BLOOD PRESSURE: 106 MMHG | HEART RATE: 91 BPM | OXYGEN SATURATION: 99 %

## 2020-11-11 VITALS
OXYGEN SATURATION: 96 % | DIASTOLIC BLOOD PRESSURE: 71 MMHG | HEART RATE: 100 BPM | RESPIRATION RATE: 18 BRPM | TEMPERATURE: 98 F | SYSTOLIC BLOOD PRESSURE: 114 MMHG

## 2020-11-11 DIAGNOSIS — Z90.49 ACQUIRED ABSENCE OF OTHER SPECIFIED PARTS OF DIGESTIVE TRACT: Chronic | ICD-10-CM

## 2020-11-11 DIAGNOSIS — C79.51 SECONDARY MALIGNANT NEOPLASM OF BONE: ICD-10-CM

## 2020-11-11 DIAGNOSIS — C80.1 MALIGNANT (PRIMARY) NEOPLASM, UNSPECIFIED: ICD-10-CM

## 2020-11-11 DIAGNOSIS — Z98.890 OTHER SPECIFIED POSTPROCEDURAL STATES: Chronic | ICD-10-CM

## 2020-11-11 DIAGNOSIS — C15.9 MALIGNANT NEOPLASM OF ESOPHAGUS, UNSPECIFIED: ICD-10-CM

## 2020-11-11 DIAGNOSIS — Z29.9 ENCOUNTER FOR PROPHYLACTIC MEASURES, UNSPECIFIED: ICD-10-CM

## 2020-11-11 DIAGNOSIS — G95.20 UNSPECIFIED CORD COMPRESSION: ICD-10-CM

## 2020-11-11 DIAGNOSIS — J44.9 CHRONIC OBSTRUCTIVE PULMONARY DISEASE, UNSPECIFIED: ICD-10-CM

## 2020-11-11 LAB
ALBUMIN SERPL ELPH-MCNC: 3 G/DL — LOW (ref 3.5–5)
ALP SERPL-CCNC: 101 U/L — SIGNIFICANT CHANGE UP (ref 40–120)
ALT FLD-CCNC: 22 U/L DA — SIGNIFICANT CHANGE UP (ref 10–60)
ANION GAP SERPL CALC-SCNC: 7 MMOL/L — SIGNIFICANT CHANGE UP (ref 5–17)
AST SERPL-CCNC: 18 U/L — SIGNIFICANT CHANGE UP (ref 10–40)
BILIRUB SERPL-MCNC: 0.8 MG/DL — SIGNIFICANT CHANGE UP (ref 0.2–1.2)
BUN SERPL-MCNC: 16 MG/DL — SIGNIFICANT CHANGE UP (ref 7–18)
CALCIUM SERPL-MCNC: 8.8 MG/DL — SIGNIFICANT CHANGE UP (ref 8.4–10.5)
CHLORIDE SERPL-SCNC: 108 MMOL/L — SIGNIFICANT CHANGE UP (ref 96–108)
CO2 SERPL-SCNC: 25 MMOL/L — SIGNIFICANT CHANGE UP (ref 22–31)
CREAT SERPL-MCNC: 0.74 MG/DL — SIGNIFICANT CHANGE UP (ref 0.5–1.3)
GLUCOSE BLDC GLUCOMTR-MCNC: 130 MG/DL — HIGH (ref 70–99)
GLUCOSE SERPL-MCNC: 129 MG/DL — HIGH (ref 70–99)
HCT VFR BLD CALC: 43.1 % — SIGNIFICANT CHANGE UP (ref 39–50)
HGB BLD-MCNC: 14 G/DL — SIGNIFICANT CHANGE UP (ref 13–17)
MCHC RBC-ENTMCNC: 29.1 PG — SIGNIFICANT CHANGE UP (ref 27–34)
MCHC RBC-ENTMCNC: 32.5 GM/DL — SIGNIFICANT CHANGE UP (ref 32–36)
MCV RBC AUTO: 89.6 FL — SIGNIFICANT CHANGE UP (ref 80–100)
NRBC # BLD: 0 /100 WBCS — SIGNIFICANT CHANGE UP (ref 0–0)
PLATELET # BLD AUTO: 304 K/UL — SIGNIFICANT CHANGE UP (ref 150–400)
POTASSIUM SERPL-MCNC: 4.5 MMOL/L — SIGNIFICANT CHANGE UP (ref 3.5–5.3)
POTASSIUM SERPL-SCNC: 4.5 MMOL/L — SIGNIFICANT CHANGE UP (ref 3.5–5.3)
PROT SERPL-MCNC: 6.7 G/DL — SIGNIFICANT CHANGE UP (ref 6–8.3)
RBC # BLD: 4.81 M/UL — SIGNIFICANT CHANGE UP (ref 4.2–5.8)
RBC # FLD: 13 % — SIGNIFICANT CHANGE UP (ref 10.3–14.5)
SODIUM SERPL-SCNC: 140 MMOL/L — SIGNIFICANT CHANGE UP (ref 135–145)
WBC # BLD: 5.78 K/UL — SIGNIFICANT CHANGE UP (ref 3.8–10.5)
WBC # FLD AUTO: 5.78 K/UL — SIGNIFICANT CHANGE UP (ref 3.8–10.5)

## 2020-11-11 PROCEDURE — 80048 BASIC METABOLIC PNL TOTAL CA: CPT

## 2020-11-11 PROCEDURE — 82306 VITAMIN D 25 HYDROXY: CPT

## 2020-11-11 PROCEDURE — 83735 ASSAY OF MAGNESIUM: CPT

## 2020-11-11 PROCEDURE — 93005 ELECTROCARDIOGRAM TRACING: CPT

## 2020-11-11 PROCEDURE — 36415 COLL VENOUS BLD VENIPUNCTURE: CPT

## 2020-11-11 PROCEDURE — 83036 HEMOGLOBIN GLYCOSYLATED A1C: CPT

## 2020-11-11 PROCEDURE — 99222 1ST HOSP IP/OBS MODERATE 55: CPT

## 2020-11-11 PROCEDURE — 80061 LIPID PANEL: CPT

## 2020-11-11 PROCEDURE — 85730 THROMBOPLASTIN TIME PARTIAL: CPT

## 2020-11-11 PROCEDURE — 80053 COMPREHEN METABOLIC PANEL: CPT

## 2020-11-11 PROCEDURE — 84484 ASSAY OF TROPONIN QUANT: CPT

## 2020-11-11 PROCEDURE — 85379 FIBRIN DEGRADATION QUANT: CPT

## 2020-11-11 PROCEDURE — 85610 PROTHROMBIN TIME: CPT

## 2020-11-11 PROCEDURE — 72146 MRI CHEST SPINE W/O DYE: CPT

## 2020-11-11 PROCEDURE — 85025 COMPLETE CBC W/AUTO DIFF WBC: CPT

## 2020-11-11 PROCEDURE — 99233 SBSQ HOSP IP/OBS HIGH 50: CPT

## 2020-11-11 PROCEDURE — 83690 ASSAY OF LIPASE: CPT

## 2020-11-11 PROCEDURE — 82607 VITAMIN B-12: CPT

## 2020-11-11 PROCEDURE — 85027 COMPLETE CBC AUTOMATED: CPT

## 2020-11-11 PROCEDURE — 82962 GLUCOSE BLOOD TEST: CPT

## 2020-11-11 PROCEDURE — 86803 HEPATITIS C AB TEST: CPT

## 2020-11-11 PROCEDURE — 99223 1ST HOSP IP/OBS HIGH 75: CPT

## 2020-11-11 PROCEDURE — 71275 CT ANGIOGRAPHY CHEST: CPT

## 2020-11-11 PROCEDURE — 87635 SARS-COV-2 COVID-19 AMP PRB: CPT

## 2020-11-11 PROCEDURE — 84100 ASSAY OF PHOSPHORUS: CPT

## 2020-11-11 PROCEDURE — 96372 THER/PROPH/DIAG INJ SC/IM: CPT

## 2020-11-11 PROCEDURE — 84443 ASSAY THYROID STIM HORMONE: CPT

## 2020-11-11 PROCEDURE — 99285 EMERGENCY DEPT VISIT HI MDM: CPT

## 2020-11-11 PROCEDURE — 94640 AIRWAY INHALATION TREATMENT: CPT

## 2020-11-11 RX ORDER — DEXAMETHASONE 0.5 MG/5ML
0 ELIXIR ORAL
Qty: 0 | Refills: 0 | DISCHARGE
Start: 2020-11-11

## 2020-11-11 RX ORDER — BUDESONIDE AND FORMOTEROL FUMARATE DIHYDRATE 160; 4.5 UG/1; UG/1
2 AEROSOL RESPIRATORY (INHALATION)
Refills: 0 | Status: DISCONTINUED | OUTPATIENT
Start: 2020-11-11 | End: 2020-11-13

## 2020-11-11 RX ORDER — BUDESONIDE AND FORMOTEROL FUMARATE DIHYDRATE 160; 4.5 UG/1; UG/1
2 AEROSOL RESPIRATORY (INHALATION)
Qty: 0 | Refills: 0 | DISCHARGE
Start: 2020-11-11

## 2020-11-11 RX ORDER — SENNA PLUS 8.6 MG/1
2 TABLET ORAL
Qty: 0 | Refills: 0 | DISCHARGE
Start: 2020-11-11

## 2020-11-11 RX ORDER — DEXAMETHASONE 0.5 MG/5ML
4 ELIXIR ORAL EVERY 6 HOURS
Refills: 0 | Status: DISCONTINUED | OUTPATIENT
Start: 2020-11-11 | End: 2020-11-13

## 2020-11-11 RX ORDER — PANTOPRAZOLE SODIUM 20 MG/1
1 TABLET, DELAYED RELEASE ORAL
Qty: 0 | Refills: 0 | DISCHARGE
Start: 2020-11-11

## 2020-11-11 RX ORDER — BUDESONIDE AND FORMOTEROL FUMARATE DIHYDRATE 160; 4.5 UG/1; UG/1
2 AEROSOL RESPIRATORY (INHALATION)
Qty: 0 | Refills: 0 | DISCHARGE

## 2020-11-11 RX ORDER — POLYETHYLENE GLYCOL 3350 17 G/17G
17 POWDER, FOR SOLUTION ORAL
Qty: 0 | Refills: 0 | DISCHARGE
Start: 2020-11-11

## 2020-11-11 RX ORDER — OXYCODONE HYDROCHLORIDE 5 MG/1
5 TABLET ORAL EVERY 4 HOURS
Refills: 0 | Status: DISCONTINUED | OUTPATIENT
Start: 2020-11-11 | End: 2020-11-12

## 2020-11-11 RX ORDER — ACETAMINOPHEN 500 MG
650 TABLET ORAL EVERY 6 HOURS
Refills: 0 | Status: DISCONTINUED | OUTPATIENT
Start: 2020-11-11 | End: 2020-11-13

## 2020-11-11 RX ORDER — PANTOPRAZOLE SODIUM 20 MG/1
40 TABLET, DELAYED RELEASE ORAL
Refills: 0 | Status: DISCONTINUED | OUTPATIENT
Start: 2020-11-11 | End: 2020-11-13

## 2020-11-11 RX ORDER — BUDESONIDE AND FORMOTEROL FUMARATE DIHYDRATE 160; 4.5 UG/1; UG/1
1 AEROSOL RESPIRATORY (INHALATION)
Qty: 0 | Refills: 0 | DISCHARGE
Start: 2020-11-11

## 2020-11-11 RX ORDER — SENNA PLUS 8.6 MG/1
2 TABLET ORAL AT BEDTIME
Refills: 0 | Status: DISCONTINUED | OUTPATIENT
Start: 2020-11-11 | End: 2020-11-13

## 2020-11-11 RX ORDER — OXYCODONE HYDROCHLORIDE 5 MG/1
1 TABLET ORAL
Qty: 0 | Refills: 0 | DISCHARGE
Start: 2020-11-11

## 2020-11-11 RX ORDER — ENOXAPARIN SODIUM 100 MG/ML
40 INJECTION SUBCUTANEOUS DAILY
Refills: 0 | Status: DISCONTINUED | OUTPATIENT
Start: 2020-11-11 | End: 2020-11-13

## 2020-11-11 RX ORDER — DEXAMETHASONE 0.5 MG/5ML
4 ELIXIR ORAL
Qty: 160 | Refills: 0
Start: 2020-11-11 | End: 2020-11-20

## 2020-11-11 RX ORDER — OMEPRAZOLE 10 MG/1
1 CAPSULE, DELAYED RELEASE ORAL
Qty: 0 | Refills: 0 | DISCHARGE

## 2020-11-11 RX ORDER — LIDOCAINE 4 G/100G
1 CREAM TOPICAL
Qty: 0 | Refills: 0 | DISCHARGE
Start: 2020-11-11

## 2020-11-11 RX ADMIN — ENOXAPARIN SODIUM 40 MILLIGRAM(S): 100 INJECTION SUBCUTANEOUS at 13:47

## 2020-11-11 RX ADMIN — LIDOCAINE 1 PATCH: 4 CREAM TOPICAL at 00:13

## 2020-11-11 RX ADMIN — OXYCODONE HYDROCHLORIDE 5 MILLIGRAM(S): 5 TABLET ORAL at 23:59

## 2020-11-11 RX ADMIN — Medication 4 MILLIGRAM(S): at 00:05

## 2020-11-11 RX ADMIN — OXYCODONE HYDROCHLORIDE 5 MILLIGRAM(S): 5 TABLET ORAL at 09:03

## 2020-11-11 RX ADMIN — Medication 4 MILLIGRAM(S): at 06:18

## 2020-11-11 RX ADMIN — PANTOPRAZOLE SODIUM 40 MILLIGRAM(S): 20 TABLET, DELAYED RELEASE ORAL at 06:18

## 2020-11-11 RX ADMIN — OXYCODONE HYDROCHLORIDE 5 MILLIGRAM(S): 5 TABLET ORAL at 23:29

## 2020-11-11 RX ADMIN — Medication 4 MILLIGRAM(S): at 13:47

## 2020-11-11 RX ADMIN — Medication 4 MILLIGRAM(S): at 19:07

## 2020-11-11 RX ADMIN — Medication 4 MILLIGRAM(S): at 23:31

## 2020-11-11 NOTE — CONSULT NOTE ADULT - PROBLEM SELECTOR RECOMMENDATION 9
1. MRI spinal axis w/ deon  2 case to be d/w attending  3. RT consult 1. MRI spinal axis w/ deon (brain and whole spine)  Case discussed with attending neurosurgeon. 1. MRI spinal axis w/ deon (brain and whole spine)  2. rad onc consult   Case discussed with attending neurosurgeon.

## 2020-11-11 NOTE — H&P ADULT - HISTORY OF PRESENT ILLNESS
65M h/o esophageal cancer s/p chemo and radiation in 5/2018 and s/p esophagogastrectomy, gallstones s/p cholecystectomy on 8/17/2018, COPD, current smoker, presented to [facility] on 11/9 w/ R rib pain and upper back pain. Patient states the pain started about 1 week ago. Pain started on his ribs and wrapped around like a belt. Pain is intermittent, 10 out of 10 on pain scale at its worst, described as "getting hit w/ a bat," worse w/ movement, improved w/ rest and Tylenol. Pain progressively worsened and patient presented to [facility]. At [facility], CTA chest showed new enlarged prevertebral/paraesophageal lymph nodes and numerous small lung nodules suspicious for metastatic disease. Patient also had MRI of T-spine that showed epidural tumor at T4-T8 levels w/ possible cord compression at T5-T6, and compression deformities of T7/T9/T10 vertebral bodies. Patient started on steroids, pain control, and transferred to [facility] for radiation therapy. Patient denies any leg weakness, no numbness, no urinary incontinence.

## 2020-11-11 NOTE — CONSULT NOTE ADULT - SUBJECTIVE AND OBJECTIVE BOX
Reason for consult: Esophageal cancer     HPI:  65M h/o esophageal cancer s/p chemo and radiation in 5/2018 with Dr JACINTO and s/p esophagogastrectomy, gallstones s/p cholecystectomy on 8/17/2018, COPD, current smoker, presented to [facility] on 11/9 w/ R rib pain and upper back pain. Patient states the pain started about 1 week ago. Pain started on his ribs and wrapped around like a belt. Pain is intermittent, 10 out of 10 on pain scale at its worst, described as "getting hit w/ a bat," worse w/ movement, improved w/ rest and Tylenol. Pain progressively worsened and patient presented to [facility]. At [facility], CTA chest showed new enlarged prevertebral/paraesophageal lymph nodes and numerous small lung nodules suspicious for metastatic disease. Patient also had MRI of T-spine that showed epidural tumor at T4-T8 levels w/ possible cord compression at T5-T6, and compression deformities of T7/T9/T10 vertebral bodies. Patient started on steroids, pain control, and transferred to [facility] for radiation therapy. Patient denies any leg weakness, no numbness, no urinary incontinence.        (11 Nov 2020 11:00)      PAST MEDICAL & SURGICAL HISTORY:  Chronic obstructive pulmonary disease    Malignant neoplasm of esophagus, unspecified    History of cholecystectomy    History of bilateral inguinal hernia repair        FAMILY HISTORY:  Family history of esophageal cancer        Alochol: Denied  Smoking: Nonsmoker  Drug Use: Denied  Marital Status:         Allergies    albuterol (Other (Mild))    Intolerances        MEDICATIONS  (STANDING):  budesonide 160 MICROgram(s)/formoterol 4.5 MICROgram(s) Inhaler 2 Puff(s) Inhalation two times a day  dexAMETHasone  Injectable 4 milliGRAM(s) IV Push every 6 hours  enoxaparin Injectable 40 milliGRAM(s) SubCutaneous daily  pantoprazole    Tablet 40 milliGRAM(s) Oral before breakfast    MEDICATIONS  (PRN):  acetaminophen   Tablet .. 650 milliGRAM(s) Oral every 6 hours PRN Mild Pain (1 - 3)  oxyCODONE    IR 5 milliGRAM(s) Oral every 4 hours PRN Moderate Pain (4 - 6)  senna 2 Tablet(s) Oral at bedtime PRN Constipation      ROS:     General:  No wt loss, fevers, chills, night sweats, fatigue,   Eyes:  Good vision, no reported pain  ENT:  No sore throat, pain, runny nose, dysphagia  CV:  No pain, palpitations, hypo/hypertension  Resp:  No dyspnea, cough, tachypnea, wheezing  GI:  no abdominal pain   :  No pain, bleeding, incontinence, nocturia  Muscle:  No pain, weakness  Neuro:  sharm pain around chest   Psych:  No fatigue, insomnia, mood problems, depression  Endocrine:  No polyuria, polydipsia, cold/heat intolerance  a      PHYSICAL EXAM:     GENERAL:  Appears stated age, well-groomed  HEENT:  NC/AT,    CHEST: CTA b/l   HEART:  s1s2+   ABDOMEN:  Soft, non-tender, non-distended  EXTEREMITIES:  no cyanosis,clubbing or edema  SKIN:  No rash/erythema/ecchymoses  NEURO:  Alert, oriented, no asterixis good strength in all extremities, no sendory deficits                              14.0   5.78  )-----------( 304      ( 11 Nov 2020 06:18 )             43.1       11-11    140  |  108  |  16  ----------------------------<  129<H>  4.5   |  25  |  0.74    Ca    8.8      11 Nov 2020 06:18  Phos  3.3     11-10  Mg     2.2     11-10    TPro  6.7  /  Alb  3.0<L>  /  TBili  0.8  /  DBili  x   /  AST  18  /  ALT  22  /  AlkPhos  101  11-11

## 2020-11-11 NOTE — DISCHARGE NOTE NURSING/CASE MANAGEMENT/SOCIAL WORK - NSDCPEWEB_GEN_ALL_CORE
[facility] website --- [email or web address] --- [email or web address]

## 2020-11-11 NOTE — H&P ADULT - PROBLEM SELECTOR PLAN 2
MRI T-spine w/ epidural tumor at T4-T8 w/ possible cord compression at T5-T6, also compression deformities T7/T9/T10  No neuro deficits at this time  Pain control   Continue Dexamethasone 4mg IV q6h   Neurosurgery consult   Radiation Oncology eval MRI T-spine w/ epidural tumor at T4-T8 w/ possible cord compression at T5-T6, also compression deformities T7/T9/T10  No neuro deficits at this time  Pain control   Continue Dexamethasone 4mg IV q6h   Neurosurgery consult   Spoke w/ Dr Block from Rad-Onc, plan to start simulation and treatment tomorrow

## 2020-11-11 NOTE — CHART NOTE - NSCHARTNOTEFT_GEN_A_CORE
discussed with hospitalist- patient will be simulated and treated to the T-spine tomorrow morning- thanks , Humberto Farr MD  cell

## 2020-11-11 NOTE — CONSULT NOTE ADULT - SUBJECTIVE AND OBJECTIVE BOX
SERAANGIE 65y ,Male  HPI:  65M h/o esophageal cancer s/p chemo and radiation in 5/2018 and s/p esophagogastrectomy, gallstones s/p cholecystectomy on 8/17/2018, COPD, current smoker, presented to [facility] on 11/9 w/ R rib pain and upper back pain. Patient states the pain started about 1 week ago. Pain started on his ribs and wrapped around like a belt. Pain is intermittent, 10 out of 10 on pain scale at its worst, described as "getting hit w/ a bat," worse w/ movement, improved w/ rest and Tylenol. Pain progressively worsened and patient presented to [facility]. At [facility], CTA chest showed new enlarged prevertebral/paraesophageal lymph nodes and numerous small lung nodules suspicious for metastatic disease. Patient also had MRI of T-spine that showed epidural tumor at T4-T8 levels w/ possible cord compression at T5-T6, and compression deformities of T7/T9/T10 vertebral bodies. Patient started on steroids, pain control, and transferred to [facility] for radiation therapy. Patient denies any leg weakness, no numbness, no urinary incontinence.     PAST MEDICAL & SURGICAL HISTORY:  Chronic obstructive pulmonary disease  Malignant neoplasm of esophagus, unspecified  History of cholecystectomy  History of bilateral inguinal hernia repair    Allergies  albuterol (Other (Mild))    MEDICATIONS  (STANDING):  budesonide 160 MICROgram(s)/formoterol 4.5 MICROgram(s) Inhaler 2 Puff(s) Inhalation two times a day  dexAMETHasone  Injectable 4 milliGRAM(s) IV Push every 6 hours  enoxaparin Injectable 40 milliGRAM(s) SubCutaneous daily  pantoprazole    Tablet 40 milliGRAM(s) Oral before breakfast    MEDICATIONS  (PRN):  acetaminophen   Tablet .. 650 milliGRAM(s) Oral every 6 hours PRN Mild Pain (1 - 3)  oxyCODONE    IR 5 milliGRAM(s) Oral every 4 hours PRN Moderate Pain (4 - 6)  senna 2 Tablet(s) Oral at bedtime PRN Constipation    Vital Signs Last 24 Hrs  T(C): 36.8 (11 Nov 2020 11:15), Max: 36.8 (11 Nov 2020 09:24)  T(F): 98.2 (11 Nov 2020 11:15), Max: 98.2 (11 Nov 2020 09:24)  HR: 91 (11 Nov 2020 11:15) (66 - 100)  BP: 106/72 (11 Nov 2020 11:15) (106/72 - 114/71)  BP(mean): --  RR: 18 (11 Nov 2020 11:15) (16 - 18)  SpO2: 99% (11 Nov 2020 11:15) (96% - 99%)    PE:  AA&0 x 3, CN 2-12 grossly intact, speach clear, follows commands, PERRL  Motor: strength : BUE/BLE 5/5  Sensory: intact to light touch  no clonus      LABS:                        14.0   5.78  )-----------( 304      ( 11 Nov 2020 06:18 )             43.1     11-11    140  |  108  |  16  ----------------------------<  129<H>  4.5   |  25  |  0.74    Ca    8.8      11 Nov 2020 06:18  Phos  3.3     11-10  Mg     2.2     11-10    TPro  6.7  /  Alb  3.0<L>  /  TBili  0.8  /  DBili  x   /  AST  18  /  ALT  22  /  AlkPhos  101  11-11    PT/INR - ( 10 Nov 2020 06:53 )   PT: 12.6 sec;   INR: 1.06 ratio         PTT - ( 10 Nov 2020 06:53 )  PTT:28.2 sec         SERAANGIE 65y ,Male  HPI:  65M h/o esophageal cancer s/p chemo and radiation in 5/2018 and s/p esophagogastrectomy, gallstones s/p cholecystectomy on 8/17/2018, COPD, current smoker, presented to [facility] on 11/9 w/ R rib pain and upper back pain. Patient states the pain started about 1 week ago. Pain started on his ribs and wrapped around like a belt. Pain is intermittent, 10 out of 10 on pain scale at its worst, described as "getting hit w/ a bat," worse w/ movement, improved w/ rest and Tylenol. Pain progressively worsened and patient presented to [facility]. At [facility], CTA chest showed new enlarged prevertebral/paraesophageal lymph nodes and numerous small lung nodules suspicious for metastatic disease. Patient also had MRI of T-spine that showed epidural tumor at T4-T8 levels w/ possible cord compression at T5-T6, and compression deformities of T7/T9/T10 vertebral bodies. Patient started on steroids, pain control, and transferred to [facility] for radiation therapy. Patient denies any leg weakness, no numbness, no urinary incontinence.     PAST MEDICAL & SURGICAL HISTORY:  Chronic obstructive pulmonary disease  Malignant neoplasm of esophagus, unspecified  History of cholecystectomy  History of bilateral inguinal hernia repair    Allergies  albuterol (Other (Mild))    MEDICATIONS  (STANDING):  budesonide 160 MICROgram(s)/formoterol 4.5 MICROgram(s) Inhaler 2 Puff(s) Inhalation two times a day  dexAMETHasone  Injectable 4 milliGRAM(s) IV Push every 6 hours  enoxaparin Injectable 40 milliGRAM(s) SubCutaneous daily  pantoprazole    Tablet 40 milliGRAM(s) Oral before breakfast    MEDICATIONS  (PRN):  acetaminophen   Tablet .. 650 milliGRAM(s) Oral every 6 hours PRN Mild Pain (1 - 3)  oxyCODONE    IR 5 milliGRAM(s) Oral every 4 hours PRN Moderate Pain (4 - 6)  senna 2 Tablet(s) Oral at bedtime PRN Constipation    Vital Signs Last 24 Hrs  T(C): 36.8 (11 Nov 2020 11:15), Max: 36.8 (11 Nov 2020 09:24)  T(F): 98.2 (11 Nov 2020 11:15), Max: 98.2 (11 Nov 2020 09:24)  HR: 91 (11 Nov 2020 11:15) (66 - 100)  BP: 106/72 (11 Nov 2020 11:15) (106/72 - 114/71)  BP(mean): --  RR: 18 (11 Nov 2020 11:15) (16 - 18)  SpO2: 99% (11 Nov 2020 11:15) (96% - 99%)    AOx3, appropriate, follows commands  PERRL, EOMI, face symmetrical   SAPP x 4 with good strength 5/5, finger strength 5/5, handgrip 5/5   Sensation intact to light touch however endorses he used to have left pinky and ring finger decreased sensation which has significantly improved since surgery/chemo  No pronator drift   No clonus  No hoffmans      LABS:                        14.0   5.78  )-----------( 304      ( 11 Nov 2020 06:18 )             43.1     11-11    140  |  108  |  16  ----------------------------<  129<H>  4.5   |  25  |  0.74    Ca    8.8      11 Nov 2020 06:18  Phos  3.3     11-10  Mg     2.2     11-10    TPro  6.7  /  Alb  3.0<L>  /  TBili  0.8  /  DBili  x   /  AST  18  /  ALT  22  /  AlkPhos  101  11-11    PT/INR - ( 10 Nov 2020 06:53 )   PT: 12.6 sec;   INR: 1.06 ratio         PTT - ( 10 Nov 2020 06:53 )  PTT:28.2 sec

## 2020-11-11 NOTE — H&P ADULT - NSHPLABSRESULTS_GEN_ALL_CORE
14.0   5.78  )-----------( 304      ( 11 Nov 2020 06:18 )             43.1     11-11    140  |  108  |  16  ----------------------------<  129<H>  4.5   |  25  |  0.74    Ca    8.8      11 Nov 2020 06:18  Phos  3.3     11-10  Mg     2.2     11-10    TPro  6.7  /  Alb  3.0<L>  /  TBili  0.8  /  DBili  x   /  AST  18  /  ALT  22  /  AlkPhos  101  11-11      MRI T-spine:  1.  Abnormal epidural soft tissue at the T4-T8 levels suggesting epidural tumor. This results in multilevel spinal canal narrowing, most severe at the T5 and T6 levels where there is cord deformity suggesting cord compression. Recommend MRI thoracic spine with contrast if there is no contraindication.  2.  Compression deformities of the T7, T9, and T10 vertebral bodies without significant bony retropulsion; tumor involvement cannot be excluded. T1 hypointense, STIR hyperintense lesions in the T5 vertebral body suggesting metastases.    CTA chest:   No pulmonary embolus.    New enlarged prevertebral/paraesophageal lymph nodes suspicious for metastatic disease.    Numerous 5 mm and smaller lung nodules also suspicious for metastatic disease.      EKG tracing 11/9 reviewed and interpreted: SR and PAC's

## 2020-11-11 NOTE — CONSULT NOTE ADULT - ASSESSMENT
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      1. Cord compression   - evaluated by neurosurgery eval noted   - will go for sim and start radiation therapy   - likely recurrent esophageal ca   - cont dexamethasone with PPI     2. Esophageal ca   - likely recurrent disease   - will need PET scan outpatient for extent of disease   - will need bx to confirm recurrence   - pt to follow up with Dr Briones after discharge   - will cont to follow     Delbert Cruz MD  Hematology Oncology   [phone]

## 2020-11-11 NOTE — H&P ADULT - ASSESSMENT
65M h/o esophageal cancer s/p chemo and radiation in 5/2018 and s/p esophagogastrectomy, gallstones s/p cholecystectomy on 8/17/2018, COPD, current smoker, presented to [facility] on 11/9 w/ R rib pain and upper back pain, found to have metastatic disease to thoracic spine and possible cord compression at T5-T6, transferred to [facility] for radiation.

## 2020-11-11 NOTE — DISCHARGE NOTE NURSING/CASE MANAGEMENT/SOCIAL WORK - PATIENT PORTAL LINK FT
You can access the FollowMyHealth Patient Portal offered by [facility] by registering at the following website: [email or web address]. By joining [facility]’s FollowMyHealth portal, you will also be able to view your health information using other applications (apps) compatible with our system.

## 2020-11-11 NOTE — PROGRESS NOTE ADULT - ASSESSMENT
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  He presented with pain around the rib for 1 week.  No sob, fever, chills.    Problem/Recommendation - 1:  Problem: Malignant neoplasm of esophagus, unspecified. Recommendation: he had good response to chemoRT   now there are some enlarged mediastinal lymph nodes and pulmonary nodules  ?recurrence, will need to do a PET scan as outpt.    Problem/Recommendation - 2:  ·  Problem: Chest wall pain.  Recommendation: like a belt around rib cage  will do MRI  of T spine to see any spine lesion  mediastinal nodes should not cause this pain.   MRI showed cord compression  clinically, no cord compromization  started on dex   spoke to RT at [facility], will transfer for RT

## 2020-11-11 NOTE — PATIENT PROFILE ADULT - FUNCTIONAL SCREEN CURRENT LEVEL: COMMUNICATION, MLM
PDMP reviewed; no aberrant behavior identified, prescription authorized.     0 = understands/communicates without difficulty

## 2020-11-11 NOTE — H&P ADULT - NSHPPHYSICALEXAM_GEN_ALL_CORE
Vital Signs Last 24 Hrs  T(C): 36.8 (11 Nov 2020 11:15), Max: 36.8 (11 Nov 2020 09:24)  T(F): 98.2 (11 Nov 2020 11:15), Max: 98.2 (11 Nov 2020 09:24)  HR: 91 (11 Nov 2020 11:15) (66 - 100)  BP: 106/72 (11 Nov 2020 11:15) (106/72 - 114/71)  RR: 18 (11 Nov 2020 09:24) (16 - 18)  SpO2: 99% (11 Nov 2020 11:15) (96% - 99%)

## 2020-11-11 NOTE — PROGRESS NOTE ADULT - SUBJECTIVE AND OBJECTIVE BOX
able to walk  no incontinence  MRI showed T spine cord comprssion  on dex    MEDICATIONS  (STANDING):  budesonide 160 MICROgram(s)/formoterol 4.5 MICROgram(s) Inhaler 2 Puff(s) Inhalation two times a day  dexAMETHasone  Injectable 4 milliGRAM(s) IV Push every 6 hours  enoxaparin Injectable 40 milliGRAM(s) SubCutaneous daily  influenza   Vaccine 0.5 milliLiter(s) IntraMuscular once  lidocaine   Patch 1 Patch Transdermal daily  naproxen 375 milliGRAM(s) Oral every 8 hours  pantoprazole    Tablet 40 milliGRAM(s) Oral before breakfast  polyethylene glycol 3350 17 Gram(s) Oral daily  senna 2 Tablet(s) Oral at bedtime    MEDICATIONS  (PRN):  oxyCODONE    IR 5 milliGRAM(s) Oral every 4 hours PRN Severe Pain (7 - 10)      Allergies    albuterol (Other (Mild))    Intolerances        Vital Signs Last 24 Hrs  T(C): 36.8 (11 Nov 2020 11:15), Max: 36.8 (11 Nov 2020 09:24)  T(F): 98.2 (11 Nov 2020 11:15), Max: 98.2 (11 Nov 2020 09:24)  HR: 91 (11 Nov 2020 11:15) (66 - 100)  BP: 106/72 (11 Nov 2020 11:15) (106/72 - 114/71)  BP(mean): --  RR: 18 (11 Nov 2020 11:15) (16 - 18)  SpO2: 99% (11 Nov 2020 11:15) (96% - 99%)    PHYSICAL EXAM  General: adult in NAD  HEENT: clear oropharynx, anicteric sclera, pink conjunctiva  Neck: supple  CV: normal S1/S2 with no murmur rubs or gallops  Lungs: positive air movement b/l ant lungs,clear to auscultation, no wheezes, no rales  Abdomen: soft non-tender non-distended, no hepatosplenomegaly  Ext: no clubbing cyanosis or edema  Skin: no rashes and no petechiae  Neuro: alert and oriented X 4, no focal deficits  LABS:                          14.0   5.78  )-----------( 304      ( 11 Nov 2020 06:18 )             43.1         Mean Cell Volume : 89.6 fl  Mean Cell Hemoglobin : 29.1 pg  Mean Cell Hemoglobin Concentration : 32.5 gm/dL  Auto Neutrophil # : x  Auto Lymphocyte # : x  Auto Monocyte # : x  Auto Eosinophil # : x  Auto Basophil # : x  Auto Neutrophil % : x  Auto Lymphocyte % : x  Auto Monocyte % : x  Auto Eosinophil % : x  Auto Basophil % : x    Serial CBC  Hematocrit 43.1  Hemoglobin 14.0  Plat 304  RBC 4.81  WBC 5.78  Serial CBC  Hematocrit 41.3  Hemoglobin 13.3  Plat 302  RBC 4.50  WBC 7.60  Serial CBC  Hematocrit 41.1  Hemoglobin 13.4  Plat 303  RBC 4.50  WBC 8.41    11-11    140  |  108  |  16  ----------------------------<  129<H>  4.5   |  25  |  0.74    Ca    8.8      11 Nov 2020 06:18  Phos  3.3     11-10  Mg     2.2     11-10    TPro  6.7  /  Alb  3.0<L>  /  TBili  0.8  /  DBili  x   /  AST  18  /  ALT  22  /  AlkPhos  101  11-11      PT/INR - ( 10 Nov 2020 06:53 )   PT: 12.6 sec;   INR: 1.06 ratio         PTT - ( 10 Nov 2020 06:53 )  PTT:28.2 sec    Vitamin B12, Serum: 1107 pg/mL (11-10 @ 10:32)            BLOOD SMEAR INTERPRETATION:       RADIOLOGY & ADDITIONAL STUDIES:

## 2020-11-11 NOTE — H&P ADULT - PROBLEM SELECTOR PLAN 1
s/p chemo and radiation in 5/2018 and s/p esophagogastrectomy, now w/ new findings of metastatic disease   CTA chest w/ enlarged prevertebral/paraesophageal lymph nodes and numerous small lung nodules   MRI T-spine w/ epidural tumor at T4-T8 levels   Patient will need to f/up with primary oncologist, Dr Briones s/p chemo and radiation in 5/2018 and s/p esophagogastrectomy, now w/ new findings of metastatic disease   CTA chest w/ enlarged prevertebral/paraesophageal lymph nodes and numerous small lung nodules   MRI T-spine w/ epidural tumor at T4-T8 levels   PET scan as outpatient   Patient will need to f/up with primary oncologist, Dr Briones, as outpatient

## 2020-11-12 ENCOUNTER — TRANSCRIPTION ENCOUNTER (OUTPATIENT)
Age: 65
End: 2020-11-12

## 2020-11-12 LAB
ANION GAP SERPL CALC-SCNC: 10 MMO/L — SIGNIFICANT CHANGE UP (ref 7–14)
BASOPHILS # BLD AUTO: 0 K/UL — SIGNIFICANT CHANGE UP (ref 0–0.2)
BASOPHILS NFR BLD AUTO: 0 % — SIGNIFICANT CHANGE UP (ref 0–2)
BUN SERPL-MCNC: 22 MG/DL — SIGNIFICANT CHANGE UP (ref 7–23)
CALCIUM SERPL-MCNC: 9.1 MG/DL — SIGNIFICANT CHANGE UP (ref 8.4–10.5)
CHLORIDE SERPL-SCNC: 105 MMOL/L — SIGNIFICANT CHANGE UP (ref 98–107)
CO2 SERPL-SCNC: 24 MMOL/L — SIGNIFICANT CHANGE UP (ref 22–31)
CREAT SERPL-MCNC: 0.69 MG/DL — SIGNIFICANT CHANGE UP (ref 0.5–1.3)
EOSINOPHIL # BLD AUTO: 0 K/UL — SIGNIFICANT CHANGE UP (ref 0–0.5)
EOSINOPHIL NFR BLD AUTO: 0 % — SIGNIFICANT CHANGE UP (ref 0–6)
GLUCOSE SERPL-MCNC: 141 MG/DL — HIGH (ref 70–99)
HCT VFR BLD CALC: 43.7 % — SIGNIFICANT CHANGE UP (ref 39–50)
HGB BLD-MCNC: 13.9 G/DL — SIGNIFICANT CHANGE UP (ref 13–17)
IMM GRANULOCYTES NFR BLD AUTO: 0.4 % — SIGNIFICANT CHANGE UP (ref 0–1.5)
LYMPHOCYTES # BLD AUTO: 0.71 K/UL — LOW (ref 1–3.3)
LYMPHOCYTES # BLD AUTO: 6.6 % — LOW (ref 13–44)
MCHC RBC-ENTMCNC: 29.1 PG — SIGNIFICANT CHANGE UP (ref 27–34)
MCHC RBC-ENTMCNC: 31.8 % — LOW (ref 32–36)
MCV RBC AUTO: 91.4 FL — SIGNIFICANT CHANGE UP (ref 80–100)
MONOCYTES # BLD AUTO: 0.27 K/UL — SIGNIFICANT CHANGE UP (ref 0–0.9)
MONOCYTES NFR BLD AUTO: 2.5 % — SIGNIFICANT CHANGE UP (ref 2–14)
NEUTROPHILS # BLD AUTO: 9.81 K/UL — HIGH (ref 1.8–7.4)
NEUTROPHILS NFR BLD AUTO: 90.5 % — HIGH (ref 43–77)
NRBC # FLD: 0 K/UL — SIGNIFICANT CHANGE UP (ref 0–0)
PLATELET # BLD AUTO: 322 K/UL — SIGNIFICANT CHANGE UP (ref 150–400)
PMV BLD: 10.4 FL — SIGNIFICANT CHANGE UP (ref 7–13)
POTASSIUM SERPL-MCNC: 4 MMOL/L — SIGNIFICANT CHANGE UP (ref 3.5–5.3)
POTASSIUM SERPL-SCNC: 4 MMOL/L — SIGNIFICANT CHANGE UP (ref 3.5–5.3)
RBC # BLD: 4.78 M/UL — SIGNIFICANT CHANGE UP (ref 4.2–5.8)
RBC # FLD: 13.4 % — SIGNIFICANT CHANGE UP (ref 10.3–14.5)
SODIUM SERPL-SCNC: 139 MMOL/L — SIGNIFICANT CHANGE UP (ref 135–145)
WBC # BLD: 10.83 K/UL — HIGH (ref 3.8–10.5)
WBC # FLD AUTO: 10.83 K/UL — HIGH (ref 3.8–10.5)

## 2020-11-12 PROCEDURE — 72147 MRI CHEST SPINE W/DYE: CPT | Mod: 26

## 2020-11-12 PROCEDURE — 77334 RADIATION TREATMENT AID(S): CPT | Mod: 26

## 2020-11-12 PROCEDURE — 72149 MRI LUMBAR SPINE W/DYE: CPT | Mod: 26

## 2020-11-12 PROCEDURE — 77290 THER RAD SIMULAJ FIELD CPLX: CPT | Mod: 26

## 2020-11-12 PROCEDURE — 99233 SBSQ HOSP IP/OBS HIGH 50: CPT

## 2020-11-12 RX ORDER — ENOXAPARIN SODIUM 100 MG/ML
40 INJECTION SUBCUTANEOUS
Qty: 0 | Refills: 0 | DISCHARGE
Start: 2020-11-12

## 2020-11-12 RX ORDER — OXYCODONE HYDROCHLORIDE 5 MG/1
1 TABLET ORAL
Qty: 0 | Refills: 0 | DISCHARGE
Start: 2020-11-12

## 2020-11-12 RX ORDER — OXYCODONE HYDROCHLORIDE 5 MG/1
5 TABLET ORAL EVERY 4 HOURS
Refills: 0 | Status: DISCONTINUED | OUTPATIENT
Start: 2020-11-12 | End: 2020-11-13

## 2020-11-12 RX ORDER — ACETAMINOPHEN 500 MG
2 TABLET ORAL
Qty: 0 | Refills: 0 | DISCHARGE
Start: 2020-11-12

## 2020-11-12 RX ORDER — OMEPRAZOLE 10 MG/1
1 CAPSULE, DELAYED RELEASE ORAL
Qty: 0 | Refills: 0 | DISCHARGE

## 2020-11-12 RX ORDER — PANTOPRAZOLE SODIUM 20 MG/1
1 TABLET, DELAYED RELEASE ORAL
Qty: 0 | Refills: 0 | DISCHARGE
Start: 2020-11-12

## 2020-11-12 RX ORDER — SENNA PLUS 8.6 MG/1
2 TABLET ORAL
Qty: 0 | Refills: 0 | DISCHARGE
Start: 2020-11-12

## 2020-11-12 RX ORDER — DEXAMETHASONE 0.5 MG/5ML
4 ELIXIR ORAL
Qty: 0 | Refills: 0 | DISCHARGE
Start: 2020-11-12

## 2020-11-12 RX ADMIN — BUDESONIDE AND FORMOTEROL FUMARATE DIHYDRATE 2 PUFF(S): 160; 4.5 AEROSOL RESPIRATORY (INHALATION) at 09:16

## 2020-11-12 RX ADMIN — Medication 4 MILLIGRAM(S): at 11:56

## 2020-11-12 RX ADMIN — BUDESONIDE AND FORMOTEROL FUMARATE DIHYDRATE 2 PUFF(S): 160; 4.5 AEROSOL RESPIRATORY (INHALATION) at 21:56

## 2020-11-12 RX ADMIN — ENOXAPARIN SODIUM 40 MILLIGRAM(S): 100 INJECTION SUBCUTANEOUS at 11:56

## 2020-11-12 RX ADMIN — PANTOPRAZOLE SODIUM 40 MILLIGRAM(S): 20 TABLET, DELAYED RELEASE ORAL at 06:43

## 2020-11-12 RX ADMIN — Medication 4 MILLIGRAM(S): at 17:26

## 2020-11-12 RX ADMIN — Medication 4 MILLIGRAM(S): at 06:43

## 2020-11-12 NOTE — PROGRESS NOTE ADULT - SUBJECTIVE AND OBJECTIVE BOX
Patient is a 65y old  Male who presents with a chief complaint of back pain (12 Nov 2020 09:36)        SUBJECTIVE / OVERNIGHT EVENTS:    no acute events o/n  pt denies any pain this AM  denies cp/sob/abdominal pain  feels well    MEDICATIONS  (STANDING):  budesonide 160 MICROgram(s)/formoterol 4.5 MICROgram(s) Inhaler 2 Puff(s) Inhalation two times a day  dexAMETHasone  Injectable 4 milliGRAM(s) IV Push every 6 hours  enoxaparin Injectable 40 milliGRAM(s) SubCutaneous daily  pantoprazole    Tablet 40 milliGRAM(s) Oral before breakfast    MEDICATIONS  (PRN):  acetaminophen   Tablet .. 650 milliGRAM(s) Oral every 6 hours PRN Mild Pain (1 - 3)  oxyCODONE    IR 5 milliGRAM(s) Oral every 4 hours PRN Moderate Pain (4 - 6)  senna 2 Tablet(s) Oral at bedtime PRN Constipation      Vital Signs Last 24 Hrs  T(C): 36.4 (12 Nov 2020 06:14), Max: 36.9 (11 Nov 2020 20:15)  T(F): 97.5 (12 Nov 2020 06:14), Max: 98.5 (11 Nov 2020 20:15)  HR: 67 (12 Nov 2020 06:14) (67 - 84)  BP: 117/77 (12 Nov 2020 06:14) (111/65 - 117/77)  BP(mean): --  RR: 19 (12 Nov 2020 06:14) (18 - 19)  SpO2: 100% (12 Nov 2020 06:14) (97% - 100%)  CAPILLARY BLOOD GLUCOSE        I&O's Summary        PHYSICAL EXAM  GENERAL: NAD, well-developed  NECK: Supple, No JVD  CHEST/LUNG: Clear to auscultation bilaterally; No wheeze  HEART: Regular rate and rhythm; No murmurs, rubs, or gallops  ABDOMEN: Soft, Nontender, Nondistended; Bowel sounds present  EXTREMITIES:  2+ Peripheral Pulses, No clubbing, cyanosis, or edema    LABS:                        13.9   10.83 )-----------( 322      ( 12 Nov 2020 06:15 )             43.7     11-12    139  |  105  |  22  ----------------------------<  141<H>  4.0   |  24  |  0.69    Ca    9.1      12 Nov 2020 06:15    TPro  6.7  /  Alb  3.0<L>  /  TBili  0.8  /  DBili  x   /  AST  18  /  ALT  22  /  AlkPhos  101  11-11              RADIOLOGY & ADDITIONAL TESTS:    Imaging Personally Reviewed:  Consultant(s) Notes Reviewed:    Care Discussed with Consultants/Other Providers:

## 2020-11-12 NOTE — CHART NOTE - NSCHARTNOTESELECT_GEN_ALL_CORE
Encounter addended by: Elyse Costello OTR on: 6/22/2017  6:46 AM<BR>     Actions taken: Flowsheet accepted
Event Note/ACP NP
Event Note/Rad Onc
Rad Onc/Event Note

## 2020-11-12 NOTE — PROGRESS NOTE ADULT - ASSESSMENT
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  He presented with pain around the rib for 1 week.  No sob, fever, chills.    Malignant neoplasm of esophagus, unspecified. Recommendation: he had good response to chemoRT   now there are some enlarged mediastinal lymph nodes and pulmonary nodules  ?recurrence, will need to do a PET scan as outpt.    Chest wall pain -- now resolved    MRI showed likely cord compression     MRI T spine noncontrast: Abnormal epidural soft tissue at the T4-T8 levels suggesting epidural tumor. This results in multilevel spinal canal narrowing, most severe at the T5 and T6 levels where there is cord deformity suggesting cord compression.  Compression deformities of the T7, T9, and T10 vertebral bodies without significant bony retropulsion; tumor involvement cannot be excluded. T1 hypointense, STIR hyperintense lesions in the T5 vertebral body suggesting metastases.    awaiting MRI of T/L spine with contrast to clarify  surg eval appreciated  rad/onc eval appreciated, pt simulated today, planned for RT but will review imaging again, d/w Dr Farr  no clinical sx of cord compression  cont decadron 4mg q6h  f/u rad/onc eval and final plan for RT    D/w pt and rad/onc, will follow, [phone]

## 2020-11-12 NOTE — PROGRESS NOTE ADULT - ASSESSMENT
65M h/o esophageal cancer s/p chemo and radiation in 5/2018 and s/p esophagogastrectomy, gallstones s/p cholecystectomy on 8/17/2018, COPD, current smoker, presented to [facility] on 11/9 w/ R rib pain and upper back pain, found to have metastatic disease to thoracic spine and possible cord compression at T5-T6, transferred to [facility] for radiation.

## 2020-11-12 NOTE — DISCHARGE NOTE PROVIDER - NSDCMRMEDTOKEN_GEN_ALL_CORE_FT
acetaminophen 325 mg oral tablet: 2 tab(s) orally every 6 hours, As needed, Mild Pain (1 - 3)  budesonide-formoterol 160 mcg-4.5 mcg/inh inhalation aerosol: 1 puff(s) inhaled once a day  dexamethasone: 4 milligram(s) intravenous every 6 hours  enoxaparin: 40 milligram(s) subcutaneous once a day  oxyCODONE 5 mg oral tablet: 1 tab(s) orally every 4 hours, As needed, Moderate Pain (4 - 6)  pantoprazole 40 mg oral delayed release tablet: 1 tab(s) orally once a day (before a meal)  senna oral tablet: 2 tab(s) orally once a day (at bedtime), As needed, Constipation   acetaminophen 325 mg oral tablet: 2 tab(s) orally every 6 hours, As needed, Mild Pain (1 - 3)  budesonide-formoterol 160 mcg-4.5 mcg/inh inhalation aerosol: 1 puff(s) inhaled once a day  dexamethasone: 4 milligram(s) intravenous every 6 hours  oxyCODONE 5 mg oral tablet: 1 tab(s) orally every 4 hours, As needed, Moderate Pain (4 - 6)  pantoprazole 40 mg oral delayed release tablet: 1 tab(s) orally once a day (before a meal)  senna oral tablet: 2 tab(s) orally once a day (at bedtime), As needed, Constipation

## 2020-11-12 NOTE — DISCHARGE NOTE PROVIDER - CARE PROVIDER_API CALL
Dr. Anselmo  follow up with Dr. Briones as outpatient  Phone: (   )    -  Fax: (   )    -  Follow Up Time:     Humberto Farr  THERAPEUTIC RADIOLOGY  [address] [place] [address]  [place] [address]  Phone: [phone]  Fax: [phone]  Follow Up Time:

## 2020-11-12 NOTE — CONSULT NOTE ADULT - SUBJECTIVE AND OBJECTIVE BOX
HPI:  Angie Zamora is a 65M with a  h/o esophageal cancer s/p chemo and radiation in 5/2018 and s/p esophagogastrectomy, gallstones s/p cholecystectomy on 8/17/2018, COPD, current smoker, presented to [facility] on 11/9 w/ R rib pain and upper back pain. Patient states the pain started about 1 week ago. Pain started on his ribs and wrapped around like a belt. Pain is intermittent, 10 out of 10 on pain scale at its worst, described as "getting hit w/ a bat," worse w/ movement, improved w/ rest and Tylenol. Pain progressively worsened and patient presented to [facility]. At [facility], CTA chest showed new enlarged prevertebral/paraesophageal lymph nodes and numerous small lung nodules suspicious for metastatic disease. Patient also had MRI of T-spine that showed epidural tumor at T4-T8 levels w/ possible cord compression at T5-T6, and compression deformities of T7/T9/T10 vertebral bodies. MRI findings were reviewed remotely with Dr Hany Javier. Patient was started on steroids, pain control, and transferred to [facility] for radiation therapy. Patient denies any leg weakness, no numbness, no urinary incontinence.       EXAM:  MR SPINE THORACIC                        PROCEDURE DATE:  11/10/2020    COMPARISON: CT chest 11/9/2020    FINDINGS:    Compression deformities of the T7, T9, and T10 vertebral bodies without significant bony retropulsion; tumor involvement cannot be excluded. T1 hypointense, STIR hyperintense lesions in the T5 vertebral body suggesting metastases.  Abnormal epidural soft tissue at the T4-T8 levels suggesting epidural tumor. This results in multilevel spinal canal narrowing, most severe at the T5 and T6 levels where there is cord deformity suggesting cord compression. Epidural tumor extends into multiple neural foramina including at the right T4-T5, right T5-T6, bilateral T6-T7, and right T8-T9 levels.  Bilateral T9-T10 and T10-T11 neuroforaminal narrowing. Multilevel intervertebral disc desiccation.  Paraesophageal/prevertebral lymphadenopathy again noted.      IMPRESSION:  1.  Abnormal epidural soft tissue at the T4-T8 levels suggesting epidural tumor. This results in multilevel spinal canal narrowing, most severe at the T5 and T6 levels where there is cord deformity suggesting cord compression. Recommend MRI thoracic spine with contrast if there is no contraindication.  2.  Compression deformities of the T7, T9, and T10 vertebral bodies without significant bony retropulsion; tumor involvement cannot be excluded. T1 hypointense, STIR hyperintense lesions in the T5 vertebral body suggesting metastases.      Allergies    albuterol (Other (Mild))    Intolerances        ROS: [  ] Fever  [  ] Chills  [  ]Chest Pain [  ] SOB  [  ]Cough [  ] N/V  [  ] Diarrhea [  ]Constipation [  ]Other ROS:  [  ] ROS otherwise negative    PAST MEDICAL & SURGICAL HISTORY:  Chronic obstructive pulmonary disease    Malignant neoplasm of esophagus, unspecified    History of cholecystectomy    History of bilateral inguinal hernia repair        FAMILY HISTORY:  Family history of esophageal cancer        MEDICATIONS  (STANDING):  budesonide 160 MICROgram(s)/formoterol 4.5 MICROgram(s) Inhaler 2 Puff(s) Inhalation two times a day  dexAMETHasone  Injectable 4 milliGRAM(s) IV Push every 6 hours  enoxaparin Injectable 40 milliGRAM(s) SubCutaneous daily  pantoprazole    Tablet 40 milliGRAM(s) Oral before breakfast    MEDICATIONS  (PRN):  acetaminophen   Tablet .. 650 milliGRAM(s) Oral every 6 hours PRN Mild Pain (1 - 3)  oxyCODONE    IR 5 milliGRAM(s) Oral every 4 hours PRN Moderate Pain (4 - 6)  senna 2 Tablet(s) Oral at bedtime PRN Constipation      PHYSICAL EXAM  Vital Signs Last 24 Hrs  T(C): 36.4 (12 Nov 2020 06:14), Max: 36.9 (11 Nov 2020 20:15)  T(F): 97.5 (12 Nov 2020 06:14), Max: 98.5 (11 Nov 2020 20:15)  HR: 67 (12 Nov 2020 06:14) (67 - 100)  BP: 117/77 (12 Nov 2020 06:14) (106/72 - 117/77)  BP(mean): --  RR: 19 (12 Nov 2020 06:14) (18 - 19)  SpO2: 100% (12 Nov 2020 06:14) (96% - 100%)    General: Well nourished, well developed, no acute distress  HEENT: NC/AT; EOMI, PERRL, sclera nonicteric; external ears normal; no rhinorrhea or epistaxis; mucous membranes moist; oropharynx clear and without erythema  CV: NR, RR; no appreciable r/m/g  Lungs: CTAB, no increased work of breathing  Abdomen: Bowel sounds present; soft, NTND  MSK: Vertebral spine non-tender to palpation  Neuro: AAOx3; cranial nerves II-XII intact; strength 5/5 in upper and lower extremities; sensation to light touch in tact bilaterally.  Psych: Full affect; mood congruent  Skin: no visible rashes on limited examination    IMAGING/LABS/PATHOLOGY: I have personally reviewed the relevant labs, pathology, and imaging as noted in the HPI.  In addition,                          14.0   5.78  )-----------( 304      ( 11 Nov 2020 06:18 )             43.1     11-11    140  |  108  |  16  ----------------------------<  129<H>  4.5   |  25  |  0.74    Ca    8.8      11 Nov 2020 06:18    TPro  6.7  /  Alb  3.0<L>  /  TBili  0.8  /  DBili  x   /  AST  18  /  ALT  22  /  AlkPhos  101  11-11          ASSESSMENT/PLAN    ANGIE ZAMORA is a 65y man with     We discussed the use of palliative radiation in this setting, namely to improve quality of life through the reduction of symptoms.  We talked about the risks, benefits, acute and long term side effects, as well as expected treatment outcomes.  He/She was given the opportunity to ask questions, which were answered to his/her apparent satisfaction.  He/She provided written consent to proceed with radiation therapy. We will arrange for inpatient/outpatient treatment. HPI:  Angie Zamora is a 65M with a  h/o esophageal cancer s/p chemo and radiation in 5/2018 and s/p esophagogastrectomy, gallstones s/p cholecystectomy on 8/17/2018, COPD, current smoker, presented to [facility] on 11/9 w/ R rib pain and upper back pain. Patient states the pain started about 1 week ago. Pain started on his ribs and wrapped around like a belt. Pain is intermittent, 10 out of 10 on pain scale at its worst, described as "getting hit w/ a bat," worse w/ movement, improved w/ rest and Tylenol. Pain progressively worsened and patient presented to [facility]. At [facility], CTA chest showed new enlarged prevertebral/paraesophageal lymph nodes and numerous small lung nodules suspicious for metastatic disease. Patient also had MRI of T-spine that showed epidural tumor at T4-T8 levels w/ possible cord compression at T5-T6, and compression deformities of T7/T9/T10 vertebral bodies. MRI findings were reviewed remotely with Dr Hany Javier. Patient was started on steroids, pain control, and transferred to [facility] for radiation therapy. Patient denies any leg weakness, no numbness, no urinary incontinence.       EXAM:  MR SPINE THORACIC                        PROCEDURE DATE:  11/10/2020    COMPARISON: CT chest 11/9/2020    FINDINGS:    Compression deformities of the T7, T9, and T10 vertebral bodies without significant bony retropulsion; tumor involvement cannot be excluded. T1 hypointense, STIR hyperintense lesions in the T5 vertebral body suggesting metastases.  Abnormal epidural soft tissue at the T4-T8 levels suggesting epidural tumor. This results in multilevel spinal canal narrowing, most severe at the T5 and T6 levels where there is cord deformity suggesting cord compression. Epidural tumor extends into multiple neural foramina including at the right T4-T5, right T5-T6, bilateral T6-T7, and right T8-T9 levels.  Bilateral T9-T10 and T10-T11 neuroforaminal narrowing. Multilevel intervertebral disc desiccation.  Paraesophageal/prevertebral lymphadenopathy again noted.    IMPRESSION:  1.  Abnormal epidural soft tissue at the T4-T8 levels suggesting epidural tumor. This results in multilevel spinal canal narrowing, most severe at the T5 and T6 levels where there is cord deformity suggesting cord compression. Recommend MRI thoracic spine with contrast if there is no contraindication.  2.  Compression deformities of the T7, T9, and T10 vertebral bodies without significant bony retropulsion; tumor involvement cannot be excluded. T1 hypointense, STIR hyperintense lesions in the T5 vertebral body suggesting metastases.      EXAM:  CT ANGIO CHEST (W)AW IC                        PROCEDURE DATE:  11/09/2020    COMPARISON: CT chest 3/30/2019    FINDINGS:    PULMONARY VESSELS: No pulmonary embolus. Main pulmonary artery normal in diameter.  HEART AND VASCULATURE: Normal heart size without pericardial effusion. No aortic aneurysm or dissection.  LUNGS, AIRWAYS, PLEURA: Patent central airways. Mild bronchial wall thickening. Emphysema. Numerous 5 mm and smaller lung nodules new from prior for example right upper lobe (series 5 image 41); (upper lobe (series 5 image 70-72).  No pleural effusionsor pneumothorax.  MEDIASTINUM: Again noted, the patient is status post esophagectomy and gastric pull-through. 3.1 x 1.7 cm 2.7 x 2 cm prevertebral/paraesophageal nodule (series 5 images 66 and 81) are new from prior. The remaining subcentimeter mediastinal and hilar lymph nodes are stable.  UPPER ABDOMEN: Cholecystectomy. 2 mm nonobstructing left renal stone.  BONES AND SOFT TISSUES: Increased fracture deformity of T7, T9 and T10 vertebral bodies.  LOWER NECK: Within normal limits.    IMPRESSION:  No pulmonary embolus.  New enlarged prevertebral/paraesophageal lymph nodes suspicious for metastatic disease.  Numerous 5 mm and smaller lung nodules also suspicious for metastatic disease.    Allergies    albuterol (Other (Mild))    Intolerances        ROS: [  ] Fever  [  ] Chills  [  ]Chest Pain [  ] SOB  [  ]Cough [  ] N/V  [  ] Diarrhea [  ]Constipation [  ]Other ROS:  [  ] ROS otherwise negative    PAST MEDICAL & SURGICAL HISTORY:  Chronic obstructive pulmonary disease    Malignant neoplasm of esophagus, unspecified    History of cholecystectomy    History of bilateral inguinal hernia repair        FAMILY HISTORY:  Family history of esophageal cancer        MEDICATIONS  (STANDING):  budesonide 160 MICROgram(s)/formoterol 4.5 MICROgram(s) Inhaler 2 Puff(s) Inhalation two times a day  dexAMETHasone  Injectable 4 milliGRAM(s) IV Push every 6 hours  enoxaparin Injectable 40 milliGRAM(s) SubCutaneous daily  pantoprazole    Tablet 40 milliGRAM(s) Oral before breakfast    MEDICATIONS  (PRN):  acetaminophen   Tablet .. 650 milliGRAM(s) Oral every 6 hours PRN Mild Pain (1 - 3)  oxyCODONE    IR 5 milliGRAM(s) Oral every 4 hours PRN Moderate Pain (4 - 6)  senna 2 Tablet(s) Oral at bedtime PRN Constipation      PHYSICAL EXAM  Vital Signs Last 24 Hrs  T(C): 36.4 (12 Nov 2020 06:14), Max: 36.9 (11 Nov 2020 20:15)  T(F): 97.5 (12 Nov 2020 06:14), Max: 98.5 (11 Nov 2020 20:15)  HR: 67 (12 Nov 2020 06:14) (67 - 100)  BP: 117/77 (12 Nov 2020 06:14) (106/72 - 117/77)  BP(mean): --  RR: 19 (12 Nov 2020 06:14) (18 - 19)  SpO2: 100% (12 Nov 2020 06:14) (96% - 100%)    General: Well nourished, well developed, no acute distress  HEENT: NC/AT; EOMI, PERRL, sclera nonicteric; external ears normal; no rhinorrhea or epistaxis; mucous membranes moist; oropharynx clear and without erythema  CV: NR, RR; no appreciable r/m/g  Lungs: CTAB, no increased work of breathing  Abdomen: Bowel sounds present; soft, NTND  MSK: Vertebral spine non-tender to palpation  Neuro: AAOx3; cranial nerves II-XII intact; strength 5/5 in upper and lower extremities; sensation to light touch in tact bilaterally.  Psych: Full affect; mood congruent  Skin: no visible rashes on limited examination    IMAGING/LABS/PATHOLOGY: I have personally reviewed the relevant labs, pathology, and imaging as noted in the HPI.  In addition,                          14.0   5.78  )-----------( 304      ( 11 Nov 2020 06:18 )             43.1     11-11    140  |  108  |  16  ----------------------------<  129<H>  4.5   |  25  |  0.74    Ca    8.8      11 Nov 2020 06:18    TPro  6.7  /  Alb  3.0<L>  /  TBili  0.8  /  DBili  x   /  AST  18  /  ALT  22  /  AlkPhos  101  11-11          ASSESSMENT/PLAN    ANGIE ZAMORA is a 65y man with     We discussed the use of palliative radiation in this setting, namely to improve quality of life through the reduction of symptoms.  We talked about the risks, benefits, acute and long term side effects, as well as expected treatment outcomes.  He/She was given the opportunity to ask questions, which were answered to his/her apparent satisfaction.  He/She provided written consent to proceed with radiation therapy. We will arrange for inpatient/outpatient treatment. HPI:  Angie Zamora is a 65M with a  h/o esophageal cancer s/p chemo and radiation in 5/2018 and s/p esophagogastrectomy, gallstones s/p cholecystectomy on 8/17/2018, COPD, current smoker, [facility] on 11/9 w/ R rib pain and upper back pain. Patient states the pain started about 1 week ago. Pain started on his ribs and wrapped around like a belt. Pain is intermittent, 10 out of 10 on pain scale at its worst, described as "getting hit w/ a bat," worse w/ movement, improved w/ rest and Tylenol. Pain progressively worsened and patient presented to [facility]. At [facility], CTA chest showed new enlarged prevertebral/paraesophageal lymph nodes and numerous small lung nodules suspicious for metastatic disease. Patient also had MRI of T-spine that showed epidural tumor at T4-T8 levels w/ possible cord compression at T5-T6, and compression deformities of T7/T9/T10 vertebral bodies. MRI findings were reviewed remotely with Dr Hany Javier. Patient was started on steroids, pain control, and transferred to [facility] for radiation therapy. Patient denies any leg weakness, no numbness, no urinary incontinence.       EXAM:  MR SPINE THORACIC                        PROCEDURE DATE:  11/10/2020    COMPARISON: CT chest 11/9/2020    FINDINGS:    Compression deformities of the T7, T9, and T10 vertebral bodies without significant bony retropulsion; tumor involvement cannot be excluded. T1 hypointense, STIR hyperintense lesions in the T5 vertebral body suggesting metastases.  Abnormal epidural soft tissue at the T4-T8 levels suggesting epidural tumor. This results in multilevel spinal canal narrowing, most severe at the T5 and T6 levels where there is cord deformity suggesting cord compression. Epidural tumor extends into multiple neural foramina including at the right T4-T5, right T5-T6, bilateral T6-T7, and right T8-T9 levels.  Bilateral T9-T10 and T10-T11 neuroforaminal narrowing. Multilevel intervertebral disc desiccation.  Paraesophageal/prevertebral lymphadenopathy again noted.    IMPRESSION:  1.  Abnormal epidural soft tissue at the T4-T8 levels suggesting epidural tumor. This results in multilevel spinal canal narrowing, most severe at the T5 and T6 levels where there is cord deformity suggesting cord compression. Recommend MRI thoracic spine with contrast if there is no contraindication.  2.  Compression deformities of the T7, T9, and T10 vertebral bodies without significant bony retropulsion; tumor involvement cannot be excluded. T1 hypointense, STIR hyperintense lesions in the T5 vertebral body suggesting metastases.      EXAM:  CT ANGIO CHEST (W)AW IC                        PROCEDURE DATE:  11/09/2020    COMPARISON: CT chest 3/30/2019    FINDINGS:    PULMONARY VESSELS: No pulmonary embolus. Main pulmonary artery normal in diameter.  HEART AND VASCULATURE: Normal heart size without pericardial effusion. No aortic aneurysm or dissection.  LUNGS, AIRWAYS, PLEURA: Patent central airways. Mild bronchial wall thickening. Emphysema. Numerous 5 mm and smaller lung nodules new from prior for example right upper lobe (series 5 image 41); (upper lobe (series 5 image 70-72).  No pleural effusionsor pneumothorax.  MEDIASTINUM: Again noted, the patient is status post esophagectomy and gastric pull-through. 3.1 x 1.7 cm 2.7 x 2 cm prevertebral/paraesophageal nodule (series 5 images 66 and 81) are new from prior. The remaining subcentimeter mediastinal and hilar lymph nodes are stable.  UPPER ABDOMEN: Cholecystectomy. 2 mm nonobstructing left renal stone.  BONES AND SOFT TISSUES: Increased fracture deformity of T7, T9 and T10 vertebral bodies.  LOWER NECK: Within normal limits.    IMPRESSION:  No pulmonary embolus.  New enlarged prevertebral/paraesophageal lymph nodes suspicious for metastatic disease.  Numerous 5 mm and smaller lung nodules also suspicious for metastatic disease.    Allergies    albuterol (Other (Mild))    Intolerances        ROS: [  ] Fever  [  ] Chills  [  ]Chest Pain [  ] SOB  [  ]Cough [  ] N/V  [  ] Diarrhea [  ]Constipation [  ]Other ROS:  [  ] ROS otherwise negative    PAST MEDICAL & SURGICAL HISTORY:  Chronic obstructive pulmonary disease    Malignant neoplasm of esophagus, unspecified    History of cholecystectomy    History of bilateral inguinal hernia repair        FAMILY HISTORY:  Family history of esophageal cancer        MEDICATIONS  (STANDING):  budesonide 160 MICROgram(s)/formoterol 4.5 MICROgram(s) Inhaler 2 Puff(s) Inhalation two times a day  dexAMETHasone  Injectable 4 milliGRAM(s) IV Push every 6 hours  enoxaparin Injectable 40 milliGRAM(s) SubCutaneous daily  pantoprazole    Tablet 40 milliGRAM(s) Oral before breakfast    MEDICATIONS  (PRN):  acetaminophen   Tablet .. 650 milliGRAM(s) Oral every 6 hours PRN Mild Pain (1 - 3)  oxyCODONE    IR 5 milliGRAM(s) Oral every 4 hours PRN Moderate Pain (4 - 6)  senna 2 Tablet(s) Oral at bedtime PRN Constipation      PHYSICAL EXAM- KPS 80  Vital Signs Last 24 Hrs  T(C): 36.4 (12 Nov 2020 06:14), Max: 36.9 (11 Nov 2020 20:15)  T(F): 97.5 (12 Nov 2020 06:14), Max: 98.5 (11 Nov 2020 20:15)  HR: 67 (12 Nov 2020 06:14) (67 - 100)  BP: 117/77 (12 Nov 2020 06:14) (106/72 - 117/77)  BP(mean): --  RR: 19 (12 Nov 2020 06:14) (18 - 19)  SpO2: 100% (12 Nov 2020 06:14) (96% - 100%)    General: Well nourished, well developed, no acute distress  HEENT: NC/AT; EOMI, PERRL, sclera nonicteric; external ears normal; no rhinorrhea or epistaxis; mucous membranes moist; oropharynx clear and without erythema  CV: NR, RR; no appreciable r/m/g  Lungs: CTAB, no increased work of breathing  Abdomen: Bowel sounds present; soft, NTND  MSK: Vertebral spine tender to palpation in the mid-T-spine  Neuro: AAOx3; cranial nerves II-XII intact; strength 5/5 in upper and lower extremities; sensation to light touch intact bilaterally.  Psych: Full affect; mood congruent  Skin: no visible rashes on limited examination    IMAGING/LABS/PATHOLOGY: I have personally reviewed the relevant labs, pathology, and imaging as noted in the HPI.  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 MRI was reviewed with dr Hany Javier.    We discussed the use of palliative radiation in this setting, namely to improve quality of life through the reduction of symptoms.  We talked about the risks, benefits, acute and long term side effects, as well as expected treatment outcomes.  Mr Marcial was given the opportunity to ask questions, which were answered to his apparent satisfaction.  He provided written consent to proceed with radiation therapy. We will arrange for inpatient treatment. Prior radiation recomrds from Dr Pickett's office in [place] have been urgently requested.    Sammy Farr MD  cell

## 2020-11-12 NOTE — CHART NOTE - NSCHARTNOTEFT_GEN_A_CORE
prior radiation records from [place] obtained and reviewed- spinal cord in the lower T-spine area has already received a dose close to maximum spinal cord tolerance- I have discussed with Dr Kerri Cintron and my Rad Onc colleague Dr Mundo Noyola regarding possible radiosurgery as outpt, to minimize cord dose and risk of radiation myelitis- he feels radiosurgery will be very difficult, given the amount of disease- discussed with spine surgeon Dr Bayron Martins, who will evaluate for decompressive surgery prior to any radiation- these issues were all discussed today, in person, with Mr Kempico- radiation is therefore on hold, pending neurosurgical evaluation, and pending results of MRI spine with contrast -  Sammy Farr MD cell  prior radiation records from [place] obtained and reviewed- spinal cord in the lower T-spine area has already received a dose close to maximum spinal cord tolerance- I have discussed with Dr Kerri Cintron and my Rad Onc colleague Dr Mundo Noyola regarding possible radiosurgery as outpt, to minimize cord dose and risk of radiation myelitis- he feels radiosurgery will be very difficult, given the amount of disease- discussed with spine surgeon Dr Bayron Martins, who also recommends decompressive surgery prior to any radiation- these issues were all discussed today, in person, with Mr Ferrer- radiation is therefore on hold, pending neurosurgery outcome -  Sammy Farr MD cell

## 2020-11-12 NOTE — DISCHARGE NOTE PROVIDER - PROVIDER TOKENS
FREE:[LAST:[Anselmo],FIRST:[],PHONE:[(   )    -],FAX:[(   )    -],ADDRESS:[follow up with Dr. Briones as outpatient]],PROVIDER:[TOKEN:[[id number]]]

## 2020-11-12 NOTE — DISCHARGE NOTE PROVIDER - NSDCCPCAREPLAN_GEN_ALL_CORE_FT
PRINCIPAL DISCHARGE DIAGNOSIS  Diagnosis: Spine metastasis  Assessment and Plan of Treatment: Plan - transfer to [place] for neurosurgery      SECONDARY DISCHARGE DIAGNOSES  Diagnosis: Cord compression  Assessment and Plan of Treatment: Plan - transfer to [place] for neurosurgery    Diagnosis: Chronic obstructive pulmonary disease, unspecified COPD type  Assessment and Plan of Treatment: Please continue to use your inhalers as prescribed and follow-up with your primary care provider/pulmonologist for further care/recommendations. It is recommended to undergo annual pulmonary function testings. Monitor for signs/symptoms of COPD exacerbation, such as, shortness of breath, increased sputum production, increased cough, wheezing, difficulty breathing, or fever - Report to the emergency room if symptoms are not relieved by usual regimen.

## 2020-11-12 NOTE — CHART NOTE - NSCHARTNOTEFT_GEN_A_CORE
As per neurosurgery plans is for Pt to be transferred to [facility] for decompressive spine surgery as no further radiation is recommended. Met with Pt and his wife at bedside to obtain authorization for transfer form signature, Pt indicated that he does not want surgery as it is expensive and will decrease his quality of life. Neurosurgery called and notified of this, will speak to pt further. At this time, no bed available yet at [facility].

## 2020-11-12 NOTE — DISCHARGE NOTE NURSING/CASE MANAGEMENT/SOCIAL WORK - PATIENT PORTAL LINK FT
You can access the FollowMyHealth Patient Portal offered by [facility] [place] by registering at the following website: [email or web address]. By joining [facility]’s FollowMyHealth portal, you will also be able to view your health information using other applications (apps) compatible with our system.

## 2020-11-12 NOTE — DISCHARGE NOTE PROVIDER - HOSPITAL COURSE
65M h/o esophageal cancer status post chemotherapy and radiation in 5/2018 and status post esophagogastrectomy, gallstones status post cholecystectomy on 8/17/2018, COPD, current smoker, presented to [facility] on 11/9 with right rib pain and upper back pain, found to have metastatic disease to thoracic spine and possible cord compression at T5-T6, transferred to [facility] for radiation.   +Esophageal CA w/spine mets    # Malignant neoplasm of esophagus, unspecified location  -s/p chemo and radiation in 5/2018 and s/p esophagogastrectomy, now w/ new findings of metastatic disease   -CTA chest with enlarged prevertebral/paraesophageal lymph nodes and numerous small lung nodules   -MRI T-spine w/ epidural tumor at T4-T8 levels   -Plans for PET scan as outpatient , Patient will need to follow up  with primary oncologist, Dr Briones, as outpatient.     Cord compression.    -No neuro deficits at this time  -Pain control   -Decadron  -Neurosurgery consulted - pt to be transferred to [place] for surgery follow up.  -Rad/Onc c/s - prior radiation records from [place] obtained and reviewed- spinal cord in the lower T-spine area has already received a dose close to maximum spinal cord tolerance- ? possible radiosurgery as outpt, to minimize cord dose and risk of radiation myelitis-  recommends decompressive surgery prior to any radiation- these issues were all discussed, in person, with Mr Ferrer- radiation is therefore on hold, pending neurosurgery outcome       COPD  -Stable, c/w Symbicort.

## 2020-11-12 NOTE — PROGRESS NOTE ADULT - SUBJECTIVE AND OBJECTIVE BOX
Pt seen, feeling fine, no complaints    MEDICATIONS  (STANDING):  budesonide 160 MICROgram(s)/formoterol 4.5 MICROgram(s) Inhaler 2 Puff(s) Inhalation two times a day  dexAMETHasone  Injectable 4 milliGRAM(s) IV Push every 6 hours  enoxaparin Injectable 40 milliGRAM(s) SubCutaneous daily  pantoprazole    Tablet 40 milliGRAM(s) Oral before breakfast    MEDICATIONS  (PRN):  acetaminophen   Tablet .. 650 milliGRAM(s) Oral every 6 hours PRN Mild Pain (1 - 3)  oxyCODONE    IR 5 milliGRAM(s) Oral every 4 hours PRN Moderate Pain (4 - 6)  senna 2 Tablet(s) Oral at bedtime PRN Constipation      ROS  No fever, sweats, chills  No epistaxis, HA, sore throat  No CP, SOB, cough, sputum  No n/v/d, abd pain, melena, hematochezia  No edema  No rash  No anxiety  No back pain, joint pain  No bleeding, bruising  No dysuria, hematuria    Vital Signs Last 24 Hrs  T(C): 36.4 (12 Nov 2020 06:14), Max: 36.9 (11 Nov 2020 20:15)  T(F): 97.5 (12 Nov 2020 06:14), Max: 98.5 (11 Nov 2020 20:15)  HR: 67 (12 Nov 2020 06:14) (67 - 91)  BP: 117/77 (12 Nov 2020 06:14) (106/72 - 117/77)  BP(mean): --  RR: 19 (12 Nov 2020 06:14) (18 - 19)  SpO2: 100% (12 Nov 2020 06:14) (97% - 100%)    PE  NAD  Awake, alert                          13.9   10.83 )-----------( 322      ( 12 Nov 2020 06:15 )             43.7       11-12    139  |  105  |  22  ----------------------------<  141<H>  4.0   |  24  |  0.69    Ca    9.1      12 Nov 2020 06:15    TPro  6.7  /  Alb  3.0<L>  /  TBili  0.8  /  DBili  x   /  AST  18  /  ALT  22  /  AlkPhos  101  11-11

## 2020-11-12 NOTE — DISCHARGE NOTE NURSING/CASE MANAGEMENT/SOCIAL WORK - NSDCPELOVENOX_GEN_ALL_CORE
Enoxaparin/Lovenox - Potential for adverse drug reactions and interactions/Enoxaparin/Lovenox - Dietary Advice/Enoxaparin/Lovenox - Compliance/Enoxaparin/Lovenox - Follow up monitoring

## 2020-11-12 NOTE — PROGRESS NOTE ADULT - PROBLEM SELECTOR PLAN 2
MRI T-spine w/ epidural tumor at T4-T8 w/ possible cord compression at T5-T6, also compression deformities T7/T9/T10  No neuro deficits at this time  Continue Dexamethasone 4mg IV q6h   oxyIR 5 mg po PRN pain   Neurosurgery consult appreciated, MRI w/ contrast pending   Rad Onc consult appreciated, to start inpatient RT

## 2020-11-12 NOTE — PROGRESS NOTE ADULT - PROBLEM SELECTOR PLAN 1
s/p chemo and radiation in 5/2018 and s/p esophagogastrectomy, now w/ new findings of metastatic disease   CTA chest w/ enlarged prevertebral/paraesophageal lymph nodes and numerous small lung nodules   MRI T-spine w/ epidural tumor at T4-T8 levels   PET scan as outpatient   Patient will need to f/up with primary oncologist, Dr Briones, as outpatient

## 2020-11-13 ENCOUNTER — TRANSCRIPTION ENCOUNTER (OUTPATIENT)
Age: 65
End: 2020-11-13

## 2020-11-13 ENCOUNTER — INPATIENT (INPATIENT)
Facility: HOSPITAL | Age: 65
LOS: 5 days | Discharge: HOME | DRG: 25 | End: 2020-11-19
Attending: NEUROLOGICAL SURGERY | Admitting: NEUROLOGICAL SURGERY
Payer: COMMERCIAL

## 2020-11-13 VITALS — OXYGEN SATURATION: 99 % | HEART RATE: 83 BPM | RESPIRATION RATE: 16 BRPM | TEMPERATURE: 98 F

## 2020-11-13 VITALS
HEART RATE: 74 BPM | TEMPERATURE: 98 F | OXYGEN SATURATION: 98 % | SYSTOLIC BLOOD PRESSURE: 129 MMHG | DIASTOLIC BLOOD PRESSURE: 83 MMHG | RESPIRATION RATE: 18 BRPM

## 2020-11-13 DIAGNOSIS — Z29.9 ENCOUNTER FOR PROPHYLACTIC MEASURES, UNSPECIFIED: ICD-10-CM

## 2020-11-13 DIAGNOSIS — Z01.818 ENCOUNTER FOR OTHER PREPROCEDURAL EXAMINATION: ICD-10-CM

## 2020-11-13 DIAGNOSIS — F17.200 NICOTINE DEPENDENCE, UNSPECIFIED, UNCOMPLICATED: ICD-10-CM

## 2020-11-13 DIAGNOSIS — Z98.890 OTHER SPECIFIED POSTPROCEDURAL STATES: Chronic | ICD-10-CM

## 2020-11-13 DIAGNOSIS — Z90.49 ACQUIRED ABSENCE OF OTHER SPECIFIED PARTS OF DIGESTIVE TRACT: Chronic | ICD-10-CM

## 2020-11-13 DIAGNOSIS — J44.9 CHRONIC OBSTRUCTIVE PULMONARY DISEASE, UNSPECIFIED: ICD-10-CM

## 2020-11-13 DIAGNOSIS — D49.2 NEOPLASM OF UNSPECIFIED BEHAVIOR OF BONE, SOFT TISSUE, AND SKIN: ICD-10-CM

## 2020-11-13 DIAGNOSIS — C15.9 MALIGNANT NEOPLASM OF ESOPHAGUS, UNSPECIFIED: ICD-10-CM

## 2020-11-13 DIAGNOSIS — R22.2 LOCALIZED SWELLING, MASS AND LUMP, TRUNK: ICD-10-CM

## 2020-11-13 DIAGNOSIS — Z79.899 OTHER LONG TERM (CURRENT) DRUG THERAPY: ICD-10-CM

## 2020-11-13 LAB
ANION GAP SERPL CALC-SCNC: 8 MMO/L — SIGNIFICANT CHANGE UP (ref 7–14)
APTT BLD: 25.8 SEC — LOW (ref 27.5–35.5)
BLD GP AB SCN SERPL QL: NEGATIVE — SIGNIFICANT CHANGE UP
BUN SERPL-MCNC: 26 MG/DL — HIGH (ref 7–23)
CALCIUM SERPL-MCNC: 9.1 MG/DL — SIGNIFICANT CHANGE UP (ref 8.4–10.5)
CHLORIDE SERPL-SCNC: 102 MMOL/L — SIGNIFICANT CHANGE UP (ref 98–107)
CO2 SERPL-SCNC: 25 MMOL/L — SIGNIFICANT CHANGE UP (ref 22–31)
CREAT SERPL-MCNC: 0.73 MG/DL — SIGNIFICANT CHANGE UP (ref 0.5–1.3)
GLUCOSE BLDC GLUCOMTR-MCNC: 144 MG/DL — HIGH (ref 70–99)
GLUCOSE BLDC GLUCOMTR-MCNC: 201 MG/DL — HIGH (ref 70–99)
GLUCOSE SERPL-MCNC: 129 MG/DL — HIGH (ref 70–99)
HCT VFR BLD CALC: 42.1 % — SIGNIFICANT CHANGE UP (ref 39–50)
HGB BLD-MCNC: 13.3 G/DL — SIGNIFICANT CHANGE UP (ref 13–17)
INR BLD: 0.99 RATIO — SIGNIFICANT CHANGE UP (ref 0.88–1.16)
MCHC RBC-ENTMCNC: 28.9 PG — SIGNIFICANT CHANGE UP (ref 27–34)
MCHC RBC-ENTMCNC: 31.6 % — LOW (ref 32–36)
MCV RBC AUTO: 91.5 FL — SIGNIFICANT CHANGE UP (ref 80–100)
NRBC # FLD: 0 K/UL — SIGNIFICANT CHANGE UP (ref 0–0)
PLATELET # BLD AUTO: 325 K/UL — SIGNIFICANT CHANGE UP (ref 150–400)
PMV BLD: 9.9 FL — SIGNIFICANT CHANGE UP (ref 7–13)
POTASSIUM SERPL-MCNC: 4.3 MMOL/L — SIGNIFICANT CHANGE UP (ref 3.5–5.3)
POTASSIUM SERPL-SCNC: 4.3 MMOL/L — SIGNIFICANT CHANGE UP (ref 3.5–5.3)
PROTHROM AB SERPL-ACNC: 11.9 SEC — SIGNIFICANT CHANGE UP (ref 10.6–13.6)
RBC # BLD: 4.6 M/UL — SIGNIFICANT CHANGE UP (ref 4.2–5.8)
RBC # FLD: 13.6 % — SIGNIFICANT CHANGE UP (ref 10.3–14.5)
RH IG SCN BLD-IMP: POSITIVE — SIGNIFICANT CHANGE UP
SARS-COV-2 IGG SERPL QL IA: NEGATIVE — SIGNIFICANT CHANGE UP
SARS-COV-2 IGM SERPL IA-ACNC: 0.09 INDEX — SIGNIFICANT CHANGE UP
SODIUM SERPL-SCNC: 135 MMOL/L — SIGNIFICANT CHANGE UP (ref 135–145)
WBC # BLD: 12.71 K/UL — HIGH (ref 3.8–10.5)
WBC # FLD AUTO: 12.71 K/UL — HIGH (ref 3.8–10.5)

## 2020-11-13 PROCEDURE — 71046 X-RAY EXAM CHEST 2 VIEWS: CPT | Mod: 26

## 2020-11-13 PROCEDURE — 99223 1ST HOSP IP/OBS HIGH 75: CPT

## 2020-11-13 PROCEDURE — 93010 ELECTROCARDIOGRAM REPORT: CPT

## 2020-11-13 PROCEDURE — 99407 BEHAV CHNG SMOKING > 10 MIN: CPT

## 2020-11-13 RX ORDER — INSULIN LISPRO 100/ML
VIAL (ML) SUBCUTANEOUS AT BEDTIME
Refills: 0 | Status: DISCONTINUED | OUTPATIENT
Start: 2020-11-13 | End: 2020-11-14

## 2020-11-13 RX ORDER — ONDANSETRON 8 MG/1
4 TABLET, FILM COATED ORAL ONCE
Refills: 0 | Status: DISCONTINUED | OUTPATIENT
Start: 2020-11-13 | End: 2020-11-14

## 2020-11-13 RX ORDER — SODIUM CHLORIDE 9 MG/ML
1000 INJECTION, SOLUTION INTRAVENOUS
Refills: 0 | Status: DISCONTINUED | OUTPATIENT
Start: 2020-11-13 | End: 2020-11-19

## 2020-11-13 RX ORDER — DEXTROSE 50 % IN WATER 50 %
12.5 SYRINGE (ML) INTRAVENOUS ONCE
Refills: 0 | Status: DISCONTINUED | OUTPATIENT
Start: 2020-11-13 | End: 2020-11-18

## 2020-11-13 RX ORDER — INFLUENZA VIRUS VACCINE 15; 15; 15; 15 UG/.5ML; UG/.5ML; UG/.5ML; UG/.5ML
0.5 SUSPENSION INTRAMUSCULAR ONCE
Refills: 0 | Status: DISCONTINUED | OUTPATIENT
Start: 2020-11-13 | End: 2020-11-14

## 2020-11-13 RX ORDER — SENNA PLUS 8.6 MG/1
2 TABLET ORAL AT BEDTIME
Refills: 0 | Status: DISCONTINUED | OUTPATIENT
Start: 2020-11-13 | End: 2020-11-14

## 2020-11-13 RX ORDER — OXYCODONE HYDROCHLORIDE 5 MG/1
5 TABLET ORAL EVERY 4 HOURS
Refills: 0 | Status: DISCONTINUED | OUTPATIENT
Start: 2020-11-13 | End: 2020-11-14

## 2020-11-13 RX ORDER — OXYCODONE HYDROCHLORIDE 5 MG/1
10 TABLET ORAL EVERY 4 HOURS
Refills: 0 | Status: DISCONTINUED | OUTPATIENT
Start: 2020-11-13 | End: 2020-11-14

## 2020-11-13 RX ORDER — GABAPENTIN 400 MG/1
300 CAPSULE ORAL THREE TIMES A DAY
Refills: 0 | Status: DISCONTINUED | OUTPATIENT
Start: 2020-11-13 | End: 2020-11-14

## 2020-11-13 RX ORDER — DEXTROSE 50 % IN WATER 50 %
25 SYRINGE (ML) INTRAVENOUS ONCE
Refills: 0 | Status: DISCONTINUED | OUTPATIENT
Start: 2020-11-13 | End: 2020-11-18

## 2020-11-13 RX ORDER — GLUCAGON INJECTION, SOLUTION 0.5 MG/.1ML
1 INJECTION, SOLUTION SUBCUTANEOUS ONCE
Refills: 0 | Status: DISCONTINUED | OUTPATIENT
Start: 2020-11-13 | End: 2020-11-19

## 2020-11-13 RX ORDER — ACETAMINOPHEN 500 MG
650 TABLET ORAL EVERY 6 HOURS
Refills: 0 | Status: DISCONTINUED | OUTPATIENT
Start: 2020-11-13 | End: 2020-11-14

## 2020-11-13 RX ORDER — DEXAMETHASONE 0.5 MG/5ML
4 ELIXIR ORAL EVERY 6 HOURS
Refills: 0 | Status: DISCONTINUED | OUTPATIENT
Start: 2020-11-13 | End: 2020-11-14

## 2020-11-13 RX ORDER — INSULIN LISPRO 100/ML
VIAL (ML) SUBCUTANEOUS
Refills: 0 | Status: DISCONTINUED | OUTPATIENT
Start: 2020-11-13 | End: 2020-11-14

## 2020-11-13 RX ORDER — BUDESONIDE AND FORMOTEROL FUMARATE DIHYDRATE 160; 4.5 UG/1; UG/1
2 AEROSOL RESPIRATORY (INHALATION)
Refills: 0 | Status: DISCONTINUED | OUTPATIENT
Start: 2020-11-13 | End: 2020-11-14

## 2020-11-13 RX ORDER — SODIUM CHLORIDE 9 MG/ML
1000 INJECTION INTRAMUSCULAR; INTRAVENOUS; SUBCUTANEOUS
Refills: 0 | Status: DISCONTINUED | OUTPATIENT
Start: 2020-11-13 | End: 2020-11-14

## 2020-11-13 RX ORDER — POLYETHYLENE GLYCOL 3350 17 G/17G
17 POWDER, FOR SOLUTION ORAL
Refills: 0 | Status: DISCONTINUED | OUTPATIENT
Start: 2020-11-13 | End: 2020-11-14

## 2020-11-13 RX ORDER — DEXTROSE 50 % IN WATER 50 %
15 SYRINGE (ML) INTRAVENOUS ONCE
Refills: 0 | Status: DISCONTINUED | OUTPATIENT
Start: 2020-11-13 | End: 2020-11-18

## 2020-11-13 RX ORDER — PANTOPRAZOLE SODIUM 20 MG/1
40 TABLET, DELAYED RELEASE ORAL
Refills: 0 | Status: DISCONTINUED | OUTPATIENT
Start: 2020-11-13 | End: 2020-11-14

## 2020-11-13 RX ADMIN — OXYCODONE HYDROCHLORIDE 5 MILLIGRAM(S): 5 TABLET ORAL at 02:22

## 2020-11-13 RX ADMIN — OXYCODONE HYDROCHLORIDE 5 MILLIGRAM(S): 5 TABLET ORAL at 22:03

## 2020-11-13 RX ADMIN — OXYCODONE HYDROCHLORIDE 5 MILLIGRAM(S): 5 TABLET ORAL at 22:34

## 2020-11-13 RX ADMIN — Medication 4 MILLIGRAM(S): at 13:43

## 2020-11-13 RX ADMIN — GABAPENTIN 300 MILLIGRAM(S): 400 CAPSULE ORAL at 22:03

## 2020-11-13 RX ADMIN — Medication 4 MILLIGRAM(S): at 17:15

## 2020-11-13 RX ADMIN — GABAPENTIN 300 MILLIGRAM(S): 400 CAPSULE ORAL at 13:43

## 2020-11-13 RX ADMIN — Medication 4 MILLIGRAM(S): at 01:27

## 2020-11-13 RX ADMIN — OXYCODONE HYDROCHLORIDE 5 MILLIGRAM(S): 5 TABLET ORAL at 01:27

## 2020-11-13 NOTE — H&P ADULT - ASSESSMENT
Sánchez Ferrer  65M h/o esophageal cancer status post chemotherapy and radiation in 5/2018 and status post esophagogastrectomy, COPD, current smoker, presented to [facility] on 11/9 with right rib pain and upper back pain, found to have metastatic disease to thoracic spine and possible cord compression at T5-T6, transferred to [facility] for radiation.   Imaging: CTA chest with enlarged prevertebral/paraesophageal lymph nodes and numerous small lung nodules. MRI T-spine w/ epidural tumor at T5-T11 levels, sig stenosis at T6 Bilsky 3.   -prior radiation records from [place] obtained and reviewed- spinal cord in the lower T-spine area has already received a dose close to maximum spinal cord tolerance. Possible outpatient radiation.   -pre op for angio embo and surgery tomrorow  - Plans for PET scan as outpatient

## 2020-11-13 NOTE — H&P ADULT - HISTORY OF PRESENT ILLNESS
65M h/o esophageal cancer status post chemotherapy and radiation in 5/2018 and status post esophagogastrectomy, gallstones status post cholecystectomy on 8/17/2018, COPD, current smoker, presented to [facility] on 11/9 with right rib pain and upper back pain, found to have metastatic disease to thoracic spine and possible cord compression at T5-T6, transferred to [facility] for radiation.

## 2020-11-13 NOTE — CONSULT NOTE ADULT - PROBLEM SELECTOR RECOMMENDATION 6
patient's home mediations reviewed and home documentation updated to reflect this. at home he takes:  - omeprazole 40mg daily  - symbicort 160-4.5mcg 1-2 puffs daily and PRN

## 2020-11-13 NOTE — PROGRESS NOTE ADULT - SUBJECTIVE AND OBJECTIVE BOX
Pt seen, feeling fine, no complaints  transferred to [facility] for surgical decompression T Spine    MEDICATIONS  (STANDING):  budesonide 160 MICROgram(s)/formoterol 4.5 MICROgram(s) Inhaler 2 Puff(s) Inhalation two times a day  dexAMETHasone  Injectable 4 milliGRAM(s) IV Push every 6 hours  dextrose 40% Gel 15 Gram(s) Oral once  dextrose 5%. 1000 milliLiter(s) (50 mL/Hr) IV Continuous <Continuous>  dextrose 5%. 1000 milliLiter(s) (100 mL/Hr) IV Continuous <Continuous>  dextrose 50% Injectable 25 Gram(s) IV Push once  dextrose 50% Injectable 12.5 Gram(s) IV Push once  dextrose 50% Injectable 25 Gram(s) IV Push once  gabapentin 300 milliGRAM(s) Oral three times a day  glucagon  Injectable 1 milliGRAM(s) IntraMuscular once  influenza   Vaccine 0.5 milliLiter(s) IntraMuscular once  insulin lispro (ADMELOG) corrective regimen sliding scale   SubCutaneous three times a day before meals  insulin lispro (ADMELOG) corrective regimen sliding scale   SubCutaneous at bedtime  pantoprazole    Tablet 40 milliGRAM(s) Oral before breakfast  polyethylene glycol 3350 17 Gram(s) Oral two times a day  senna 2 Tablet(s) Oral at bedtime  sodium chloride 0.9%. 1000 milliLiter(s) (75 mL/Hr) IV Continuous <Continuous>    MEDICATIONS  (PRN):  acetaminophen   Tablet .. 650 milliGRAM(s) Oral every 6 hours PRN Temp greater or equal to 38C (100.4F), Mild Pain (1 - 3)  ondansetron Injectable 4 milliGRAM(s) IV Push once PRN Nausea and/or Vomiting  oxyCODONE    IR 5 milliGRAM(s) Oral every 4 hours PRN Moderate Pain (4 - 6)  oxyCODONE    IR 10 milliGRAM(s) Oral every 4 hours PRN Severe Pain (7 - 10)        ROS  No fever, sweats, chills  No epistaxis, HA, sore throat  No CP, SOB, cough, sputum  No n/v/d, abd pain, melena, hematochezia  No edema  No rash  No anxiety  No back pain, joint pain  No bleeding, bruising  No dysuria, hematuria    Vital Signs Last 24 Hrs  T(C): 36.9 (13 Nov 2020 12:46), Max: 36.9 (13 Nov 2020 12:46)  T(F): 98.4 (13 Nov 2020 12:46), Max: 98.4 (13 Nov 2020 12:46)  HR: 82 (13 Nov 2020 12:46) (72 - 99)  BP: 129/75 (13 Nov 2020 12:46) (113/74 - 133/89)  BP(mean): --  RR: 18 (13 Nov 2020 12:46) (18 - 19)  SpO2: 99% (13 Nov 2020 12:46) (98% - 100%)  PE  NAD  Awake, alert                          13.3   12.71 )-----------( 325      ( 13 Nov 2020 05:45 )             42.1                           13.9   10.83 )-----------( 322      ( 12 Nov 2020 06:15 )             43.7       11-12    139  |  105  |  22  ----------------------------<  141<H>  4.0   |  24  |  0.69    Ca    9.1      12 Nov 2020 06:15    TPro  6.7  /  Alb  3.0<L>  /  TBili  0.8  /  DBili  x   /  AST  18  /  ALT  22  /  AlkPhos  101  11-11

## 2020-11-13 NOTE — CONSULT NOTE ADULT - PROBLEM SELECTOR RECOMMENDATION 3
s/p esophagogatrectomy, chemo + RT in 5/2018.  - now imaging concerning for new metastatic disease  - seen by heme/onc at [facility]  - will need outpatient PET

## 2020-11-13 NOTE — CONSULT NOTE ADULT - PROBLEM SELECTOR RECOMMENDATION 7
DVT ppx: start lovenox when clear from neurosurgery standpoint  GI ppx: pantoprazole while on steroids

## 2020-11-13 NOTE — CONSULT NOTE ADULT - PROBLEM SELECTOR RECOMMENDATION 4
not on home O2. takes symbicort 1-2 puff daily and PRN. (had reaction to albuterol in ICU before, thus does not have rescue inhaler)  - mild scant end expiratory wheeze at lower lung fields today  - ordered symbicort 2 puffs BID

## 2020-11-13 NOTE — CONSULT NOTE ADULT - SUBJECTIVE AND OBJECTIVE BOX
[facility] Division of Hospital Medicine  Yolis Amaya MD  Pager (IBAN, 4S-8Y): [phone]  Other Times:  [phone]    HPI:  64 yo male active smoker with PMH of esophageal ca s/p esophagogatrectomy, chemo + RT in 5/2018, cholelithiasis s/p cholecystectomy 8/2018, COPD not on home O2 who presented to [facility] on 11/9/20 with R rib pain. States 1 week ago, started to develop R anterolateral rib pain with a "tightening" sensation around his upper chest/back that he describes as "belt he can't loosen". Denies any numbness, tingling nor weakness. Imaging at [facility] concerning for metastatic disease to thoracic spine with possible cord compression at T5-T6 transferred to [facility] for possible radiation. Subsequently transferred to [facility] for planned decompression and tumor resection with neurosurgery on 11/14/20. Medicine consulted for pre-op clearance.    Interval history: patient seen and examined bedside. as persisting "belt-like" sensation wrapping around this thoracic spine but overall improved. no fevers, chills, chest pain, SOB, cough, abd pain, n/v/d/c nor dysuria. as relatively good exercise tolerance per patient. able to walk 10 blocks prior to getting mild SOB. no issues ambulating up 2 flights of stairs. still smoking, down to 4-5 cigarettes/day.    PAST MEDICAL & SURGICAL HISTORY:  Chronic obstructive pulmonary disease  Malignant neoplasm of esophagus, unspecified  History of cholecystectomy  History of bilateral inguinal hernia repair    Review of Systems:   CONSTITUTIONAL: No fever, chills  HEENT: No sore throat, vision changes  RESPIRATORY: No cough, wheezing, shortness of breath  CARDIOVASCULAR: No chest pain, palpitations, leg edema  GASTROINTESTINAL: No abdominal pain, nausea, vomiting, diarrhea or constipation. No melena or hematochezia.  GENITOURINARY: No dysuria, frequency, hematuria  NEUROLOGICAL: No headaches, memory loss, loss of strength, numbness, or tremors  SKIN: No itching, burning, rashes, or lesions   MUSCULOSKELETAL: No joint pain or swelling; +tight/wrapping belt-like sensation around thoracic spine  PSYCHIATRIC: No depression, anxiety  HEME/LYMPH: No easy bruising, or bleeding gums      Allergies  albuterol (Other (Mild)) - rash?    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 1000 milliLiter(s) (50 mL/Hr) IV Continuous <Continuous>  dextrose 5%. 1000 milliLiter(s) (100 mL/Hr) IV Continuous <Continuous>  dextrose 50% Injectable 25 Gram(s) IV Push once  dextrose 50% Injectable 12.5 Gram(s) IV Push once  dextrose 50% Injectable 25 Gram(s) IV Push once  gabapentin 300 milliGRAM(s) Oral three times a day  glucagon  Injectable 1 milliGRAM(s) IntraMuscular once  influenza   Vaccine 0.5 milliLiter(s) IntraMuscular once  insulin lispro (ADMELOG) corrective regimen sliding scale   SubCutaneous three times a day before meals  insulin lispro (ADMELOG) corrective regimen sliding scale   SubCutaneous at bedtime  pantoprazole    Tablet 40 milliGRAM(s) Oral before breakfast  polyethylene glycol 3350 17 Gram(s) Oral two times a day  senna 2 Tablet(s) Oral at bedtime  sodium chloride 0.9%. 1000 milliLiter(s) (75 mL/Hr) IV Continuous <Continuous>    MEDICATIONS  (PRN):  acetaminophen   Tablet .. 650 milliGRAM(s) Oral every 6 hours PRN Temp greater or equal to 38C (100.4F), Mild Pain (1 - 3)  ondansetron Injectable 4 milliGRAM(s) IV Push once PRN Nausea and/or Vomiting  oxyCODONE    IR 5 milliGRAM(s) Oral every 4 hours PRN Moderate Pain (4 - 6)  oxyCODONE    IR 10 milliGRAM(s) Oral every 4 hours PRN Severe Pain (7 - 10)        CAPILLARY BLOOD GLUCOSE      POCT Blood Glucose.: 144 mg/dL (13 Nov 2020 12:57)    I&O's Summary      Physical Exam:  Vital Signs Last 24 Hrs  T(C): 36.9 (13 Nov 2020 12:46), Max: 36.9 (13 Nov 2020 12:46)  T(F): 98.4 (13 Nov 2020 12:46), Max: 98.4 (13 Nov 2020 12:46)  HR: 82 (13 Nov 2020 12:46) (72 - 99)  BP: 129/75 (13 Nov 2020 12:46) (113/74 - 133/89)  BP(mean): --  RR: 18 (13 Nov 2020 12:46) (18 - 19)  SpO2: 99% (13 Nov 2020 12:46) (98% - 100%)    CONSTITUTIONAL: NAD, well-developed, well-groomed  EYES: PERRLA; conjunctiva and sclera clear  ENMT: Moist oral mucosa, no pharyngeal injection or exudates; normal dentition  NECK: Supple, no palpable masses; no thyromegaly  RESPIRATORY: Normal respiratory effort; lungs are clear to auscultation bilaterally with exception of scant expiratory wheeze in lung bases b/l  CARDIOVASCULAR: Regular rate and rhythm, normal S1 and S2, no murmur/rub/gallop; No lower extremity edema; Peripheral pulses are 2+ bilaterally  ABDOMEN: Soft, Nondistended,  Nontender to palpation, normoactive bowel sounds, +well-healed vertical incision from prior surgery  MUSCULOSKELETAL:  No clubbing or cyanosis of digits; no joint swelling or tenderness to palpation  PSYCH: A+O to person, place, and time; affect appropriate  NEUROLOGY: CN 2-12 are intact and symmetric; no gross sensory deficits, 5/5 strength throughout  SKIN: +well-healed vertical incision from prior surgery on abd    LABS:                        13.3   12.71 )-----------( 325      ( 13 Nov 2020 05:45 )             42.1     11-13    135  |  102  |  26<H>  ----------------------------<  129<H>  4.3   |  25  |  0.73    Ca    9.1      13 Nov 2020 05:45      PT/INR - ( 13 Nov 2020 11:16 )   PT: 11.9 sec;   INR: 0.99 ratio         PTT - ( 13 Nov 2020 11:16 )  PTT:25.8 sec      RADIOLOGY & ADDITIONAL TESTS:  Results Reviewed: mild leukocytosis likely 2/2 to steroid use, Hb stable, Cr stable, FS at goal  Imaging Personally Reviewed:  11/9/20 CTA Chest:  IMPRESSION:    No pulmonary embolus.    New enlarged prevertebral/paraesophageal lymph nodes suspicious for metastatic disease.    Numerous 5 mm and smaller lung nodules also suspicious for metastatic disease.    11/12/20 MRI Thoracic and Lumbar Spine with contrast:  IMPRESSION:    -Metastatic disease involving the thoracic spine from T5 through T11 with epidural involvement spanning T4-T8 levels with severe spinal canal stenosis at T6 level concerning for cord compression. Correlate with clinical symptomatology.    -Compression fractures again noted at T7, T9, and T10 without resultant spinal canal stenosis at these levels.    -Additional lesions suspicious for metastasis involving the prevertebral/para-aortic soft tissues as well as the right posterior mediastinum, as described on CT chest from 11/9/2020.  Electrocardiogram Personally Reviewed: 11/9/20 EKG reviewed: NSR with PACs    COORDINATION OF CARE:  Care Discussed with Consultants/Other Providers [Y]: neurosurgery resident [place]  Prior or Outpatient Records Reviewed [Y/N]:

## 2020-11-13 NOTE — CONSULT NOTE ADULT - PROBLEM SELECTOR RECOMMENDATION 9
MRI spine with metastatic disease involving thoracic spine from T5-T11 with epidural involvement spanning T4-T8 with severe spinal canal stenosis at T6 concerning for cord compression.   - plan for decompression and tumor resection tomorrow 11/14/20  - c/w dexamethasone 4mg q6h  - monitor FS, thus far looks well controlled  - pantoprazole for GI ppx

## 2020-11-13 NOTE — CONSULT NOTE ADULT - PROBLEM SELECTOR RECOMMENDATION 5
spoked 1PPD x 30 years. now cut back ot 4-5 cigarettes/day since 2018.  - counselled on smoking cessation for 12 minutes, but patient not ready to quit at this time  - nicotine patch offered, patient declined

## 2020-11-13 NOTE — CONSULT NOTE ADULT - PROBLEM SELECTOR RECOMMENDATION 2
patient with METS>4. no active chest pain nor dyspnea on exertion. EKG reviewed: NSR with PACs. RCRI risk is 0 pts, class I risk = 3.9% 30-day risk of death, MI or cardiac arrest. overall, patient deemed low risk for planned procedure. no further cardiac testing at this time. no contraindication from medicine standpoint to proceed with surgery.

## 2020-11-13 NOTE — CONSULT NOTE ADULT - ASSESSMENT
64 yo male active smoker with PMH of esophageal ca s/p esophagogatrectomy, chemo + RT in 5/2018, cholelithiasis s/p cholecystectomy 8/2018, COPD not on home O2 who presented to [facility] on 11/9/20 with R rib pain found to have metastatic disease to thoracic spine initially transferred to [facility] for radiation subsequently transferred to [facility] on 11/13 for neurosurgical intervention. MRI spine with metastatic disease involving thoracic spine from T5-T11 with epidural involvement spanning T4-T8 with severe spinal canal stenosis at T6 concerning for cord compression. Plan for decompression and tumor resection tomorrow 11/14/20.     PCP: Dr. Valero (sp?) Sure  Oncologist: Dr. Briones

## 2020-11-13 NOTE — CONSULT NOTE ADULT - ASSESSMENT
Pre-Operative Cardiac Risk Stratification and Optimization   Based on current ACC/AHA guidelines, patient history and physical exam, the patient is considered to have low risk  unremarkable EKG  no active CV symptoms   no CB objection to proceed to this urgent surgery     COPD  nebs  monitor O2sat     Esophageal ca  s/p esophagectomy   fu with heme onc     Cigarette use   counseling given   pt does not want nicotine patch     DVT proph  lovenox post op once deemed safe

## 2020-11-13 NOTE — PROGRESS NOTE ADULT - ASSESSMENT
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  He presented with pain around the rib for 1 week.  No sob, fever, chills.    Malignant neoplasm of esophagus, unspecified. Recommendation: he had good response to chemoRT   now there are some enlarged mediastinal lymph nodes and pulmonary nodules  ?recurrence, will need to do a PET scan as outpt.    Chest wall pain -- now resolved    MRI showed likely cord compression     MRI T spine noncontrast: Abnormal epidural soft tissue at the T4-T8 levels suggesting epidural tumor. This results in multilevel spinal canal narrowing, most severe at the T5 and T6 levels where there is cord deformity suggesting cord compression.  Compression deformities of the T7, T9, and T10 vertebral bodies without significant bony retropulsion; tumor involvement cannot be excluded. T1 hypointense, STIR hyperintense lesions in the T5 vertebral body suggesting metastases.    MRI with contrast   -Metastatic disease involving the thoracic spine from T5 through T11 with epidural involvement spanning T4-T8 levels with severe spinal canal stenosis at T6 level concerning for cord compression.  -Compression fractures again noted at T7, T9, and T10 without resultant spinal canal stenosis at these levels.  -Additional lesions suspicious for metastasis involving the prevertebral/para-aortic soft tissues as well as the right posterior mediastinum, as described on CT chest from 11/9/2020.      pt not candidate for radiation based on prior radiation treatment   cont decadron 4mg q6h  pt transferred to [facility] for surgical decompression T spine     will follow     Neftaly Tejeda MD  [facility]  [phone]  cell: [phone]

## 2020-11-13 NOTE — CONSULT NOTE ADULT - SUBJECTIVE AND OBJECTIVE BOX
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 MRI spine with metastatic disease involving thoracic spine from T5-T11 with epidural involvement spanning T4-T8 with severe spinal canal stenosis at T6 concerning for cord compression. Plan for decompression and tumor resection tomorrow 11/14/20.   denies any chest pain, sob, palpitation, dizziness or syncope.     PAST MEDICAL & SURGICAL HISTORY:  Chronic obstructive pulmonary disease    Malignant neoplasm of esophagus, unspecified    History of cholecystectomy    History of bilateral inguinal hernia repair            MEDICATIONS:      budesonide 160 MICROgram(s)/formoterol 4.5 MICROgram(s) Inhaler 2 Puff(s) Inhalation two times a day    acetaminophen   Tablet .. 650 milliGRAM(s) Oral every 6 hours PRN  gabapentin 300 milliGRAM(s) Oral three times a day  ondansetron Injectable 4 milliGRAM(s) IV Push once PRN  oxyCODONE    IR 5 milliGRAM(s) Oral every 4 hours PRN  oxyCODONE    IR 10 milliGRAM(s) Oral every 4 hours PRN    pantoprazole    Tablet 40 milliGRAM(s) Oral before breakfast  polyethylene glycol 3350 17 Gram(s) Oral two times a day  senna 2 Tablet(s) Oral at bedtime    dexAMETHasone  Injectable 4 milliGRAM(s) IV Push every 6 hours  dextrose 40% Gel 15 Gram(s) Oral once  dextrose 50% Injectable 25 Gram(s) IV Push once  dextrose 50% Injectable 12.5 Gram(s) IV Push once  dextrose 50% Injectable 25 Gram(s) IV Push once  glucagon  Injectable 1 milliGRAM(s) IntraMuscular once  insulin lispro (ADMELOG) corrective regimen sliding scale   SubCutaneous three times a day before meals  insulin lispro (ADMELOG) corrective regimen sliding scale   SubCutaneous at bedtime    dextrose 5%. 1000 milliLiter(s) IV Continuous <Continuous>  dextrose 5%. 1000 milliLiter(s) IV Continuous <Continuous>  influenza   Vaccine 0.5 milliLiter(s) IntraMuscular once  sodium chloride 0.9%. 1000 milliLiter(s) IV Continuous <Continuous>      FAMILY HISTORY:  Family history of esophageal cancer        Non-contributory    SOCIAL HISTORY:    pos tobacco     Allergies    albuterol (Other (Mild))    Intolerances    	    REVIEW OF SYSTEMS:  as above  The rest of the 14 points ROS reviewed and except above they are unremarkable.        PHYSICAL EXAM:  T(C): 36.9 (11-13-20 @ 12:46), Max: 36.9 (11-13-20 @ 12:46)  HR: 82 (11-13-20 @ 12:46) (72 - 99)  BP: 129/75 (11-13-20 @ 12:46) (113/74 - 133/89)  RR: 18 (11-13-20 @ 12:46) (18 - 19)  SpO2: 99% (11-13-20 @ 12:46) (98% - 100%)  Wt(kg): --  I&O's Summary    JVP: Normal  Neck: supple  Lung: clear   CV: S1 S2 , Murmur:  Abd: soft  Ext: No edema  neuro: Awake / alert  Psych: flat affect  Skin: normal      LABS/DATA:    TELEMETRY: 	    ECG:  	e< from: 12 Lead ECG (11.09.20 @ 13:35) >    Ventricular Rate 79 BPM    Atrial Rate 79 BPM    P-R Interval 130 ms    QRS Duration 80 ms    Q-T Interval 380 ms    QTC Calculation(Bazett) 435 ms    P Axis 54 degrees    R Axis 59 degrees    T Axis 42 degrees    Diagnosis Line Sinus rhythm with Premature atrial complexes  Otherwise normal ECG    Confirmed by PARI LYON, [facility] ([id number]) on 11/10/2020 11:06:54 AM    < end of copied text >     	  CARDIAC MARKERS:      < from: Xray Chest 2 Views PA/Lat (11.13.20 @ 10:26) >    EXAM:  XR CHEST PA LAT 2V                            PROCEDURE DATE:  11/13/2020            INTERPRETATION:  EXAMINATION: XR CHEST PA AND LATERAL    CLINICAL INDICATION: screening preop    TECHNIQUE: 2 views; Frontal and lateral views of the chest were obtained.    COMPARISON: Chest x-ray 8/27/2018.    FINDINGS:  The heart is normal in size.  The lungs are clear.  There is no pneumothorax or pleural effusion.  Right 6th  rib fracture. Hiatal hernia is noted.    IMPRESSION:  Clear lungs.              ORVILLE ZUÑIGA MD; Resident Interventional Radiology  This document has been electronically signed.  MARISSA VALENZUELA MD; Attending Radiologist  This document has been electronically signed. Nov 13 2020 11:47AM    < end of copied text >  < from: MR Lumbar Spine w/ IV Cont (11.12.20 @ 14:20) >  EXAM:  MR SPINE THORACIC IC      EXAM:  MR SPINE LUMBAR IC        PROCEDURE DATE:  Nov 12 2020         INTERPRETATION:  CLINICAL INDICATION: Spinal metastases from esophageal cancer. Status post chemotherapy and radiation 2018.    TECHNIQUE: Multi-planar multi-sequential MR imaging of the thoracic spine was performed before and after the intravenous administration of contrast, according to standard protocol. 7 ml of Gadavist IV contrast was administered.    COMPARISON: MRI thoracic spine without IV contrast 11/10/2020.    FINDINGS:  There is abnormal enhancement involving the T5-T11 vertebral levels as well as abnormal epidural enhancement spanning T4-T8 levels. These correspond with abnormal soft tissue seen on the comparison MRI compatible with metastatic disease.  Again noted moderate or severe compression fractures involving T7, T9, and T10.    At the T6 level, concentric epidural soft tissue results in severe thecal sac stenosis with deformity of the cord.    At T5 level, right epidural tissue deforms the cord without complete CSF effacement.    At T7 and T8 levels, right epidural tissue indents the thecal sac without spinal canal stenosis.    Also again noted are enhancing lesions within the prevertebral soft tissues spanning T4-T8 levels measuring 11.3 x 1.5 cm, adjacent to the descending aorta (10-10). There is prominence of the thoracic duct inferior to the level of this para-aortic lesion, concerning for obstruction.    Additional lesion in the posterior right mediastinum measuring up to 3.1 cm is again noted, (11-17).    Sequelae of esophagectomy and gastric pull-through again noted.    IMPRESSION:    -Metastatic disease involving the thoracic spine from T5 through T11 with epidural involvement spanning T4-T8 levels with severe spinal canal stenosis at T6 level concerning for cord compression. Correlate with clinical symptomatology.    -Compression fractures again noted at T7, T9, and T10 without resultant spinal canal stenosis at these levels.    -Additional lesions suspicious for metastasis involving the prevertebral/para-aortic soft tissues as well as the right posterior mediastinum, as described on CT chest from 11/9/2020.              CHAITANYA REYES M.D., ATTENDING RADIOLOGIST  This document has beenelectronically signed. Nov 12 2020  3:07PM    < end of copied text >                                  13.3   12.71 )-----------( 325      ( 13 Nov 2020 05:45 )             42.1     11-13    135  |  102  |  26<H>  ----------------------------<  129<H>  4.3   |  25  |  0.73    Ca    9.1      13 Nov 2020 05:45      proBNP:   Lipid Profile:   HgA1c:   TSH:

## 2020-11-14 ENCOUNTER — APPOINTMENT (OUTPATIENT)
Dept: SPINE | Facility: HOSPITAL | Age: 65
End: 2020-11-14
Payer: COMMERCIAL

## 2020-11-14 ENCOUNTER — RESULT REVIEW (OUTPATIENT)
Age: 65
End: 2020-11-14

## 2020-11-14 LAB
ANION GAP SERPL CALC-SCNC: 13 MMOL/L — SIGNIFICANT CHANGE UP (ref 5–17)
BUN SERPL-MCNC: 16 MG/DL — SIGNIFICANT CHANGE UP (ref 7–23)
CALCIUM SERPL-MCNC: 8.7 MG/DL — SIGNIFICANT CHANGE UP (ref 8.4–10.5)
CHLORIDE SERPL-SCNC: 112 MMOL/L — HIGH (ref 96–108)
CO2 SERPL-SCNC: 18 MMOL/L — LOW (ref 22–31)
CREAT SERPL-MCNC: 0.59 MG/DL — SIGNIFICANT CHANGE UP (ref 0.5–1.3)
GAS PNL BLDA: SIGNIFICANT CHANGE UP
GLUCOSE BLDC GLUCOMTR-MCNC: 130 MG/DL — HIGH (ref 70–99)
GLUCOSE BLDC GLUCOMTR-MCNC: 159 MG/DL — HIGH (ref 70–99)
GLUCOSE SERPL-MCNC: 177 MG/DL — HIGH (ref 70–99)
HCT VFR BLD CALC: 32.2 % — LOW (ref 39–50)
HGB BLD-MCNC: 10.4 G/DL — LOW (ref 13–17)
MCHC RBC-ENTMCNC: 29.8 PG — SIGNIFICANT CHANGE UP (ref 27–34)
MCHC RBC-ENTMCNC: 32.3 GM/DL — SIGNIFICANT CHANGE UP (ref 32–36)
MCV RBC AUTO: 92.3 FL — SIGNIFICANT CHANGE UP (ref 80–100)
MRSA PCR RESULT.: SIGNIFICANT CHANGE UP
NRBC # BLD: 0 /100 WBCS — SIGNIFICANT CHANGE UP (ref 0–0)
PLATELET # BLD AUTO: 294 K/UL — SIGNIFICANT CHANGE UP (ref 150–400)
POTASSIUM SERPL-MCNC: 4.3 MMOL/L — SIGNIFICANT CHANGE UP (ref 3.5–5.3)
POTASSIUM SERPL-SCNC: 4.3 MMOL/L — SIGNIFICANT CHANGE UP (ref 3.5–5.3)
RBC # BLD: 3.49 M/UL — LOW (ref 4.2–5.8)
RBC # FLD: 13.7 % — SIGNIFICANT CHANGE UP (ref 10.3–14.5)
S AUREUS DNA NOSE QL NAA+PROBE: SIGNIFICANT CHANGE UP
SODIUM SERPL-SCNC: 143 MMOL/L — SIGNIFICANT CHANGE UP (ref 135–145)
WBC # BLD: 18.4 K/UL — HIGH (ref 3.8–10.5)
WBC # FLD AUTO: 18.4 K/UL — HIGH (ref 3.8–10.5)

## 2020-11-14 PROCEDURE — 99291 CRITICAL CARE FIRST HOUR: CPT

## 2020-11-14 PROCEDURE — 63276 BX/EXC XDRL SPINE LESN THRC: CPT

## 2020-11-14 PROCEDURE — 88307 TISSUE EXAM BY PATHOLOGIST: CPT | Mod: 26

## 2020-11-14 PROCEDURE — 63055 DECOMPRESS SPINAL CORD THRC: CPT

## 2020-11-14 PROCEDURE — 88360 TUMOR IMMUNOHISTOCHEM/MANUAL: CPT | Mod: 26

## 2020-11-14 PROCEDURE — 22610 ARTHRD PST TQ 1NTRSPC THRC: CPT

## 2020-11-14 PROCEDURE — 22614 ARTHRD PST TQ 1NTRSPC EA ADD: CPT

## 2020-11-14 PROCEDURE — 22843 INSERT SPINE FIXATION DEVICE: CPT

## 2020-11-14 RX ORDER — ONDANSETRON 8 MG/1
4 TABLET, FILM COATED ORAL ONCE
Refills: 0 | Status: DISCONTINUED | OUTPATIENT
Start: 2020-11-14 | End: 2020-11-19

## 2020-11-14 RX ORDER — ACETAMINOPHEN 500 MG
650 TABLET ORAL EVERY 6 HOURS
Refills: 0 | Status: DISCONTINUED | OUTPATIENT
Start: 2020-11-14 | End: 2020-11-19

## 2020-11-14 RX ORDER — SENNA PLUS 8.6 MG/1
2 TABLET ORAL AT BEDTIME
Refills: 0 | Status: DISCONTINUED | OUTPATIENT
Start: 2020-11-14 | End: 2020-11-19

## 2020-11-14 RX ORDER — POLYETHYLENE GLYCOL 3350 17 G/17G
17 POWDER, FOR SOLUTION ORAL
Refills: 0 | Status: DISCONTINUED | OUTPATIENT
Start: 2020-11-14 | End: 2020-11-19

## 2020-11-14 RX ORDER — INFLUENZA VIRUS VACCINE 15; 15; 15; 15 UG/.5ML; UG/.5ML; UG/.5ML; UG/.5ML
0.5 SUSPENSION INTRAMUSCULAR ONCE
Refills: 0 | Status: COMPLETED | OUTPATIENT
Start: 2020-11-14 | End: 2020-11-18

## 2020-11-14 RX ORDER — SODIUM CHLORIDE 9 MG/ML
1000 INJECTION INTRAMUSCULAR; INTRAVENOUS; SUBCUTANEOUS
Refills: 0 | Status: DISCONTINUED | OUTPATIENT
Start: 2020-11-14 | End: 2020-11-17

## 2020-11-14 RX ORDER — PANTOPRAZOLE SODIUM 20 MG/1
40 TABLET, DELAYED RELEASE ORAL
Refills: 0 | Status: DISCONTINUED | OUTPATIENT
Start: 2020-11-14 | End: 2020-11-19

## 2020-11-14 RX ORDER — OXYCODONE HYDROCHLORIDE 5 MG/1
10 TABLET ORAL EVERY 4 HOURS
Refills: 0 | Status: DISCONTINUED | OUTPATIENT
Start: 2020-11-14 | End: 2020-11-19

## 2020-11-14 RX ORDER — INSULIN LISPRO 100/ML
VIAL (ML) SUBCUTANEOUS AT BEDTIME
Refills: 0 | Status: DISCONTINUED | OUTPATIENT
Start: 2020-11-14 | End: 2020-11-18

## 2020-11-14 RX ORDER — SODIUM CHLORIDE 9 MG/ML
1000 INJECTION INTRAMUSCULAR; INTRAVENOUS; SUBCUTANEOUS ONCE
Refills: 0 | Status: COMPLETED | OUTPATIENT
Start: 2020-11-14 | End: 2020-11-14

## 2020-11-14 RX ORDER — CEFAZOLIN SODIUM 1 G
2000 VIAL (EA) INJECTION EVERY 8 HOURS
Refills: 0 | Status: COMPLETED | OUTPATIENT
Start: 2020-11-14 | End: 2020-11-15

## 2020-11-14 RX ORDER — DEXAMETHASONE 0.5 MG/5ML
4 ELIXIR ORAL EVERY 6 HOURS
Refills: 0 | Status: DISCONTINUED | OUTPATIENT
Start: 2020-11-14 | End: 2020-11-16

## 2020-11-14 RX ORDER — INSULIN LISPRO 100/ML
VIAL (ML) SUBCUTANEOUS
Refills: 0 | Status: DISCONTINUED | OUTPATIENT
Start: 2020-11-14 | End: 2020-11-18

## 2020-11-14 RX ORDER — ONDANSETRON 8 MG/1
4 TABLET, FILM COATED ORAL EVERY 6 HOURS
Refills: 0 | Status: DISCONTINUED | OUTPATIENT
Start: 2020-11-14 | End: 2020-11-19

## 2020-11-14 RX ORDER — CALCIUM CHLORIDE
100 POWDER (GRAM) MISCELLANEOUS ONCE
Refills: 0 | Status: COMPLETED | OUTPATIENT
Start: 2020-11-14 | End: 2020-11-14

## 2020-11-14 RX ORDER — SODIUM CHLORIDE 9 MG/ML
1000 INJECTION INTRAMUSCULAR; INTRAVENOUS; SUBCUTANEOUS
Refills: 0 | Status: DISCONTINUED | OUTPATIENT
Start: 2020-11-14 | End: 2020-11-14

## 2020-11-14 RX ORDER — DEXTROSE MONOHYDRATE, SODIUM CHLORIDE, AND POTASSIUM CHLORIDE 50; .745; 4.5 G/1000ML; G/1000ML; G/1000ML
1000 INJECTION, SOLUTION INTRAVENOUS
Refills: 0 | Status: DISCONTINUED | OUTPATIENT
Start: 2020-11-14 | End: 2020-11-14

## 2020-11-14 RX ORDER — PHENYLEPHRINE HYDROCHLORIDE 10 MG/ML
0.5 INJECTION INTRAVENOUS
Qty: 40 | Refills: 0 | Status: DISCONTINUED | OUTPATIENT
Start: 2020-11-14 | End: 2020-11-14

## 2020-11-14 RX ORDER — GABAPENTIN 400 MG/1
300 CAPSULE ORAL THREE TIMES A DAY
Refills: 0 | Status: DISCONTINUED | OUTPATIENT
Start: 2020-11-14 | End: 2020-11-19

## 2020-11-14 RX ORDER — OXYCODONE HYDROCHLORIDE 5 MG/1
5 TABLET ORAL EVERY 4 HOURS
Refills: 0 | Status: DISCONTINUED | OUTPATIENT
Start: 2020-11-14 | End: 2020-11-19

## 2020-11-14 RX ORDER — BUDESONIDE AND FORMOTEROL FUMARATE DIHYDRATE 160; 4.5 UG/1; UG/1
2 AEROSOL RESPIRATORY (INHALATION)
Refills: 0 | Status: DISCONTINUED | OUTPATIENT
Start: 2020-11-14 | End: 2020-11-19

## 2020-11-14 RX ORDER — ENOXAPARIN SODIUM 100 MG/ML
40 INJECTION SUBCUTANEOUS DAILY
Refills: 0 | Status: DISCONTINUED | OUTPATIENT
Start: 2020-11-15 | End: 2020-11-19

## 2020-11-14 RX ADMIN — GABAPENTIN 300 MILLIGRAM(S): 400 CAPSULE ORAL at 05:17

## 2020-11-14 RX ADMIN — SODIUM CHLORIDE 75 MILLILITER(S): 9 INJECTION INTRAMUSCULAR; INTRAVENOUS; SUBCUTANEOUS at 00:02

## 2020-11-14 RX ADMIN — SODIUM CHLORIDE 75 MILLILITER(S): 9 INJECTION INTRAMUSCULAR; INTRAVENOUS; SUBCUTANEOUS at 21:12

## 2020-11-14 RX ADMIN — Medication 100 MILLIGRAM(S): at 21:12

## 2020-11-14 RX ADMIN — BUDESONIDE AND FORMOTEROL FUMARATE DIHYDRATE 2 PUFF(S): 160; 4.5 AEROSOL RESPIRATORY (INHALATION) at 19:30

## 2020-11-14 RX ADMIN — OXYCODONE HYDROCHLORIDE 5 MILLIGRAM(S): 5 TABLET ORAL at 05:37

## 2020-11-14 RX ADMIN — SODIUM CHLORIDE 1000 MILLILITER(S): 9 INJECTION INTRAMUSCULAR; INTRAVENOUS; SUBCUTANEOUS at 17:56

## 2020-11-14 RX ADMIN — GABAPENTIN 300 MILLIGRAM(S): 400 CAPSULE ORAL at 21:12

## 2020-11-14 RX ADMIN — Medication 100 MILLIGRAM(S): at 17:45

## 2020-11-14 RX ADMIN — Medication 4 MILLIGRAM(S): at 00:01

## 2020-11-14 RX ADMIN — Medication 4 MILLIGRAM(S): at 19:30

## 2020-11-14 RX ADMIN — PANTOPRAZOLE SODIUM 40 MILLIGRAM(S): 20 TABLET, DELAYED RELEASE ORAL at 05:09

## 2020-11-14 RX ADMIN — OXYCODONE HYDROCHLORIDE 5 MILLIGRAM(S): 5 TABLET ORAL at 05:07

## 2020-11-14 RX ADMIN — BUDESONIDE AND FORMOTEROL FUMARATE DIHYDRATE 2 PUFF(S): 160; 4.5 AEROSOL RESPIRATORY (INHALATION) at 05:07

## 2020-11-14 RX ADMIN — PHENYLEPHRINE HYDROCHLORIDE 12.8 MICROGRAM(S)/KG/MIN: 10 INJECTION INTRAVENOUS at 18:29

## 2020-11-14 RX ADMIN — Medication 4 MILLIGRAM(S): at 05:09

## 2020-11-14 RX ADMIN — SODIUM CHLORIDE 1000 MILLILITER(S): 9 INJECTION INTRAMUSCULAR; INTRAVENOUS; SUBCUTANEOUS at 18:30

## 2020-11-14 NOTE — PROGRESS NOTE ADULT - SUBJECTIVE AND OBJECTIVE BOX
Subjective: Patient seen and examined. No new events except as noted.     SUBJECTIVE/ROS:  No chest pain, dyspnea, palpitation, or dizziness.       MEDICATIONS:  MEDICATIONS  (STANDING):  budesonide 160 MICROgram(s)/formoterol 4.5 MICROgram(s) Inhaler 2 Puff(s) Inhalation two times a day  dexAMETHasone  Injectable 4 milliGRAM(s) IV Push every 6 hours  dextrose 40% Gel 15 Gram(s) Oral once  dextrose 5%. 1000 milliLiter(s) (50 mL/Hr) IV Continuous <Continuous>  dextrose 5%. 1000 milliLiter(s) (100 mL/Hr) IV Continuous <Continuous>  dextrose 50% Injectable 25 Gram(s) IV Push once  dextrose 50% Injectable 12.5 Gram(s) IV Push once  dextrose 50% Injectable 25 Gram(s) IV Push once  gabapentin 300 milliGRAM(s) Oral three times a day  glucagon  Injectable 1 milliGRAM(s) IntraMuscular once  influenza   Vaccine 0.5 milliLiter(s) IntraMuscular once  insulin lispro (ADMELOG) corrective regimen sliding scale   SubCutaneous three times a day before meals  insulin lispro (ADMELOG) corrective regimen sliding scale   SubCutaneous at bedtime  pantoprazole    Tablet 40 milliGRAM(s) Oral before breakfast  polyethylene glycol 3350 17 Gram(s) Oral two times a day  senna 2 Tablet(s) Oral at bedtime  sodium chloride 0.9%. 1000 milliLiter(s) (75 mL/Hr) IV Continuous <Continuous>      PHYSICAL EXAM:  T(C): 36.5 (11-14-20 @ 05:04), Max: 36.9 (11-13-20 @ 12:46)  HR: 78 (11-14-20 @ 05:04) (74 - 85)  BP: 120/85 (11-14-20 @ 05:04) (116/76 - 133/89)  RR: 16 (11-14-20 @ 05:04) (16 - 18)  SpO2: 99% (11-14-20 @ 05:04) (95% - 100%)  Wt(kg): --  I&O's Summary    13 Nov 2020 07:01  -  14 Nov 2020 06:11  --------------------------------------------------------  IN: 525 mL / OUT: 0 mL / NET: 525 mL            JVP: Normal  Neck: supple  Lung: clear   CV: S1 S2 , Murmur:  Abd: soft  Ext: No edema  neuro: Awake / alert  Psych: flat affect  Skin: normal``    LABS/DATA:    CARDIAC MARKERS:                                13.3   12.71 )-----------( 325      ( 13 Nov 2020 05:45 )             42.1     11-13    135  |  102  |  26<H>  ----------------------------<  129<H>  4.3   |  25  |  0.73    Ca    9.1      13 Nov 2020 05:45      proBNP:   Lipid Profile:   HgA1c:   TSH:     TELE:  EKG:

## 2020-11-14 NOTE — PROGRESS NOTE ADULT - ASSESSMENT
ASSESSMENT/PLAN: thoracic metastatic disease with cord compression status post spinal angio/embo and T4-6 laminectomy/T3-12 posterior fusion with plastics closure    NEURO:  Monitor drain output  Pain control  Steroids for cord edema per NSGY  CT T spine  Activity: [x] mobilize as tolerated [] Bedrest [] PT [] OT [] PMNR    PULM:  Incentive spirometry    CV:  MAP >65mmhg  Wean off pressors    RENAL:  Fluids: IVF    GI:  Diet: Dysphagia screen and advance as tolerated  Bowel regimen     ENDO:   Goal euglycemia (-180)    HEME/ONC:  VTE prophylaxis: [x] SCDs [] chemoprophylaxis [x] hold chemoprophylaxis due to: fresh post-op [x] high risk of DVT/PE on admission due to: malignancy     ID:  Lena-op antibiotics     SOCIAL/FAMILY:  [x] awaiting [] updated at bedside [] family meeting    CODE STATUS:  [x] Full Code [] DNR [] DNI [] Palliative/Comfort Care    DISPOSITION:  [x] ICU [] Stroke Unit [] Floor [] EMU [] RCU [] PCU    [x] Patient is at high risk of neurologic deterioration/death due to: acute blood loss anemia and hypotension, cord compression     Contact: [phone] ASSESSMENT/PLAN: thoracic metastatic disease with cord compression status post spinal angio/embo and T4-6 laminectomy/T3-12 posterior fusion with plastics closure    NEURO:  Monitor drain output  Pain control  Steroids for cord edema per NSGY  CT T spine  Activity: [x] mobilize as tolerated [] Bedrest [] PT [] OT [] PMNR    PULM:  Incentive spirometry  Smoking cessation education  Offer nicotine patch     CV:  MAP >65mmhg  Wean off pressors    RENAL:  Fluids: IVF    GI:  Diet: Dysphagia screen and advance as tolerated  Bowel regimen     ENDO:   Goal euglycemia (-180)    HEME/ONC:  VTE prophylaxis: [x] SCDs [] chemoprophylaxis [x] hold chemoprophylaxis due to: fresh post-op [x] high risk of DVT/PE on admission due to: malignancy     ID:  Lena-op antibiotics     SOCIAL/FAMILY:  [x] awaiting [] updated at bedside [] family meeting    CODE STATUS:  [x] Full Code [] DNR [] DNI [] Palliative/Comfort Care    DISPOSITION:  [x] ICU [] Stroke Unit [] Floor [] EMU [] RCU [] PCU    [x] Patient is at high risk of neurologic deterioration/death due to: acute blood loss anemia and hypotension, cord compression     Contact: [phone]

## 2020-11-14 NOTE — CHART NOTE - NSCHARTNOTEFT_GEN_A_CORE
Interventional Neuro- Radiology   Procedure Note PA-C    Procedure: Selective Cerebral Angiography   Pre- Procedure Diagnosis: thoracic spinal tumor  Post- Procedure Diagnosis:    : Dr. Lindsay Montgomery     NP: Stella Dozier    RN: Linda    Anesthesia: Dr. Gisel Quiroga (general anesthesia)    Sheath:    I/Os:  Estimated blood loss: less than 10cc  IV fluids:     cc     Urine output     cc        Contrast Omnipaque 240      cc             Vitals: BP         HR      Spo2    %        Preliminary Report:  Using a 5 Fr short sheath to the right groin under general anesthesia via   left vertebral artery,  left common carotid artery, left external carotid artey, right vertebral arter,  right common carotid artery, right external carotid artery  a selective cerebral angiography was performed and  demonstrates ________. ( Official note to follow).  Patient tolerated procedure well, hemodynamically stable, no change in neurological status compared to baseline.  Results discussed with neurosurgery, patient and patient's  family.  Groin sheath was removed,  manual compression held to hemostasis  for  21 minutes, no active bleeding, no hematoma, quick clot and safeguard balloon dressing applied at _____h.  STAT labs:  CBC BMP  ____h.  Patient transferred to Recovery Room Interventional Neuro- Radiology   Procedure Note PA-C    Procedure: Selective Cerebral Angiography   Pre- Procedure Diagnosis: thoracic spinal tumor  Post- Procedure Diagnosis: endovascular embolization of thoracic spinal tumor R T5 RT6 and LT6 with coils and particles    : Dr. Lindsay Montgomery     NP: Stella Dozier    RN: Linda    Anesthesia: Dr. Gisel Quiroga (general anesthesia)    Sheath: 5 Kazakh short     I/Os:  Estimated blood loss: less than 10cc  IV fluids:     cc     Urine output     cc        Contrast Omnipaque 240      cc             Vitals: /67        HR 75     Spo2 100 %        Preliminary Report:  Using a 5 Fr short sheath to bilateral groin under general anesthesia via thoracic spinal arteries a selective spinal angiography was performed and  demonstrates thoracic spinal arteries supplying the tumor.  We proceeded with embolization of right thoracic 5 and 6 level with coils and particles and left thoracic 5 level with particles.  ( Official note to follow).  Patient tolerated procedure well, hemodynamically stable, neuro monitoring remains intact.  Results discussed with neurosurgery.  Groin sheath was removed,  manual compression held to hemostasis  for  21 minutes, no active bleeding, no hematoma, quick clot and safeguard balloon dressing applied at 1215h.  Patient transferred to OR

## 2020-11-14 NOTE — CHART NOTE - NSCHARTNOTEFT_GEN_A_CORE
Interventional Neuro Radiology  Pre-Procedure Note [facility]    HPI:  This is a 65M h/o esophageal cancer status post chemotherapy and radiation in 5/2018 and status post esophagogastrectomy, gallstones status post cholecystectomy on 8/17/2018, COPD, current smoker, [facility] with right rib pain and upper back pain, found to have metastatic disease to thoracic spine and possible cord compression at T5-T6. Patient presents to neuro IR for spinal angiography and embolization of thoracic spinal tumor.       Allergies: albuterol (Other (Mild))      PAST MEDICAL & SURGICAL HISTORY:  Chronic obstructive pulmonary disease    Malignant neoplasm of esophagus, unspecified    History of cholecystectomy    History of bilateral inguinal hernia repair        Social History: Unknown     FAMILY HISTORY:  Family history of esophageal cancer        Current Medications:   acetaminophen   Tablet .. 650 milliGRAM(s) Oral every 6 hours PRN  budesonide 160 MICROgram(s)/formoterol 4.5 MICROgram(s) Inhaler 2 Puff(s) Inhalation two times a day  dexAMETHasone  Injectable 4 milliGRAM(s) IV Push every 6 hours  insulin lispro (ADMELOG) corrective regimen sliding scale   SubCutaneous three times a day before meals  insulin lispro (ADMELOG) corrective regimen sliding scale   SubCutaneous at bedtime  ondansetron Injectable 4 milliGRAM(s) IV Push once PRN  oxyCODONE    IR 5 milliGRAM(s) Oral every 4 hours PRN  oxyCODONE    IR 10 milliGRAM(s) Oral every 4 hours PRN  pantoprazole    Tablet 40 milliGRAM(s) Oral before breakfast  polyethylene glycol 3350 17 Gram(s) Oral two times a day  senna 2 Tablet(s) Oral at bedtime  sodium chloride 0.9%. 1000 milliLiter(s) IV Continuous <Continuous>      Labs:                         13.3   12.71 )-----------( 325                  42.1       11-13    135  |  102  |  26<H>  ----------------------------<  129<H>  4.3   |  25  |  0.73      Blood Bank: 11-14-20  B Positive      Assessment/Plan:   This is a 65y  year old male with thoracic spinal tumor.   Patient presents to neuro-IR for selective cerebral angiography and possible embolization.   Procedure, goals, risks, benefits and alternatives  were discussed with patient and patient's family.  All questions were answered.  Risks include but are not limited to stroke, vessel injury, hemorrhage, and or right  groin hematoma.  Patient demonstrates understanding  of all risks involved with this procedure and wishes to continue.   Appropriate  consent was obtained from patient and consent is in the patient's chart.

## 2020-11-14 NOTE — CHART NOTE - NSCHARTNOTEFT_GEN_A_CORE
CAPRINI SCORE [CLOT] Score on Admission for     AGE RELATED RISK FACTORS                                                       MOBILITY RELATED FACTORS  [ ] Age 41-60 years                                            (1 Point)                  [ ] Bed rest                                                        (1 Point)  [x ] Age: 61-74 years                                           (2 Points)                 [ ] Plaster cast                                                   (2 Points)  [ ] Age= 75 years                                              (3 Points)                 [ ] Bed bound for more than 72 hours                 (2 Points)    DISEASE RELATED RISK FACTORS                                               GENDER SPECIFIC FACTORS  [ ] Edema in the lower extremities                       (1 Point)                  [ ] Pregnancy                                                     (1 Point)  [ ] Varicose veins                                               (1 Point)                  [ ] Post-partum < 6 weeks                                   (1 Point)             [ ] BMI > 25 Kg/m2                                            (1 Point)                  [ ] Hormonal therapy  or oral contraception          (1 Point)                 [ ] Sepsis (in the previous month)                        (1 Point)                  [ ] History of pregnancy complications                 (1 point)  [ ] Pneumonia or serious lung disease                                               [ ] Unexplained or recurrent                     (1 Point)           (in the previous month)                               (1 Point)  [ ] Abnormal pulmonary function test                     (1 Point)                 SURGERY RELATED RISK FACTORS (include planned surgeries)  [ ] Acute myocardial infarction                              (1 Point)                 [ ]  Section                                             (1 Point)  [ ] Congestive heart failure (in the previous month)  (1 Point)         [ ] Minor surgery                                                  (1 Point)   [ ] Inflammatory bowel disease                             (1 Point)                 [ ] Arthroscopic surgery                                        (2 Points)  [ ] Central venous access                                      (2 Points)                [x ] General surgery lasting more than 45 minutes   (2 Points)       [ ] Stroke (in the previous month)                          (5 Points)               [ ] Elective arthroplasty                                         (5 Points)            [ x] current or past malignancy                              (2 Points)                                                                                                       HEMATOLOGY RELATED FACTORS                                                 TRAUMA RELATED RISK FACTORS  [ ] Prior episodes of VTE                                     (3 Points)                [ ] Fracture of the hip, pelvis, or leg                       (5 Points)  [ ] Positive family history for VTE                         (3 Points)                 [ ] Acute spinal cord injury (in the previous month)  (5 Points)  [ ] Prothrombin 74235 A                                     (3 Points)                 [ ] Paralysis  (less than 1 month)                             (5 Points)  [ ] Factor V Leiden                                             (3 Points)                  [ ] Multiple Trauma within 1 month                        (5 Points)  [ ] Lupus anticoagulants                                     (3 Points)                                                           [ ] Anticardiolipin antibodies                               (3 Points)                                                       [ ] High homocysteine in the blood                      (3 Points)                                             [ ] Other congenital or acquired thrombophilia      (3 Points)                                                [ ] Heparin induced thrombocytopenia                  (3 Points)                                          Total Score [     6     ]    Risk:  Very low 0   Low 1 to 2   Moderate 3 to 4   High =5       VTE Prophylasix Recommednations:  [x ] mechanical pneumatic compression devices                                      [ ] contraindicated: _____________________  [ ] chemo prophylasix                                                                                   [x ] contraindicated ___Bleeding Risk __________________    **** HIGH LIKELIHOOD DVT PRESENT ON ADMISSION  [x ] (please order LE dopplers within 24 hours of admission)

## 2020-11-14 NOTE — PROGRESS NOTE ADULT - SUBJECTIVE AND OBJECTIVE BOX
SUMMARY:  65 year-old man with COPD, on-going tobacco use and esophageal cancer status post chemo/radiation and esophagectomy with thoracic metastatic disease causing cord compression for which he underwent spinal angio/emb with right T 5/6 marker and T4-6 laminectomy with T3-12 posterior fusion and plastics closure. Operative course notable for EBL 800cc+. Upon arrival to [facility], noted to have hypotension (SBP 60s-70s per PA report) for which he was given calcium, 1L fluids and placed on phenylephrine gtt.     HOSPITAL COURSE:  11/14: Spinal angio/embo. T4-6 laminectomy and T3-12 posterior fusion with plastics closure. EBL 800cc+ without intra-op tranfusion. Post-op hypotension s/p calcium, 1L fluids and placed on phenylephrine gtt    24 HOUR EVENTS:  Given 1 unit PRC in ICU for persistent hypotension.     REVIEW OF SYSTEMS: [] Unable to Assess due to neurologic exam   [x] All ROS addressed below are non-contributory, except:  Neuro: [ ] Headache [ ] Back pain [ ] Numbness [ ] Weakness [ ] Ataxia [ ] Dizziness [ ] Aphasia [ ] Dysarthria [ ] Visual disturbance  Resp: [ ] Shortness of breath/dyspnea [ ] Orthopnea [ ] Cough  CV: [ ] Chest pain [ ] Palpitation [ ] Lightheadedness [ ] Syncope  Renal: [ ] Thirst [ ] Edema  GI: [ ] Nausea [ ] Emesis [ ] Abdominal pain [ ] Constipation [ ] Diarrhea  Hem: [ ] Hematemesis [ ] Bright red blood per rectum  ID: [ ] Fever [ ] Chills [ ] Dysuria  ENT: [ ] Rhinorrhea    VITALS/DATA/ORDERS: [x] Reviewed    EXAMINATION: SUMMARY:  65 year-old man with COPD, on-going tobacco use and esophageal cancer status post chemo/radiation and esophagectomy with thoracic metastatic disease causing cord compression for which he underwent spinal angio/emb with right T 5/6 marker and T4-6 laminectomy with T3-12 posterior fusion and plastics closure. Operative course notable for EBL 800cc+. Upon arrival to [facility], noted to have hypotension (SBP 60s-70s per PA report) for which he was given calcium, 1L fluids and placed on phenylephrine gtt.     HOSPITAL COURSE:  11/14: Spinal angio/embo. T4-6 laminectomy and T3-12 posterior fusion with plastics closure. EBL 800cc+ without intra-op transfusion. Post-op hypotension s/p calcium, 1L fluids and placed on phenylephrine gtt.    24 HOUR EVENTS:  Given 1 unit PRC in ICU for persistent hypotension. Weaned off pressor.    REVIEW OF SYSTEMS: [] Unable to Assess due to neurologic exam   [x] All ROS addressed below are non-contributory, except:  Neuro: [ ] Headache [ ] Back pain [ ] Numbness [ ] Weakness [ ] Ataxia [ ] Dizziness [ ] Aphasia [ ] Dysarthria [ ] Visual disturbance  Resp: [ ] Shortness of breath/dyspnea [ ] Orthopnea [ ] Cough  CV: [ ] Chest pain [ ] Palpitation [ ] Lightheadedness [ ] Syncope  Renal: [ ] Thirst [ ] Edema  GI: [ ] Nausea [ ] Emesis [ ] Abdominal pain [ ] Constipation [ ] Diarrhea  Hem: [ ] Hematemesis [ ] Bright red blood per rectum  ID: [ ] Fever [ ] Chills [ ] Dysuria  ENT: [ ] Rhinorrhea    VITALS/DATA/ORDERS: [x] Reviewed    EXAMINATION:  Awake, alert, fully oriented, follows, PERRL, face symmetric, no drift, full strength, no groin hematoma, + distal pulses

## 2020-11-14 NOTE — PRE-ANESTHESIA EVALUATION ADULT - NSANTHPMHFT_GEN_ALL_CORE
esophageal cancer status post chemotherapy and radiation in 5/2018 and status post esophagogastrectomy, gallstones status post cholecystectomy on 8/17/2018, COPD, current smoker, CTA chest with numerous small lung nodules. MRI T-spine w/ epidural tumor at T5-T11 levels, now s/p embolization. intubated & sedated  cleared by cardiology 11/13/20

## 2020-11-15 LAB
ANION GAP SERPL CALC-SCNC: 14 MMOL/L — SIGNIFICANT CHANGE UP (ref 5–17)
BASOPHILS # BLD AUTO: 0.01 K/UL — SIGNIFICANT CHANGE UP (ref 0–0.2)
BASOPHILS NFR BLD AUTO: 0.1 % — SIGNIFICANT CHANGE UP (ref 0–2)
BUN SERPL-MCNC: 14 MG/DL — SIGNIFICANT CHANGE UP (ref 7–23)
CALCIUM SERPL-MCNC: 6.6 MG/DL — LOW (ref 8.4–10.5)
CHLORIDE SERPL-SCNC: 116 MMOL/L — HIGH (ref 96–108)
CO2 SERPL-SCNC: 17 MMOL/L — LOW (ref 22–31)
CREAT SERPL-MCNC: 0.51 MG/DL — SIGNIFICANT CHANGE UP (ref 0.5–1.3)
EOSINOPHIL # BLD AUTO: 0 K/UL — SIGNIFICANT CHANGE UP (ref 0–0.5)
EOSINOPHIL NFR BLD AUTO: 0 % — SIGNIFICANT CHANGE UP (ref 0–6)
GLUCOSE BLDC GLUCOMTR-MCNC: 122 MG/DL — HIGH (ref 70–99)
GLUCOSE BLDC GLUCOMTR-MCNC: 129 MG/DL — HIGH (ref 70–99)
GLUCOSE BLDC GLUCOMTR-MCNC: 132 MG/DL — HIGH (ref 70–99)
GLUCOSE BLDC GLUCOMTR-MCNC: 157 MG/DL — HIGH (ref 70–99)
GLUCOSE SERPL-MCNC: 134 MG/DL — HIGH (ref 70–99)
HCT VFR BLD CALC: 30 % — LOW (ref 39–50)
HCT VFR BLD CALC: 32.8 % — LOW (ref 39–50)
HCT VFR BLD CALC: 33.3 % — LOW (ref 39–50)
HGB BLD-MCNC: 10 G/DL — LOW (ref 13–17)
HGB BLD-MCNC: 10.9 G/DL — LOW (ref 13–17)
HGB BLD-MCNC: 11.1 G/DL — LOW (ref 13–17)
IMM GRANULOCYTES NFR BLD AUTO: 0.4 % — SIGNIFICANT CHANGE UP (ref 0–1.5)
LYMPHOCYTES # BLD AUTO: 0.65 K/UL — LOW (ref 1–3.3)
LYMPHOCYTES # BLD AUTO: 4 % — LOW (ref 13–44)
MCHC RBC-ENTMCNC: 29.7 PG — SIGNIFICANT CHANGE UP (ref 27–34)
MCHC RBC-ENTMCNC: 29.9 PG — SIGNIFICANT CHANGE UP (ref 27–34)
MCHC RBC-ENTMCNC: 29.9 PG — SIGNIFICANT CHANGE UP (ref 27–34)
MCHC RBC-ENTMCNC: 33.2 GM/DL — SIGNIFICANT CHANGE UP (ref 32–36)
MCHC RBC-ENTMCNC: 33.3 GM/DL — SIGNIFICANT CHANGE UP (ref 32–36)
MCHC RBC-ENTMCNC: 33.3 GM/DL — SIGNIFICANT CHANGE UP (ref 32–36)
MCV RBC AUTO: 89 FL — SIGNIFICANT CHANGE UP (ref 80–100)
MCV RBC AUTO: 89.8 FL — SIGNIFICANT CHANGE UP (ref 80–100)
MCV RBC AUTO: 89.9 FL — SIGNIFICANT CHANGE UP (ref 80–100)
MONOCYTES # BLD AUTO: 1.49 K/UL — HIGH (ref 0–0.9)
MONOCYTES NFR BLD AUTO: 9.3 % — SIGNIFICANT CHANGE UP (ref 2–14)
NEUTROPHILS # BLD AUTO: 13.87 K/UL — HIGH (ref 1.8–7.4)
NEUTROPHILS NFR BLD AUTO: 86.2 % — HIGH (ref 43–77)
NRBC # BLD: 0 /100 WBCS — SIGNIFICANT CHANGE UP (ref 0–0)
PLATELET # BLD AUTO: 205 K/UL — SIGNIFICANT CHANGE UP (ref 150–400)
PLATELET # BLD AUTO: 212 K/UL — SIGNIFICANT CHANGE UP (ref 150–400)
PLATELET # BLD AUTO: 218 K/UL — SIGNIFICANT CHANGE UP (ref 150–400)
POTASSIUM SERPL-MCNC: 3.9 MMOL/L — SIGNIFICANT CHANGE UP (ref 3.5–5.3)
POTASSIUM SERPL-SCNC: 3.9 MMOL/L — SIGNIFICANT CHANGE UP (ref 3.5–5.3)
RBC # BLD: 3.34 M/UL — LOW (ref 4.2–5.8)
RBC # BLD: 3.65 M/UL — LOW (ref 4.2–5.8)
RBC # BLD: 3.74 M/UL — LOW (ref 4.2–5.8)
RBC # FLD: 13.9 % — SIGNIFICANT CHANGE UP (ref 10.3–14.5)
RBC # FLD: 14 % — SIGNIFICANT CHANGE UP (ref 10.3–14.5)
RBC # FLD: 14 % — SIGNIFICANT CHANGE UP (ref 10.3–14.5)
SODIUM SERPL-SCNC: 147 MMOL/L — HIGH (ref 135–145)
WBC # BLD: 10.99 K/UL — HIGH (ref 3.8–10.5)
WBC # BLD: 12.32 K/UL — HIGH (ref 3.8–10.5)
WBC # BLD: 16.09 K/UL — HIGH (ref 3.8–10.5)
WBC # FLD AUTO: 10.99 K/UL — HIGH (ref 3.8–10.5)
WBC # FLD AUTO: 12.32 K/UL — HIGH (ref 3.8–10.5)
WBC # FLD AUTO: 16.09 K/UL — HIGH (ref 3.8–10.5)

## 2020-11-15 PROCEDURE — 93970 EXTREMITY STUDY: CPT | Mod: 26

## 2020-11-15 PROCEDURE — 72128 CT CHEST SPINE W/O DYE: CPT | Mod: 26

## 2020-11-15 PROCEDURE — 99233 SBSQ HOSP IP/OBS HIGH 50: CPT

## 2020-11-15 RX ORDER — CALCIUM GLUCONATE 100 MG/ML
2 VIAL (ML) INTRAVENOUS ONCE
Refills: 0 | Status: COMPLETED | OUTPATIENT
Start: 2020-11-15 | End: 2020-11-15

## 2020-11-15 RX ADMIN — OXYCODONE HYDROCHLORIDE 10 MILLIGRAM(S): 5 TABLET ORAL at 18:48

## 2020-11-15 RX ADMIN — BUDESONIDE AND FORMOTEROL FUMARATE DIHYDRATE 2 PUFF(S): 160; 4.5 AEROSOL RESPIRATORY (INHALATION) at 05:21

## 2020-11-15 RX ADMIN — GABAPENTIN 300 MILLIGRAM(S): 400 CAPSULE ORAL at 13:52

## 2020-11-15 RX ADMIN — OXYCODONE HYDROCHLORIDE 5 MILLIGRAM(S): 5 TABLET ORAL at 01:04

## 2020-11-15 RX ADMIN — Medication 100 MILLIGRAM(S): at 05:35

## 2020-11-15 RX ADMIN — PANTOPRAZOLE SODIUM 40 MILLIGRAM(S): 20 TABLET, DELAYED RELEASE ORAL at 08:50

## 2020-11-15 RX ADMIN — Medication 4 MILLIGRAM(S): at 23:08

## 2020-11-15 RX ADMIN — Medication 100 MILLIGRAM(S): at 13:52

## 2020-11-15 RX ADMIN — ENOXAPARIN SODIUM 40 MILLIGRAM(S): 100 INJECTION SUBCUTANEOUS at 11:04

## 2020-11-15 RX ADMIN — Medication 200 GRAM(S): at 01:45

## 2020-11-15 RX ADMIN — GABAPENTIN 300 MILLIGRAM(S): 400 CAPSULE ORAL at 23:07

## 2020-11-15 RX ADMIN — Medication 4 MILLIGRAM(S): at 00:18

## 2020-11-15 RX ADMIN — OXYCODONE HYDROCHLORIDE 10 MILLIGRAM(S): 5 TABLET ORAL at 19:18

## 2020-11-15 RX ADMIN — OXYCODONE HYDROCHLORIDE 5 MILLIGRAM(S): 5 TABLET ORAL at 00:34

## 2020-11-15 RX ADMIN — Medication 4 MILLIGRAM(S): at 05:35

## 2020-11-15 RX ADMIN — SODIUM CHLORIDE 75 MILLILITER(S): 9 INJECTION INTRAMUSCULAR; INTRAVENOUS; SUBCUTANEOUS at 23:19

## 2020-11-15 RX ADMIN — BUDESONIDE AND FORMOTEROL FUMARATE DIHYDRATE 2 PUFF(S): 160; 4.5 AEROSOL RESPIRATORY (INHALATION) at 17:53

## 2020-11-15 RX ADMIN — Medication 4 MILLIGRAM(S): at 11:04

## 2020-11-15 RX ADMIN — GABAPENTIN 300 MILLIGRAM(S): 400 CAPSULE ORAL at 05:35

## 2020-11-15 RX ADMIN — Medication 4 MILLIGRAM(S): at 17:52

## 2020-11-15 NOTE — PROGRESS NOTE ADULT - ASSESSMENT
ASSESSMENT/PLAN: thoracic metastatic disease with cord compression status post spinal angio/embo and T4-6 laminectomy/T3-12 posterior fusion with plastics closure    NEURO:  Monitor drain output  Pain control  Steroids for cord edema per NSGY  CT T spine  Activity: [x] mobilize as tolerated [] Bedrest [] PT [] OT [] PMNR    PULM:  Incentive spirometry  Smoking cessation education  Offer nicotine patch     CV:  MAP >65mmhg  Wean off pressors    RENAL:  Fluids: IVF    GI:  Diet: Dysphagia screen and advance as tolerated  Bowel regimen     ENDO:   Goal euglycemia (-180)    HEME/ONC:  VTE prophylaxis: [x] SCDs [] chemoprophylaxis [x] hold chemoprophylaxis due to: fresh post-op [x] high risk of DVT/PE on admission due to: malignancy     ID:  Lena-op antibiotics     SOCIAL/FAMILY:  [x] awaiting [] updated at bedside [] family meeting    CODE STATUS:  [x] Full Code [] DNR [] DNI [] Palliative/Comfort Care    DISPOSITION:  [x] ICU [] Stroke Unit [] Floor [] EMU [] RCU [] PCU    [x] Patient is at high risk of neurologic deterioration/death due to: acute blood loss anemia and hypotension, cord compression     Contact: [phone] ASSESSMENT/PLAN: thoracic metastatic disease with cord compression status post spinal angio/embo and T4-6 laminectomy/T3-12 posterior fusion with plastics closure    NEURO:  Q4 neuro checks   Monitor drain output  Pain control  Steroids for cord edema per NSGY  CT T spine done f/u official report   Activity: [x] mobilize as tolerated [] Bedrest [] PT [] OT [] PMNR    PULM:  Incentive spirometry  Smoking cessation education  Offer nicotine patch     CV:  MAP >65mmhg  off pressors     RENAL:  Fluids: IVF     GI:  Diet: Regular diet   Bowel regimen     ENDO:   Goal euglycemia (-180)    HEME/ONC:  stable H&H s/p PRBC x 2   VTE prophylaxis: [x] SCDs [x] chemoprophylaxis: SQL [] hold chemoprophylaxis due to: fresh post-op [x] high risk of DVT/PE on admission due to: malignancy     ID:  Lena-op antibiotics     SOCIAL/FAMILY:  [x] awaiting [] updated at bedside [] family meeting    CODE STATUS:  [x] Full Code [] DNR [] DNI [] Palliative/Comfort Care    DISPOSITION:  [] ICU [] Stroke Unit [x] Floor [] EMU [] RCU [] PCU    [x] Patient is at high risk of neurologic deterioration/death due to: acute blood loss anemia and hypotension, cord compression     Contact: [phone] ASSESSMENT/PLAN: thoracic metastatic disease with cord compression status post spinal angio/embo and T4-6 laminectomy/T3-12 posterior fusion with plastics closure    NEURO:  Q4 neuro checks   Monitor drain output  Pain control  Steroids for cord edema per NSGY  CT T spine done f/u official report   Activity: [x] mobilize as tolerated [] Bedrest [] PT [] OT [] PMNR    PULM:  Incentive spirometry  Smoking cessation education  Offer nicotine patch     CV:  MAP >65mmhg  off pressors     RENAL:  Fluids: IVF     GI:  Diet: Regular diet   Bowel regimen     ENDO:   Goal euglycemia (-180)    HEME/ONC:  stable H&H s/p PRBC x 2   VTE prophylaxis: [x] SCDs [x] chemoprophylaxis: SQL [] hold chemoprophylaxis due to: fresh post-op [x] high risk of DVT/PE on admission due to: malignancy     ID:  Lena-op antibiotics     SOCIAL/FAMILY:  [x] awaiting [] updated at bedside [] family meeting    CODE STATUS:  [x] Full Code [] DNR [] DNI [] Palliative/Comfort Care    DISPOSITION:  [] ICU [] Stroke Unit [x] Floor [] EMU [] RCU [] PCU    Patient was not critically ill, but was medically complex.  30 minutes spent.     Contact: [phone]

## 2020-11-15 NOTE — CONSULT NOTE ADULT - SUBJECTIVE AND OBJECTIVE BOX
ANGIE ZAMORA  [id number]    65y y.o presents to the  spine service with back pain.  Patient diagnosed with spine mass and pathologic fracture requiring operative intervention with posterior spine fusion.  Plastic surgery consulted to evaluate patient for muscle flap reconstruction of posterior spine wound given severe spine disease requiring wide exposure of spine structures, need for bilateral multilevel hardware placement, use of autologous and cadaveric bone graft with need for radiation requring healthy vascularized muscle flap coverage of exposed vital stuctures and extensive foreign body.    Localized swelling, mass, or lump of torso    Family history of esophageal cancer    Handoff    MEWS Score    Chronic obstructive pulmonary disease    Malignant neoplasm of esophagus, unspecified    Prophylactic measure    Medication management    Smoker    COPD (chronic obstructive pulmonary disease)    Esophageal cancer    Pre-op evaluation    Thoracic spine tumor    Fusion of thoracic spine by posterior approach    Fusion of thoracic spine at 6 levels by anterior approach    History of cholecystectomy    History of bilateral inguinal hernia repair    LOCALIZED SWELLING, MASS AND L    SysAdmin_VstLnk      albuterol (Other (Mild))      T(C): 36.7 (11-15-20 @ 19:04), Max: 36.9 (11-15-20 @ 11:00)  HR: 97 (11-15-20 @ 19:04) (63 - 110)  BP: 106/69 (11-15-20 @ 19:04) (103/64 - 123/80)  RR: 18 (11-15-20 @ 19:04) (15 - 22)  SpO2: 95% (11-15-20 @ 19:04) (93% - 100%)  Intubated, prone position  33cm spine wound with exposure of spine, intact bilateral hardware and bone graft with denuded adjacent muscles and soft tissue.        Imaging: Reviewed.     A/P:  65yy.o with spinal mass and pathologic fracture s/p posterior spine decompression/fusion with muscle flap reconstruction.  - Diet  - Pain control  - IV abx  - Drain monitoring  - Ambulation as per Ortho   - DVT PPx: SCD, chemoprophylaxis as per spine service  - Will Follow    Thank You  Wiliam Bryson MD  Plastic Surgery

## 2020-11-15 NOTE — PHYSICAL THERAPY INITIAL EVALUATION ADULT - PERTINENT HX OF CURRENT PROBLEM, REHAB EVAL
64 y/o male admitted to [facility] on 11/13/20 h/o esophageal cancer status post chemotherapy and radiation in 5/2018 and status post esophagogastrectomy, COPD, current smoker, presented to [facility] on 11/9 with right rib pain and upper back pain, found to have metastatic disease to thoracic spine and possible cord compression at T5-T6, transferred to [facility] for radiation.

## 2020-11-15 NOTE — PHYSICAL THERAPY INITIAL EVALUATION ADULT - CRITERIA FOR SKILLED THERAPEUTIC INTERVENTIONS
anticipated discharge recommendation/impairments found/TBA next session impairments found/therapy frequency/anticipated equipment needs at discharge/anticipated discharge recommendation/predicted duration of therapy intervention/rehab potential

## 2020-11-15 NOTE — PROGRESS NOTE ADULT - ASSESSMENT
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  He presented with pain around the rib for 1 week.  No sob, fever, chills.    Malignant neoplasm of esophagus, unspecified. Recommendation: he had good response to chemoRT   now there are some enlarged mediastinal lymph nodes and pulmonary nodules  ?recurrence, will need to do a PET scan as outpt.    Chest wall pain -- now resolved    MRI showed likely cord compression     MRI T spine noncontrast: Abnormal epidural soft tissue at the T4-T8 levels suggesting epidural tumor. This results in multilevel spinal canal narrowing, most severe at the T5 and T6 levels where there is cord deformity suggesting cord compression.  Compression deformities of the T7, T9, and T10 vertebral bodies without significant bony retropulsion; tumor involvement cannot be excluded. T1 hypointense, STIR hyperintense lesions in the T5 vertebral body suggesting metastases.    MRI with contrast   -Metastatic disease involving the thoracic spine from T5 through T11 with epidural involvement spanning T4-T8 levels with severe spinal canal stenosis at T6 level concerning for cord compression.  -Compression fractures again noted at T7, T9, and T10 without resultant spinal canal stenosis at these levels.  -Additional lesions suspicious for metastasis involving the prevertebral/para-aortic soft tissues as well as the right posterior mediastinum, as described on CT chest from 11/9/2020.      pt not candidate for radiation based on prior radiation treatment   cont decadron 4mg q6h  pt transferred to [facility] for surgical decompression T spine     Laminectomy  done by amos peter. will await pathology   will need outpatient pet   pt to follow with Dr Briones      Delbert Cruz MD  Hematology Oncology   [phone]

## 2020-11-15 NOTE — PROGRESS NOTE ADULT - SUBJECTIVE AND OBJECTIVE BOX
Patient seen and examined at bedside. pt s/p nuerosx for spinal mass. no complaints     MEDICATIONS  (STANDING):  budesonide 160 MICROgram(s)/formoterol 4.5 MICROgram(s) Inhaler 2 Puff(s) Inhalation two times a day  ceFAZolin   IVPB 2000 milliGRAM(s) IV Intermittent every 8 hours  dexAMETHasone  Injectable 4 milliGRAM(s) IV Push every 6 hours  dextrose 40% Gel 15 Gram(s) Oral once  dextrose 5%. 1000 milliLiter(s) (50 mL/Hr) IV Continuous <Continuous>  dextrose 5%. 1000 milliLiter(s) (100 mL/Hr) IV Continuous <Continuous>  dextrose 50% Injectable 25 Gram(s) IV Push once  dextrose 50% Injectable 12.5 Gram(s) IV Push once  dextrose 50% Injectable 25 Gram(s) IV Push once  enoxaparin Injectable 40 milliGRAM(s) SubCutaneous daily  gabapentin 300 milliGRAM(s) Oral three times a day  glucagon  Injectable 1 milliGRAM(s) IntraMuscular once  influenza   Vaccine 0.5 milliLiter(s) IntraMuscular once  insulin lispro (ADMELOG) corrective regimen sliding scale   SubCutaneous three times a day before meals  insulin lispro (ADMELOG) corrective regimen sliding scale   SubCutaneous at bedtime  pantoprazole    Tablet 40 milliGRAM(s) Oral before breakfast  polyethylene glycol 3350 17 Gram(s) Oral two times a day  senna 2 Tablet(s) Oral at bedtime  sodium chloride 0.9%. 1000 milliLiter(s) (75 mL/Hr) IV Continuous <Continuous>    MEDICATIONS  (PRN):  acetaminophen   Tablet .. 650 milliGRAM(s) Oral every 6 hours PRN Temp greater or equal to 38C (100.4F), Mild Pain (1 - 3)  ondansetron   Disintegrating Tablet 4 milliGRAM(s) Oral every 6 hours PRN Nausea  ondansetron Injectable 4 milliGRAM(s) IV Push once PRN Nausea and/or Vomiting  oxyCODONE    IR 5 milliGRAM(s) Oral every 4 hours PRN Moderate Pain (4 - 6)  oxyCODONE    IR 10 milliGRAM(s) Oral every 4 hours PRN Severe Pain (7 - 10)        Vital Signs Last 24 Hrs  T(C): 36.5 (15 Nov 2020 07:00), Max: 36.5 (14 Nov 2020 17:45)  T(F): 97.7 (15 Nov 2020 07:00), Max: 97.7 (14 Nov 2020 17:45)  HR: 97 (15 Nov 2020 12:00) (60 - 114)  BP: 93/69 (14 Nov 2020 17:55) (91/69 - 149/72)  BP(mean): 79 (14 Nov 2020 17:55) (75 - 79)  RR: 17 (15 Nov 2020 12:00) (12 - 22)  SpO2: 97% (15 Nov 2020 12:00) (95% - 100%)      PHYSICAL EXAM:     GENERAL:  Appears stated age, well-groomed  HEENT:  NC/AT,    CHEST:  CTA b/l  HEART:  S1 s2+   ABDOMEN:  Soft, non-tender, non-distended  EXTEREMITIES:  no cyanosis,clubbing or edema  SKIN:  No rash/erythema/ecchymoses  NEURO:  Alert, oriented, no asterixis                            10.9   12.32 )-----------( 205      ( 15 Nov 2020 06:02 )             32.8       11-15    147<H>  |  116<H>  |  14  ----------------------------<  134<H>  3.9   |  17<L>  |  0.51    Ca    6.6<L>      15 Nov 2020 00:34

## 2020-11-15 NOTE — PROGRESS NOTE ADULT - ASSESSMENT
PLAN  CT T spine in Am   ?Tx Floor in Am   F/u Path   Drains per Plastics (NSx please update output daily)

## 2020-11-15 NOTE — PROGRESS NOTE ADULT - SUBJECTIVE AND OBJECTIVE BOX
Vital Signs Last 24 Hrs  T(C): 36 (14 Nov 2020 19:00), Max: 36.6 (14 Nov 2020 00:10)  T(F): 96.8 (14 Nov 2020 19:00), Max: 97.8 (14 Nov 2020 00:10)  HR: 80 (14 Nov 2020 23:00) (60 - 114)  BP: 93/69 (14 Nov 2020 17:55) (91/69 - 149/72)  BP(mean): 79 (14 Nov 2020 17:55) (75 - 79)  RR: 18 (14 Nov 2020 23:00) (12 - 20)  SpO2: 98% (14 Nov 2020 23:00) (95% - 100%)    EXAM  AOx3, FC, PERRL, EOMI, no facial   5/5 throughout, no drift

## 2020-11-15 NOTE — PROGRESS NOTE ADULT - SUBJECTIVE AND OBJECTIVE BOX
Subjective: Patient seen and examined. No new events except as noted.     SUBJECTIVE/ROS:  feels ok   No chest pain, dyspnea, palpitation, or dizziness.       MEDICATIONS:  MEDICATIONS  (STANDING):  budesonide 160 MICROgram(s)/formoterol 4.5 MICROgram(s) Inhaler 2 Puff(s) Inhalation two times a day  ceFAZolin   IVPB 2000 milliGRAM(s) IV Intermittent every 8 hours  dexAMETHasone  Injectable 4 milliGRAM(s) IV Push every 6 hours  dextrose 40% Gel 15 Gram(s) Oral once  dextrose 5%. 1000 milliLiter(s) (50 mL/Hr) IV Continuous <Continuous>  dextrose 5%. 1000 milliLiter(s) (100 mL/Hr) IV Continuous <Continuous>  dextrose 50% Injectable 25 Gram(s) IV Push once  dextrose 50% Injectable 12.5 Gram(s) IV Push once  dextrose 50% Injectable 25 Gram(s) IV Push once  enoxaparin Injectable 40 milliGRAM(s) SubCutaneous daily  gabapentin 300 milliGRAM(s) Oral three times a day  glucagon  Injectable 1 milliGRAM(s) IntraMuscular once  influenza   Vaccine 0.5 milliLiter(s) IntraMuscular once  insulin lispro (ADMELOG) corrective regimen sliding scale   SubCutaneous three times a day before meals  insulin lispro (ADMELOG) corrective regimen sliding scale   SubCutaneous at bedtime  pantoprazole    Tablet 40 milliGRAM(s) Oral before breakfast  polyethylene glycol 3350 17 Gram(s) Oral two times a day  senna 2 Tablet(s) Oral at bedtime  sodium chloride 0.9%. 1000 milliLiter(s) (75 mL/Hr) IV Continuous <Continuous>      PHYSICAL EXAM:  T(C): 36.5 (11-15-20 @ 07:00), Max: 36.5 (11-14-20 @ 07:52)  HR: 98 (11-15-20 @ 07:00) (60 - 114)  BP: 93/69 (11-14-20 @ 17:55) (91/69 - 149/72)  RR: 15 (11-15-20 @ 07:00) (12 - 20)  SpO2: 98% (11-15-20 @ 07:00) (97% - 100%)  Wt(kg): --  I&O's Summary    14 Nov 2020 07:01  -  15 Nov 2020 07:00  --------------------------------------------------------  IN: 3320.4 mL / OUT: 1430 mL / NET: 1890.4 mL        Weight (kg): 68.5 (11-14 @ 14:50)      JVP: Normal  Neck: supple  Lung: clear   CV: S1 S2 , Murmur:  Abd: soft  Ext: No edema  neuro: Awake / alert  Psych: flat affect  Skin: normal``    LABS/DATA:    CARDIAC MARKERS:                                10.9   12.32 )-----------( 205      ( 15 Nov 2020 06:02 )             32.8     11-15    147<H>  |  116<H>  |  14  ----------------------------<  134<H>  3.9   |  17<L>  |  0.51    Ca    6.6<L>      15 Nov 2020 00:34      proBNP:   Lipid Profile:   HgA1c:   TSH:     TELE:  EKG:

## 2020-11-15 NOTE — PHYSICAL THERAPY INITIAL EVALUATION ADULT - PRECAUTIONS/LIMITATIONS, REHAB EVAL
surgical precautions/fall precautions CTA chest with enlarged prevertebral/paraesophageal lymph nodes and numerous small lung nodules. MRI T-spine w/ epidural tumor at T5-T11 levels, sig stenosis at T6 Bilsky 3./fall precautions/surgical precautions

## 2020-11-15 NOTE — PHYSICAL THERAPY INITIAL EVALUATION ADULT - DID THE PATIENT HAVE SURGERY?
yes Fusion T/S spine by posterior approach, T3-T12 INSTRUMENTATION/yes yes/Fusion T/S spine by posterior approach, T3-T12 INSTRUMENTATION T4-T6 lami for epidural tumor

## 2020-11-15 NOTE — PHYSICAL THERAPY INITIAL EVALUATION ADULT - GENERAL OBSERVATIONS, REHAB EVAL
Rec'd in Rec'd in bed on monitor, back incision w/ aquacell dressing, Lit and hemovac, + IV, NAD< agreeable to PT

## 2020-11-15 NOTE — PROGRESS NOTE ADULT - SUBJECTIVE AND OBJECTIVE BOX
SUMMARY:  65 year-old man with COPD, on-going tobacco use and esophageal cancer status post chemo/radiation and esophagectomy with thoracic metastatic disease causing cord compression for which he underwent spinal angio/emb with right T 5/6 marker and T4-6 laminectomy with T3-12 posterior fusion and plastics closure. Operative course notable for EBL 800cc+. Upon arrival to NSCU, noted to have hypotension (SBP 60s-70s per PA report) for which he was given calcium, 1L fluids and placed on phenylephrine gtt.     HOSPITAL COURSE:  11/14: Spinal angio/embo. T4-6 laminectomy and T3-12 posterior fusion with plastics closure. EBL 800cc+ without intra-op transfusion. Post-op hypotension s/p calcium, 1L fluids and placed on phenylephrine gtt.    24 HOUR EVENTS:  Given 1 unit PRC in ICU for persistent hypotension. Weaned off pressor.    REVIEW OF SYSTEMS: [] Unable to Assess due to neurologic exam   [x] All ROS addressed below are non-contributory, except:  Neuro: [ ] Headache [ ] Back pain [ ] Numbness [ ] Weakness [ ] Ataxia [ ] Dizziness [ ] Aphasia [ ] Dysarthria [ ] Visual disturbance  Resp: [ ] Shortness of breath/dyspnea [ ] Orthopnea [ ] Cough  CV: [ ] Chest pain [ ] Palpitation [ ] Lightheadedness [ ] Syncope  Renal: [ ] Thirst [ ] Edema  GI: [ ] Nausea [ ] Emesis [ ] Abdominal pain [ ] Constipation [ ] Diarrhea  Hem: [ ] Hematemesis [ ] Bright red blood per rectum  ID: [ ] Fever [ ] Chills [ ] Dysuria  ENT: [ ] Rhinorrhea            ICU Vital Signs Last 24 Hrs  T(C): 36.2 (15 Nov 2020 03:00), Max: 36.5 (14 Nov 2020 07:52)  T(F): 97.1 (15 Nov 2020 03:00), Max: 97.7 (14 Nov 2020 07:52)  HR: 85 (15 Nov 2020 06:00) (60 - 114)  BP: 93/69 (14 Nov 2020 17:55) (91/69 - 149/72)  BP(mean): 79 (14 Nov 2020 17:55) (75 - 79)  ABP: 116/58 (15 Nov 2020 06:00) (77/52 - 130/61)  ABP(mean): 78 (15 Nov 2020 06:00) (40 - 89)  RR: 18 (15 Nov 2020 06:00) (12 - 20)  SpO2: 98% (15 Nov 2020 06:00) (97% - 100%)      11-14-20 @ 07:01  -  11-15-20 @ 07:00  --------------------------------------------------------  IN: 3245.4 mL / OUT: 1430 mL / NET: 1815.4 mL            acetaminophen   Tablet .. 650 milliGRAM(s) Oral every 6 hours PRN  budesonide 160 MICROgram(s)/formoterol 4.5 MICROgram(s) Inhaler 2 Puff(s) Inhalation two times a day  ceFAZolin   IVPB 2000 milliGRAM(s) IV Intermittent every 8 hours  dexAMETHasone  Injectable 4 milliGRAM(s) IV Push every 6 hours  dextrose 40% Gel 15 Gram(s) Oral once  dextrose 5%. 1000 milliLiter(s) (50 mL/Hr) IV Continuous <Continuous>  dextrose 5%. 1000 milliLiter(s) (100 mL/Hr) IV Continuous <Continuous>  dextrose 50% Injectable 25 Gram(s) IV Push once  dextrose 50% Injectable 12.5 Gram(s) IV Push once  dextrose 50% Injectable 25 Gram(s) IV Push once  enoxaparin Injectable 40 milliGRAM(s) SubCutaneous daily  gabapentin 300 milliGRAM(s) Oral three times a day  glucagon  Injectable 1 milliGRAM(s) IntraMuscular once  influenza   Vaccine 0.5 milliLiter(s) IntraMuscular once  insulin lispro (ADMELOG) corrective regimen sliding scale   SubCutaneous three times a day before meals  insulin lispro (ADMELOG) corrective regimen sliding scale   SubCutaneous at bedtime  ondansetron   Disintegrating Tablet 4 milliGRAM(s) Oral every 6 hours PRN  ondansetron Injectable 4 milliGRAM(s) IV Push once PRN  oxyCODONE    IR 5 milliGRAM(s) Oral every 4 hours PRN  oxyCODONE    IR 10 milliGRAM(s) Oral every 4 hours PRN  pantoprazole    Tablet 40 milliGRAM(s) Oral before breakfast  polyethylene glycol 3350 17 Gram(s) Oral two times a day  senna 2 Tablet(s) Oral at bedtime  sodium chloride 0.9%. 1000 milliLiter(s) (75 mL/Hr) IV Continuous <Continuous>      LABS:  Na: 147 (11-15 @ 00:34), 143 (11-14 @ 18:19), 135 (11-13 @ 05:45)  K: 3.9 (11-15 @ 00:34), 4.3 (11-14 @ 18:19), 4.3 (11-13 @ 05:45)  Cl: 116 (11-15 @ 00:34), 112 (11-14 @ 18:19), 102 (11-13 @ 05:45)  CO2: 17 (11-15 @ 00:34), 18 (11-14 @ 18:19), 25 (11-13 @ 05:45)  BUN: 14 (11-15 @ 00:34), 16 (11-14 @ 18:19), 26 (11-13 @ 05:45)  Cr: 0.51 (11-15 @ 00:34), 0.59 (11-14 @ 18:19), 0.73 (11-13 @ 05:45)  Glu: 134(11-15 @ 00:34), 177(11-14 @ 18:19), 129(11-13 @ 05:45)    Hgb: 10.9 (11-15 @ 06:02), 10.0 (11-15 @ 00:34), 10.4 (11-14 @ 18:19), 13.3 (11-13 @ 05:45)  Hct: 32.8 (11-15 @ 06:02), 30.0 (11-15 @ 00:34), 32.2 (11-14 @ 18:19), 42.1 (11-13 @ 05:45)  WBC: 12.32 (11-15 @ 06:02), 16.09 (11-15 @ 00:34), 18.40 (11-14 @ 18:19), 12.71 (11-13 @ 05:45)  Plt: 205 (11-15 @ 06:02), 212 (11-15 @ 00:34), 294 (11-14 @ 18:19), 325 (11-13 @ 05:45)    INR: 0.99 11-13-20 @ 11:16  PTT: 25.8 11-13-20 @ 11:16    ABG - ( 14 Nov 2020 18:12 )  pH, Arterial: 7.34  pH, Blood: x     /  pCO2: 38    /  pO2: 121   / HCO3: 20    / Base Excess: -4.9  /  SaO2: 99            General: No acute distress  HEENT: Anicteric sclerae  Cardiac: [id number]  Lungs: Clear  Abdomen: Soft, non-tender, +BS  Extremities: No c/c/e  Skin/Incision Site: Clean, dry and intact  Neurologic: Awake, alert, fully oriented, follows commands, PERRL, VFFtc, EOMI, face symmetric, tongue midline, no drift, full strength SUMMARY:  65 year-old man with COPD, on-going tobacco use and esophageal cancer status post chemo/radiation and esophagectomy with thoracic metastatic disease causing cord compression for which he underwent spinal angio/emb with right T 5/6 marker and T4-6 laminectomy with T3-12 posterior fusion and plastics closure. Operative course notable for EBL 800cc+. Upon arrival to NSCU, noted to have hypotension (SBP 60s-70s per PA report) for which he was given calcium, 1L fluids and placed on phenylephrine gtt.     HOSPITAL COURSE:  11/14: Spinal angio/embo. T4-6 laminectomy and T3-12 posterior fusion with plastics closure. EBL 800cc+ without intra-op transfusion. Post-op hypotension s/p calcium, 1L fluids and placed on phenylephrine gtt.    24 HOUR EVENTS:  PRBC x 2 overnight     REVIEW OF SYSTEMS: [] Unable to Assess due to neurologic exam   [x] All ROS addressed below are non-contributory, except:  Neuro: [ ] Headache [ ] Back pain [ ] Numbness [ ] Weakness [ ] Ataxia [ ] Dizziness [ ] Aphasia [ ] Dysarthria [ ] Visual disturbance  Resp: [ ] Shortness of breath/dyspnea [ ] Orthopnea [ ] Cough  CV: [ ] Chest pain [ ] Palpitation [ ] Lightheadedness [ ] Syncope  Renal: [ ] Thirst [ ] Edema  GI: [ ] Nausea [ ] Emesis [ ] Abdominal pain [ ] Constipation [ ] Diarrhea  Hem: [ ] Hematemesis [ ] Bright red blood per rectum  ID: [ ] Fever [ ] Chills [ ] Dysuria  ENT: [ ] Rhinorrhea      ICU Vital Signs Last 24 Hrs  T(C): 36.2 (15 Nov 2020 03:00), Max: 36.5 (14 Nov 2020 07:52)  T(F): 97.1 (15 Nov 2020 03:00), Max: 97.7 (14 Nov 2020 07:52)  HR: 85 (15 Nov 2020 06:00) (60 - 114)  BP: 93/69 (14 Nov 2020 17:55) (91/69 - 149/72)  BP(mean): 79 (14 Nov 2020 17:55) (75 - 79)  ABP: 116/58 (15 Nov 2020 06:00) (77/52 - 130/61)  ABP(mean): 78 (15 Nov 2020 06:00) (40 - 89)  RR: 18 (15 Nov 2020 06:00) (12 - 20)  SpO2: 98% (15 Nov 2020 06:00) (97% - 100%)      11-14-20 @ 07:01  -  11-15-20 @ 07:00  --------------------------------------------------------  IN: 3245.4 mL / OUT: 1430 mL / NET: 1815.4 mL      acetaminophen   Tablet .. 650 milliGRAM(s) Oral every 6 hours PRN  budesonide 160 MICROgram(s)/formoterol 4.5 MICROgram(s) Inhaler 2 Puff(s) Inhalation two times a day  ceFAZolin   IVPB 2000 milliGRAM(s) IV Intermittent every 8 hours  dexAMETHasone  Injectable 4 milliGRAM(s) IV Push every 6 hours  enoxaparin Injectable 40 milliGRAM(s) SubCutaneous daily  gabapentin 300 milliGRAM(s) Oral three times a day  glucagon  Injectable 1 milliGRAM(s) IntraMuscular once  influenza   Vaccine 0.5 milliLiter(s) IntraMuscular once  insulin lispro (ADMELOG) corrective regimen sliding scale   SubCutaneous three times a day before meals  insulin lispro (ADMELOG) corrective regimen sliding scale   SubCutaneous at bedtime  ondansetron   Disintegrating Tablet 4 milliGRAM(s) Oral every 6 hours PRN  ondansetron Injectable 4 milliGRAM(s) IV Push once PRN  oxyCODONE    IR 5 milliGRAM(s) Oral every 4 hours PRN  oxyCODONE    IR 10 milliGRAM(s) Oral every 4 hours PRN  pantoprazole    Tablet 40 milliGRAM(s) Oral before breakfast  polyethylene glycol 3350 17 Gram(s) Oral two times a day  senna 2 Tablet(s) Oral at bedtime  sodium chloride 0.9%. 1000 milliLiter(s) (75 mL/Hr) IV Continuous <Continuous>      LABS:  Na: 147 (11-15 @ 00:34), 143 (11-14 @ 18:19), 135 (11-13 @ 05:45)  K: 3.9 (11-15 @ 00:34), 4.3 (11-14 @ 18:19), 4.3 (11-13 @ 05:45)  Cl: 116 (11-15 @ 00:34), 112 (11-14 @ 18:19), 102 (11-13 @ 05:45)  CO2: 17 (11-15 @ 00:34), 18 (11-14 @ 18:19), 25 (11-13 @ 05:45)  BUN: 14 (11-15 @ 00:34), 16 (11-14 @ 18:19), 26 (11-13 @ 05:45)  Cr: 0.51 (11-15 @ 00:34), 0.59 (11-14 @ 18:19), 0.73 (11-13 @ 05:45)  Glu: 134(11-15 @ 00:34), 177(11-14 @ 18:19), 129(11-13 @ 05:45)    Hgb: 10.9 (11-15 @ 06:02), 10.0 (11-15 @ 00:34), 10.4 (11-14 @ 18:19), 13.3 (11-13 @ 05:45)  Hct: 32.8 (11-15 @ 06:02), 30.0 (11-15 @ 00:34), 32.2 (11-14 @ 18:19), 42.1 (11-13 @ 05:45)  WBC: 12.32 (11-15 @ 06:02), 16.09 (11-15 @ 00:34), 18.40 (11-14 @ 18:19), 12.71 (11-13 @ 05:45)  Plt: 205 (11-15 @ 06:02), 212 (11-15 @ 00:34), 294 (11-14 @ 18:19), 325 (11-13 @ 05:45)    INR: 0.99 11-13-20 @ 11:16  PTT: 25.8 11-13-20 @ 11:16    ABG - ( 14 Nov 2020 18:12 )  pH, Arterial: 7.34  pH, Blood: x     /  pCO2: 38    /  pO2: 121   / HCO3: 20    / Base Excess: -4.9  /  SaO2: 99            General: No acute distress  HEENT: Anicteric sclerae  Cardiac: [id number]  Lungs: Clear  Abdomen: Soft, non-tender, +BS  Extremities: No c/c/e  Skin/Incision Site: Clean, dry and intact  Neurologic: Awake, alert, fully oriented, follows commands, PERRL, VFFtc, EOMI, face symmetric, tongue midline, no drift, full strength

## 2020-11-15 NOTE — PROGRESS NOTE ADULT - ASSESSMENT
COPD  nebs  monitor O2sat     Esophageal ca  s/p esophagectomy   fu with heme onc     Cigarette use   counseling given   pt does not want nicotine patch     DVT proph  lovenox    Hypocalcemia  check albumin

## 2020-11-15 NOTE — PHYSICAL THERAPY INITIAL EVALUATION ADULT - ACTIVE RANGE OF MOTION EXAMINATION, REHAB EVAL
sh fl to 90 2/2 to spinal precautions/bilateral upper extremity Active ROM was WFL (within functional limits)/bilateral  lower extremity Active ROM was WFL (within functional limits)

## 2020-11-15 NOTE — PHYSICAL THERAPY INITIAL EVALUATION ADULT - ADDITIONAL COMMENTS
lives w/ lives w/wife in apt, + elevator and ramped entrance, decreased hearing R ear, glasses all the time, R handed / does not use assistive device,

## 2020-11-16 ENCOUNTER — TRANSCRIPTION ENCOUNTER (OUTPATIENT)
Age: 65
End: 2020-11-16

## 2020-11-16 DIAGNOSIS — R79.89 OTHER SPECIFIED ABNORMAL FINDINGS OF BLOOD CHEMISTRY: ICD-10-CM

## 2020-11-16 DIAGNOSIS — E87.0 HYPEROSMOLALITY AND HYPERNATREMIA: ICD-10-CM

## 2020-11-16 DIAGNOSIS — D69.6 THROMBOCYTOPENIA, UNSPECIFIED: ICD-10-CM

## 2020-11-16 LAB
ALBUMIN SERPL ELPH-MCNC: 3.1 G/DL — LOW (ref 3.3–5)
ALP SERPL-CCNC: 68 U/L — SIGNIFICANT CHANGE UP (ref 40–120)
ALT FLD-CCNC: 21 U/L — SIGNIFICANT CHANGE UP (ref 10–45)
ANION GAP SERPL CALC-SCNC: 7 MMOL/L — SIGNIFICANT CHANGE UP (ref 5–17)
AST SERPL-CCNC: 24 U/L — SIGNIFICANT CHANGE UP (ref 10–40)
BILIRUB SERPL-MCNC: 0.5 MG/DL — SIGNIFICANT CHANGE UP (ref 0.2–1.2)
BUN SERPL-MCNC: 22 MG/DL — SIGNIFICANT CHANGE UP (ref 7–23)
CALCIUM SERPL-MCNC: 8.6 MG/DL — SIGNIFICANT CHANGE UP (ref 8.4–10.5)
CHLORIDE SERPL-SCNC: 104 MMOL/L — SIGNIFICANT CHANGE UP (ref 96–108)
CO2 SERPL-SCNC: 26 MMOL/L — SIGNIFICANT CHANGE UP (ref 22–31)
CREAT SERPL-MCNC: 0.75 MG/DL — SIGNIFICANT CHANGE UP (ref 0.5–1.3)
GLUCOSE BLDC GLUCOMTR-MCNC: 148 MG/DL — HIGH (ref 70–99)
GLUCOSE BLDC GLUCOMTR-MCNC: 152 MG/DL — HIGH (ref 70–99)
GLUCOSE BLDC GLUCOMTR-MCNC: 172 MG/DL — HIGH (ref 70–99)
GLUCOSE BLDC GLUCOMTR-MCNC: 177 MG/DL — HIGH (ref 70–99)
GLUCOSE SERPL-MCNC: 176 MG/DL — HIGH (ref 70–99)
HCT VFR BLD CALC: 33 % — LOW (ref 39–50)
HCT VFR BLD CALC: 35.9 % — LOW (ref 39–50)
HGB BLD-MCNC: 10.6 G/DL — LOW (ref 13–17)
HGB BLD-MCNC: 11.7 G/DL — LOW (ref 13–17)
LACTATE SERPL-SCNC: 3.1 MMOL/L — HIGH (ref 0.7–2)
MCHC RBC-ENTMCNC: 29.2 PG — SIGNIFICANT CHANGE UP (ref 27–34)
MCHC RBC-ENTMCNC: 29.5 PG — SIGNIFICANT CHANGE UP (ref 27–34)
MCHC RBC-ENTMCNC: 32.1 GM/DL — SIGNIFICANT CHANGE UP (ref 32–36)
MCHC RBC-ENTMCNC: 32.6 GM/DL — SIGNIFICANT CHANGE UP (ref 32–36)
MCV RBC AUTO: 89.5 FL — SIGNIFICANT CHANGE UP (ref 80–100)
MCV RBC AUTO: 91.9 FL — SIGNIFICANT CHANGE UP (ref 80–100)
NRBC # BLD: 0 /100 WBCS — SIGNIFICANT CHANGE UP (ref 0–0)
NRBC # BLD: 0 /100 WBCS — SIGNIFICANT CHANGE UP (ref 0–0)
PLATELET # BLD AUTO: 138 K/UL — LOW (ref 150–400)
PLATELET # BLD AUTO: 235 K/UL — SIGNIFICANT CHANGE UP (ref 150–400)
POTASSIUM SERPL-MCNC: 5 MMOL/L — SIGNIFICANT CHANGE UP (ref 3.5–5.3)
POTASSIUM SERPL-SCNC: 5 MMOL/L — SIGNIFICANT CHANGE UP (ref 3.5–5.3)
PROT SERPL-MCNC: 5.4 G/DL — LOW (ref 6–8.3)
RBC # BLD: 3.59 M/UL — LOW (ref 4.2–5.8)
RBC # BLD: 4.01 M/UL — LOW (ref 4.2–5.8)
RBC # FLD: 13.8 % — SIGNIFICANT CHANGE UP (ref 10.3–14.5)
RBC # FLD: 14 % — SIGNIFICANT CHANGE UP (ref 10.3–14.5)
SODIUM SERPL-SCNC: 137 MMOL/L — SIGNIFICANT CHANGE UP (ref 135–145)
WBC # BLD: 12.45 K/UL — HIGH (ref 3.8–10.5)
WBC # BLD: 16.23 K/UL — HIGH (ref 3.8–10.5)
WBC # FLD AUTO: 12.45 K/UL — HIGH (ref 3.8–10.5)
WBC # FLD AUTO: 16.23 K/UL — HIGH (ref 3.8–10.5)

## 2020-11-16 PROCEDURE — 99233 SBSQ HOSP IP/OBS HIGH 50: CPT

## 2020-11-16 RX ORDER — DEXAMETHASONE 0.5 MG/5ML
4 ELIXIR ORAL EVERY 8 HOURS
Refills: 0 | Status: COMPLETED | OUTPATIENT
Start: 2020-11-16 | End: 2020-11-17

## 2020-11-16 RX ORDER — DEXAMETHASONE 0.5 MG/5ML
2 ELIXIR ORAL EVERY 8 HOURS
Refills: 0 | Status: COMPLETED | OUTPATIENT
Start: 2020-11-17 | End: 2020-11-18

## 2020-11-16 RX ORDER — DEXAMETHASONE 0.5 MG/5ML
ELIXIR ORAL
Refills: 0 | Status: COMPLETED | OUTPATIENT
Start: 2020-11-16 | End: 2020-11-18

## 2020-11-16 RX ADMIN — Medication 2: at 17:52

## 2020-11-16 RX ADMIN — Medication 4 MILLIGRAM(S): at 13:02

## 2020-11-16 RX ADMIN — GABAPENTIN 300 MILLIGRAM(S): 400 CAPSULE ORAL at 21:54

## 2020-11-16 RX ADMIN — OXYCODONE HYDROCHLORIDE 5 MILLIGRAM(S): 5 TABLET ORAL at 13:09

## 2020-11-16 RX ADMIN — SENNA PLUS 2 TABLET(S): 8.6 TABLET ORAL at 21:54

## 2020-11-16 RX ADMIN — OXYCODONE HYDROCHLORIDE 10 MILLIGRAM(S): 5 TABLET ORAL at 21:52

## 2020-11-16 RX ADMIN — GABAPENTIN 300 MILLIGRAM(S): 400 CAPSULE ORAL at 13:01

## 2020-11-16 RX ADMIN — BUDESONIDE AND FORMOTEROL FUMARATE DIHYDRATE 2 PUFF(S): 160; 4.5 AEROSOL RESPIRATORY (INHALATION) at 06:01

## 2020-11-16 RX ADMIN — PANTOPRAZOLE SODIUM 40 MILLIGRAM(S): 20 TABLET, DELAYED RELEASE ORAL at 06:00

## 2020-11-16 RX ADMIN — POLYETHYLENE GLYCOL 3350 17 GRAM(S): 17 POWDER, FOR SOLUTION ORAL at 17:52

## 2020-11-16 RX ADMIN — OXYCODONE HYDROCHLORIDE 5 MILLIGRAM(S): 5 TABLET ORAL at 13:39

## 2020-11-16 RX ADMIN — Medication 4 MILLIGRAM(S): at 05:59

## 2020-11-16 RX ADMIN — Medication 2: at 09:29

## 2020-11-16 RX ADMIN — ENOXAPARIN SODIUM 40 MILLIGRAM(S): 100 INJECTION SUBCUTANEOUS at 13:03

## 2020-11-16 RX ADMIN — Medication 4 MILLIGRAM(S): at 21:57

## 2020-11-16 RX ADMIN — BUDESONIDE AND FORMOTEROL FUMARATE DIHYDRATE 2 PUFF(S): 160; 4.5 AEROSOL RESPIRATORY (INHALATION) at 17:53

## 2020-11-16 RX ADMIN — GABAPENTIN 300 MILLIGRAM(S): 400 CAPSULE ORAL at 06:00

## 2020-11-16 NOTE — PROGRESS NOTE ADULT - SUBJECTIVE AND OBJECTIVE BOX
Subjective: Patient seen and examined. No new events except as noted.     SUBJECTIVE/ROS:  No chest pain, dyspnea, palpitation, or dizziness.       MEDICATIONS:  MEDICATIONS  (STANDING):  budesonide 160 MICROgram(s)/formoterol 4.5 MICROgram(s) Inhaler 2 Puff(s) Inhalation two times a day  dexAMETHasone  Injectable 4 milliGRAM(s) IV Push every 6 hours  dextrose 40% Gel 15 Gram(s) Oral once  dextrose 5%. 1000 milliLiter(s) (50 mL/Hr) IV Continuous <Continuous>  dextrose 5%. 1000 milliLiter(s) (100 mL/Hr) IV Continuous <Continuous>  dextrose 50% Injectable 25 Gram(s) IV Push once  dextrose 50% Injectable 12.5 Gram(s) IV Push once  dextrose 50% Injectable 25 Gram(s) IV Push once  enoxaparin Injectable 40 milliGRAM(s) SubCutaneous daily  gabapentin 300 milliGRAM(s) Oral three times a day  glucagon  Injectable 1 milliGRAM(s) IntraMuscular once  influenza   Vaccine 0.5 milliLiter(s) IntraMuscular once  insulin lispro (ADMELOG) corrective regimen sliding scale   SubCutaneous three times a day before meals  insulin lispro (ADMELOG) corrective regimen sliding scale   SubCutaneous at bedtime  pantoprazole    Tablet 40 milliGRAM(s) Oral before breakfast  polyethylene glycol 3350 17 Gram(s) Oral two times a day  senna 2 Tablet(s) Oral at bedtime  sodium chloride 0.9%. 1000 milliLiter(s) (75 mL/Hr) IV Continuous <Continuous>      PHYSICAL EXAM:  T(C): 36.6 (11-16-20 @ 04:21), Max: 36.9 (11-15-20 @ 11:00)  HR: 69 (11-16-20 @ 04:21) (69 - 110)  BP: 113/69 (11-16-20 @ 04:21) (103/64 - 123/80)  RR: 18 (11-16-20 @ 04:21) (16 - 22)  SpO2: 95% (11-16-20 @ 04:21) (93% - 100%)  Wt(kg): --  I&O's Summary    15 Nov 2020 07:01  -  16 Nov 2020 07:00  --------------------------------------------------------  IN: 2260 mL / OUT: 2985 mL / NET: -725 mL            JVP: Normal  Neck: supple  Lung: clear   CV: S1 S2 , Murmur:  Abd: soft  Ext: No edema  neuro: Awake / alert  Psych: flat affect  Skin: normal``    LABS/DATA:    CARDIAC MARKERS:                                11.1   10.99 )-----------( 218      ( 15 Nov 2020 15:09 )             33.3     11-15    147<H>  |  116<H>  |  14  ----------------------------<  134<H>  3.9   |  17<L>  |  0.51    Ca    6.6<L>      15 Nov 2020 00:34      proBNP:   Lipid Profile:   HgA1c:   TSH:     TELE:  EKG:

## 2020-11-16 NOTE — PROVIDER CONTACT NOTE (CRITICAL VALUE NOTIFICATION) - ASSESSMENT
Patient neurologically stable and denies pain at this time. patient surgical incision WNL, and patient drains are WNL.

## 2020-11-16 NOTE — PROGRESS NOTE ADULT - SUBJECTIVE AND OBJECTIVE BOX
Toni Manriquez   Pager [phone]  Office [phone]      CC: Patient is a 65y old  Male who presents with a chief complaint of Reconstruction of Back with Muscle Flaps (15 Nov 2020 21:26)      SUBJECTIVE / OVERNIGHT EVENTS:    MEDICATIONS  (STANDING):  budesonide 160 MICROgram(s)/formoterol 4.5 MICROgram(s) Inhaler 2 Puff(s) Inhalation two times a day  dexAMETHasone  Injectable 4 milliGRAM(s) IV Push every 6 hours  dextrose 40% Gel 15 Gram(s) Oral once  dextrose 5%. 1000 milliLiter(s) (50 mL/Hr) IV Continuous <Continuous>  dextrose 5%. 1000 milliLiter(s) (100 mL/Hr) IV Continuous <Continuous>  dextrose 50% Injectable 25 Gram(s) IV Push once  dextrose 50% Injectable 12.5 Gram(s) IV Push once  dextrose 50% Injectable 25 Gram(s) IV Push once  enoxaparin Injectable 40 milliGRAM(s) SubCutaneous daily  gabapentin 300 milliGRAM(s) Oral three times a day  glucagon  Injectable 1 milliGRAM(s) IntraMuscular once  influenza   Vaccine 0.5 milliLiter(s) IntraMuscular once  insulin lispro (ADMELOG) corrective regimen sliding scale   SubCutaneous three times a day before meals  insulin lispro (ADMELOG) corrective regimen sliding scale   SubCutaneous at bedtime  pantoprazole    Tablet 40 milliGRAM(s) Oral before breakfast  polyethylene glycol 3350 17 Gram(s) Oral two times a day  senna 2 Tablet(s) Oral at bedtime  sodium chloride 0.9%. 1000 milliLiter(s) (75 mL/Hr) IV Continuous <Continuous>    MEDICATIONS  (PRN):  acetaminophen   Tablet .. 650 milliGRAM(s) Oral every 6 hours PRN Temp greater or equal to 38C (100.4F), Mild Pain (1 - 3)  ondansetron   Disintegrating Tablet 4 milliGRAM(s) Oral every 6 hours PRN Nausea  ondansetron Injectable 4 milliGRAM(s) IV Push once PRN Nausea and/or Vomiting  oxyCODONE    IR 5 milliGRAM(s) Oral every 4 hours PRN Moderate Pain (4 - 6)  oxyCODONE    IR 10 milliGRAM(s) Oral every 4 hours PRN Severe Pain (7 - 10)      Vital Signs Last 24 Hrs  T(C): 37.2 (16 Nov 2020 08:19), Max: 37.2 (16 Nov 2020 08:19)  T(F): 98.9 (16 Nov 2020 08:19), Max: 98.9 (16 Nov 2020 08:19)  HR: 76 (16 Nov 2020 08:19) (69 - 110)  BP: 110/68 (16 Nov 2020 08:19) (103/64 - 123/80)  BP(mean): 82 (15 Nov 2020 16:00) (77 - 82)  RR: 18 (16 Nov 2020 08:19) (17 - 19)  SpO2: 99% (16 Nov 2020 08:19) (93% - 100%)  CAPILLARY BLOOD GLUCOSE      POCT Blood Glucose.: 177 mg/dL (16 Nov 2020 08:32)  POCT Blood Glucose.: 157 mg/dL (15 Nov 2020 21:10)  POCT Blood Glucose.: 132 mg/dL (15 Nov 2020 17:35)  POCT Blood Glucose.: 122 mg/dL (15 Nov 2020 12:48)    I&O's Summary    15 Nov 2020 07:01  -  16 Nov 2020 07:00  --------------------------------------------------------  IN: 2260 mL / OUT: 2985 mL / NET: -725 mL      tele:    PHYSICAL EXAM:    GENERAL: NAD   HEENT: EOMI, PERRL  PULM: Clear to auscultation bilaterally  CV: Regular rate and rhythm; nl S1, S2; No murmurs, rubs, or gallops  ABDOMEN: Soft, Nontender, Nondistended; Bowel sounds present  EXTREMITIES/MSK:  No edema, calf tenderness   PSYCH: AAOx3  NEUROLOGY: non-focal          LABS:                        10.6   12.45 )-----------( 138      ( 16 Nov 2020 06:39 )             33.0     11-15    147<H>  |  116<H>  |  14  ----------------------------<  134<H>  3.9   |  17<L>  |  0.51    Ca    6.6<L>      15 Nov 2020 00:34              ABG - ( 14 Nov 2020 18:12 )  pH, Arterial: 7.34  pH, Blood: x     /  pCO2: 38    /  pO2: 121   / HCO3: 20    / Base Excess: -4.9  /  SaO2: 99                  RADIOLOGY & ADDITIONAL TESTS:    Imaging Personally Reviewed:    Consultant(s) Notes Reviewed:      Care Discussed with Consultants/Other Providers:   Toni Manriquez   Pager [phone]  Office [phone]      CC: Patient is a 65y old  Male who presents with a chief complaint of Reconstruction of Back with Muscle Flaps (15 Nov 2020 21:26)      SUBJECTIVE / OVERNIGHT EVENTS: Feels good. Ambulated without light-headedness. No cp/sob. no f/c/r. No n/v/d/abd pain    MEDICATIONS  (STANDING):  budesonide 160 MICROgram(s)/formoterol 4.5 MICROgram(s) Inhaler 2 Puff(s) Inhalation two times a day  dexAMETHasone  Injectable 4 milliGRAM(s) IV Push every 6 hours  dextrose 40% Gel 15 Gram(s) Oral once  dextrose 5%. 1000 milliLiter(s) (50 mL/Hr) IV Continuous <Continuous>  dextrose 5%. 1000 milliLiter(s) (100 mL/Hr) IV Continuous <Continuous>  dextrose 50% Injectable 25 Gram(s) IV Push once  dextrose 50% Injectable 12.5 Gram(s) IV Push once  dextrose 50% Injectable 25 Gram(s) IV Push once  enoxaparin Injectable 40 milliGRAM(s) SubCutaneous daily  gabapentin 300 milliGRAM(s) Oral three times a day  glucagon  Injectable 1 milliGRAM(s) IntraMuscular once  influenza   Vaccine 0.5 milliLiter(s) IntraMuscular once  insulin lispro (ADMELOG) corrective regimen sliding scale   SubCutaneous three times a day before meals  insulin lispro (ADMELOG) corrective regimen sliding scale   SubCutaneous at bedtime  pantoprazole    Tablet 40 milliGRAM(s) Oral before breakfast  polyethylene glycol 3350 17 Gram(s) Oral two times a day  senna 2 Tablet(s) Oral at bedtime  sodium chloride 0.9%. 1000 milliLiter(s) (75 mL/Hr) IV Continuous <Continuous>    MEDICATIONS  (PRN):  acetaminophen   Tablet .. 650 milliGRAM(s) Oral every 6 hours PRN Temp greater or equal to 38C (100.4F), Mild Pain (1 - 3)  ondansetron   Disintegrating Tablet 4 milliGRAM(s) Oral every 6 hours PRN Nausea  ondansetron Injectable 4 milliGRAM(s) IV Push once PRN Nausea and/or Vomiting  oxyCODONE    IR 5 milliGRAM(s) Oral every 4 hours PRN Moderate Pain (4 - 6)  oxyCODONE    IR 10 milliGRAM(s) Oral every 4 hours PRN Severe Pain (7 - 10)      Vital Signs Last 24 Hrs  T(C): 37.2 (16 Nov 2020 08:19), Max: 37.2 (16 Nov 2020 08:19)  T(F): 98.9 (16 Nov 2020 08:19), Max: 98.9 (16 Nov 2020 08:19)  HR: 76 (16 Nov 2020 08:19) (69 - 110)  BP: 110/68 (16 Nov 2020 08:19) (103/64 - 123/80)  BP(mean): 82 (15 Nov 2020 16:00) (77 - 82)  RR: 18 (16 Nov 2020 08:19) (17 - 19)  SpO2: 99% (16 Nov 2020 08:19) (93% - 100%)  CAPILLARY BLOOD GLUCOSE      POCT Blood Glucose.: 177 mg/dL (16 Nov 2020 08:32)  POCT Blood Glucose.: 157 mg/dL (15 Nov 2020 21:10)  POCT Blood Glucose.: 132 mg/dL (15 Nov 2020 17:35)  POCT Blood Glucose.: 122 mg/dL (15 Nov 2020 12:48)    I&O's Summary    15 Nov 2020 07:01  -  16 Nov 2020 07:00  --------------------------------------------------------  IN: 2260 mL / OUT: 2985 mL / NET: -725 mL          PHYSICAL EXAM:    GENERAL: NAD   HEENT: EOMI, PERRL  PULM: Clear to auscultation bilaterally  CV: Regular rate and rhythm; nl S1, S2; No murmurs, rubs, or gallops  ABDOMEN: Soft, Nontender, Nondistended; Bowel sounds present  EXTREMITIES/MSK:  No edema, calf tenderness   PSYCH: AAOx3  NEUROLOGY: non-focal          LABS:                        10.6   12.45 )-----------( 138      ( 16 Nov 2020 06:39 )             33.0     11-15    147<H>  |  116<H>  |  14  ----------------------------<  134<H>  3.9   |  17<L>  |  0.51    Ca    6.6<L>      15 Nov 2020 00:34              ABG - ( 14 Nov 2020 18:12 )  pH, Arterial: 7.34  pH, Blood: x     /  pCO2: 38    /  pO2: 121   / HCO3: 20    / Base Excess: -4.9  /  SaO2: 99                  RADIOLOGY & ADDITIONAL TESTS:    Imaging Personally Reviewed:    Consultant(s) Notes Reviewed:      Care Discussed with Consultants/Other Providers:   Toni Manriquez   Pager [phone]  Office [phone]      CC: Patient is a 65y old  Male who presents with a chief complaint of Reconstruction of Back with Muscle Flaps (15 Nov 2020 21:26)      SUBJECTIVE / OVERNIGHT EVENTS: Feels good. Ambulated without light-headedness. No cp/sob. no f/c/r. No n/v/d/abd pain    MEDICATIONS  (STANDING):  budesonide 160 MICROgram(s)/formoterol 4.5 MICROgram(s) Inhaler 2 Puff(s) Inhalation two times a day  dexAMETHasone  Injectable 4 milliGRAM(s) IV Push every 6 hours  dextrose 40% Gel 15 Gram(s) Oral once  dextrose 5%. 1000 milliLiter(s) (50 mL/Hr) IV Continuous <Continuous>  dextrose 5%. 1000 milliLiter(s) (100 mL/Hr) IV Continuous <Continuous>  dextrose 50% Injectable 25 Gram(s) IV Push once  dextrose 50% Injectable 12.5 Gram(s) IV Push once  dextrose 50% Injectable 25 Gram(s) IV Push once  enoxaparin Injectable 40 milliGRAM(s) SubCutaneous daily  gabapentin 300 milliGRAM(s) Oral three times a day  glucagon  Injectable 1 milliGRAM(s) IntraMuscular once  influenza   Vaccine 0.5 milliLiter(s) IntraMuscular once  insulin lispro (ADMELOG) corrective regimen sliding scale   SubCutaneous three times a day before meals  insulin lispro (ADMELOG) corrective regimen sliding scale   SubCutaneous at bedtime  pantoprazole    Tablet 40 milliGRAM(s) Oral before breakfast  polyethylene glycol 3350 17 Gram(s) Oral two times a day  senna 2 Tablet(s) Oral at bedtime  sodium chloride 0.9%. 1000 milliLiter(s) (75 mL/Hr) IV Continuous <Continuous>    MEDICATIONS  (PRN):  acetaminophen   Tablet .. 650 milliGRAM(s) Oral every 6 hours PRN Temp greater or equal to 38C (100.4F), Mild Pain (1 - 3)  ondansetron   Disintegrating Tablet 4 milliGRAM(s) Oral every 6 hours PRN Nausea  ondansetron Injectable 4 milliGRAM(s) IV Push once PRN Nausea and/or Vomiting  oxyCODONE    IR 5 milliGRAM(s) Oral every 4 hours PRN Moderate Pain (4 - 6)  oxyCODONE    IR 10 milliGRAM(s) Oral every 4 hours PRN Severe Pain (7 - 10)      Vital Signs Last 24 Hrs  T(C): 37.2 (16 Nov 2020 08:19), Max: 37.2 (16 Nov 2020 08:19)  T(F): 98.9 (16 Nov 2020 08:19), Max: 98.9 (16 Nov 2020 08:19)  HR: 76 (16 Nov 2020 08:19) (69 - 110)  BP: 110/68 (16 Nov 2020 08:19) (103/64 - 123/80)  BP(mean): 82 (15 Nov 2020 16:00) (77 - 82)  RR: 18 (16 Nov 2020 08:19) (17 - 19)  SpO2: 99% (16 Nov 2020 08:19) (93% - 100%)  CAPILLARY BLOOD GLUCOSE      POCT Blood Glucose.: 177 mg/dL (16 Nov 2020 08:32)  POCT Blood Glucose.: 157 mg/dL (15 Nov 2020 21:10)  POCT Blood Glucose.: 132 mg/dL (15 Nov 2020 17:35)  POCT Blood Glucose.: 122 mg/dL (15 Nov 2020 12:48)    I&O's Summary    15 Nov 2020 07:01  -  16 Nov 2020 07:00  --------------------------------------------------------  IN: 2260 mL / OUT: 2985 mL / NET: -725 mL          PHYSICAL EXAM:    GENERAL: NAD   HEENT: EOMI, PERRL  PULM: Clear to auscultation bilaterally  CV: Regular rate and rhythm; nl S1, S2; No murmurs, rubs, or gallops  ABDOMEN: Soft, Nontender, Nondistended; Bowel sounds present  EXTREMITIES/MSK:  No edema, calf tenderness   PSYCH: AAOx3  NEUROLOGY: non-focal          LABS:                        10.6   12.45 )-----------( 138      ( 16 Nov 2020 06:39 )             33.0     11-15    147<H>  |  116<H>  |  14  ----------------------------<  134<H>  3.9   |  17<L>  |  0.51    Ca    6.6<L>      15 Nov 2020 00:34              ABG - ( 14 Nov 2020 18:12 )  pH, Arterial: 7.34  pH, Blood: x     /  pCO2: 38    /  pO2: 121   / HCO3: 20    / Base Excess: -4.9  /  SaO2: 99                  RADIOLOGY & ADDITIONAL TESTS:    Imaging Personally Reviewed:    Consultant(s) Notes Reviewed:      Care Discussed with Consultants/Other Providers: neurosurg

## 2020-11-16 NOTE — PROGRESS NOTE ADULT - SUBJECTIVE AND OBJECTIVE BOX
SUBJECTIVE: Patient seen and examined at bedside. No acute overnight events. [facility] Denies chest pain, shortness of breath nausea/vomiting. Tolerating regular diet. Voiding. No bowel movement but +flatus.     Vital Signs Last 24 Hrs  T(C): 37.2 (11-16-20 @ 08:19), Max: 37.2 (11-16-20 @ 08:19)  T(F): 98.9 (11-16-20 @ 08:19), Max: 98.9 (11-16-20 @ 08:19)  HR: 104 (11-16-20 @ 10:45) (69 - 110)  BP: 93/52 (11-16-20 @ 10:45) (93/52 - 123/80)  BP(mean): 82 (11-15-20 @ 16:00) (77 - 82)  RR: 18 (11-16-20 @ 08:19) (18 - 19)  SpO2: 96% (11-16-20 @ 10:45) (93% - 100%)    PHYSICAL EXAM:    Constitutional: No Acute Distress, resting comfortably in bed     Neurological: Awake, alert, oriented to person, place and time, speech clear and fluent, face equal, tongue midline, briskly following commands, no drift, moves all extremities with 5/5 strength, sensation intact to light touch throughout, pupils 4mm and reactive bilaterally, extraocular movements intact, no nystagmus    Incision: +Aquacel AG dressing C/D/I    Drains: +HMV w/ 240cc output / 24 hrs, +SHERMAN w/ 70 cc output / 24 hrs    Pulmonary: Clear to Auscultation, No rales, No rhonchi, No wheezes     Cardiovascular: S1, S2, Regular rate and rhythm     Gastrointestinal: Soft, Non-tender, Non-distended, +bowel sounds x 4    Extremities: No calf tenderness bilaterally, no cyanosis, clubbing or edema        LABS:                          10.6   12.45 )-----------( 138      ( 16 Nov 2020 06:39 )             33.0    11-15    147<H>  |  116<H>  |  14  ----------------------------<  134<H>  3.9   |  17<L>  |  0.51    Ca    6.6<L>      15 Nov 2020 00:34        11-15 @ 07:01  -  11-16 @ 07:00  --------------------------------------------------------  IN: 2260 mL / OUT: 2985 mL / NET: -725 mL      IMAGING:     VA Duplex Lower Ext Vein Scan, Bilat (11.15.20 @ 13:35)    IMPRESSION:  No evidence of deep venous thrombosis in either lower extremity.    PRETTY MCLEAN MD; Attending Radiologist  This document has been electronically signed. Nov 15 2020  2:10PM    CT Thoracic Spine No Cont (11.15.20 @ 08:13)  IMPRESSION:  Expected postoperative changes from laminectomy and posterior instrumented fusion.    ROSE WHITMAN MD; Resident Interventional Radiology  This document has been electronically signed.  GAMALIEL MOELLER MD; Attending Radiologist  This document has been electronically signed. Nov 15 2020 10:40AM        MEDICATIONS:  Antibiotics:    Neuro:  acetaminophen   Tablet .. 650 milliGRAM(s) Oral every 6 hours PRN Temp greater or equal to 38C (100.4F), Mild Pain (1 - 3)  gabapentin 300 milliGRAM(s) Oral three times a day  ondansetron   Disintegrating Tablet 4 milliGRAM(s) Oral every 6 hours PRN Nausea  ondansetron Injectable 4 milliGRAM(s) IV Push once PRN Nausea and/or Vomiting  oxyCODONE    IR 5 milliGRAM(s) Oral every 4 hours PRN Moderate Pain (4 - 6)  oxyCODONE    IR 10 milliGRAM(s) Oral every 4 hours PRN Severe Pain (7 - 10)    Cardiac:    Pulm:  budesonide 160 MICROgram(s)/formoterol 4.5 MICROgram(s) Inhaler 2 Puff(s) Inhalation two times a day    GI/:  pantoprazole    Tablet 40 milliGRAM(s) Oral before breakfast  polyethylene glycol 3350 17 Gram(s) Oral two times a day  senna 2 Tablet(s) Oral at bedtime    Other:   dexAMETHasone  Injectable 4 milliGRAM(s) IV Push every 6 hours  dextrose 40% Gel 15 Gram(s) Oral once  dextrose 5%. 1000 milliLiter(s) IV Continuous <Continuous>  dextrose 5%. 1000 milliLiter(s) IV Continuous <Continuous>  dextrose 50% Injectable 25 Gram(s) IV Push once  dextrose 50% Injectable 12.5 Gram(s) IV Push once  dextrose 50% Injectable 25 Gram(s) IV Push once  enoxaparin Injectable 40 milliGRAM(s) SubCutaneous daily  glucagon  Injectable 1 milliGRAM(s) IntraMuscular once  influenza   Vaccine 0.5 milliLiter(s) IntraMuscular once  insulin lispro (ADMELOG) corrective regimen sliding scale   SubCutaneous three times a day before meals  insulin lispro (ADMELOG) corrective regimen sliding scale   SubCutaneous at bedtime  sodium chloride 0.9%. 1000 milliLiter(s) IV Continuous <Continuous>        DIET: regular

## 2020-11-16 NOTE — PROGRESS NOTE ADULT - PROBLEM SELECTOR PLAN 1
s/p T3-T12 INSTRUMENTATION  T4-6 LAMINECTOMY FOR EPIDURAL TUMOR on 11/14/20 c  cc.  f/u path s/p T3-T12 INSTRUMENTATION  T4-6 LAMINECTOMY FOR EPIDURAL TUMOR on 11/14/20 c  cc. c/w postop hypotension requiring phenylephrine. PRBC x 2.  f/u path

## 2020-11-16 NOTE — PROGRESS NOTE ADULT - ASSESSMENT
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 Transferred from [facility] for surgical resection of spinal cord tumor prior to possible radiation. Admitted to [facility] 11/14 s/p spinal angio: endovascular embolization of thoracic spinal tumor R T5 RT6 and LT6 with coils and particles; s/p T4-6 Laminectomy for resection of epidural fusion with T3-12 fusion with plastics closure POD# 2.      NEURO:  - Neuro checks every 4 hours  - Continue surgical drains; 1 HMV and 1 SHERMAN per plastics. Plastics following.   - 3 day decadron taper for spinal cord edema  - Pain control with Oxy IR PRN and Tylenol  - Continue Neurontin 300 TID for neuropathic pain  - Encouraged mobilization  - Awaiting final surgical path     PULM:  - Incentive spirometry  - Continue Symbicort BID for history of COPD    CV:  - Keep normotensive; BP on soft side. Will continue to trend.     RENAL:  - IVL today; patient eating and drinking adequately  - Voiding    GI:  - Continue regular diet  - Continue Protonix for GI prophylaxis  - Continue senna, miralax for bowel regimen    ENDO:   - Continue ISS for glucose control while on steroids. Goal euglycemia (-180)    HEME/ONC:  - Repeat CBC to f/u thrombocytopenia  - Repeat BMP to f/u hypernatremia  - Repeat lactate to trend; last lactate 4.5 on 11/14  - Will f/u with heme/onc. Patient will need PET scan as outpatient and f/u with his primary oncologist Dr. Briones. Potential radiation oncology as outpatient but awaiting final pathology; patient previously received radiation therapy to lower thoracic spine and plan for further radiation is to be determined.     ID:  - Completed milly-op antibiotics  - Afebrile    DISPO:  - PT: Home PT w/ RW (script given to  today)  - Will discuss with Dr. Martins  - Spectralink # [phone]

## 2020-11-16 NOTE — DISCHARGE NOTE NURSING/CASE MANAGEMENT/SOCIAL WORK - PATIENT PORTAL LINK FT
You can access the FollowMyHealth Patient Portal offered by [facility] by registering at the following website: [email or web address]. By joining [facility]’s FollowMyHealth portal, you will also be able to view your health information using other applications (apps) compatible with our system.

## 2020-11-16 NOTE — DISCHARGE NOTE NURSING/CASE MANAGEMENT/SOCIAL WORK - NSDCPEPTSTRK_GEN_ALL_CORE
Signs and symptoms of stroke/Call 911 for stroke/Need for follow up after discharge/Prescribed medications/Stroke education booklet/Risk factors for stroke/Stroke support groups for patients, families, and friends/Stroke warning signs and symptoms

## 2020-11-16 NOTE — PROGRESS NOTE ADULT - ASSESSMENT
COPD  nebs  monitor O2sat     Esophageal ca  s/p esophagectomy   fu with heme onc     Cigarette use   counseling given   pt does not want nicotine patch     DVT proph  lovenox    Hypocalcemia  check albumin     fu labs today

## 2020-11-16 NOTE — PROGRESS NOTE ADULT - ASSESSMENT
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 for radiation subsequently transferred to [facility] on 11/13 for neurosurgical intervention. MRI spine with metastatic disease involving thoracic spine from T5-T11 with epidural involvement spanning T4-T8 with severe spinal canal stenosis at T6 concerning for cord compression. Plan for decompression and tumor resection tomorrow 11/14/20.     PCP: Dr. Valero (sp?) Sure  Oncologist: Dr. 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 MRI spine with metastatic disease involving thoracic spine from T5-T11 with epidural involvement spanning T4-T8 with severe spinal canal stenosis at T6 concerning for cord compression. s/p T3-T12 INSTRUMENTATION  T4-6 LAMINECTOMY FOR EPIDURAL TUMOR on 11/14/20 c  cc.     PCP: Dr. Valero (sp?) Sure  Oncologist: Dr. Briones

## 2020-11-16 NOTE — PROGRESS NOTE ADULT - REASON FOR ADMISSION
Admitted 11/14 s/p spinal angio with endovascular embolization of thoracic spinal tumor R T5 RT6 and LT6 with coils and particles; s/p T4-6 Laminectomy for resection of epidural fusion with T3-12 fusion with plastics closure

## 2020-11-17 LAB
ANION GAP SERPL CALC-SCNC: 5 MMOL/L — SIGNIFICANT CHANGE UP (ref 5–17)
BUN SERPL-MCNC: 20 MG/DL — SIGNIFICANT CHANGE UP (ref 7–23)
CALCIUM SERPL-MCNC: 8.4 MG/DL — SIGNIFICANT CHANGE UP (ref 8.4–10.5)
CHLORIDE SERPL-SCNC: 106 MMOL/L — SIGNIFICANT CHANGE UP (ref 96–108)
CO2 SERPL-SCNC: 28 MMOL/L — SIGNIFICANT CHANGE UP (ref 22–31)
CREAT SERPL-MCNC: 0.68 MG/DL — SIGNIFICANT CHANGE UP (ref 0.5–1.3)
GLUCOSE BLDC GLUCOMTR-MCNC: 122 MG/DL — HIGH (ref 70–99)
GLUCOSE BLDC GLUCOMTR-MCNC: 126 MG/DL — HIGH (ref 70–99)
GLUCOSE BLDC GLUCOMTR-MCNC: 160 MG/DL — HIGH (ref 70–99)
GLUCOSE BLDC GLUCOMTR-MCNC: 181 MG/DL — HIGH (ref 70–99)
GLUCOSE SERPL-MCNC: 118 MG/DL — HIGH (ref 70–99)
HCT VFR BLD CALC: 31 % — LOW (ref 39–50)
HGB BLD-MCNC: 10.4 G/DL — LOW (ref 13–17)
LACTATE SERPL-SCNC: 1.7 MMOL/L — SIGNIFICANT CHANGE UP (ref 0.7–2)
MCHC RBC-ENTMCNC: 29.8 PG — SIGNIFICANT CHANGE UP (ref 27–34)
MCHC RBC-ENTMCNC: 33.5 GM/DL — SIGNIFICANT CHANGE UP (ref 32–36)
MCV RBC AUTO: 88.8 FL — SIGNIFICANT CHANGE UP (ref 80–100)
NRBC # BLD: 0 /100 WBCS — SIGNIFICANT CHANGE UP (ref 0–0)
PLATELET # BLD AUTO: 217 K/UL — SIGNIFICANT CHANGE UP (ref 150–400)
POTASSIUM SERPL-MCNC: 4.8 MMOL/L — SIGNIFICANT CHANGE UP (ref 3.5–5.3)
POTASSIUM SERPL-SCNC: 4.8 MMOL/L — SIGNIFICANT CHANGE UP (ref 3.5–5.3)
RBC # BLD: 3.49 M/UL — LOW (ref 4.2–5.8)
RBC # FLD: 13.8 % — SIGNIFICANT CHANGE UP (ref 10.3–14.5)
SODIUM SERPL-SCNC: 139 MMOL/L — SIGNIFICANT CHANGE UP (ref 135–145)
SURGICAL PATHOLOGY STUDY: SIGNIFICANT CHANGE UP
WBC # BLD: 13.84 K/UL — HIGH (ref 3.8–10.5)
WBC # FLD AUTO: 13.84 K/UL — HIGH (ref 3.8–10.5)

## 2020-11-17 PROCEDURE — 99233 SBSQ HOSP IP/OBS HIGH 50: CPT

## 2020-11-17 RX ADMIN — OXYCODONE HYDROCHLORIDE 5 MILLIGRAM(S): 5 TABLET ORAL at 21:26

## 2020-11-17 RX ADMIN — GABAPENTIN 300 MILLIGRAM(S): 400 CAPSULE ORAL at 05:37

## 2020-11-17 RX ADMIN — SENNA PLUS 2 TABLET(S): 8.6 TABLET ORAL at 21:24

## 2020-11-17 RX ADMIN — GABAPENTIN 300 MILLIGRAM(S): 400 CAPSULE ORAL at 12:53

## 2020-11-17 RX ADMIN — BUDESONIDE AND FORMOTEROL FUMARATE DIHYDRATE 2 PUFF(S): 160; 4.5 AEROSOL RESPIRATORY (INHALATION) at 17:16

## 2020-11-17 RX ADMIN — ENOXAPARIN SODIUM 40 MILLIGRAM(S): 100 INJECTION SUBCUTANEOUS at 12:54

## 2020-11-17 RX ADMIN — Medication 2: at 12:53

## 2020-11-17 RX ADMIN — Medication 4 MILLIGRAM(S): at 05:37

## 2020-11-17 RX ADMIN — BUDESONIDE AND FORMOTEROL FUMARATE DIHYDRATE 2 PUFF(S): 160; 4.5 AEROSOL RESPIRATORY (INHALATION) at 05:37

## 2020-11-17 RX ADMIN — Medication 2: at 17:16

## 2020-11-17 RX ADMIN — Medication 2 MILLIGRAM(S): at 12:53

## 2020-11-17 RX ADMIN — GABAPENTIN 300 MILLIGRAM(S): 400 CAPSULE ORAL at 21:24

## 2020-11-17 RX ADMIN — Medication 2 MILLIGRAM(S): at 21:24

## 2020-11-17 RX ADMIN — PANTOPRAZOLE SODIUM 40 MILLIGRAM(S): 20 TABLET, DELAYED RELEASE ORAL at 05:38

## 2020-11-17 NOTE — PROGRESS NOTE ADULT - PROBLEM SELECTOR PLAN 1
s/p T3-T12 INSTRUMENTATION  T4-6 LAMINECTOMY FOR EPIDURAL TUMOR on 11/14/20 c  cc. c/w postop hypotension requiring phenylephrine. PRBC x 2.  f/u path

## 2020-11-17 NOTE — PROGRESS NOTE ADULT - ASSESSMENT
COPD  nebs  stable     Esophageal ca  s/p esophagectomy   fu with heme onc     DVT proph  lovenox

## 2020-11-17 NOTE — PROGRESS NOTE ADULT - SUBJECTIVE AND OBJECTIVE BOX
SUBJECTIVE: Patient seen and examined at bedside. Complains of incisional pain.     OVERNIGHT EVENTS: none     Vital Signs Last 24 Hrs  T(C): 36.4 (17 Nov 2020 08:10), Max: 36.7 (16 Nov 2020 15:34)  T(F): 97.5 (17 Nov 2020 08:10), Max: 98.1 (16 Nov 2020 15:34)  HR: 97 (17 Nov 2020 08:10) (77 - 97)  BP: 130/76 (17 Nov 2020 08:10) (100/61 - 130/76)  BP(mean): --  RR: 18 (17 Nov 2020 08:10) (18 - 18)  SpO2: 98% (17 Nov 2020 08:10) (95% - 98%)    PHYSICAL EXAM:    General: No Acute Distress     Neurological: Awake, alert oriented to person, place and time, Following Commands, PERRL, EOMI, Face Symmetrical, Speech Fluent, Moving all extremities, Muscle Strength normal in all four extremities, No Drift, Sensation to Light Touch Intact    Pulmonary: Clear to Auscultation, No Rales, No Rhonchi, No Wheezes     Cardiovascular: S1, S2, Regular Rate and Rhythm     Gastrointestinal: Soft, Nontender, Nondistended     Incision: c/d/i, + aqua cell dressing     LABS:                        10.4   13.84 )-----------( 217      ( 17 Nov 2020 05:26 )             31.0    11-17    139  |  106  |  20  ----------------------------<  118<H>  4.8   |  28  |  0.68    Ca    8.4      17 Nov 2020 05:26    TPro  5.4<L>  /  Alb  3.1<L>  /  TBili  0.5  /  DBili  x   /  AST  24  /  ALT  21  /  AlkPhos  68  11-16 11-16 @ 07:01  -  11-17 @ 07:00  --------------------------------------------------------  IN: 1680 mL / OUT: 1885 mL / NET: -205 mL      DRAINS: + HMV (180 cc/24 hours) + SHERMAN (105cc/24 hours)     MEDICATIONS  (STANDING):  bisacodyl Suppository 10 milliGRAM(s) Rectal once  budesonide 160 MICROgram(s)/formoterol 4.5 MICROgram(s) Inhaler 2 Puff(s) Inhalation two times a day  dexAMETHasone     Tablet   Oral   dexAMETHasone     Tablet 2 milliGRAM(s) Oral every 8 hours  dextrose 40% Gel 15 Gram(s) Oral once  dextrose 5%. 1000 milliLiter(s) (50 mL/Hr) IV Continuous <Continuous>  dextrose 5%. 1000 milliLiter(s) (100 mL/Hr) IV Continuous <Continuous>  dextrose 50% Injectable 25 Gram(s) IV Push once  dextrose 50% Injectable 12.5 Gram(s) IV Push once  dextrose 50% Injectable 25 Gram(s) IV Push once  enoxaparin Injectable 40 milliGRAM(s) SubCutaneous daily  gabapentin 300 milliGRAM(s) Oral three times a day  glucagon  Injectable 1 milliGRAM(s) IntraMuscular once  influenza   Vaccine 0.5 milliLiter(s) IntraMuscular once  insulin lispro (ADMELOG) corrective regimen sliding scale   SubCutaneous at bedtime  insulin lispro (ADMELOG) corrective regimen sliding scale   SubCutaneous three times a day before meals  pantoprazole    Tablet 40 milliGRAM(s) Oral before breakfast  polyethylene glycol 3350 17 Gram(s) Oral two times a day  senna 2 Tablet(s) Oral at bedtime    MEDICATIONS  (PRN):  acetaminophen   Tablet .. 650 milliGRAM(s) Oral every 6 hours PRN Temp greater or equal to 38C (100.4F), Mild Pain (1 - 3)  ondansetron   Disintegrating Tablet 4 milliGRAM(s) Oral every 6 hours PRN Nausea  ondansetron Injectable 4 milliGRAM(s) IV Push once PRN Nausea and/or Vomiting  oxyCODONE    IR 5 milliGRAM(s) Oral every 4 hours PRN Moderate Pain (4 - 6)  oxyCODONE    IR 10 milliGRAM(s) Oral every 4 hours PRN Severe Pain (7 - 10)      DIET: regular diet     IMAGING: no new imaging

## 2020-11-17 NOTE — PROGRESS NOTE ADULT - ASSESSMENT
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 Transferred from [facility] for surgical resection of spinal cord tumor prior to possible radiation. Admitted to [facility] 11/14     PROCEDURE: 11/14 s/p spinal angio: endovascular embolization of thoracic spinal tumor R T5 RT6 and LT6 with coils and particles; 11/14 s/p T4-6 Laminectomy for resection of epidural fusion with T3-12 fusion with plastics closure POD#3    PLAN:   -Neuro: continue drains and monitor output. Plastics team following  - 3 day decadron taper for spinal cord edema  -Oxy IR for pain control   - Continue Neurontin for neuropathic pain  - Awaiting final surgical path.  Heme/onc saw. Recommend 1) pt not candidate for radiation based on prior radiation treatment 2) will await pathology  3) will need outpatient pet  4) pt to follow with Dr Briones    - Encouraged incentive spirometry  - Symbicort  for history of COPD  -Continue regular diet.  -Senna and miralax for bowel regimen  -Thrombocytopenia resolved  -Lactate normalized. Will IVL patient   -Leukocytosis most likely secondary to steroids. Patient remains afebrile and hemodynamically stable. Will trend in am.   -DVT ppx: venodynes and sql  -PT: home with home PT when stable     Will discuss above with Dr. Martins  Spectra #[phone]

## 2020-11-17 NOTE — PROGRESS NOTE ADULT - ASSESSMENT
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 MRI spine with metastatic disease involving thoracic spine from T5-T11 with epidural involvement spanning T4-T8 with severe spinal canal stenosis at T6 concerning for cord compression. s/p T3-T12 INSTRUMENTATION  T4-6 LAMINECTOMY FOR EPIDURAL TUMOR on 11/14/20 c  cc.     Oncologist: Dr. Briones

## 2020-11-17 NOTE — PROGRESS NOTE ADULT - SUBJECTIVE AND OBJECTIVE BOX
Subjective: Patient seen and examined. No new events except as noted.     SUBJECTIVE/ROS:  No chest pain, dyspnea, palpitation, or dizziness.       MEDICATIONS:  MEDICATIONS  (STANDING):  bisacodyl Suppository 10 milliGRAM(s) Rectal once  budesonide 160 MICROgram(s)/formoterol 4.5 MICROgram(s) Inhaler 2 Puff(s) Inhalation two times a day  dexAMETHasone     Tablet   Oral   dexAMETHasone     Tablet 2 milliGRAM(s) Oral every 8 hours  dextrose 40% Gel 15 Gram(s) Oral once  dextrose 5%. 1000 milliLiter(s) (50 mL/Hr) IV Continuous <Continuous>  dextrose 5%. 1000 milliLiter(s) (100 mL/Hr) IV Continuous <Continuous>  dextrose 50% Injectable 25 Gram(s) IV Push once  dextrose 50% Injectable 12.5 Gram(s) IV Push once  dextrose 50% Injectable 25 Gram(s) IV Push once  enoxaparin Injectable 40 milliGRAM(s) SubCutaneous daily  gabapentin 300 milliGRAM(s) Oral three times a day  glucagon  Injectable 1 milliGRAM(s) IntraMuscular once  influenza   Vaccine 0.5 milliLiter(s) IntraMuscular once  insulin lispro (ADMELOG) corrective regimen sliding scale   SubCutaneous at bedtime  insulin lispro (ADMELOG) corrective regimen sliding scale   SubCutaneous three times a day before meals  pantoprazole    Tablet 40 milliGRAM(s) Oral before breakfast  polyethylene glycol 3350 17 Gram(s) Oral two times a day  senna 2 Tablet(s) Oral at bedtime  sodium chloride 0.9%. 1000 milliLiter(s) (75 mL/Hr) IV Continuous <Continuous>      PHYSICAL EXAM:  T(C): 36.4 (11-17-20 @ 08:10), Max: 36.7 (11-16-20 @ 11:33)  HR: 97 (11-17-20 @ 08:10) (77 - 104)  BP: 130/76 (11-17-20 @ 08:10) (93/52 - 130/76)  RR: 18 (11-17-20 @ 08:10) (18 - 18)  SpO2: 98% (11-17-20 @ 08:10) (95% - 98%)  Wt(kg): --  I&O's Summary    16 Nov 2020 07:01  -  17 Nov 2020 07:00  --------------------------------------------------------  IN: 1680 mL / OUT: 1885 mL / NET: -205 mL            JVP: Normal  Neck: supple  Lung: clear   CV: S1 S2 , Murmur:  Abd: soft  Ext: No edema  neuro: Awake / alert  Psych: flat affect  Skin: normal``    LABS/DATA:    CARDIAC MARKERS:                                10.4   13.84 )-----------( 217      ( 17 Nov 2020 05:26 )             31.0     11-17    139  |  106  |  20  ----------------------------<  118<H>  4.8   |  28  |  0.68    Ca    8.4      17 Nov 2020 05:26    TPro  5.4<L>  /  Alb  3.1<L>  /  TBili  0.5  /  DBili  x   /  AST  24  /  ALT  21  /  AlkPhos  68  11-16    proBNP:   Lipid Profile:   HgA1c:   TSH:     TELE:  EKG:

## 2020-11-17 NOTE — PROGRESS NOTE ADULT - SUBJECTIVE AND OBJECTIVE BOX
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 1000 milliLiter(s) (50 mL/Hr) IV Continuous <Continuous>  dextrose 5%. 1000 milliLiter(s) (100 mL/Hr) IV Continuous <Continuous>  dextrose 50% Injectable 25 Gram(s) IV Push once  dextrose 50% Injectable 12.5 Gram(s) IV Push once  dextrose 50% Injectable 25 Gram(s) IV Push once  enoxaparin Injectable 40 milliGRAM(s) SubCutaneous daily  gabapentin 300 milliGRAM(s) Oral three times a day  glucagon  Injectable 1 milliGRAM(s) IntraMuscular once  influenza   Vaccine 0.5 milliLiter(s) IntraMuscular once  insulin lispro (ADMELOG) corrective regimen sliding scale   SubCutaneous three times a day before meals  insulin lispro (ADMELOG) corrective regimen sliding scale   SubCutaneous at bedtime  pantoprazole    Tablet 40 milliGRAM(s) Oral before breakfast  polyethylene glycol 3350 17 Gram(s) Oral two times a day  senna 2 Tablet(s) Oral at bedtime  sodium chloride 0.9%. 1000 milliLiter(s) (75 mL/Hr) IV Continuous <Continuous>    MEDICATIONS  (PRN):  acetaminophen   Tablet .. 650 milliGRAM(s) Oral every 6 hours PRN Temp greater or equal to 38C (100.4F), Mild Pain (1 - 3)  ondansetron   Disintegrating Tablet 4 milliGRAM(s) Oral every 6 hours PRN Nausea  ondansetron Injectable 4 milliGRAM(s) IV Push once PRN Nausea and/or Vomiting  oxyCODONE    IR 5 milliGRAM(s) Oral every 4 hours PRN Moderate Pain (4 - 6)  oxyCODONE    IR 10 milliGRAM(s) Oral every 4 hours PRN Severe Pain (7 - 10)    CAPILLARY BLOOD GLUCOSE      POCT Blood Glucose.: 126 mg/dL (17 Nov 2020 08:46)  POCT Blood Glucose.: 172 mg/dL (16 Nov 2020 21:36)  POCT Blood Glucose.: 152 mg/dL (16 Nov 2020 17:20)  POCT Blood Glucose.: 148 mg/dL (16 Nov 2020 12:23)    I&O's Summary    16 Nov 2020 07:01  -  17 Nov 2020 07:00  --------------------------------------------------------  IN: 1680 mL / OUT: 1885 mL / NET: -205 mL        PHYSICAL EXAM:  Vital Signs Last 24 Hrs  T(C): 36.4 (17 Nov 2020 08:10), Max: 36.7 (16 Nov 2020 11:33)  T(F): 97.5 (17 Nov 2020 08:10), Max: 98.1 (16 Nov 2020 15:34)  HR: 97 (17 Nov 2020 08:10) (77 - 97)  BP: 130/76 (17 Nov 2020 08:10) (100/61 - 130/76)  BP(mean): --  RR: 18 (17 Nov 2020 08:10) (18 - 18)  SpO2: 98% (17 Nov 2020 08:10) (95% - 98%)  CONSTITUTIONAL: NAD, well-developed, well-groomed  ENMT: Moist oral mucosa, no pharyngeal injection or exudates; normal dentition  RESPIRATORY: Normal respiratory effort; lungs are clear to auscultation bilaterally  CARDIOVASCULAR: Regular rate and rhythm, normal S1 and S2, no murmur/rub/gallop; No lower extremity edema; Peripheral pulses are 2+ bilaterally  ABDOMEN: Nontender to palpation, normoactive bowel sounds, no rebound/guarding; No hepatosplenomegaly  PSYCH: A+O to person, place, and time; affect appropriate  NEUROLOGY: CN 2-12 are intact and symmetric; no gross sensory deficits   SKIN: No rashes; no palpable lesions    LABS:                        10.4   13.84 )-----------( 217      ( 17 Nov 2020 05:26 )             31.0     11-17    139  |  106  |  20  ----------------------------<  118<H>  4.8   |  28  |  0.68    Ca    8.4      17 Nov 2020 05:26    TPro  5.4<L>  /  Alb  3.1<L>  /  TBili  0.5  /  DBili  x   /  AST  24  /  ALT  21  /  AlkPhos  68  11-16              COVID-19 PCR: [facility] (09 Nov 2020 19:04)      RADIOLOGY & ADDITIONAL TESTS:  Imaging from Last 24 Hours:    Electrocardiogram/QTc Interval:    COORDINATION OF CARE:  Care Discussed with Consultants/Other Providers:   Toni Manriquez   Pager [phone]  Office [phone]      CC: Patient is a 65y old  Male who presents with a chief complaint of Admitted 11/14 s/p spinal angio with endovascular 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 No light-headedness c standing or ambulation. No CP/SOB. No n/v/d. No focal deficits    MEDICATIONS  (STANDING):  bisacodyl Suppository 10 milliGRAM(s) Rectal once  budesonide 160 MICROgram(s)/formoterol 4.5 MICROgram(s) Inhaler 2 Puff(s) Inhalation two times a day  dexAMETHasone     Tablet   Oral   dexAMETHasone     Tablet 2 milliGRAM(s) Oral every 8 hours  dextrose 40% Gel 15 Gram(s) Oral once  dextrose 5%. 1000 milliLiter(s) (50 mL/Hr) IV Continuous <Continuous>  dextrose 5%. 1000 milliLiter(s) (100 mL/Hr) IV Continuous <Continuous>  dextrose 50% Injectable 25 Gram(s) IV Push once  dextrose 50% Injectable 12.5 Gram(s) IV Push once  dextrose 50% Injectable 25 Gram(s) IV Push once  enoxaparin Injectable 40 milliGRAM(s) SubCutaneous daily  gabapentin 300 milliGRAM(s) Oral three times a day  glucagon  Injectable 1 milliGRAM(s) IntraMuscular once  influenza   Vaccine 0.5 milliLiter(s) IntraMuscular once  insulin lispro (ADMELOG) corrective regimen sliding scale   SubCutaneous three times a day before meals  insulin lispro (ADMELOG) corrective regimen sliding scale   SubCutaneous at bedtime  pantoprazole    Tablet 40 milliGRAM(s) Oral before breakfast  polyethylene glycol 3350 17 Gram(s) Oral two times a day  senna 2 Tablet(s) Oral at bedtime    MEDICATIONS  (PRN):  acetaminophen   Tablet .. 650 milliGRAM(s) Oral every 6 hours PRN Temp greater or equal to 38C (100.4F), Mild Pain (1 - 3)  ondansetron   Disintegrating Tablet 4 milliGRAM(s) Oral every 6 hours PRN Nausea  ondansetron Injectable 4 milliGRAM(s) IV Push once PRN Nausea and/or Vomiting  oxyCODONE    IR 5 milliGRAM(s) Oral every 4 hours PRN Moderate Pain (4 - 6)  oxyCODONE    IR 10 milliGRAM(s) Oral every 4 hours PRN Severe Pain (7 - 10)      Vital Signs Last 24 Hrs  T(C): 36.6 (17 Nov 2020 12:00), Max: 36.7 (16 Nov 2020 15:34)  T(F): 97.9 (17 Nov 2020 12:00), Max: 98.1 (16 Nov 2020 15:34)  HR: 90 (17 Nov 2020 12:00) (77 - 97)  BP: 124/72 (17 Nov 2020 12:00) (100/61 - 130/76)  BP(mean): --  RR: 18 (17 Nov 2020 12:00) (18 - 18)  SpO2: 97% (17 Nov 2020 12:00) (95% - 98%)  CAPILLARY BLOOD GLUCOSE      POCT Blood Glucose.: 181 mg/dL (17 Nov 2020 12:43)  POCT Blood Glucose.: 126 mg/dL (17 Nov 2020 08:46)  POCT Blood Glucose.: 172 mg/dL (16 Nov 2020 21:36)  POCT Blood Glucose.: 152 mg/dL (16 Nov 2020 17:20)    I&O's Summary    16 Nov 2020 07:01  -  17 Nov 2020 07:00  --------------------------------------------------------  IN: 1680 mL / OUT: 1885 mL / NET: -205 mL          PHYSICAL EXAM:    GENERAL: NAD   HEENT: EOMI, PERRL  PULM: Clear to auscultation bilaterally  CV: Regular rate and rhythm; nl S1, S2; No murmurs, rubs, or gallops  ABDOMEN: Soft, Nontender, Nondistended; Bowel sounds present  EXTREMITIES/MSK:  No edema, calf tenderness   PSYCH: AAOx3  NEUROLOGY: non-focal          LABS:                        10.4   13.84 )-----------( 217      ( 17 Nov 2020 05:26 )             31.0     11-17    139  |  106  |  20  ----------------------------<  118<H>  4.8   |  28  |  0.68    Ca    8.4      17 Nov 2020 05:26    TPro  5.4<L>  /  Alb  3.1<L>  /  TBili  0.5  /  DBili  x   /  AST  24  /  ALT  21  /  AlkPhos  68  11-16                RADIOLOGY & ADDITIONAL TESTS:    Imaging Personally Reviewed:    Consultant(s) Notes Reviewed:      Care Discussed with Consultants/Other Providers: neurosurg regard lactate

## 2020-11-18 DIAGNOSIS — D72.829 ELEVATED WHITE BLOOD CELL COUNT, UNSPECIFIED: ICD-10-CM

## 2020-11-18 LAB
ANION GAP SERPL CALC-SCNC: 5 MMOL/L — SIGNIFICANT CHANGE UP (ref 5–17)
BUN SERPL-MCNC: 20 MG/DL — SIGNIFICANT CHANGE UP (ref 7–23)
CALCIUM SERPL-MCNC: 8.3 MG/DL — LOW (ref 8.4–10.5)
CHLORIDE SERPL-SCNC: 102 MMOL/L — SIGNIFICANT CHANGE UP (ref 96–108)
CO2 SERPL-SCNC: 29 MMOL/L — SIGNIFICANT CHANGE UP (ref 22–31)
CREAT SERPL-MCNC: 0.69 MG/DL — SIGNIFICANT CHANGE UP (ref 0.5–1.3)
GLUCOSE BLDC GLUCOMTR-MCNC: 112 MG/DL — HIGH (ref 70–99)
GLUCOSE BLDC GLUCOMTR-MCNC: 126 MG/DL — HIGH (ref 70–99)
GLUCOSE SERPL-MCNC: 116 MG/DL — HIGH (ref 70–99)
HCT VFR BLD CALC: 32.4 % — LOW (ref 39–50)
HGB BLD-MCNC: 10.7 G/DL — LOW (ref 13–17)
MCHC RBC-ENTMCNC: 29.6 PG — SIGNIFICANT CHANGE UP (ref 27–34)
MCHC RBC-ENTMCNC: 33 GM/DL — SIGNIFICANT CHANGE UP (ref 32–36)
MCV RBC AUTO: 89.5 FL — SIGNIFICANT CHANGE UP (ref 80–100)
NRBC # BLD: 0 /100 WBCS — SIGNIFICANT CHANGE UP (ref 0–0)
PLATELET # BLD AUTO: 258 K/UL — SIGNIFICANT CHANGE UP (ref 150–400)
POTASSIUM SERPL-MCNC: 4.3 MMOL/L — SIGNIFICANT CHANGE UP (ref 3.5–5.3)
POTASSIUM SERPL-SCNC: 4.3 MMOL/L — SIGNIFICANT CHANGE UP (ref 3.5–5.3)
RBC # BLD: 3.62 M/UL — LOW (ref 4.2–5.8)
RBC # FLD: 13.8 % — SIGNIFICANT CHANGE UP (ref 10.3–14.5)
SODIUM SERPL-SCNC: 136 MMOL/L — SIGNIFICANT CHANGE UP (ref 135–145)
WBC # BLD: 15.33 K/UL — HIGH (ref 3.8–10.5)
WBC # FLD AUTO: 15.33 K/UL — HIGH (ref 3.8–10.5)

## 2020-11-18 PROCEDURE — 99232 SBSQ HOSP IP/OBS MODERATE 35: CPT

## 2020-11-18 RX ADMIN — OXYCODONE HYDROCHLORIDE 5 MILLIGRAM(S): 5 TABLET ORAL at 14:00

## 2020-11-18 RX ADMIN — BUDESONIDE AND FORMOTEROL FUMARATE DIHYDRATE 2 PUFF(S): 160; 4.5 AEROSOL RESPIRATORY (INHALATION) at 21:53

## 2020-11-18 RX ADMIN — OXYCODONE HYDROCHLORIDE 5 MILLIGRAM(S): 5 TABLET ORAL at 13:03

## 2020-11-18 RX ADMIN — INFLUENZA VIRUS VACCINE 0.5 MILLILITER(S): 15; 15; 15; 15 SUSPENSION INTRAMUSCULAR at 13:04

## 2020-11-18 RX ADMIN — BUDESONIDE AND FORMOTEROL FUMARATE DIHYDRATE 2 PUFF(S): 160; 4.5 AEROSOL RESPIRATORY (INHALATION) at 12:16

## 2020-11-18 RX ADMIN — ENOXAPARIN SODIUM 40 MILLIGRAM(S): 100 INJECTION SUBCUTANEOUS at 12:15

## 2020-11-18 RX ADMIN — OXYCODONE HYDROCHLORIDE 10 MILLIGRAM(S): 5 TABLET ORAL at 19:58

## 2020-11-18 RX ADMIN — SENNA PLUS 2 TABLET(S): 8.6 TABLET ORAL at 21:53

## 2020-11-18 RX ADMIN — POLYETHYLENE GLYCOL 3350 17 GRAM(S): 17 POWDER, FOR SOLUTION ORAL at 12:00

## 2020-11-18 RX ADMIN — GABAPENTIN 300 MILLIGRAM(S): 400 CAPSULE ORAL at 13:03

## 2020-11-18 RX ADMIN — OXYCODONE HYDROCHLORIDE 10 MILLIGRAM(S): 5 TABLET ORAL at 20:28

## 2020-11-18 RX ADMIN — PANTOPRAZOLE SODIUM 40 MILLIGRAM(S): 20 TABLET, DELAYED RELEASE ORAL at 05:11

## 2020-11-18 RX ADMIN — GABAPENTIN 300 MILLIGRAM(S): 400 CAPSULE ORAL at 21:53

## 2020-11-18 RX ADMIN — POLYETHYLENE GLYCOL 3350 17 GRAM(S): 17 POWDER, FOR SOLUTION ORAL at 17:33

## 2020-11-18 RX ADMIN — Medication 2 MILLIGRAM(S): at 05:11

## 2020-11-18 RX ADMIN — GABAPENTIN 300 MILLIGRAM(S): 400 CAPSULE ORAL at 05:11

## 2020-11-18 NOTE — PROGRESS NOTE ADULT - SUBJECTIVE AND OBJECTIVE BOX
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 1000 milliLiter(s) (50 mL/Hr) IV Continuous <Continuous>  dextrose 5%. 1000 milliLiter(s) (100 mL/Hr) IV Continuous <Continuous>  dextrose 50% Injectable 25 Gram(s) IV Push once  dextrose 50% Injectable 12.5 Gram(s) IV Push once  dextrose 50% Injectable 25 Gram(s) IV Push once  enoxaparin Injectable 40 milliGRAM(s) SubCutaneous daily  gabapentin 300 milliGRAM(s) Oral three times a day  glucagon  Injectable 1 milliGRAM(s) IntraMuscular once  influenza   Vaccine 0.5 milliLiter(s) IntraMuscular once  insulin lispro (ADMELOG) corrective regimen sliding scale   SubCutaneous three times a day before meals  insulin lispro (ADMELOG) corrective regimen sliding scale   SubCutaneous at bedtime  pantoprazole    Tablet 40 milliGRAM(s) Oral before breakfast  polyethylene glycol 3350 17 Gram(s) Oral two times a day  senna 2 Tablet(s) Oral at bedtime    MEDICATIONS  (PRN):  acetaminophen   Tablet .. 650 milliGRAM(s) Oral every 6 hours PRN Temp greater or equal to 38C (100.4F), Mild Pain (1 - 3)  ondansetron   Disintegrating Tablet 4 milliGRAM(s) Oral every 6 hours PRN Nausea  ondansetron Injectable 4 milliGRAM(s) IV Push once PRN Nausea and/or Vomiting  oxyCODONE    IR 5 milliGRAM(s) Oral every 4 hours PRN Moderate Pain (4 - 6)  oxyCODONE    IR 10 milliGRAM(s) Oral every 4 hours PRN Severe Pain (7 - 10)      Vital Signs Last 24 Hrs  T(C): 36.4 (18 Nov 2020 08:14), Max: 36.6 (17 Nov 2020 12:00)  T(F): 97.5 (18 Nov 2020 08:14), Max: 97.9 (17 Nov 2020 12:00)  HR: 68 (18 Nov 2020 08:14) (68 - 92)  BP: 108/69 (18 Nov 2020 08:14) (105/68 - 124/72)  BP(mean): --  RR: 18 (18 Nov 2020 08:14) (18 - 18)  SpO2: 97% (18 Nov 2020 08:14) (96% - 98%)  CAPILLARY BLOOD GLUCOSE      POCT Blood Glucose.: 112 mg/dL (18 Nov 2020 08:56)  POCT Blood Glucose.: 122 mg/dL (17 Nov 2020 21:23)  POCT Blood Glucose.: 160 mg/dL (17 Nov 2020 17:06)  POCT Blood Glucose.: 181 mg/dL (17 Nov 2020 12:43)    I&O's Summary    17 Nov 2020 07:01  -  18 Nov 2020 07:00  --------------------------------------------------------  IN: 1140 mL / OUT: 2500 mL / NET: -1360 mL    18 Nov 2020 07:01  -  18 Nov 2020 11:54  --------------------------------------------------------  IN: 0 mL / OUT: 600 mL / NET: -600 mL      tele:    PHYSICAL EXAM:    GENERAL: NAD   HEENT: EOMI, PERRL  PULM: Clear to auscultation bilaterally  CV: Regular rate and rhythm; nl S1, S2; No murmurs, rubs, or gallops  ABDOMEN: Soft, Nontender, Nondistended; Bowel sounds present  EXTREMITIES/MSK:  No edema, calf tenderness   PSYCH: AAOx3  NEUROLOGY: non-focal          LABS:                        10.7   15.33 )-----------( 258      ( 18 Nov 2020 05:43 )             32.4     11-18    136  |  102  |  20  ----------------------------<  116<H>  4.3   |  29  |  0.69    Ca    8.3<L>      18 Nov 2020 05:43    TPro  5.4<L>  /  Alb  3.1<L>  /  TBili  0.5  /  DBili  x   /  AST  24  /  ALT  21  /  AlkPhos  68  11-16                RADIOLOGY & ADDITIONAL TESTS:    Imaging Personally Reviewed:    Consultant(s) Notes Reviewed:      Care Discussed with Consultants/Other Providers:   Toni Manriquez   Pager [phone]  Office [phone]      CC: Patient is a 65y old  Male who presents with a chief complaint of Admitted 11/14 s/p spinal angio with endovascular embolization of thoracic spinal tumor R T5 RT6 and 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 No f/c/r/malaise. No cp/sob/cough. No n/v/d.    MEDICATIONS  (STANDING):  bisacodyl Suppository 10 milliGRAM(s) Rectal once  budesonide 160 MICROgram(s)/formoterol 4.5 MICROgram(s) Inhaler 2 Puff(s) Inhalation two times a day  dextrose 40% Gel 15 Gram(s) Oral once  dextrose 5%. 1000 milliLiter(s) (50 mL/Hr) IV Continuous <Continuous>  dextrose 5%. 1000 milliLiter(s) (100 mL/Hr) IV Continuous <Continuous>  dextrose 50% Injectable 25 Gram(s) IV Push once  dextrose 50% Injectable 12.5 Gram(s) IV Push once  dextrose 50% Injectable 25 Gram(s) IV Push once  enoxaparin Injectable 40 milliGRAM(s) SubCutaneous daily  gabapentin 300 milliGRAM(s) Oral three times a day  glucagon  Injectable 1 milliGRAM(s) IntraMuscular once  influenza   Vaccine 0.5 milliLiter(s) IntraMuscular once  insulin lispro (ADMELOG) corrective regimen sliding scale   SubCutaneous three times a day before meals  insulin lispro (ADMELOG) corrective regimen sliding scale   SubCutaneous at bedtime  pantoprazole    Tablet 40 milliGRAM(s) Oral before breakfast  polyethylene glycol 3350 17 Gram(s) Oral two times a day  senna 2 Tablet(s) Oral at bedtime    MEDICATIONS  (PRN):  acetaminophen   Tablet .. 650 milliGRAM(s) Oral every 6 hours PRN Temp greater or equal to 38C (100.4F), Mild Pain (1 - 3)  ondansetron   Disintegrating Tablet 4 milliGRAM(s) Oral every 6 hours PRN Nausea  ondansetron Injectable 4 milliGRAM(s) IV Push once PRN Nausea and/or Vomiting  oxyCODONE    IR 5 milliGRAM(s) Oral every 4 hours PRN Moderate Pain (4 - 6)  oxyCODONE    IR 10 milliGRAM(s) Oral every 4 hours PRN Severe Pain (7 - 10)      Vital Signs Last 24 Hrs  T(C): 36.4 (18 Nov 2020 08:14), Max: 36.6 (17 Nov 2020 12:00)  T(F): 97.5 (18 Nov 2020 08:14), Max: 97.9 (17 Nov 2020 12:00)  HR: 68 (18 Nov 2020 08:14) (68 - 92)  BP: 108/69 (18 Nov 2020 08:14) (105/68 - 124/72)  BP(mean): --  RR: 18 (18 Nov 2020 08:14) (18 - 18)  SpO2: 97% (18 Nov 2020 08:14) (96% - 98%)  CAPILLARY BLOOD GLUCOSE      POCT Blood Glucose.: 112 mg/dL (18 Nov 2020 08:56)  POCT Blood Glucose.: 122 mg/dL (17 Nov 2020 21:23)  POCT Blood Glucose.: 160 mg/dL (17 Nov 2020 17:06)  POCT Blood Glucose.: 181 mg/dL (17 Nov 2020 12:43)    I&O's Summary    17 Nov 2020 07:01  -  18 Nov 2020 07:00  --------------------------------------------------------  IN: 1140 mL / OUT: 2500 mL / NET: -1360 mL    18 Nov 2020 07:01  -  18 Nov 2020 11:54  --------------------------------------------------------  IN: 0 mL / OUT: 600 mL / NET: -600 mL          PHYSICAL EXAM:    GENERAL: NAD   HEENT: EOMI, PERRL  PULM: Clear to auscultation bilaterally  CV: Regular rate and rhythm; nl S1, S2; No murmurs, rubs, or gallops  ABDOMEN: Soft, Nontender, Nondistended; Bowel sounds present  EXTREMITIES/MSK:  No edema, calf tenderness   PSYCH: AAOx3  NEUROLOGY: non-focal  Skin: no erythema          LABS:                        10.7   15.33 )-----------( 258      ( 18 Nov 2020 05:43 )             32.4     11-18    136  |  102  |  20  ----------------------------<  116<H>  4.3   |  29  |  0.69    Ca    8.3<L>      18 Nov 2020 05:43    TPro  5.4<L>  /  Alb  3.1<L>  /  TBili  0.5  /  DBili  x   /  AST  24  /  ALT  21  /  AlkPhos  68  11-16                RADIOLOGY & ADDITIONAL TESTS:    Imaging Personally Reviewed:    Consultant(s) Notes Reviewed:      Care Discussed with Consultants/Other Providers: neurosurg

## 2020-11-18 NOTE — PROGRESS NOTE ADULT - ASSESSMENT
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 MRI spine with metastatic disease involving thoracic spine from T5-T11 with epidural involvement spanning T4-T8 with severe spinal canal stenosis at T6 concerning for cord compression. s/p T3-T12 INSTRUMENTATION  T4-6 LAMINECTOMY FOR EPIDURAL TUMOR on 11/14/20 c  cc.     Oncologist: Dr. Briones

## 2020-11-18 NOTE — PROGRESS NOTE ADULT - SUBJECTIVE AND OBJECTIVE BOX
SUBJECTIVE: Patient seen and examined at bedside. No acute overnight events. Pain well controlled. Denies chest pain, shortness of breath nausea/vomiting. Tolerating regular diet. Voiding. No bowel movement but +flatus.     Vital Signs Last 24 Hrs  T(C): 36.4 (11-18-20 @ 08:14), Max: 36.6 (11-17-20 @ 12:00)  T(F): 97.5 (11-18-20 @ 08:14), Max: 97.9 (11-17-20 @ 12:00)  HR: 68 (11-18-20 @ 08:14) (68 - 92)  BP: 108/69 (11-18-20 @ 08:14) (105/68 - 124/72)  RR: 18 (11-18-20 @ 08:14) (18 - 18)  SpO2: 97% (11-18-20 @ 08:14) (96% - 98%)    PHYSICAL EXAM:    Constitutional: No Acute Distress, resting comfortably in bed     Neurological: Awake, alert, oriented to person, place and time, speech clear and fluent, face equal, tongue midline, briskly following commands, no drift, moves all extremities with 5/5 strength, sensation intact to light touch throughout, pupils 4mm and reactive bilaterally, extraocular movements intact, no nystagmus    Incision: +Aquacel AG dressing C/D/I    Drains: +HMV w/ 190cc output / 24 hrs, +SHERMAN w/ 60 cc output / 24 hrs    Pulmonary: Clear to Auscultation, No rales, No rhonchi, No wheezes     Cardiovascular: S1, S2, Regular rate and rhythm     Gastrointestinal: Soft, Non-tender, Non-distended, +bowel sounds x 4    Extremities: No calf tenderness bilaterally, no cyanosis, clubbing or edema        LABS:                          10.7   15.33 )-----------( 258      ( 18 Nov 2020 05:43 )             32.4   11-18    136  |  102  |  20  ----------------------------<  116<H>  4.3   |  29  |  0.69    Ca    8.3<L>      18 Nov 2020 05:43    TPro  5.4<L>  /  Alb  3.1<L>  /  TBili  0.5  /  DBili  x   /  AST  24  /  ALT  21  /  AlkPhos  68  11-16        IMAGING:     VA Duplex Lower Ext Vein Scan, Bilat (11.15.20 @ 13:35)    IMPRESSION:  No evidence of deep venous thrombosis in either lower extremity.    PRETTY MCLEAN MD; Attending Radiologist  This document has been electronically signed. Nov 15 2020  2:10PM    CT Thoracic Spine No Cont (11.15.20 @ 08:13)  IMPRESSION:  Expected postoperative changes from laminectomy and posterior instrumented fusion.    ROSE WHITMAN MD; Resident Interventional Radiology  This document has been electronically signed.  GAMALIEL MOELLER MD; Attending Radiologist  This document has been electronically signed. Nov 15 2020 10:40AM        MEDICATIONS  (STANDING):  bisacodyl Suppository 10 milliGRAM(s) Rectal once  budesonide 160 MICROgram(s)/formoterol 4.5 MICROgram(s) Inhaler 2 Puff(s) Inhalation two times a day  dextrose 40% Gel 15 Gram(s) Oral once  dextrose 5%. 1000 milliLiter(s) (50 mL/Hr) IV Continuous <Continuous>  dextrose 5%. 1000 milliLiter(s) (100 mL/Hr) IV Continuous <Continuous>  dextrose 50% Injectable 25 Gram(s) IV Push once  dextrose 50% Injectable 12.5 Gram(s) IV Push once  dextrose 50% Injectable 25 Gram(s) IV Push once  enoxaparin Injectable 40 milliGRAM(s) SubCutaneous daily  gabapentin 300 milliGRAM(s) Oral three times a day  glucagon  Injectable 1 milliGRAM(s) IntraMuscular once  influenza   Vaccine 0.5 milliLiter(s) IntraMuscular once  insulin lispro (ADMELOG) corrective regimen sliding scale   SubCutaneous three times a day before meals  insulin lispro (ADMELOG) corrective regimen sliding scale   SubCutaneous at bedtime  pantoprazole    Tablet 40 milliGRAM(s) Oral before breakfast  polyethylene glycol 3350 17 Gram(s) Oral two times a day  senna 2 Tablet(s) Oral at bedtime    MEDICATIONS  (PRN):  acetaminophen   Tablet .. 650 milliGRAM(s) Oral every 6 hours PRN Temp greater or equal to 38C (100.4F), Mild Pain (1 - 3)  ondansetron   Disintegrating Tablet 4 milliGRAM(s) Oral every 6 hours PRN Nausea  ondansetron Injectable 4 milliGRAM(s) IV Push once PRN Nausea and/or Vomiting  oxyCODONE    IR 5 milliGRAM(s) Oral every 4 hours PRN Moderate Pain (4 - 6)  oxyCODONE    IR 10 milliGRAM(s) Oral every 4 hours PRN Severe Pain (7 - 10)        DIET: regular

## 2020-11-18 NOTE — PROGRESS NOTE ADULT - SUBJECTIVE AND OBJECTIVE BOX
Subjective: Patient seen and examined. No new events except as noted.     SUBJECTIVE/ROS:        MEDICATIONS:  MEDICATIONS  (STANDING):  bisacodyl Suppository 10 milliGRAM(s) Rectal once  budesonide 160 MICROgram(s)/formoterol 4.5 MICROgram(s) Inhaler 2 Puff(s) Inhalation two times a day  dextrose 40% Gel 15 Gram(s) Oral once  dextrose 5%. 1000 milliLiter(s) (50 mL/Hr) IV Continuous <Continuous>  dextrose 5%. 1000 milliLiter(s) (100 mL/Hr) IV Continuous <Continuous>  dextrose 50% Injectable 25 Gram(s) IV Push once  dextrose 50% Injectable 12.5 Gram(s) IV Push once  dextrose 50% Injectable 25 Gram(s) IV Push once  enoxaparin Injectable 40 milliGRAM(s) SubCutaneous daily  gabapentin 300 milliGRAM(s) Oral three times a day  glucagon  Injectable 1 milliGRAM(s) IntraMuscular once  influenza   Vaccine 0.5 milliLiter(s) IntraMuscular once  insulin lispro (ADMELOG) corrective regimen sliding scale   SubCutaneous three times a day before meals  insulin lispro (ADMELOG) corrective regimen sliding scale   SubCutaneous at bedtime  pantoprazole    Tablet 40 milliGRAM(s) Oral before breakfast  polyethylene glycol 3350 17 Gram(s) Oral two times a day  senna 2 Tablet(s) Oral at bedtime      PHYSICAL EXAM:  T(C): 36.6 (11-18-20 @ 04:27), Max: 36.6 (11-17-20 @ 12:00)  HR: 73 (11-18-20 @ 04:27) (73 - 97)  BP: 112/74 (11-18-20 @ 04:27) (105/68 - 130/76)  RR: 18 (11-18-20 @ 04:27) (18 - 18)  SpO2: 98% (11-18-20 @ 04:27) (96% - 98%)  Wt(kg): --  I&O's Summary    17 Nov 2020 07:01  -  18 Nov 2020 07:00  --------------------------------------------------------  IN: 1140 mL / OUT: 2500 mL / NET: -1360 mL            JVP: Normal  Neck: supple  Lung: clear   CV: S1 S2 , Murmur:  Abd: soft  Ext: No edema  neuro: Awake / alert  Psych: flat affect  Skin: normal``    LABS/DATA:    CARDIAC MARKERS:                                10.7   15.33 )-----------( 258      ( 18 Nov 2020 05:43 )             32.4     11-18    136  |  102  |  20  ----------------------------<  116<H>  4.3   |  29  |  0.69    Ca    8.3<L>      18 Nov 2020 05:43    TPro  5.4<L>  /  Alb  3.1<L>  /  TBili  0.5  /  DBili  x   /  AST  24  /  ALT  21  /  AlkPhos  68  11-16    proBNP:   Lipid Profile:   HgA1c:   TSH:     TELE:  EKG:

## 2020-11-18 NOTE — PROGRESS NOTE ADULT - ASSESSMENT
ASSESSMENT: 65M h/o esophageal cancer status post chemotherapy and radiation in 5/2018 and status post esophagogastrectomy, gallstones status post cholecystectomy on 8/17/2018, COPD, current smoker, presented to [facility] on 11/9 with right rib pain and upper back pain, found to have metastatic disease to thoracic spine and possible cord compression at T5-T6, transferred to [facility] for radiation. Transferred from [facility] for surgical resection of spinal cord tumor prior to possible radiation. Admitted to [facility] 11/14 s/p spinal angio: endovascular embolization of thoracic spinal tumor R T5 RT6 and LT6 with coils and particles; s/p T4-6 Laminectomy for resection of epidural fusion with T3-12 fusion with plastics closure POD# 2.      NEURO:  - Neuro checks every 4 hours  - Continue surgical drains until tomorrow    - Decadron taper completed for spinal cord edema  - Pain control with Oxy IR PRN and Tylenol  - Continue Neurontin 300 TID for neuropathic pain  - Encouraged mobilization  - Final surgical path: metastatic poorly differentiated adenocarcinoma; will f/u with oncology regarding treatment plans    PULM:  - Incentive spirometry  - Continue Symbicort BID for history of COPD    CV:  - Keep normotensive    RENAL:  - IVL  - Voiding    GI:  - Continue regular diet  - Continue Protonix for GI prophylaxis  - Continue senna, miralax for bowel regimen    ENDO:   - Continue ISS for glucose control while on steroids. Goal euglycemia (-180)    HEME/ONC:  - Hyperlactatemia resolved  - Will f/u treatment plans with heme/onc. Patient will need PET scan as outpatient and f/u with his primary oncologist Dr. Briones. Potential radiation oncology as outpatient but awaiting final pathology; patient previously received radiation therapy to lower thoracic spine and plan for further radiation is to be determined.     ID:  - Completed milly-op antibiotics  - Afebrile    DISPO:  - PT: Home PT w/ RW (script given to )  - Anticipate discharge home tomorrow  - Will discuss with Dr. Martins  - Spectralink # [id number]

## 2020-11-19 ENCOUNTER — TRANSCRIPTION ENCOUNTER (OUTPATIENT)
Age: 65
End: 2020-11-19

## 2020-11-19 VITALS
SYSTOLIC BLOOD PRESSURE: 101 MMHG | TEMPERATURE: 98 F | RESPIRATION RATE: 18 BRPM | OXYGEN SATURATION: 96 % | DIASTOLIC BLOOD PRESSURE: 68 MMHG | HEART RATE: 105 BPM

## 2020-11-19 PROCEDURE — 86900 BLOOD TYPING SEROLOGIC ABO: CPT

## 2020-11-19 PROCEDURE — 84132 ASSAY OF SERUM POTASSIUM: CPT

## 2020-11-19 PROCEDURE — 97530 THERAPEUTIC ACTIVITIES: CPT

## 2020-11-19 PROCEDURE — 82947 ASSAY GLUCOSE BLOOD QUANT: CPT

## 2020-11-19 PROCEDURE — 86769 SARS-COV-2 COVID-19 ANTIBODY: CPT

## 2020-11-19 PROCEDURE — 85610 PROTHROMBIN TIME: CPT

## 2020-11-19 PROCEDURE — 82435 ASSAY OF BLOOD CHLORIDE: CPT

## 2020-11-19 PROCEDURE — C1889: CPT

## 2020-11-19 PROCEDURE — 88307 TISSUE EXAM BY PATHOLOGIST: CPT

## 2020-11-19 PROCEDURE — 99232 SBSQ HOSP IP/OBS MODERATE 35: CPT

## 2020-11-19 PROCEDURE — 82330 ASSAY OF CALCIUM: CPT

## 2020-11-19 PROCEDURE — 36430 TRANSFUSION BLD/BLD COMPNT: CPT

## 2020-11-19 PROCEDURE — 85730 THROMBOPLASTIN TIME PARTIAL: CPT

## 2020-11-19 PROCEDURE — 82962 GLUCOSE BLOOD TEST: CPT

## 2020-11-19 PROCEDURE — 86850 RBC ANTIBODY SCREEN: CPT

## 2020-11-19 PROCEDURE — C1894: CPT

## 2020-11-19 PROCEDURE — C1769: CPT

## 2020-11-19 PROCEDURE — 88360 TUMOR IMMUNOHISTOCHEM/MANUAL: CPT

## 2020-11-19 PROCEDURE — 72128 CT CHEST SPINE W/O DYE: CPT

## 2020-11-19 PROCEDURE — 85027 COMPLETE CBC AUTOMATED: CPT

## 2020-11-19 PROCEDURE — 82803 BLOOD GASES ANY COMBINATION: CPT

## 2020-11-19 PROCEDURE — 86923 COMPATIBILITY TEST ELECTRIC: CPT

## 2020-11-19 PROCEDURE — 87640 STAPH A DNA AMP PROBE: CPT

## 2020-11-19 PROCEDURE — 86901 BLOOD TYPING SEROLOGIC RH(D): CPT

## 2020-11-19 PROCEDURE — 80048 BASIC METABOLIC PNL TOTAL CA: CPT

## 2020-11-19 PROCEDURE — C1713: CPT

## 2020-11-19 PROCEDURE — 83605 ASSAY OF LACTIC ACID: CPT

## 2020-11-19 PROCEDURE — 76000 FLUOROSCOPY <1 HR PHYS/QHP: CPT

## 2020-11-19 PROCEDURE — 93970 EXTREMITY STUDY: CPT

## 2020-11-19 PROCEDURE — 94640 AIRWAY INHALATION TREATMENT: CPT

## 2020-11-19 PROCEDURE — 85018 HEMOGLOBIN: CPT

## 2020-11-19 PROCEDURE — 37243 VASC EMBOLIZE/OCCLUDE ORGAN: CPT

## 2020-11-19 PROCEDURE — 93005 ELECTROCARDIOGRAM TRACING: CPT

## 2020-11-19 PROCEDURE — 80053 COMPREHEN METABOLIC PANEL: CPT

## 2020-11-19 PROCEDURE — 85014 HEMATOCRIT: CPT

## 2020-11-19 PROCEDURE — 85025 COMPLETE CBC W/AUTO DIFF WBC: CPT

## 2020-11-19 PROCEDURE — 97161 PT EVAL LOW COMPLEX 20 MIN: CPT

## 2020-11-19 PROCEDURE — 97116 GAIT TRAINING THERAPY: CPT

## 2020-11-19 PROCEDURE — 84295 ASSAY OF SERUM SODIUM: CPT

## 2020-11-19 PROCEDURE — C1887: CPT

## 2020-11-19 PROCEDURE — 90686 IIV4 VACC NO PRSV 0.5 ML IM: CPT

## 2020-11-19 PROCEDURE — 71046 X-RAY EXAM CHEST 2 VIEWS: CPT

## 2020-11-19 PROCEDURE — 87641 MR-STAPH DNA AMP PROBE: CPT

## 2020-11-19 PROCEDURE — P9016: CPT

## 2020-11-19 RX ORDER — GABAPENTIN 400 MG/1
1 CAPSULE ORAL
Qty: 30 | Refills: 0
Start: 2020-11-19

## 2020-11-19 RX ORDER — SENNA PLUS 8.6 MG/1
2 TABLET ORAL
Qty: 0 | Refills: 0 | DISCHARGE
Start: 2020-11-19

## 2020-11-19 RX ORDER — ONDANSETRON 8 MG/1
1 TABLET, FILM COATED ORAL
Qty: 20 | Refills: 0
Start: 2020-11-19

## 2020-11-19 RX ORDER — OXYCODONE HYDROCHLORIDE 5 MG/1
1 TABLET ORAL
Qty: 40 | Refills: 0
Start: 2020-11-19

## 2020-11-19 RX ORDER — ACETAMINOPHEN 500 MG
2 TABLET ORAL
Qty: 0 | Refills: 0 | DISCHARGE
Start: 2020-11-19

## 2020-11-19 RX ORDER — POLYETHYLENE GLYCOL 3350 17 G/17G
17 POWDER, FOR SOLUTION ORAL
Qty: 0 | Refills: 0 | DISCHARGE
Start: 2020-11-19

## 2020-11-19 RX ADMIN — PANTOPRAZOLE SODIUM 40 MILLIGRAM(S): 20 TABLET, DELAYED RELEASE ORAL at 06:19

## 2020-11-19 RX ADMIN — GABAPENTIN 300 MILLIGRAM(S): 400 CAPSULE ORAL at 13:08

## 2020-11-19 RX ADMIN — OXYCODONE HYDROCHLORIDE 10 MILLIGRAM(S): 5 TABLET ORAL at 12:30

## 2020-11-19 RX ADMIN — GABAPENTIN 300 MILLIGRAM(S): 400 CAPSULE ORAL at 06:19

## 2020-11-19 RX ADMIN — BUDESONIDE AND FORMOTEROL FUMARATE DIHYDRATE 2 PUFF(S): 160; 4.5 AEROSOL RESPIRATORY (INHALATION) at 06:22

## 2020-11-19 RX ADMIN — OXYCODONE HYDROCHLORIDE 10 MILLIGRAM(S): 5 TABLET ORAL at 11:46

## 2020-11-19 RX ADMIN — POLYETHYLENE GLYCOL 3350 17 GRAM(S): 17 POWDER, FOR SOLUTION ORAL at 06:21

## 2020-11-19 RX ADMIN — ENOXAPARIN SODIUM 40 MILLIGRAM(S): 100 INJECTION SUBCUTANEOUS at 13:08

## 2020-11-19 NOTE — DISCHARGE NOTE PROVIDER - NSDCFUADDINST_GEN_ALL_CORE_FT
please notify physician if fevers, bleeding, swelling, pain not relieved by medication, increased sluggishness or irritability, increased nausea or vomiting, inability to tolerate foods or liquids, new or increasing weakness/numbness/tingling.   please do not engage in strenuous activity, heavy lifting, drive, or return to work or school until cleared by surgeon.   please keep incision clean and dry, do not submerge wound in water for prolonged periods of time, pat dry after showering, and do not use any creams or ointments to incision. Daily dressing changes with gauze and tegaderm to be changed daily after showering.   Please do not take NSAIDs- ie. advil, motrin, ibuprofen, aleve, aspirin, naproxen, etc. Tylenol/ acetaminophen ok to take.

## 2020-11-19 NOTE — DIETITIAN INITIAL EVALUATION ADULT. - REASON FOR ADMISSION
65y old  Male who presents with a chief complaint of Admitted 11/14 s/p spinal angio with endovascular embolization of thoracic spinal tumor R T5 RT6 and LT6 with coils and particles; s/p T4-6 Laminectomy for resection of epidural fusion with T3-12 fusion with plastics closure

## 2020-11-19 NOTE — PROGRESS NOTE ADULT - ASSESSMENT
ASSESSMENT: HPI:  65M h/o esophageal cancer status post chemotherapy and radiation in 5/2018 and status post esophagogastrectomy, gallstones status post cholecystectomy on 8/17/2018, COPD, current smoker, presented to [facility] on 11/9 with right rib pain and upper back pain, found to have metastatic disease to thoracic spine and possible cord compression at T5-T6, transferred to [facility] for radiation. Admitted to [facility] 11/14/2020 s/p spinal angio: endovascular embolization of thoracic spinal tumor RT5 RT6 and LT6 with coils and particles; s/p T4-6 laminectomy for resection of epidural fusion with T3-12 fusion with plastics closure POD #3.      NEURO:  CT Head in AM, MRI post-op, Surgical drains per NSGY, Pain control  Surgical drains removed without difficulty. Patient and patient's wife both instructed to change dressing at home, extra materials for dressing given.   Patient seen by oncology. Patient knows to follow up outpatient in one week.  Activity: [] OOB as tolerated [] Bedrest [] PT [] OT [] PMNR    PULM:  Incentive spirometry, mobilize as tolerated    CV:  Keep -150mmHg    RENAL:  IVF until good PO intake    GI:  Diet: Regular  GI prophylaxis [] not indicated [x] PPI [] other:  Bowel regimen [] colace [x] senna [] other:    ENDO:   Goal euglycemia (-180)    HEME/ONC:  VTE prophylaxis: [] SCDs [x] chemoprophylaxis [] hold chemoprophylaxis due to: [] high risk of DVT/PE on admission due to:    DISPO:  -Home PT w/ RW  -Discharge home today    ASSESSMENT: HPI:  65M h/o esophageal cancer status post chemotherapy and radiation in 5/2018 and status post esophagogastrectomy, gallstones status post cholecystectomy on 8/17/2018, COPD, current smoker, presented to [facility] on 11/9 with right rib pain and upper back pain, found to have metastatic disease to thoracic spine and possible cord compression at T5-T6, transferred to [facility] for radiation. Admitted to [facility] 11/14/2020 s/p spinal angio: endovascular embolization of thoracic spinal tumor RT5 RT6 and LT6 with coils and particles; s/p T4-6 laminectomy for resection of epidural fusion with T3-12 fusion with plastics closure POD #3.      NEURO:  Surgical drains removed without difficulty. Patient and patient's wife both instructed to change dressing daily at home, extra materials for dressing given per plastics instructions.   Patient seen by oncology. Patient instructed to follow up outpatient in one week.   Activity: [] OOB as tolerated [] Bedrest [x] PT- home PT and rolling walker [] OT [] PMNR    PULM:  Incentive spirometry, mobilize as tolerated    CV:  Keep -150mmHg    RENAL:  IVL    GI:  Diet: Regular  GI prophylaxis [] not indicated [x] PPI [] other:  Bowel regimen [] colace [x] senna [] other:    ENDO:   Goal euglycemia (-180)    HEME/ONC:  VTE prophylaxis: [] SCDs [x] chemoprophylaxis [] hold chemoprophylaxis due to: [] high risk of DVT/PE on admission due to:    DISPO:  -Home PT w/ RW  -Discharge home today   agree with above, d/c home today as discussed with Dr Martins  Discussed with patient and family wound care, follow up plans, activity, and medications. Questions answered, and they verbalized understanding.   RXs sent to pharmacy   [id number]

## 2020-11-19 NOTE — DIETITIAN INITIAL EVALUATION ADULT. - ADD RECOMMEND
RD to add Mighty Shakes (200kcal, 6g protein/serving) to meals to promote adequate protein/energy intake. Reinforce diet education as needed; RD remains available. Monitor PO intake, diet tolerance, weight trends, labs, GI function, and skin integrity. Continue to encourage po intake and obtain/honor food preferences as able.

## 2020-11-19 NOTE — DISCHARGE NOTE PROVIDER - NSDCCPCAREPLAN_GEN_ALL_CORE_FT
PRINCIPAL DISCHARGE DIAGNOSIS  Diagnosis: Thoracic spine tumor  Assessment and Plan of Treatment: 11/14 s/p spinal angio with endovascular embolization of thoracic spinal tumor R T5 RT6 and LT6 with coils and particles; s/p T4-6 Laminectomy for resection of epidural tumor with T3-12 fusion with plastics closure.   Dr Martins- neurosurgeon- please call office toconfirm/change appointment for 11/24/20 @ 2:30pm.   Dr Bryson- plastic surgeon- please call office for appointment in 1 week. Daily dressing changes after shower as instructed.   Dr Briones- oncologist- please call office for appointment in 1-2 weeks.      SECONDARY DISCHARGE DIAGNOSES  Diagnosis: Esophageal cancer  Assessment and Plan of Treatment: Please follow up [facility] Oncologist Dr Briones for further treatment plans.    Diagnosis: COPD (chronic obstructive pulmonary disease)  Assessment and Plan of Treatment: Please continue medications and follow up with your primary care physician within 1-2 weeks and pulmonologist.

## 2020-11-19 NOTE — DISCHARGE NOTE PROVIDER - REASON FOR ADMISSION
Admitted 11/14 s/p spinal angio with endovascular embolization of thoracic spinal tumor R T5 RT6 and LT6 with coils and particles; s/p T4-6 Laminectomy for resection of epidural tumor with T3-12 fusion with plastics closure.

## 2020-11-19 NOTE — PROGRESS NOTE ADULT - SUBJECTIVE AND OBJECTIVE BOX
Patient is a 65y old  Male who presents with a chief complaint of Admitted 11/14 s/p spinal angio with endovascular embolization of thoracic spinal tumor R T5 RT6 and LT6 with coils and particles; s/p T4-6 Laminectomy for resection of epidural fusion with T3-12 fusion with plastics closure (18 Nov 2020 08:14)      MEDICATIONS  (STANDING):  bisacodyl Suppository 10 milliGRAM(s) Rectal once  budesonide 160 MICROgram(s)/formoterol 4.5 MICROgram(s) Inhaler 2 Puff(s) Inhalation two times a day  dextrose 5%. 1000 milliLiter(s) (50 mL/Hr) IV Continuous <Continuous>  dextrose 5%. 1000 milliLiter(s) (100 mL/Hr) IV Continuous <Continuous>  enoxaparin Injectable 40 milliGRAM(s) SubCutaneous daily  gabapentin 300 milliGRAM(s) Oral three times a day  glucagon  Injectable 1 milliGRAM(s) IntraMuscular once  pantoprazole    Tablet 40 milliGRAM(s) Oral before breakfast  polyethylene glycol 3350 17 Gram(s) Oral two times a day  senna 2 Tablet(s) Oral at bedtime    MEDICATIONS  (PRN):  acetaminophen   Tablet .. 650 milliGRAM(s) Oral every 6 hours PRN Temp greater or equal to 38C (100.4F), Mild Pain (1 - 3)  ondansetron   Disintegrating Tablet 4 milliGRAM(s) Oral every 6 hours PRN Nausea  ondansetron Injectable 4 milliGRAM(s) IV Push once PRN Nausea and/or Vomiting  oxyCODONE    IR 5 milliGRAM(s) Oral every 4 hours PRN Moderate Pain (4 - 6)  oxyCODONE    IR 10 milliGRAM(s) Oral every 4 hours PRN Severe Pain (7 - 10)      Interim History:  No acute events over night. Vitals stable. Patient in no acute distress      Vital Signs Last 24 Hrs  T(C): 36.7 (19 Nov 2020 08:00), Max: 36.7 (18 Nov 2020 15:35)  T(F): 98 (19 Nov 2020 08:00), Max: 98 (18 Nov 2020 15:35)  HR: 60 (19 Nov 2020 08:00) (60 - 99)  BP: 110/68 (19 Nov 2020 08:00) (100/62 - 114/68)  BP(mean): --  RR: 18 (19 Nov 2020 08:00) (18 - 18)  SpO2: 96% (19 Nov 2020 08:00) (96% - 99%)    PE  NAD  Awake, alert  Anicteric, MMM  RRR  CTAB  Abd soft, NT, ND  No c/c/e  No rash grossly                            10.7   15.33 )-----------( 258      ( 18 Nov 2020 05:43 )             32.4       11-18    136  |  102  |  20  ----------------------------<  116<H>  4.3   |  29  |  0.69    Ca    8.3<L>      18 Nov 2020 05:43

## 2020-11-19 NOTE — PROGRESS NOTE ADULT - PROBLEM SELECTOR PLAN 4
- counselled on smoking cessation for 12 minutes, but patient not ready to quit. risks discussed   - nicotine patch offered, patient declined  - outpt f/u d/w pt
cont current tx
cont current tx
- counselled on smoking cessation for 12 minutes, but patient not ready to quit. risks discussed   - nicotine patch offered, patient declined  - outpt f/u d/w pt

## 2020-11-19 NOTE — DISCHARGE NOTE PROVIDER - HOSPITAL COURSE
HPI:  65M h/o esophageal cancer status post chemotherapy and radiation in 5/2018 and status post esophagogastrectomy, gallstones status post cholecystectomy on 8/17/2018, COPD, current smoker, presented to [facility] on 11/9 with right rib pain and upper back pain, found to have metastatic disease to thoracic spine and possible cord compression at T5-T6, transferred to [facility] for radiation.  (13 Nov 2020 10:09)    Patient admitted and underwent on 11/14 s/p spinal angio with endovascular embolization of thoracic spinal tumor R T5 RT6 and LT6 with coils and particles; s/p T4-6 Laminectomy for resection of epidural tumor with T3-12 fusion with plastics closure. 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  He was seen in consultation by oncology and will need PET and outpatient follow up for further adjuvant treatments. HPI:  65M h/o esophageal cancer status post chemotherapy and radiation in 5/2018 and status post esophagogastrectomy, gallstones status post cholecystectomy on 8/17/2018, COPD, current smoker, presented to [facility] on 11/9 with right rib pain and upper back pain, found to have metastatic disease to thoracic spine and possible cord compression at T5-T6, transferred to [facility] for radiation.  (13 Nov 2020 10:09)    Patient admitted and underwent on 11/14 s/p spinal angio with endovascular embolization of thoracic spinal tumor R T5 RT6 and LT6 with coils and particles; s/p T4-6 Laminectomy for resection of epidural tumor with T3-12 fusion with plastics closure. 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  He was seen in consultation by oncology and will need PET and outpatient follow up for further adjuvant treatments. Additionally he was followed in consultation by HPI:  65M h/o esophageal cancer status post chemotherapy and radiation in 5/2018 and status post esophagogastrectomy, gallstones status post cholecystectomy on 8/17/2018, COPD, current smoker, presented to [facility] on 11/9 with right rib pain and upper back pain, found to have metastatic disease to thoracic spine and possible cord compression at T5-T6, transferred to [facility] for radiation.  (13 Nov 2020 10:09)    Patient admitted and underwent on 11/14 s/p spinal angio with endovascular embolization of thoracic spinal tumor R T5 RT6 and LT6 with coils and particles; s/p T4-6 Laminectomy for resection of epidural tumor with T3-12 fusion with plastics closure. 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  He was seen in consultation by oncology and will need PET and outpatient follow up for further adjuvant treatments. Additionally he was optimized preoperatively and followed in consultation by cardiology and hospitalist medicine. PT/OT evaluated him and recommended home PT and rolling walker. Surgical drains were removed on 11/19/20. On the day of discharge he was medically and neurologically stable for discharge to home.

## 2020-11-19 NOTE — PROGRESS NOTE ADULT - PROBLEM SELECTOR PLAN 6
likely lab error
resolved. was likely due to hypotension postop
resolved. was likely due to hypotension postop
repeat CBC

## 2020-11-19 NOTE — DISCHARGE NOTE PROVIDER - PROVIDER TOKENS
PROVIDER:[TOKEN:[[id number]]],PROVIDER:[TOKEN:[[id number]]],PROVIDER:[TOKEN:[[id number]]]

## 2020-11-19 NOTE — DISCHARGE NOTE PROVIDER - NSDCACTIVITY_GEN_ALL_CORE
Walking - Indoors allowed/Walking - Outdoors allowed/Bathing allowed/Showering allowed/Do not make important decisions/No heavy lifting/straining/Stairs allowed/Do not drive or operate machinery

## 2020-11-19 NOTE — DISCHARGE NOTE PROVIDER - CARE PROVIDER_API CALL
Bayron Martins  NEUROSURGERY  [address], [address]  [place] [address]  Phone: [phone]  Fax: [phone]  Follow Up Time:     Wiliam Bryson (MD)  Plastic Surgery  [address], [address] [place] [address]  Phone: [phone]  Fax: [phone]  Follow Up Time:     Juliana Briones  INTERNAL MEDICINE  [address] [place] [address]  Phone: [phone]  Fax: [phone]  Follow Up Time:

## 2020-11-19 NOTE — PROGRESS NOTE ADULT - ASSESSMENT
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  He presented with pain around the rib for 1 week.  No sob, fever, chills.    Malignant neoplasm of esophagus, unspecified. Recommendation: he had good response to chemoRT   now there are some enlarged mediastinal lymph nodes and pulmonary nodules  ?recurrence, will need to do a PET scan as outpt.    Chest wall pain -- now resolved    MRI showed likely cord compression     MRI T spine noncontrast: Abnormal epidural soft tissue at the T4-T8 levels suggesting epidural tumor. This results in multilevel spinal canal narrowing, most severe at the T5 and T6 levels where there is cord deformity suggesting cord compression.  Compression deformities of the T7, T9, and T10 vertebral bodies without significant bony retropulsion; tumor involvement cannot be excluded. T1 hypointense, STIR hyperintense lesions in the T5 vertebral body suggesting metastases.    MRI with contrast   -Metastatic disease involving the thoracic spine from T5 through T11 with epidural involvement spanning T4-T8 levels with severe spinal canal stenosis at T6 level concerning for cord compression.  -Compression fractures again noted at T7, T9, and T10 without resultant spinal canal stenosis at these levels.  -Additional lesions suspicious for metastasis involving the prevertebral/para-aortic soft tissues as well as the right posterior mediastinum, as described on CT chest from 11/9/2020.      Pt not candidate for radiation based on prior radiation treatment   Laminectomy  done by amos peter.: pathology with adenocarcinoma  will need outpatient pet   pt to follow with Dr Briones        Fern Mason MD  Hematology/Oncology Consultant  [facility]  Cell: [phone]

## 2020-11-19 NOTE — PROGRESS NOTE ADULT - PROBLEM SELECTOR PLAN 1
s/p T3-T12 INSTRUMENTATION  T4-6 LAMINECTOMY FOR EPIDURAL TUMOR on 11/14/20 c  cc. c/w postop hypotension requiring phenylephrine. PRBC x 2.  path c adenocarcinoma- onc consult suggests outpt PET.

## 2020-11-19 NOTE — PROGRESS NOTE ADULT - PROBLEM SELECTOR PLAN 5
- counselled on smoking cessation for 12 minutes, but patient not ready to quit. risks discussed   - nicotine patch offered, patient declined  - outpt f/u d/w pt
- counselled on smoking cessation for 12 minutes, but patient not ready to quit. risks discussed   - nicotine patch offered, patient declined  - outpt f/u d/w pt
resolved. was likely due to hypotension postop
would repeat. was likely due to hypotension postop

## 2020-11-19 NOTE — DISCHARGE NOTE PROVIDER - NSDCMRMEDTOKEN_GEN_ALL_CORE_FT
budesonide-formoterol 160 mcg-4.5 mcg/inh inhalation aerosol: 2 puff(s) inhaled once a day  omeprazole 40 mg oral delayed release capsule: 1 cap(s) orally once a day  Adebayo Walker: diagnosis: metastatic spinal tumor s/p thoracic laminectomy/fusion    acetaminophen 325 mg oral tablet: 2 tab(s) orally every 6 hours, As needed, Temp greater or equal to 38C (100.4F), Mild Pain (1 - 3)  bisacodyl 10 mg rectal suppository: 1 suppository(ies) rectal once  budesonide-formoterol 160 mcg-4.5 mcg/inh inhalation aerosol: 2 puff(s) inhaled once a day  Neurontin 300 mg oral capsule: 1 cap(s) orally 3 times a day  omeprazole 40 mg oral delayed release capsule: 1 cap(s) orally once a day  ondansetron 4 mg oral tablet, disintegratin tab(s) orally every 6 hours, As needed, Nausea MDD:4  oxyCODONE 5 mg oral tablet: 1 -2 tab(s) orally every 4 hours, As needed pain MDD:6  polyethylene glycol 3350 oral powder for reconstitution: 17 gram(s) orally 2 times a day  Rolling Walker: diagnosis: metastatic spinal tumor s/p thoracic laminectomy/fusion   senna oral tablet: 2 tab(s) orally once a day (at bedtime)

## 2020-11-19 NOTE — PROGRESS NOTE ADULT - PROBLEM SELECTOR PROBLEM 3
COPD (chronic obstructive pulmonary disease)
COPD (chronic obstructive pulmonary disease)
Leukocytosis
Leukocytosis

## 2020-11-19 NOTE — PROGRESS NOTE ADULT - PROBLEM SELECTOR PROBLEM 4
Smoker
COPD (chronic obstructive pulmonary disease)
COPD (chronic obstructive pulmonary disease)
Smoker

## 2020-11-19 NOTE — PROGRESS NOTE ADULT - SUBJECTIVE AND OBJECTIVE BOX
SUBJECTIVE: Patient examined at bedside. No acute overnight events. Pain well controlled. Patient denies chest pain, SOB, abdominal pain, nausea/vomiting. Tolerating regular diet. Voiding. +BM this am and +flatus.     Vital Signs Last 24 Hrs  T(C): 37.1 (11-19-20 @ 11:43), Max: 37.1 (11-19-20 @ 11:43)  T(F): 98.7 (11-19-20 @ 11:43), Max: 98.7 (11-19-20 @ 11:43)  HR: 97 (11-19-20 @ 11:43) (60 - 99)  BP: 109/73 (11-19-20 @ 11:43) (100/62 - 114/68)  BP(mean): --  RR: 18 (11-19-20 @ 11:43) (18 - 18)  SpO2: 97% (11-19-20 @ 11:43) (96% - 99%)    PHYSICAL EXAM:    Constitutional: No Acute Distress     Neurological: Awake, alert, oriented to person, place and time, speech clear and fluent, face equal, tongue midline, briskly following commands, no drift, moves all extremities with 5/5 strength, sensation intact to light touch throughout, pupils 4mm and reactive bilaterally, extraocular movements intact, no nystagmus    Incision: +Aquacel AG dressing, c/d/i    Drains: Rt HMV and Lt SHERMAN both removed without difficulty. Sites clean, dry, intact with dressing.     Pulmonary: Clear to Auscultation, No rales, No rhonchi, No wheezes     Cardiovascular: S1, S2, Regular rate and rhythm     Gastrointestinal: Soft, Non-tender, Non-distended, +bowel sounds x 4    Extremities: No calf tenderness bilaterally, no cyanosis, clubbing or edema          LABS:                          10.7   15.33 )-----------( 258      ( 18 Nov 2020 05:43 )             32.4    11-18    136  |  102  |  20  ----------------------------<  116<H>  4.3   |  29  |  0.69    Ca    8.3<L>      18 Nov 2020 05:43        11-18 @ 07:01  -  11-19 @ 07:00  --------------------------------------------------------  IN: 1260 mL / OUT: 2035 mL / NET: -775 mL        IMAGING:   DUPLEX SCAN EXT VEINS LOWER Bl  There is normal compressibility of the bilateral common femoral, femoral and popliteal veins.  Doppler examination shows normal spontaneous and phasic flow.    No calf vein thrombosis is detected.    CT THORACIC SPINE  ALIGNMENT: The thoracic kyphosis is maintained.  BONES: The patient has 11 ribs and the first rib-bearing vertebral body is termedT1. Redemonstration of osseous metastatic disease with compression fracture deformities at T7, T9, and T10. Interval laminectomy at T5-T7 and posterior instrumented fusion from T3-T12. Surgical drain in place.  DISC LEVEL EVALUATION: Multilevel degenerative changes without significant spinal canal or foraminal narrowing at CT. Previously seen epidural tumor extension is not well evaluated at CT  THORACIC SOFT TISSUES: Gastric pull-through. Bibasilar subsegmental atelectasis. Pulmonary nodules measuring up to 5 mm, unchanged.    MEDICATIONS:  Antibiotics:    Neuro:  acetaminophen   Tablet .. 650 milliGRAM(s) Oral every 6 hours PRN Temp greater or equal to 38C (100.4F), Mild Pain (1 - 3)  gabapentin 300 milliGRAM(s) Oral three times a day  ondansetron   Disintegrating Tablet 4 milliGRAM(s) Oral every 6 hours PRN Nausea  ondansetron Injectable 4 milliGRAM(s) IV Push once PRN Nausea and/or Vomiting  oxyCODONE    IR 5 milliGRAM(s) Oral every 4 hours PRN Moderate Pain (4 - 6)  oxyCODONE    IR 10 milliGRAM(s) Oral every 4 hours PRN Severe Pain (7 - 10)    Pulm:  budesonide 160 MICROgram(s)/formoterol 4.5 MICROgram(s) Inhaler 2 Puff(s) Inhalation two times a day    GI/:  bisacodyl Suppository 10 milliGRAM(s) Rectal once  pantoprazole    Tablet 40 milliGRAM(s) Oral before breakfast  polyethylene glycol 3350 17 Gram(s) Oral two times a day  senna 2 Tablet(s) Oral at bedtime    Other:   dextrose 5%. 1000 milliLiter(s) IV Continuous <Continuous>  dextrose 5%. 1000 milliLiter(s) IV Continuous <Continuous>  enoxaparin Injectable 40 milliGRAM(s) SubCutaneous daily  glucagon  Injectable 1 milliGRAM(s) IntraMuscular once        DIET: Regular    SUBJECTIVE: Patient examined at bedside. No acute overnight events. Pain well controlled. Patient denies chest pain, SOB, abdominal pain, nausea/vomiting. Tolerating regular diet. Voiding. +BM this am and +flatus.     Vital Signs Last 24 Hrs  T(C): 37.1 (11-19-20 @ 11:43), Max: 37.1 (11-19-20 @ 11:43)  T(F): 98.7 (11-19-20 @ 11:43), Max: 98.7 (11-19-20 @ 11:43)  HR: 97 (11-19-20 @ 11:43) (60 - 99)  BP: 109/73 (11-19-20 @ 11:43) (100/62 - 114/68)  BP(mean): --  RR: 18 (11-19-20 @ 11:43) (18 - 18)  SpO2: 97% (11-19-20 @ 11:43) (96% - 99%)    PHYSICAL EXAM:    Constitutional: No Acute Distress     Neurological: Awake, alert, oriented to person, place and time, speech clear and fluent, face equal, tongue midline, briskly following commands, no drift, moves all extremities with 5/5 strength, sensation intact to light touch throughout, pupils 4mm and reactive bilaterally, extraocular movements intact, no nystagmus    Incision: +Aquacel AG dressing, c/d/i    Drains: Rt HMV and Lt SHERMAN both removed without difficulty. Sites clean, dry, subcutaneous closure, intact with new dressing.     Pulmonary: Clear to Auscultation, No rales, No rhonchi, No wheezes     Cardiovascular: S1, S2, Regular rate and rhythm     Gastrointestinal: Soft, Non-tender, Non-distended, +bowel sounds x 4    Extremities: No calf tenderness bilaterally, no cyanosis, clubbing or edema          LABS:                          10.7   15.33 )-----------( 258      ( 18 Nov 2020 05:43 )             32.4    11-18    136  |  102  |  20  ----------------------------<  116<H>  4.3   |  29  |  0.69    Ca    8.3<L>      18 Nov 2020 05:43        11-18 @ 07:01  -  11-19 @ 07:00  --------------------------------------------------------  IN: 1260 mL / OUT: 2035 mL / NET: -775 mL        IMAGING:   DUPLEX SCAN EXT VEINS LOWER Bl  There is normal compressibility of the bilateral common femoral, femoral and popliteal veins.  Doppler examination shows normal spontaneous and phasic flow.    No calf vein thrombosis is detected.    CT THORACIC SPINE  ALIGNMENT: The thoracic kyphosis is maintained.  BONES: The patient has 11 ribs and the first rib-bearing vertebral body is termedT1. Redemonstration of osseous metastatic disease with compression fracture deformities at T7, T9, and T10. Interval laminectomy at T5-T7 and posterior instrumented fusion from T3-T12. Surgical drain in place.  DISC LEVEL EVALUATION: Multilevel degenerative changes without significant spinal canal or foraminal narrowing at CT. Previously seen epidural tumor extension is not well evaluated at CT  THORACIC SOFT TISSUES: Gastric pull-through. Bibasilar subsegmental atelectasis. Pulmonary nodules measuring up to 5 mm, unchanged.    MEDICATIONS:  Antibiotics:    Neuro:  acetaminophen   Tablet .. 650 milliGRAM(s) Oral every 6 hours PRN Temp greater or equal to 38C (100.4F), Mild Pain (1 - 3)  gabapentin 300 milliGRAM(s) Oral three times a day  ondansetron   Disintegrating Tablet 4 milliGRAM(s) Oral every 6 hours PRN Nausea  ondansetron Injectable 4 milliGRAM(s) IV Push once PRN Nausea and/or Vomiting  oxyCODONE    IR 5 milliGRAM(s) Oral every 4 hours PRN Moderate Pain (4 - 6)  oxyCODONE    IR 10 milliGRAM(s) Oral every 4 hours PRN Severe Pain (7 - 10)    Pulm:  budesonide 160 MICROgram(s)/formoterol 4.5 MICROgram(s) Inhaler 2 Puff(s) Inhalation two times a day    GI/:  bisacodyl Suppository 10 milliGRAM(s) Rectal once  pantoprazole    Tablet 40 milliGRAM(s) Oral before breakfast  polyethylene glycol 3350 17 Gram(s) Oral two times a day  senna 2 Tablet(s) Oral at bedtime    Other:   dextrose 5%. 1000 milliLiter(s) IV Continuous <Continuous>  dextrose 5%. 1000 milliLiter(s) IV Continuous <Continuous>  enoxaparin Injectable 40 milliGRAM(s) SubCutaneous daily  glucagon  Injectable 1 milliGRAM(s) IntraMuscular once        DIET: Regular

## 2020-11-19 NOTE — PROGRESS NOTE ADULT - PROBLEM SELECTOR PLAN 3
cont current tx
likely sec to steroids as pt without signs/symptoms of an infection. monitor off steroids (d/c'd 11/18)
likely sec to steroids as pt without signs/symptoms of an infection. monitor off steroids (d/c'd today)
cont current tx

## 2020-11-19 NOTE — DIETITIAN INITIAL EVALUATION ADULT. - OTHER INFO
Visited pt at bedside. Pt reports having a good appetite both PTA and in-house; consuming >75% of most meals. Pt denies any known food allergies or intolerances. Pt denies any chewing/swallowing difficulty, self-feeding difficulty, nausea/vomiting, constipation, or diarrhea. Last BM 11/19 per pt report. Pt denies any micronutrient supplementation at home.     Pt reports a UBW of 165lbs and endorses significant weight loss recently. Per HIE, pt with previous weight of 160lbs (11/9). Currently dosing weight of 151lbs reveals 9lb (5.6%) weight loss in ~2 weeks. Of note, pt reports Visited pt at bedside. Pt reports having a good appetite both PTA and in-house; consuming >75% of most meals. Pt denies any known food allergies or intolerances. Pt denies any chewing/swallowing difficulty, self-feeding difficulty, nausea/vomiting, constipation, or diarrhea. Last BM 11/19 per pt report. Pt denies any micronutrient supplementation at home.     Pt reports a UBW of 165lbs and endorses significant weight loss recently. Per HIE, pt with previous weight of 160lbs (11/9). Currently dosing weight of 151lbs reveals 9lb (5.6%) weight loss in ~2 weeks. Weight loss likely loss likely due to malignancy; oncology following. Pt to go for possible PET scan as outpt. Of note, pt reports significant weight loss 2 years ago during first round of cancer treatment and states that he was able to gain some back.     Education: Discussed the importance of adequate protein/energy intake due to increased needs; protein sources discussed. Discussed small frequent meals in setting of hx of esophagogastrectomy. Pt receptive and appreciate of diet education. Handouts provided.

## 2020-11-19 NOTE — PROGRESS NOTE ADULT - SUBJECTIVE AND OBJECTIVE BOX
Toni Manriquez   Pager [phone]  Office [phone]      CC: Patient is a 65y old  Male who presents with a chief complaint of 65y old  Male who presents with a chief complaint of Admitted 11/14 s/p spinal angio with endovascular embolization of thoracic spinal tumor R T5 RT6 and LT6 with coils and particles; s/p T4-6 Laminectomy for resection of epidural fusion with T3-12 fusion with plastics closure (19 Nov 2020 12:07)      SUBJECTIVE / OVERNIGHT EVENTS:    MEDICATIONS  (STANDING):  bisacodyl Suppository 10 milliGRAM(s) Rectal once  budesonide 160 MICROgram(s)/formoterol 4.5 MICROgram(s) Inhaler 2 Puff(s) Inhalation two times a day  dextrose 5%. 1000 milliLiter(s) (50 mL/Hr) IV Continuous <Continuous>  dextrose 5%. 1000 milliLiter(s) (100 mL/Hr) IV Continuous <Continuous>  enoxaparin Injectable 40 milliGRAM(s) SubCutaneous daily  gabapentin 300 milliGRAM(s) Oral three times a day  glucagon  Injectable 1 milliGRAM(s) IntraMuscular once  pantoprazole    Tablet 40 milliGRAM(s) Oral before breakfast  polyethylene glycol 3350 17 Gram(s) Oral two times a day  senna 2 Tablet(s) Oral at bedtime    MEDICATIONS  (PRN):  acetaminophen   Tablet .. 650 milliGRAM(s) Oral every 6 hours PRN Temp greater or equal to 38C (100.4F), Mild Pain (1 - 3)  ondansetron   Disintegrating Tablet 4 milliGRAM(s) Oral every 6 hours PRN Nausea  ondansetron Injectable 4 milliGRAM(s) IV Push once PRN Nausea and/or Vomiting  oxyCODONE    IR 5 milliGRAM(s) Oral every 4 hours PRN Moderate Pain (4 - 6)  oxyCODONE    IR 10 milliGRAM(s) Oral every 4 hours PRN Severe Pain (7 - 10)      Vital Signs Last 24 Hrs  T(C): 36.7 (19 Nov 2020 08:00), Max: 36.7 (18 Nov 2020 15:35)  T(F): 98 (19 Nov 2020 08:00), Max: 98 (18 Nov 2020 15:35)  HR: 60 (19 Nov 2020 08:00) (60 - 99)  BP: 110/68 (19 Nov 2020 08:00) (100/62 - 114/68)  BP(mean): --  RR: 18 (19 Nov 2020 08:00) (18 - 18)  SpO2: 96% (19 Nov 2020 08:00) (96% - 99%)  CAPILLARY BLOOD GLUCOSE      POCT Blood Glucose.: 126 mg/dL (18 Nov 2020 12:56)    I&O's Summary    18 Nov 2020 07:01  -  19 Nov 2020 07:00  --------------------------------------------------------  IN: 1260 mL / OUT: 2035 mL / NET: -775 mL      tele:    PHYSICAL EXAM:    GENERAL: NAD   HEENT: EOMI, PERRL  PULM: Clear to auscultation bilaterally  CV: Regular rate and rhythm; nl S1, S2; No murmurs, rubs, or gallops  ABDOMEN: Soft, Nontender, Nondistended; Bowel sounds present  EXTREMITIES/MSK:  No edema, calf tenderness   PSYCH: AAOx3  NEUROLOGY: non-focal          LABS:                        10.7   15.33 )-----------( 258      ( 18 Nov 2020 05:43 )             32.4     11-18    136  |  102  |  20  ----------------------------<  116<H>  4.3   |  29  |  0.69    Ca    8.3<L>      18 Nov 2020 05:43                  RADIOLOGY & ADDITIONAL TESTS:    Imaging Personally Reviewed:    Consultant(s) Notes Reviewed:      Care Discussed with Consultants/Other Providers:   Toni Manriquez   Pager [phone]  Office [phone]      CC: Patient is a 65y old  Male who presents with a chief complaint of 65y old  Male who presents with a chief complaint of Admitted 11/14 s/p spinal angio with endovascular embolization of thoracic spinal tumor R T5 RT6 and LT6 with coils and particles; s/p T4-6 Laminectomy for resection of epidural fusion with T3-12 fusion with plastics closure (19 Nov 2020 12:07)      SUBJECTIVE / OVERNIGHT EVENTS: Back pain controlled. No cp/sob/f/c/r. No N/V/abd pain.    MEDICATIONS  (STANDING):  bisacodyl Suppository 10 milliGRAM(s) Rectal once  budesonide 160 MICROgram(s)/formoterol 4.5 MICROgram(s) Inhaler 2 Puff(s) Inhalation two times a day  dextrose 5%. 1000 milliLiter(s) (50 mL/Hr) IV Continuous <Continuous>  dextrose 5%. 1000 milliLiter(s) (100 mL/Hr) IV Continuous <Continuous>  enoxaparin Injectable 40 milliGRAM(s) SubCutaneous daily  gabapentin 300 milliGRAM(s) Oral three times a day  glucagon  Injectable 1 milliGRAM(s) IntraMuscular once  pantoprazole    Tablet 40 milliGRAM(s) Oral before breakfast  polyethylene glycol 3350 17 Gram(s) Oral two times a day  senna 2 Tablet(s) Oral at bedtime    MEDICATIONS  (PRN):  acetaminophen   Tablet .. 650 milliGRAM(s) Oral every 6 hours PRN Temp greater or equal to 38C (100.4F), Mild Pain (1 - 3)  ondansetron   Disintegrating Tablet 4 milliGRAM(s) Oral every 6 hours PRN Nausea  ondansetron Injectable 4 milliGRAM(s) IV Push once PRN Nausea and/or Vomiting  oxyCODONE    IR 5 milliGRAM(s) Oral every 4 hours PRN Moderate Pain (4 - 6)  oxyCODONE    IR 10 milliGRAM(s) Oral every 4 hours PRN Severe Pain (7 - 10)      Vital Signs Last 24 Hrs  T(C): 36.7 (19 Nov 2020 08:00), Max: 36.7 (18 Nov 2020 15:35)  T(F): 98 (19 Nov 2020 08:00), Max: 98 (18 Nov 2020 15:35)  HR: 60 (19 Nov 2020 08:00) (60 - 99)  BP: 110/68 (19 Nov 2020 08:00) (100/62 - 114/68)  BP(mean): --  RR: 18 (19 Nov 2020 08:00) (18 - 18)  SpO2: 96% (19 Nov 2020 08:00) (96% - 99%)  CAPILLARY BLOOD GLUCOSE      POCT Blood Glucose.: 126 mg/dL (18 Nov 2020 12:56)    I&O's Summary    18 Nov 2020 07:01  -  19 Nov 2020 07:00  --------------------------------------------------------  IN: 1260 mL / OUT: 2035 mL / NET: -775 mL          PHYSICAL EXAM:    GENERAL: NAD   HEENT: EOMI, PERRL  PULM: Clear to auscultation bilaterally  CV: Regular rate and rhythm; nl S1, S2; No murmurs, rubs, or gallops  ABDOMEN: Soft, Nontender, Nondistended; Bowel sounds present  EXTREMITIES/MSK:  No edema, calf tenderness   PSYCH: AAOx3  NEUROLOGY: unchanged left hand weakness and b/l UE extension improved since postop          LABS:                        10.7   15.33 )-----------( 258      ( 18 Nov 2020 05:43 )             32.4     11-18    136  |  102  |  20  ----------------------------<  116<H>  4.3   |  29  |  0.69    Ca    8.3<L>      18 Nov 2020 05:43                  RADIOLOGY & ADDITIONAL TESTS:    Imaging Personally Reviewed:    Consultant(s) Notes Reviewed:      Care Discussed with Consultants/Other Providers: neurosurg

## 2020-11-19 NOTE — PROGRESS NOTE ADULT - SUBJECTIVE AND OBJECTIVE BOX
Subjective: Patient seen and examined. No new events except as noted.     SUBJECTIVE/ROS:  feels well   No chest pain, dyspnea, palpitation, or dizziness.       MEDICATIONS:  MEDICATIONS  (STANDING):  bisacodyl Suppository 10 milliGRAM(s) Rectal once  budesonide 160 MICROgram(s)/formoterol 4.5 MICROgram(s) Inhaler 2 Puff(s) Inhalation two times a day  dextrose 5%. 1000 milliLiter(s) (50 mL/Hr) IV Continuous <Continuous>  dextrose 5%. 1000 milliLiter(s) (100 mL/Hr) IV Continuous <Continuous>  enoxaparin Injectable 40 milliGRAM(s) SubCutaneous daily  gabapentin 300 milliGRAM(s) Oral three times a day  glucagon  Injectable 1 milliGRAM(s) IntraMuscular once  pantoprazole    Tablet 40 milliGRAM(s) Oral before breakfast  polyethylene glycol 3350 17 Gram(s) Oral two times a day  senna 2 Tablet(s) Oral at bedtime      PHYSICAL EXAM:  T(C): 36.3 (11-19-20 @ 04:53), Max: 37.1 (11-18-20 @ 11:35)  HR: 86 (11-19-20 @ 04:53) (68 - 99)  BP: 100/62 (11-19-20 @ 04:53) (100/62 - 114/72)  RR: 18 (11-19-20 @ 04:53) (18 - 18)  SpO2: 96% (11-19-20 @ 04:53) (96% - 99%)  Wt(kg): --  I&O's Summary    18 Nov 2020 07:01  -  19 Nov 2020 07:00  --------------------------------------------------------  IN: 1260 mL / OUT: 2035 mL / NET: -775 mL            JVP: Normal  Neck: supple  Lung: clear   CV: S1 S2 , Murmur:  Abd: soft  Ext: No edema  neuro: Awake / alert  Psych: flat affect  Skin: normal``    LABS/DATA:    CARDIAC MARKERS:                                10.7   15.33 )-----------( 258      ( 18 Nov 2020 05:43 )             32.4     11-18    136  |  102  |  20  ----------------------------<  116<H>  4.3   |  29  |  0.69    Ca    8.3<L>      18 Nov 2020 05:43      proBNP:   Lipid Profile:   HgA1c:   TSH:     TELE:  EKG:

## 2020-11-19 NOTE — DIETITIAN INITIAL EVALUATION ADULT. - PERTINENT MEDS FT
MEDICATIONS  (STANDING):  bisacodyl Suppository 10 milliGRAM(s) Rectal once  budesonide 160 MICROgram(s)/formoterol 4.5 MICROgram(s) Inhaler 2 Puff(s) Inhalation two times a day  dextrose 5%. 1000 milliLiter(s) (50 mL/Hr) IV Continuous <Continuous>  dextrose 5%. 1000 milliLiter(s) (100 mL/Hr) IV Continuous <Continuous>  enoxaparin Injectable 40 milliGRAM(s) SubCutaneous daily  gabapentin 300 milliGRAM(s) Oral three times a day  glucagon  Injectable 1 milliGRAM(s) IntraMuscular once  pantoprazole    Tablet 40 milliGRAM(s) Oral before breakfast  polyethylene glycol 3350 17 Gram(s) Oral two times a day  senna 2 Tablet(s) Oral at bedtime    MEDICATIONS  (PRN):  acetaminophen   Tablet .. 650 milliGRAM(s) Oral every 6 hours PRN Temp greater or equal to 38C (100.4F), Mild Pain (1 - 3)  ondansetron   Disintegrating Tablet 4 milliGRAM(s) Oral every 6 hours PRN Nausea  ondansetron Injectable 4 milliGRAM(s) IV Push once PRN Nausea and/or Vomiting  oxyCODONE    IR 5 milliGRAM(s) Oral every 4 hours PRN Moderate Pain (4 - 6)  oxyCODONE    IR 10 milliGRAM(s) Oral every 4 hours PRN Severe Pain (7 - 10)

## 2020-11-19 NOTE — PROGRESS NOTE ADULT - ASSESSMENT
COPD  nebs  stable     Esophageal ca  s/p esophagectomy   fu with heme onc     DVT proph  lovenox    DC planning

## 2020-11-19 NOTE — DISCHARGE NOTE PROVIDER - CARE PROVIDERS DIRECT ADDRESSES
[email or web address],DirectAddress_Unknown,DirectAddress_Unknown

## 2020-11-19 NOTE — DIETITIAN INITIAL EVALUATION ADULT. - PERTINENT LABORATORY DATA
11-18 Na 136 mmol/L Glu 116 mg/dL<H> K+ 4.3 mmol/L Cr  0.69 mg/dL BUN 20 mg/dL Phos n/a   Alb n/a   PAB n/a   Hgb 10.7 g/dL<L> Hct 32.4 %<L> no

## 2020-11-19 NOTE — PROGRESS NOTE ADULT - ASSESSMENT
64 yo male active smoker with PMH of esophageal ca s/p esophagogatrectomy, chemo + RT in 5/2018, cholelithiasis s/p cholecystectomy 8/2018, COPD not on home O2 who presented to [facility] on 11/9/20 with R rib pain found to have metastatic disease to thoracic spine initially transferred to [facility] for radiation subsequently transferred to [facility] on 11/13 for neurosurgical intervention. MRI spine with metastatic disease involving thoracic spine from T5-T11 with epidural involvement spanning T4-T8 with severe spinal canal stenosis at T6 concerning for cord compression. s/p T3-T12 INSTRUMENTATION  T4-6 LAMINECTOMY FOR EPIDURAL TUMOR on 11/14/20 c  cc.     Oncologist: Dr. Briones

## 2020-11-23 ENCOUNTER — OUTPATIENT (OUTPATIENT)
Dept: OUTPATIENT SERVICES | Facility: HOSPITAL | Age: 65
LOS: 1 days | Discharge: HOME | End: 2020-11-23
Payer: COMMERCIAL

## 2020-11-23 ENCOUNTER — APPOINTMENT (OUTPATIENT)
Dept: RADIATION ONCOLOGY | Facility: CLINIC | Age: 65
End: 2020-11-23
Payer: COMMERCIAL

## 2020-11-23 VITALS
OXYGEN SATURATION: 100 % | BODY MASS INDEX: 23.6 KG/M2 | HEIGHT: 71 IN | TEMPERATURE: 97.6 F | SYSTOLIC BLOOD PRESSURE: 105 MMHG | DIASTOLIC BLOOD PRESSURE: 64 MMHG | RESPIRATION RATE: 18 BRPM | HEART RATE: 104 BPM | WEIGHT: 168.54 LBS

## 2020-11-23 DIAGNOSIS — Z90.49 ACQUIRED ABSENCE OF OTHER SPECIFIED PARTS OF DIGESTIVE TRACT: Chronic | ICD-10-CM

## 2020-11-23 DIAGNOSIS — Z92.21 PERSONAL HISTORY OF ANTINEOPLASTIC CHEMOTHERAPY: ICD-10-CM

## 2020-11-23 DIAGNOSIS — Z98.890 OTHER SPECIFIED POSTPROCEDURAL STATES: Chronic | ICD-10-CM

## 2020-11-23 DIAGNOSIS — Z92.3 PERSONAL HISTORY OF IRRADIATION: ICD-10-CM

## 2020-11-23 PROCEDURE — 99214 OFFICE O/P EST MOD 30 MIN: CPT | Mod: GC,25

## 2020-11-23 RX ORDER — ATORVASTATIN CALCIUM 10 MG/1
10 TABLET, FILM COATED ORAL
Qty: 30 | Refills: 0 | Status: DISCONTINUED | COMMUNITY
Start: 2020-06-17

## 2020-11-23 RX ORDER — OXYCODONE HYDROCHLORIDE 5 MG/5ML
5 SOLUTION ORAL
Qty: 200 | Refills: 0 | Status: DISCONTINUED | COMMUNITY
Start: 2018-09-04 | End: 2020-11-23

## 2020-11-23 NOTE — PHYSICAL EXAM
[] : no respiratory distress [Exaggerated Use Of Accessory Muscles For Inspiration] : no accessory muscle use [Normal] : oriented to person, place and time, the affect was normal, the mood was normal and not anxious

## 2020-11-24 ENCOUNTER — APPOINTMENT (OUTPATIENT)
Dept: SPINE | Facility: CLINIC | Age: 65
End: 2020-11-24
Payer: SELF-PAY

## 2020-11-24 VITALS
BODY MASS INDEX: 21.28 KG/M2 | DIASTOLIC BLOOD PRESSURE: 79 MMHG | HEIGHT: 71 IN | HEART RATE: 94 BPM | WEIGHT: 152 LBS | TEMPERATURE: 98 F | SYSTOLIC BLOOD PRESSURE: 117 MMHG

## 2020-11-24 PROCEDURE — 99024 POSTOP FOLLOW-UP VISIT: CPT

## 2020-11-24 NOTE — HISTORY OF PRESENT ILLNESS
[FreeTextEntry1] : Mr. 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 Brandon Gil. He began to experience a persistent cough and appetite loss. He was referred for a CT of the chest, abdomen and pelvis in March 2018. Study demonstrated severe circumferential soft tissue thickening at the distal esophagus with associated thoracic, milly esophageal and portacaval lymphadenopathy. There were non specific pulmonary findings, possibly reflecting pulmonary fibrosis, with associated emphysematous changes, and indication of a recent right 8th rib fracture. \par \par He was referred for an endoscopy on 3/14/18 at [facility]. 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 He had a colonoscopy as well which was unremarkable. \par \par He completed neoadjuvant chemoradiation on May 23, 2018 with Dr. Briones and Dr. Pickett.\par \par He completed a restaging CT of the chest, abdomen and pelvis on 6/25/18, which demonstrated few scattered mediastinal paratracheal lymph nodes. The previously described posterior esophageal dominant lymph node with necrosis is decreased in size. There is mild thickening of the esophageal wall. \par \par He underwent Mirza Raghu Esophagogastrectomy, cholecystectomy, umbilical hernia repair on 8/17/18 along with Dr. Hebert. Final pathology showed no residual carcinoma, 12 negative lymph nodes and negative margins. Post op course complicated by anastomotic leak which has resolved. Surgeon was Dr. Cloud.\par \par He presented to [facility] on 11/9 with right rib pain and upper back pain, found to have metastatic disease to thoracic spine and\par possible cord compression at T5-T6. s/p spinal angio with endovascular embolization of thoracic spinal tumor R T5 RT6 and LT6 with coils and particles; s/p T4-6 Laminectomy for resection of epidural tumor with T3-12 fusion with plastics closure. 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 Spine surgeon Dr. Martins. Significant epidural tumor noted at T4,T7,T5-6.\par \par He was recently discharged from the hospital and is doing well, with resolution of his radicular rib pain and improvement of the pain and his postop site.\par \par

## 2020-11-24 NOTE — ASSESSMENT
[FreeTextEntry1] : 65-year-old man status post thoracic resection for metastatic tumor\par \par Continue to follow-up with Dr. Noyola from radiation oncology\par \par Follow-up it with Dr. CUNNINGHAM in 3 weeks with new x-rays

## 2020-11-24 NOTE — REVIEW OF SYSTEMS
[SOB on Exertion] : shortness of breath during exertion [Abdominal Pain] : abdominal pain [Joint Pain] : joint pain [Muscle Pain] : muscle pain [Muscle Weakness] : muscle weakness [Disturbance Of Gait] : gait disturbance [Negative] : Allergic/Immunologic [Vomiting] : no vomiting [Constipation] : no constipation [Diarrhea] : no diarrhea [FreeTextEntry9] : Walks with walker

## 2020-11-24 NOTE — PHYSICAL EXAM
[General Appearance - Alert] : alert [General Appearance - In No Acute Distress] : in no acute distress [Healing Well] : healing well [No Drainage] : without drainage [Normal Skin] : normal [Oriented To Time, Place, And Person] : oriented to person, place, and time [Cranial Nerves Optic (II)] : visual acuity intact bilaterally,  pupils equal round and reactive to light [Cranial Nerves Oculomotor (III)] : extraocular motion intact [Cranial Nerves Trigeminal (V)] : facial sensation intact symmetrically [Cranial Nerves Facial (VII)] : face symmetrical [Cranial Nerves Vestibulocochlear (VIII)] : hearing was intact bilaterally [Cranial Nerves Glossopharyngeal (IX)] : tongue and palate midline [Cranial Nerves Accessory (XI - Cranial And Spinal)] : head turning and shoulder shrug symmetric [Cranial Nerves Hypoglossal (XII)] : there was no tongue deviation with protrusion [FreeTextEntry8] : Ambulates with a walker to maintain balance and gait support [Sclera] : the sclera and conjunctiva were normal [Outer Ear] : the ears and nose were normal in appearance [Heart Rate And Rhythm] : heart rate was normal and rhythm regular [Abdomen Soft] : soft [FreeTextEntry1] : Ambulates with a walker [Skin Color & Pigmentation] : normal skin color and pigmentation

## 2020-12-07 ENCOUNTER — OUTPATIENT (OUTPATIENT)
Dept: OUTPATIENT SERVICES | Facility: HOSPITAL | Age: 65
LOS: 1 days | End: 2020-12-07
Payer: COMMERCIAL

## 2020-12-07 DIAGNOSIS — Z90.49 ACQUIRED ABSENCE OF OTHER SPECIFIED PARTS OF DIGESTIVE TRACT: Chronic | ICD-10-CM

## 2020-12-07 DIAGNOSIS — Z11.59 ENCOUNTER FOR SCREENING FOR OTHER VIRAL DISEASES: ICD-10-CM

## 2020-12-07 DIAGNOSIS — Z98.890 OTHER SPECIFIED POSTPROCEDURAL STATES: Chronic | ICD-10-CM

## 2020-12-07 PROCEDURE — 77290 THER RAD SIMULAJ FIELD CPLX: CPT | Mod: 26

## 2020-12-07 PROCEDURE — U0003: CPT

## 2020-12-07 PROCEDURE — 77334 RADIATION TREATMENT AID(S): CPT | Mod: 26

## 2020-12-08 LAB — SARS-COV-2 RNA SPEC QL NAA+PROBE: SIGNIFICANT CHANGE UP

## 2020-12-09 ENCOUNTER — RESULT REVIEW (OUTPATIENT)
Age: 65
End: 2020-12-09

## 2020-12-09 ENCOUNTER — APPOINTMENT (OUTPATIENT)
Dept: RADIOLOGY | Facility: HOSPITAL | Age: 65
End: 2020-12-09
Payer: COMMERCIAL

## 2020-12-09 ENCOUNTER — OUTPATIENT (OUTPATIENT)
Dept: OUTPATIENT SERVICES | Facility: HOSPITAL | Age: 65
LOS: 1 days | End: 2020-12-09
Payer: COMMERCIAL

## 2020-12-09 DIAGNOSIS — Z90.49 ACQUIRED ABSENCE OF OTHER SPECIFIED PARTS OF DIGESTIVE TRACT: Chronic | ICD-10-CM

## 2020-12-09 DIAGNOSIS — Z98.890 OTHER SPECIFIED POSTPROCEDURAL STATES: Chronic | ICD-10-CM

## 2020-12-09 DIAGNOSIS — C15.9 MALIGNANT NEOPLASM OF ESOPHAGUS, UNSPECIFIED: ICD-10-CM

## 2020-12-09 PROCEDURE — 72128 CT CHEST SPINE W/O DYE: CPT | Mod: 26

## 2020-12-09 PROCEDURE — 62303 MYELOGRAPHY LUMBAR INJECTION: CPT

## 2020-12-09 PROCEDURE — 72128 CT CHEST SPINE W/O DYE: CPT

## 2020-12-11 ENCOUNTER — NON-APPOINTMENT (OUTPATIENT)
Age: 65
End: 2020-12-11

## 2020-12-11 ENCOUNTER — TRANSCRIPTION ENCOUNTER (OUTPATIENT)
Age: 65
End: 2020-12-11

## 2020-12-13 NOTE — ASU PATIENT PROFILE, ADULT - PSH
Assisted up to bathroom, voided. Bibiana care taught. Voices understanding. Will call with any further needs. History of bilateral inguinal hernia repair

## 2020-12-14 PROCEDURE — 99072 ADDL SUPL MATRL&STAF TM PHE: CPT

## 2020-12-14 PROCEDURE — 77263 THER RADIOLOGY TX PLNG CPLX: CPT

## 2020-12-15 ENCOUNTER — APPOINTMENT (OUTPATIENT)
Dept: SPINE | Facility: CLINIC | Age: 65
End: 2020-12-15
Payer: COMMERCIAL

## 2020-12-15 VITALS
DIASTOLIC BLOOD PRESSURE: 74 MMHG | BODY MASS INDEX: 22.54 KG/M2 | OXYGEN SATURATION: 98 % | HEART RATE: 77 BPM | WEIGHT: 161 LBS | SYSTOLIC BLOOD PRESSURE: 110 MMHG | TEMPERATURE: 97.9 F | HEIGHT: 71 IN

## 2020-12-15 DIAGNOSIS — G89.18 OTHER ACUTE POSTPROCEDURAL PAIN: ICD-10-CM

## 2020-12-15 PROCEDURE — 99024 POSTOP FOLLOW-UP VISIT: CPT

## 2020-12-15 NOTE — PHYSICAL EXAM
[General Appearance - Alert] : alert [General Appearance - In No Acute Distress] : in no acute distress [Longitudinal] : longitudinal [No Drainage] : without drainage [Oriented To Time, Place, And Person] : oriented to person, place, and time [Impaired Insight] : insight and judgment were intact [Cranial Nerves Optic (II)] : visual acuity intact bilaterally,  pupils equal round and reactive to light [Cranial Nerves Oculomotor (III)] : extraocular motion intact [Cranial Nerves Trigeminal (V)] : facial sensation intact symmetrically [Cranial Nerves Facial (VII)] : face symmetrical [Cranial Nerves Vestibulocochlear (VIII)] : hearing was intact bilaterally [Cranial Nerves Glossopharyngeal (IX)] : tongue and palate midline [Cranial Nerves Accessory (XI - Cranial And Spinal)] : head turning and shoulder shrug symmetric [Cranial Nerves Hypoglossal (XII)] : there was no tongue deviation with protrusion [Sensation Tactile Decrease] : light touch was intact [Sclera] : the sclera and conjunctiva were normal [Outer Ear] : the ears and nose were normal in appearance [Neck Appearance] : the appearance of the neck was normal [Heart Rate And Rhythm] : heart rate was normal and rhythm regular [Abdomen Soft] : soft [Abnormal Walk] : normal gait [Skin Color & Pigmentation] : normal skin color and pigmentation [FreeTextEntry1] : Thoracic lumbar

## 2020-12-15 NOTE — ASSESSMENT
[FreeTextEntry1] : 65-year-old man status post thoracic lumbar fusion for metastatic cancer\par \par Scoliosis x-ray shows intact hardware and construct\par \par Dr. Martins saw and evaluated pt during this visit.\par Diagnostic images reviewed and results, including plan of care discussed\par \par \par Follow-up with radiation oncology for continued plan of care\par Follow-up in 6 weeks with new x-rays\par Follow-up with pain management for long-term pain management control

## 2020-12-15 NOTE — REASON FOR VISIT
[Spouse] : spouse [de-identified] : S/P T3-T12 posterior segmental instrumentation, posterolateral arthrodesis.\par  T4T5, T6T7 laminectomy and removal of epidural tumor.\par  T6 transpedicular decompression of tumor.\par  [de-identified] : 11/14/20 [de-identified] : Here with review of Xray\par Today he is doing well and ambulating independently\par He is currently in the process of setting up his appointment with oncology/radiation oncologist to coordinate care plan post surgical resection.  Wife reports that they were already seen by Dr. Noyola but is now pursuing a second opinion regarding his chemotherapy.  His thoracic lumbar incision is well-healed.  Reports that he continues to experience significant pain postoperative pain especially at night.  Pain is well controlled with oxycodone prn.\par

## 2020-12-16 ENCOUNTER — OUTPATIENT (OUTPATIENT)
Dept: OUTPATIENT SERVICES | Facility: HOSPITAL | Age: 65
LOS: 1 days | Discharge: HOME | End: 2020-12-16

## 2020-12-16 DIAGNOSIS — Z98.890 OTHER SPECIFIED POSTPROCEDURAL STATES: Chronic | ICD-10-CM

## 2020-12-16 DIAGNOSIS — Z90.49 ACQUIRED ABSENCE OF OTHER SPECIFIED PARTS OF DIGESTIVE TRACT: Chronic | ICD-10-CM

## 2020-12-16 DIAGNOSIS — C15.9 MALIGNANT NEOPLASM OF ESOPHAGUS, UNSPECIFIED: ICD-10-CM

## 2020-12-17 ENCOUNTER — NON-APPOINTMENT (OUTPATIENT)
Age: 65
End: 2020-12-17

## 2020-12-18 ENCOUNTER — APPOINTMENT (OUTPATIENT)
Dept: HEMATOLOGY ONCOLOGY | Facility: CLINIC | Age: 65
End: 2020-12-18
Payer: COMMERCIAL

## 2020-12-18 PROCEDURE — 99203 OFFICE O/P NEW LOW 30 MIN: CPT | Mod: 95

## 2020-12-20 NOTE — REVIEW OF SYSTEMS
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

## 2020-12-20 NOTE — REASON FOR VISIT
[Home] : at home, [unfilled] , at the time of the visit. [Medical Office: ([facility])___] : at the medical office located in  [Initial Consultation] : an initial consultation [Spouse] : spouse [Verbal consent obtained from patient] : the patient, [unfilled] [FreeTextEntry2] : Esophago-gastric carcinoma

## 2020-12-20 NOTE — CONSULT LETTER
[Dear  ___] : Dear  [unfilled], [Consult Letter:] : I had the pleasure of evaluating your patient, [unfilled]. [Please see my note below.] : Please see my note below. [Consult Closing:] : Thank you very much for allowing me to participate in the care of this patient.  If you have any questions, please do not hesitate to contact me. [Sincerely,] : Sincerely, [FreeTextEntry2] : dr. Mundo Noyola [FreeTextEntry3] : [facility]\par [facility]\par [facility] [DrBharat  ___] : Dr. LOMELI [DrBharat ___] : Dr. LOMELI

## 2020-12-20 NOTE — HISTORY OF PRESENT ILLNESS
[de-identified] : This is a 65-year-old man with history of esophagogastric carcinoma who presents for evaluation.  His history dates back to March 2018 when he developed a cough and decreased appetite.  A CAT scan of his chest revealed a mass in the distal esophagus with periesophageal and portacaval adenopathy.  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  The patient underwent neoadjuvant chemotherapy and radiation therapy with Taxol carboplatin.  A follow-up CAT scan in June 2018 revealed decreased mass in the esophagus.  In August 2018 the patient underwent [place] Raghu esophagogastrectomy with Dr. Cloud and Dr. Arciniega.  The pathology revealed no residual carcinoma with negative margins, with12 - lymph nodes (0/12).\par \par The patient then was well until November 2020 when he developed right rib pain and upper back pain.  Imaging testing revealed metastases to the thoracic spine and possible cord compression at T5-T6.  The patient underwent laminectomy of T4-T6 resection of the epidural tumor with T3-T12 fusion and endovascular embolization of the spinal tumor T5-T6.  The pathology revealed poorly differentiated adenocarcinoma.  Postoperatively his pain was relieved.  He has undergone radiation oncology consultation and is planned to receive SBRT treatment.  He now presents for discussion regarding further chemotherapy.

## 2020-12-20 NOTE — ASSESSMENT
[FreeTextEntry1] : A discussion took place with the patient and his wife in presence regarding further management.  It was recommended that the patient schedule a PET/CT to evaluate for other sites of metastases.  He will be starting SBRT in 1 week.  We also discussed systemic chemotherapy and in reviewing his pathology the patient has HER-2 positive disease and also his PD-L1 score is +5% TPS.  Options for chemotherapy would be FOLFOX including oxaliplatin, 5-FU and leucovorin depending on the extent and severity of his neuropathy.  If he is not able to tolerate oxaliplatin then irinotecan will be substituted and Herceptin would be added.  Side effects were discussed in detail.  The patient would also be a candidate for immunotherapy based on his PD-L1 score.  Once we have more information final recommendations will be made. [Palliative] : Goals of care discussed with patient: Palliative [Palliative Care Plan] : not applicable at this time

## 2020-12-22 PROCEDURE — 77300 RADIATION THERAPY DOSE PLAN: CPT | Mod: 26

## 2020-12-22 PROCEDURE — 77334 RADIATION TREATMENT AID(S): CPT | Mod: 26

## 2020-12-22 PROCEDURE — 77295 3-D RADIOTHERAPY PLAN: CPT | Mod: 26

## 2020-12-23 ENCOUNTER — RESULT REVIEW (OUTPATIENT)
Age: 65
End: 2020-12-23

## 2020-12-23 ENCOUNTER — APPOINTMENT (OUTPATIENT)
Dept: HEMATOLOGY ONCOLOGY | Facility: CLINIC | Age: 65
End: 2020-12-23
Payer: COMMERCIAL

## 2020-12-23 VITALS
WEIGHT: 156.07 LBS | TEMPERATURE: 97.9 F | DIASTOLIC BLOOD PRESSURE: 75 MMHG | RESPIRATION RATE: 17 BRPM | HEART RATE: 97 BPM | HEIGHT: 70 IN | BODY MASS INDEX: 22.34 KG/M2 | SYSTOLIC BLOOD PRESSURE: 120 MMHG | OXYGEN SATURATION: 98 %

## 2020-12-23 LAB
BASOPHILS # BLD AUTO: 0.08 K/UL — SIGNIFICANT CHANGE UP (ref 0–0.2)
BASOPHILS NFR BLD AUTO: 1 % — SIGNIFICANT CHANGE UP (ref 0–2)
EOSINOPHIL # BLD AUTO: 0.22 K/UL — SIGNIFICANT CHANGE UP (ref 0–0.5)
EOSINOPHIL NFR BLD AUTO: 2.9 % — SIGNIFICANT CHANGE UP (ref 0–6)
HCT VFR BLD CALC: 38.2 % — LOW (ref 39–50)
HGB BLD-MCNC: 12 G/DL — LOW (ref 13–17)
IMM GRANULOCYTES NFR BLD AUTO: 0.3 % — SIGNIFICANT CHANGE UP (ref 0–1.5)
LYMPHOCYTES # BLD AUTO: 1.04 K/UL — SIGNIFICANT CHANGE UP (ref 1–3.3)
LYMPHOCYTES # BLD AUTO: 13.6 % — SIGNIFICANT CHANGE UP (ref 13–44)
MCHC RBC-ENTMCNC: 29.2 PG — SIGNIFICANT CHANGE UP (ref 27–34)
MCHC RBC-ENTMCNC: 31.4 G/DL — LOW (ref 32–36)
MCV RBC AUTO: 92.9 FL — SIGNIFICANT CHANGE UP (ref 80–100)
MONOCYTES # BLD AUTO: 0.6 K/UL — SIGNIFICANT CHANGE UP (ref 0–0.9)
MONOCYTES NFR BLD AUTO: 7.9 % — SIGNIFICANT CHANGE UP (ref 2–14)
NEUTROPHILS # BLD AUTO: 5.66 K/UL — SIGNIFICANT CHANGE UP (ref 1.8–7.4)
NEUTROPHILS NFR BLD AUTO: 74.3 % — SIGNIFICANT CHANGE UP (ref 43–77)
NRBC # BLD: 0 /100 WBCS — SIGNIFICANT CHANGE UP (ref 0–0)
PLATELET # BLD AUTO: 398 K/UL — SIGNIFICANT CHANGE UP (ref 150–400)
RBC # BLD: 4.11 M/UL — LOW (ref 4.2–5.8)
RBC # FLD: 14.3 % — SIGNIFICANT CHANGE UP (ref 10.3–14.5)
WBC # BLD: 7.62 K/UL — SIGNIFICANT CHANGE UP (ref 3.8–10.5)
WBC # FLD AUTO: 7.62 K/UL — SIGNIFICANT CHANGE UP (ref 3.8–10.5)

## 2020-12-23 PROCEDURE — 99072 ADDL SUPL MATRL&STAF TM PHE: CPT

## 2020-12-23 PROCEDURE — 99213 OFFICE O/P EST LOW 20 MIN: CPT

## 2020-12-27 LAB
ALBUMIN SERPL ELPH-MCNC: 3.9 G/DL
ALP BLD-CCNC: 165 U/L
ALT SERPL-CCNC: 8 U/L
ANION GAP SERPL CALC-SCNC: 10 MMOL/L
APTT BLD: 30.4 SEC
AST SERPL-CCNC: 16 U/L
BILIRUB SERPL-MCNC: 0.5 MG/DL
BUN SERPL-MCNC: 10 MG/DL
CALCIUM SERPL-MCNC: 9.4 MG/DL
CANCER AG19-9 SERPL-ACNC: 13 U/ML
CEA SERPL-MCNC: 5.8 NG/ML
CHLORIDE SERPL-SCNC: 102 MMOL/L
CO2 SERPL-SCNC: 28 MMOL/L
CREAT SERPL-MCNC: 0.95 MG/DL
GLUCOSE SERPL-MCNC: 111 MG/DL
HBV CORE IGG+IGM SER QL: NONREACTIVE
HBV CORE IGM SER QL: NONREACTIVE
HBV DNA # SERPL NAA+PROBE: NOT DETECTED
HBV E AB SER QL: NEGATIVE
HBV E AG SER QL: NEGATIVE
HBV SURFACE AB SER QL: NONREACTIVE
HBV SURFACE AG SER QL: NONREACTIVE
HCV AB SER QL: NONREACTIVE
HCV S/CO RATIO: 0.1 S/CO
HEPB DNA PCR LOG: NOT DETECTED LOG10IU/ML
INR PPP: 1.05 RATIO
POTASSIUM SERPL-SCNC: 4.5 MMOL/L
PROT SERPL-MCNC: 7 G/DL
PT BLD: 12.5 SEC
SODIUM SERPL-SCNC: 139 MMOL/L

## 2020-12-28 ENCOUNTER — NON-APPOINTMENT (OUTPATIENT)
Age: 65
End: 2020-12-28

## 2020-12-28 PROCEDURE — 77435 SBRT MANAGEMENT: CPT

## 2020-12-28 NOTE — VITALS
[Maximal Pain Intensity: 10/10] : 10/10 [Least Pain Intensity: 4/10] : 4/10 [Pain Location: ___] : Pain Location: [unfilled] [Opioid] : opioid [80: Normal activity with effort; some signs or symptoms of disease.] : 80: Normal activity with effort; some signs or symptoms of disease.  [ECOG Performance Status: 2 - Ambulatory and capable of all self care but unable to carry out any work activities] : Performance Status: 2 - Ambulatory and capable of all self care but unable to carry out any work activities. Up and about more than 50% of waking hours

## 2020-12-28 NOTE — HISTORY OF PRESENT ILLNESS
[FreeTextEntry1] : Mr. Sánchez Ferrer is a 66yo with hx of esophageal adenocarcinoma s/p CRT and esophagectomy 2018 recently presenting with metastatic disease throughout spine including cord compression due to deposits of epidural disease at level of inferior portion of T4, superior portion of T7, and entire T5-T6, s/p T4-5 and T6-7 laminectomies and posterolateral arthrodesis from t3-t12 . His previous RT as was done at [facility] under the care of Dr. Pickett\par \par OTV 12/28/20\par 3/3 Fractions completed today.\par c/o fatigue, poor appetite, pain to back and right rib. Using oxycodone for pain mgt.

## 2021-01-03 ENCOUNTER — OUTPATIENT (OUTPATIENT)
Dept: OUTPATIENT SERVICES | Facility: HOSPITAL | Age: 66
LOS: 1 days | End: 2021-01-03
Payer: MEDICAID

## 2021-01-03 DIAGNOSIS — Z20.828 CONTACT WITH AND (SUSPECTED) EXPOSURE TO OTHER VIRAL COMMUNICABLE DISEASES: ICD-10-CM

## 2021-01-03 DIAGNOSIS — Z90.49 ACQUIRED ABSENCE OF OTHER SPECIFIED PARTS OF DIGESTIVE TRACT: Chronic | ICD-10-CM

## 2021-01-03 DIAGNOSIS — Z98.890 OTHER SPECIFIED POSTPROCEDURAL STATES: Chronic | ICD-10-CM

## 2021-01-03 LAB
FULL GENE SEQUENCE RESULT: NORMAL
HCV GENTYP BLD NAA+PROBE: NOT DETECTED
INTERPRETATION PGDFRB: NORMAL
Lab: NORMAL
REF LAB TEST METHOD: NORMAL
REVIEWED BY: NORMAL
SARS-COV-2 RNA SPEC QL NAA+PROBE: SIGNIFICANT CHANGE UP
TA REPEAT RESULT: NORMAL
TEST PERFORMANCE INFO SPEC: NORMAL

## 2021-01-03 PROCEDURE — U0003: CPT

## 2021-01-03 PROCEDURE — U0005: CPT

## 2021-01-03 PROCEDURE — C9803: CPT

## 2021-01-04 ENCOUNTER — APPOINTMENT (OUTPATIENT)
Dept: CARDIOLOGY | Facility: CLINIC | Age: 66
End: 2021-01-04
Payer: SELF-PAY

## 2021-01-04 PROCEDURE — 99072 ADDL SUPL MATRL&STAF TM PHE: CPT

## 2021-01-04 PROCEDURE — 93306 TTE W/DOPPLER COMPLETE: CPT

## 2021-01-04 PROCEDURE — 93356 MYOCRD STRAIN IMG SPCKL TRCK: CPT

## 2021-01-05 ENCOUNTER — APPOINTMENT (OUTPATIENT)
Dept: HEMATOLOGY ONCOLOGY | Facility: CLINIC | Age: 66
End: 2021-01-05
Payer: MEDICAID

## 2021-01-05 ENCOUNTER — OUTPATIENT (OUTPATIENT)
Dept: OUTPATIENT SERVICES | Facility: HOSPITAL | Age: 66
LOS: 1 days | Discharge: HOME | End: 2021-01-05

## 2021-01-05 DIAGNOSIS — Z90.49 ACQUIRED ABSENCE OF OTHER SPECIFIED PARTS OF DIGESTIVE TRACT: Chronic | ICD-10-CM

## 2021-01-05 DIAGNOSIS — Z98.890 OTHER SPECIFIED POSTPROCEDURAL STATES: Chronic | ICD-10-CM

## 2021-01-05 DIAGNOSIS — C15.9 MALIGNANT NEOPLASM OF ESOPHAGUS, UNSPECIFIED: ICD-10-CM

## 2021-01-05 PROCEDURE — 99204 OFFICE O/P NEW MOD 45 MIN: CPT | Mod: 95

## 2021-01-05 RX ORDER — OXYCODONE 5 MG/1
5 TABLET ORAL
Qty: 21 | Refills: 0 | Status: DISCONTINUED | COMMUNITY
Start: 2020-12-15 | End: 2021-01-05

## 2021-01-05 RX ORDER — OXYCODONE AND ACETAMINOPHEN 5; 325 MG/1; MG/1
5-325 TABLET ORAL
Qty: 21 | Refills: 0 | Status: DISCONTINUED | COMMUNITY
Start: 2020-12-07 | End: 2021-01-05

## 2021-01-06 ENCOUNTER — OUTPATIENT (OUTPATIENT)
Dept: OUTPATIENT SERVICES | Facility: HOSPITAL | Age: 66
LOS: 1 days | End: 2021-01-06
Payer: MEDICAID

## 2021-01-06 ENCOUNTER — RESULT REVIEW (OUTPATIENT)
Age: 66
End: 2021-01-06

## 2021-01-06 VITALS
DIASTOLIC BLOOD PRESSURE: 68 MMHG | HEART RATE: 80 BPM | OXYGEN SATURATION: 100 % | SYSTOLIC BLOOD PRESSURE: 104 MMHG | RESPIRATION RATE: 16 BRPM

## 2021-01-06 VITALS
HEART RATE: 95 BPM | RESPIRATION RATE: 16 BRPM | TEMPERATURE: 98 F | HEIGHT: 69 IN | DIASTOLIC BLOOD PRESSURE: 95 MMHG | WEIGHT: 156.09 LBS | OXYGEN SATURATION: 100 % | SYSTOLIC BLOOD PRESSURE: 117 MMHG

## 2021-01-06 DIAGNOSIS — C79.51 SECONDARY MALIGNANT NEOPLASM OF BONE: ICD-10-CM

## 2021-01-06 DIAGNOSIS — Z98.890 OTHER SPECIFIED POSTPROCEDURAL STATES: Chronic | ICD-10-CM

## 2021-01-06 DIAGNOSIS — Z90.49 ACQUIRED ABSENCE OF OTHER SPECIFIED PARTS OF DIGESTIVE TRACT: Chronic | ICD-10-CM

## 2021-01-06 PROCEDURE — 76937 US GUIDE VASCULAR ACCESS: CPT

## 2021-01-06 PROCEDURE — 36561 INSERT TUNNELED CV CATH: CPT

## 2021-01-06 PROCEDURE — C1769: CPT

## 2021-01-06 PROCEDURE — C1788: CPT

## 2021-01-06 PROCEDURE — 76937 US GUIDE VASCULAR ACCESS: CPT | Mod: 26

## 2021-01-06 PROCEDURE — C9399: CPT

## 2021-01-06 PROCEDURE — 77001 FLUOROGUIDE FOR VEIN DEVICE: CPT | Mod: 26

## 2021-01-06 PROCEDURE — C1894: CPT

## 2021-01-06 PROCEDURE — 77001 FLUOROGUIDE FOR VEIN DEVICE: CPT

## 2021-01-06 NOTE — ASU DISCHARGE PLAN (ADULT/PEDIATRIC) - ASU DC SPECIAL INSTRUCTIONSFT
Chest Port Placement    Discharge Instructions  - You have had a chest port implanted in your chest.   - The port is ready for use.  - You may shower in 48 hours. No soaking or swimming for 2 weeks or until the site is completely healed.  - Keep the area covered and dry for the next 7 days. It may be removed by a chemotherapy nurse as needed for treatment.  - Do not perform any heavy lifting or put tension on the area for the next week or until the site is healed.  - You may resume your normal diet.  - You may resume your normal medications however you should wait 48 hours before restarting aspirin, plavix, or blood thinners.  - It is normal to experience some pain over the site for the next few days. You may take apply ice to the area (20 minutes on, 20 minutes off) and take Tylenol for that pain. Do not take more frequently than every 6 hours and do not exceed more than 3000mg of Tylenol in a 24 hour period.    - You were given conscious sedation which may make you drowsy, therefore you need someone to stay with you until the morning following the procedure.  - Do not drive, engage in heavy lifting or strenuous activity, or drink any alcoholic beverages for the next 24 hours.   - You may resume normal activity in 24 hours.    Notify your primary physician and/or Interventional Radiology IMMEDIATELY if you experience any of the following       - Fever of 100.4F or 38C       - Chills or Rigors/ Shakes       - Swelling and/or Redness in the area around the port       - Worsening Pain       - Blood soaked bandages or worsening bleeding       - Lightheadedness and/or dizziness upon standing       - Chest Pain/ Tightness       - Shortness of Breath       - Difficulty walking    If you have a problem that you believe requires IMMEDIATE attention, please go to your NEAREST Emergency Room. If you believe your problem can safely wait until you speak to a physician, please call Interventional Radiology for any concerns.    During Normal Weekday Business Hours- You can contact the Interventional Radiology department during normal business hours via telephone.  During Evenings and Weekends- If you need to contact Interventional Radiology during off hours, do so by calling the hospital and requesting to be connected to the Interventional Radiologist on call.

## 2021-01-06 NOTE — PRE PROCEDURE NOTE - PRE PROCEDURE EVALUATION
Interventional Radiology Pre-Procedure Note    This is a 65y Male with pmhx of esophageal ca s/p esophagogatrectomy, chemo + RT in 5/2018, cholelithiasis s/p cholecystectomy 8/2018, COPD found to have metastatic disease to thoracic spine s/p spinal embolization and surgical intervention now requiring long term venous access for chemotherapy. Patient unsure of start date for chemotherapy. Patient denies fever, chills, nausea, vomiting, change in mental status or recent travel.     NPO: 1/5/2020 @midnight  Anticoagulation: n/a  Antibiotics: n/a  Adverse reaction to anesthesia: denies  Objection to blood products: none    PAST MEDICAL & SURGICAL HISTORY:  Chronic obstructive pulmonary disease  Malignant neoplasm of esophagus, unspecified  History of cholecystectomy  History of bilateral inguinal hernia repair       Vital Signs Last 24 Hrs  T(C): 36.7 (06 Jan 2021 08:09), Max: 36.7 (06 Jan 2021 07:12)  T(F): 98 (06 Jan 2021 07:12), Max: 98 (06 Jan 2021 07:12)  HR: 95 (06 Jan 2021 08:09) (95 - 95)  BP: 117/95 (06 Jan 2021 08:09) (117/95 - 117/95)  RR: 16 (06 Jan 2021 08:09) (16 - 16)  SpO2: 100% (06 Jan 2021 08:09) (100% - 100%)    Allergies:   albuterol (Other (Mild))      Physical Exam: GEN: NAD; A&Ox3    Labs: reviewed WNL  Plts: 398  WBC: 7.62  INR: 1.05      Informed consent obtained. All questions and concerns have been addressed at this time.     Plan: Chest port placement

## 2021-01-06 NOTE — ASU DISCHARGE PLAN (ADULT/PEDIATRIC) - CALL YOUR DOCTOR IF YOU HAVE ANY OF THE FOLLOWING:
Bleeding that does not stop/Swelling that gets worse/Pain not relieved by Medications/Fever greater than (need to indicate Fahrenheit or Celsius)/Wound/Surgical Site with redness, or foul smelling discharge or pus/Numbness, tingling, color or temperature change to extremity/Nausea and vomiting that does not stop/Excessive diarrhea/Inability to tolerate liquids or foods

## 2021-01-07 ENCOUNTER — APPOINTMENT (OUTPATIENT)
Dept: INFUSION THERAPY | Facility: HOSPITAL | Age: 66
End: 2021-01-07

## 2021-01-11 DIAGNOSIS — Z45.2 ENCOUNTER FOR ADJUSTMENT AND MANAGEMENT OF VASCULAR ACCESS DEVICE: ICD-10-CM

## 2021-01-11 DIAGNOSIS — C15.9 MALIGNANT NEOPLASM OF ESOPHAGUS, UNSPECIFIED: ICD-10-CM

## 2021-01-14 ENCOUNTER — RESULT REVIEW (OUTPATIENT)
Age: 66
End: 2021-01-14

## 2021-01-14 ENCOUNTER — LABORATORY RESULT (OUTPATIENT)
Age: 66
End: 2021-01-14

## 2021-01-14 ENCOUNTER — APPOINTMENT (OUTPATIENT)
Dept: INFUSION THERAPY | Facility: HOSPITAL | Age: 66
End: 2021-01-14

## 2021-01-14 LAB
BASOPHILS # BLD AUTO: 0.06 K/UL — SIGNIFICANT CHANGE UP (ref 0–0.2)
BASOPHILS NFR BLD AUTO: 0.9 % — SIGNIFICANT CHANGE UP (ref 0–2)
EOSINOPHIL # BLD AUTO: 0.28 K/UL — SIGNIFICANT CHANGE UP (ref 0–0.5)
EOSINOPHIL NFR BLD AUTO: 4.3 % — SIGNIFICANT CHANGE UP (ref 0–6)
HCT VFR BLD CALC: 37.4 % — LOW (ref 39–50)
HGB BLD-MCNC: 12 G/DL — LOW (ref 13–17)
IMM GRANULOCYTES NFR BLD AUTO: 0.5 % — SIGNIFICANT CHANGE UP (ref 0–1.5)
LYMPHOCYTES # BLD AUTO: 0.96 K/UL — LOW (ref 1–3.3)
LYMPHOCYTES # BLD AUTO: 14.8 % — SIGNIFICANT CHANGE UP (ref 13–44)
MCHC RBC-ENTMCNC: 28.9 PG — SIGNIFICANT CHANGE UP (ref 27–34)
MCHC RBC-ENTMCNC: 32.1 G/DL — SIGNIFICANT CHANGE UP (ref 32–36)
MCV RBC AUTO: 90.1 FL — SIGNIFICANT CHANGE UP (ref 80–100)
MONOCYTES # BLD AUTO: 0.77 K/UL — SIGNIFICANT CHANGE UP (ref 0–0.9)
MONOCYTES NFR BLD AUTO: 11.8 % — SIGNIFICANT CHANGE UP (ref 2–14)
NEUTROPHILS # BLD AUTO: 4.4 K/UL — SIGNIFICANT CHANGE UP (ref 1.8–7.4)
NEUTROPHILS NFR BLD AUTO: 67.7 % — SIGNIFICANT CHANGE UP (ref 43–77)
NRBC # BLD: 0 /100 WBCS — SIGNIFICANT CHANGE UP (ref 0–0)
PLATELET # BLD AUTO: 288 K/UL — SIGNIFICANT CHANGE UP (ref 150–400)
RBC # BLD: 4.15 M/UL — LOW (ref 4.2–5.8)
RBC # FLD: 14.2 % — SIGNIFICANT CHANGE UP (ref 10.3–14.5)
WBC # BLD: 6.5 K/UL — SIGNIFICANT CHANGE UP (ref 3.8–10.5)
WBC # FLD AUTO: 6.5 K/UL — SIGNIFICANT CHANGE UP (ref 3.8–10.5)

## 2021-01-15 ENCOUNTER — NON-APPOINTMENT (OUTPATIENT)
Age: 66
End: 2021-01-15

## 2021-01-15 DIAGNOSIS — Z51.11 ENCOUNTER FOR ANTINEOPLASTIC CHEMOTHERAPY: ICD-10-CM

## 2021-01-15 DIAGNOSIS — R11.2 NAUSEA WITH VOMITING, UNSPECIFIED: ICD-10-CM

## 2021-01-16 ENCOUNTER — APPOINTMENT (OUTPATIENT)
Dept: INFUSION THERAPY | Facility: HOSPITAL | Age: 66
End: 2021-01-16

## 2021-01-16 NOTE — DATA REVIEWED
[FreeTextEntry1] : CT T-Spine (12/2020): There is no change to 11/15/2020. There has been a previous T5-T7 laminectomy. Transpedicular screws T3, T4, T8 T11 12 connecting rods. The T7, there is disc space narrowing at T9 and T10 vertebral bodies demonstrate loss of height.\par \par There is good opacification of the thoracic subarachnoid space.\par \par There is displacement of the cord to the left T3-4 through T4-5 by right-sided epidural soft tissue. Bilateral epidural soft tissue is present at T5-6 and the cord appears to be more midline in location with mass effect on both lateral recesses. There is bilateral epidural soft tissue extending down to the T67 level. At T7 there is right greater than left epidural soft tissue which extends down to T8-9. Conus terminates at T12-L1.\par \par \par Metallic hardware in good position.\par \par Compared with the prior exam the appears to be similar soft tissue in the right lateral recess at T3-4.\par \par At T4-5 dorsal epidural neoplasm is decreased with a laminectomy defect. The right lateral epidural neoplasm is similar.\par \par At T5-6 there is bilateral epidural neoplasm prajapati similar to the prior exam as well as a laminectomy defect.\par \par At T6-7 there is similar appearing bilateral soft tissue neoplasm in the epidural space.\par \par At T7-8 there is right-sided epidural neoplasm with mild mass effect on the lateral aspect of the thecal sac appears lightly larger. There also is a left-sided epidural neoplasm which was not appreciated previously.\par \par T8-9 demonstrates right-sided epidural neoplasm is slightly larger slightly increased mass effect on the thecal sac compared with 11/12/2020.\par At T9-10 cord is midline. The remainder of the levels are unremarkable.\par \par Right-sided esophagogastrectomy\par \par IMPRESSION: Compression deformities T7, T9 and T10. Good opacification of the subarachnoid space. Prior C5-T7 laminectomy with transpedicular screws and connecting rods extending from T3 through T12. Multilevel epidural neoplasm from T3-4 through T8-9 similar to the prior MRI examination, with individual levels described above.\par \par Post myelogram CT images obtained for stereotactic radiosurgical purposes.

## 2021-01-16 NOTE — ASSESSMENT
[______] : HCP: [unfilled] [FreeTextEntry1] : 65yoM with:\par \par 1. Stage IV Gastro-Esophageal cancer - To initiate FOLFIRI/Herceptin; follow up with Med Onc\par \par 2. Neoplasm-related pain - c/w Tylenol, Gabapentin, PRN Oxycodone.\par \par Medical cannabis certification completed today. Provided cannabis education, overview of state program, and discussed adverse effects in detail. Counseled that vaporized cannabis is not the preferred route of administration due to the fact that both short-term and long-term risks associated with vaporizing oils are not yet fully understood. Recommend starting with 1:1 THC:CBD formulation at low dose of THC (~2mg/dose).\par \par 3. anorexia/cachexia of malignancy - Medical cannabis to be initiated. \par \par 4. Encounter for Palliative Care - Emotional support provided.\par No formal HCP on file but patient states his wife is his main medical decision maker.

## 2021-01-16 NOTE — HISTORY OF PRESENT ILLNESS
[Home] : at home, [unfilled] , at the time of the visit. [Medical Office: ([facility])___] : at the medical office located in  [Spouse] : spouse [Verbal consent obtained from patient] : the patient, [unfilled] [FreeTextEntry1] : 65yoM with esophagogastric carcinoma presents for initial palliative care visit, referred by Dr. Long.\par \par He was first diagnosed with the cancer in 2018, underwent crispin-adjuvant chemoRT followed by [place] Raghu esophagogastrectomy in 8/2018.  \par \par The patient then was well until November 2020 when he developed right rib pain and upper back pain. Imaging testing revealed metastases to the thoracic spine and possible cord compression at T5-T6. The patient underwent laminectomy of T4-T6 resection of the epidural tumor with T3-T12 fusion and endovascular embolization of the spinal tumor T5-T6. The pathology revealed poorly differentiated adenocarcinoma. Postoperatively his pain was relieved. He has undergone radiation oncology consultation and is planned to receive SBRT treatment. \par \par Is to initiate FOLFIRI/Herceptin tomorrow. \par \par Main issue for which he is referred is pain - he feels pain from the low back to the mid back, paraspinal region.  He describes it as if he got hit with a bat. At the worst it goes up to about 7/10.  He took Tylenol 1300mg this AM and a hot shower, he rates the pain currently at 3/10.  He has oxycodone 5mg for PRN use but does not like to use it. He tends to use it once at night. 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 \par He uses Tylenol 1300mg BID.  \par \par He was using medical cannabis in the past when on chemotherapy, which helped him with keeping his appetite up.  He is interested in re-certification \par \par He feels intermittently nauseous and regurgitates mucous after which he feels better. \par \par ROS:\par +appetite is low - he eats small portions, but infrequently. \par +wakes up every 2-3 hours with pain, readjusts his position and tends to fall back asleep again \par +fatigue\par +weakness\par +occasional imbalance when he stands up from sitting\par \par Denies constipation, diarrhea\par \par Patient is , lives with his wife. They have one daughter - He enjoys playing [employer], used to practice up to 6 hours daily, now is only doing about 1 hour daily. \par \par I-Stop Ref#: [id number]

## 2021-01-19 ENCOUNTER — NON-APPOINTMENT (OUTPATIENT)
Age: 66
End: 2021-01-19

## 2021-01-21 ENCOUNTER — TRANSCRIPTION ENCOUNTER (OUTPATIENT)
Age: 66
End: 2021-01-21

## 2021-01-21 ENCOUNTER — APPOINTMENT (OUTPATIENT)
Dept: HEMATOLOGY ONCOLOGY | Facility: CLINIC | Age: 66
End: 2021-01-21

## 2021-01-26 ENCOUNTER — APPOINTMENT (OUTPATIENT)
Dept: SPINE | Facility: CLINIC | Age: 66
End: 2021-01-26

## 2021-01-28 ENCOUNTER — APPOINTMENT (OUTPATIENT)
Dept: INFUSION THERAPY | Facility: HOSPITAL | Age: 66
End: 2021-01-28

## 2021-01-29 ENCOUNTER — APPOINTMENT (OUTPATIENT)
Dept: INFUSION THERAPY | Facility: HOSPITAL | Age: 66
End: 2021-01-29

## 2021-01-29 ENCOUNTER — RESULT REVIEW (OUTPATIENT)
Age: 66
End: 2021-01-29

## 2021-01-29 ENCOUNTER — LABORATORY RESULT (OUTPATIENT)
Age: 66
End: 2021-01-29

## 2021-01-29 LAB
BASOPHILS # BLD AUTO: 0.04 K/UL — SIGNIFICANT CHANGE UP (ref 0–0.2)
BASOPHILS NFR BLD AUTO: 0.9 % — SIGNIFICANT CHANGE UP (ref 0–2)
EOSINOPHIL # BLD AUTO: 0.23 K/UL — SIGNIFICANT CHANGE UP (ref 0–0.5)
EOSINOPHIL NFR BLD AUTO: 5.4 % — SIGNIFICANT CHANGE UP (ref 0–6)
HCT VFR BLD CALC: 35.8 % — LOW (ref 39–50)
HGB BLD-MCNC: 11.7 G/DL — LOW (ref 13–17)
IMM GRANULOCYTES NFR BLD AUTO: 0.7 % — SIGNIFICANT CHANGE UP (ref 0–1.5)
LYMPHOCYTES # BLD AUTO: 0.71 K/UL — LOW (ref 1–3.3)
LYMPHOCYTES # BLD AUTO: 16.7 % — SIGNIFICANT CHANGE UP (ref 13–44)
MCHC RBC-ENTMCNC: 29 PG — SIGNIFICANT CHANGE UP (ref 27–34)
MCHC RBC-ENTMCNC: 32.7 G/DL — SIGNIFICANT CHANGE UP (ref 32–36)
MCV RBC AUTO: 88.8 FL — SIGNIFICANT CHANGE UP (ref 80–100)
MONOCYTES # BLD AUTO: 0.66 K/UL — SIGNIFICANT CHANGE UP (ref 0–0.9)
MONOCYTES NFR BLD AUTO: 15.5 % — HIGH (ref 2–14)
NEUTROPHILS # BLD AUTO: 2.58 K/UL — SIGNIFICANT CHANGE UP (ref 1.8–7.4)
NEUTROPHILS NFR BLD AUTO: 60.8 % — SIGNIFICANT CHANGE UP (ref 43–77)
NRBC # BLD: 0 /100 WBCS — SIGNIFICANT CHANGE UP (ref 0–0)
PLATELET # BLD AUTO: 322 K/UL — SIGNIFICANT CHANGE UP (ref 150–400)
RBC # BLD: 4.03 M/UL — LOW (ref 4.2–5.8)
RBC # FLD: 14.1 % — SIGNIFICANT CHANGE UP (ref 10.3–14.5)
WBC # BLD: 4.25 K/UL — SIGNIFICANT CHANGE UP (ref 3.8–10.5)
WBC # FLD AUTO: 4.25 K/UL — SIGNIFICANT CHANGE UP (ref 3.8–10.5)

## 2021-02-02 ENCOUNTER — APPOINTMENT (OUTPATIENT)
Dept: INFUSION THERAPY | Facility: HOSPITAL | Age: 66
End: 2021-02-02

## 2021-02-02 ENCOUNTER — NON-APPOINTMENT (OUTPATIENT)
Age: 66
End: 2021-02-02

## 2021-02-03 NOTE — ASSESSMENT
[Palliative] : Goals of care discussed with patient: Palliative [Palliative Care Plan] : not applicable at this time [FreeTextEntry1] : Pt to start SBRT with Dr. Noyola to area of resected bone metastases in spine. Discussed scheduling PETCT to evaluate for extent of disease. Discussed chemotherapy as pt has HER2 positive and PDL-1 pos- 5% Pt was discussed at  tumor board and the recommendation was to begin chemo with Folfox and herceptin. Side effects and toxicity were again discussed in detail. Pt to do cardiac evaluation with ECHO. Pt to schedule rx after completing RT.

## 2021-02-03 NOTE — HISTORY OF PRESENT ILLNESS
[de-identified] : This is a 65-year-old man with history of esophagogastric carcinoma who presents for evaluation.  His history dates back to March 2018 when he developed a cough and decreased appetite.  A CAT scan of his chest revealed a mass in the distal esophagus with periesophageal and portacaval adenopathy.  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  The patient underwent neoadjuvant chemotherapy and radiation therapy with Taxol carboplatin.  A follow-up CAT scan in June 2018 revealed decreased mass in the esophagus.  In August 2018 the patient underwent [place] Raghu esophagogastrectomy with Dr. Cloud and Dr. Arciniega.  The pathology revealed no residual carcinoma with negative margins, with12 - lymph nodes (0/12).\par \par The patient then was well until November 2020 when he developed right rib pain and upper back pain.  Imaging testing revealed metastases to the thoracic spine and possible cord compression at T5-T6.  The patient underwent laminectomy of T4-T6 resection of the epidural tumor with T3-T12 fusion and endovascular embolization of the spinal tumor T5-T6.  The pathology revealed poorly differentiated adenocarcinoma.  Postoperatively his pain was relieved.  He has undergone radiation oncology consultation and is planned to receive SBRT treatment.  He now presents for discussion regarding further chemotherapy. [de-identified] : 12/23/2020, UGT1A1 test shows: Heterozygosity for tA7 promoter variant. This result is consistent with carrier status for Gilbert syndrome, but is not predicted to cause hyperbilirubinemia. 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 Individuals heterozygous for this DPYD variant are predicted to have intermediate dihydropyrimidine dehydrogenase activity.  [de-identified] : Since the last visit the pt will begin SBRT to spine for metastases. pt to do PetCT for re-staging

## 2021-02-06 ENCOUNTER — OUTPATIENT (OUTPATIENT)
Dept: OUTPATIENT SERVICES | Facility: HOSPITAL | Age: 66
LOS: 1 days | Discharge: HOME | End: 2021-02-06

## 2021-02-06 DIAGNOSIS — Z90.49 ACQUIRED ABSENCE OF OTHER SPECIFIED PARTS OF DIGESTIVE TRACT: Chronic | ICD-10-CM

## 2021-02-06 DIAGNOSIS — C15.9 MALIGNANT NEOPLASM OF ESOPHAGUS, UNSPECIFIED: ICD-10-CM

## 2021-02-06 DIAGNOSIS — Z98.890 OTHER SPECIFIED POSTPROCEDURAL STATES: Chronic | ICD-10-CM

## 2021-02-10 ENCOUNTER — APPOINTMENT (OUTPATIENT)
Dept: HEMATOLOGY ONCOLOGY | Facility: CLINIC | Age: 66
End: 2021-02-10
Payer: MEDICAID

## 2021-02-10 ENCOUNTER — LABORATORY RESULT (OUTPATIENT)
Age: 66
End: 2021-02-10

## 2021-02-10 VITALS
HEIGHT: 70 IN | RESPIRATION RATE: 20 BRPM | WEIGHT: 150.36 LBS | TEMPERATURE: 98 F | BODY MASS INDEX: 21.53 KG/M2 | HEART RATE: 82 BPM | SYSTOLIC BLOOD PRESSURE: 125 MMHG | OXYGEN SATURATION: 97 % | DIASTOLIC BLOOD PRESSURE: 77 MMHG

## 2021-02-10 PROCEDURE — 99072 ADDL SUPL MATRL&STAF TM PHE: CPT

## 2021-02-10 PROCEDURE — 99214 OFFICE O/P EST MOD 30 MIN: CPT

## 2021-02-10 NOTE — REVIEW OF SYSTEMS
[Negative] : Allergic/Immunologic [FreeTextEntry6] : takes symbicort for SOB [FreeTextEntry7] : pt on omeprazole for reflux

## 2021-02-10 NOTE — ASSESSMENT
[FreeTextEntry1] : Pt to continbue on chemo with Folfiri and herceptin and be followed for side effects and toxicity. Pt to complete 6 cycles of therapy and  then repeat Ct scans to see if response or progression of his disease.Pt to RTO in 1 month or sooner if needed. Pt had 3 rx with SBRT to spine and back pain controlled.

## 2021-02-10 NOTE — HISTORY OF PRESENT ILLNESS
[de-identified] : This is a 65-year-old man with history of esophagogastric carcinoma who presents for evaluation.  His history dates back to March 2018 when he developed a cough and decreased appetite.  A CAT scan of his chest revealed a mass in the distal esophagus with periesophageal and portacaval adenopathy.  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  The patient underwent neoadjuvant chemotherapy and radiation therapy with Taxol carboplatin.  A follow-up CAT scan in June 2018 revealed decreased mass in the esophagus.  In August 2018 the patient underwent [place] Raghu esophagogastrectomy with Dr. Cloud and Dr. Arciniega.  The pathology revealed no residual carcinoma with negative margins, with12 - lymph nodes (0/12).\par \par The patient then was well until November 2020 when he developed right rib pain and upper back pain.  Imaging testing revealed metastases to the thoracic spine and possible cord compression at T5-T6.  The patient underwent laminectomy of T4-T6 resection of the epidural tumor with T3-T12 fusion and endovascular embolization of the spinal tumor T5-T6.  The pathology revealed poorly differentiated adenocarcinoma.  Postoperatively his pain was relieved.  He has undergone radiation oncology consultation and is planned to receive SBRT treatment.  He now presents for discussion regarding further chemotherapy. [de-identified] : Pt had 2 cycles of Folfiri and herceptin and tolerated rx well

## 2021-02-11 ENCOUNTER — LABORATORY RESULT (OUTPATIENT)
Age: 66
End: 2021-02-11

## 2021-02-11 ENCOUNTER — RESULT REVIEW (OUTPATIENT)
Age: 66
End: 2021-02-11

## 2021-02-11 ENCOUNTER — APPOINTMENT (OUTPATIENT)
Dept: RADIATION ONCOLOGY | Facility: CLINIC | Age: 66
End: 2021-02-11
Payer: MEDICAID

## 2021-02-11 ENCOUNTER — APPOINTMENT (OUTPATIENT)
Dept: INFUSION THERAPY | Facility: HOSPITAL | Age: 66
End: 2021-02-11

## 2021-02-11 VITALS
WEIGHT: 155.86 LBS | SYSTOLIC BLOOD PRESSURE: 111 MMHG | HEART RATE: 60 BPM | BODY MASS INDEX: 22.36 KG/M2 | OXYGEN SATURATION: 100 % | DIASTOLIC BLOOD PRESSURE: 76 MMHG | RESPIRATION RATE: 18 BRPM

## 2021-02-11 LAB
BASOPHILS # BLD AUTO: 0.03 K/UL — SIGNIFICANT CHANGE UP (ref 0–0.2)
BASOPHILS NFR BLD AUTO: 0.7 % — SIGNIFICANT CHANGE UP (ref 0–2)
EOSINOPHIL # BLD AUTO: 0.38 K/UL — SIGNIFICANT CHANGE UP (ref 0–0.5)
EOSINOPHIL NFR BLD AUTO: 8.3 % — HIGH (ref 0–6)
HCT VFR BLD CALC: 35 % — LOW (ref 39–50)
HGB BLD-MCNC: 11.3 G/DL — LOW (ref 13–17)
IMM GRANULOCYTES NFR BLD AUTO: 0.7 % — SIGNIFICANT CHANGE UP (ref 0–1.5)
LYMPHOCYTES # BLD AUTO: 0.89 K/UL — LOW (ref 1–3.3)
LYMPHOCYTES # BLD AUTO: 19.5 % — SIGNIFICANT CHANGE UP (ref 13–44)
MCHC RBC-ENTMCNC: 28.9 PG — SIGNIFICANT CHANGE UP (ref 27–34)
MCHC RBC-ENTMCNC: 32.3 G/DL — SIGNIFICANT CHANGE UP (ref 32–36)
MCV RBC AUTO: 89.5 FL — SIGNIFICANT CHANGE UP (ref 80–100)
MONOCYTES # BLD AUTO: 0.55 K/UL — SIGNIFICANT CHANGE UP (ref 0–0.9)
MONOCYTES NFR BLD AUTO: 12 % — SIGNIFICANT CHANGE UP (ref 2–14)
NEUTROPHILS # BLD AUTO: 2.69 K/UL — SIGNIFICANT CHANGE UP (ref 1.8–7.4)
NEUTROPHILS NFR BLD AUTO: 58.8 % — SIGNIFICANT CHANGE UP (ref 43–77)
NRBC # BLD: 0 /100 WBCS — SIGNIFICANT CHANGE UP (ref 0–0)
PLATELET # BLD AUTO: 307 K/UL — SIGNIFICANT CHANGE UP (ref 150–400)
RBC # BLD: 3.91 M/UL — LOW (ref 4.2–5.8)
RBC # FLD: 14.7 % — HIGH (ref 10.3–14.5)
WBC # BLD: 4.57 K/UL — SIGNIFICANT CHANGE UP (ref 3.8–10.5)
WBC # FLD AUTO: 4.57 K/UL — SIGNIFICANT CHANGE UP (ref 3.8–10.5)

## 2021-02-11 PROCEDURE — 99024 POSTOP FOLLOW-UP VISIT: CPT | Mod: GC

## 2021-02-11 RX ORDER — PNV NO.95/FERROUS FUM/FOLIC AC 28MG-0.8MG
TABLET ORAL
Refills: 0 | Status: DISCONTINUED | COMMUNITY
End: 2021-02-11

## 2021-02-11 RX ORDER — CHOLECALCIFEROL (VITAMIN D3) 1250 MCG
1.25 MG CAPSULE ORAL
Qty: 12 | Refills: 0 | Status: DISCONTINUED | COMMUNITY
Start: 2020-06-17 | End: 2021-02-11

## 2021-02-11 RX ORDER — LIDOCAINE AND PRILOCAINE 25; 25 MG/G; MG/G
2.5-2.5 CREAM TOPICAL
Qty: 2 | Refills: 6 | Status: ACTIVE | COMMUNITY
Start: 2021-02-11 | End: 1900-01-01

## 2021-02-11 NOTE — REVIEW OF SYSTEMS
[Fatigue: Grade 0] : Fatigue: Grade 0 [Skin Hyperpigmentation: Grade 0] : Skin Hyperpigmentation: Grade 0 [Dermatitis Radiation: Grade 0] : Dermatitis Radiation: Grade 0 [Esophagitis: Grade 0] : Esophagitis: Grade 0 [Cough: Grade 0] : Cough: Grade 0 [Hoarseness: Grade 0] : Hoarseness: Grade 0

## 2021-02-11 NOTE — PHYSICAL EXAM
[General Appearance - In No Acute Distress] : in no acute distress [Sclera] : the sclera and conjunctiva were normal [] : no respiratory distress [Normal] : no palpable adenopathy [Cervical Lymph Nodes Enlarged Posterior Bilaterally] : posterior cervical [Cervical Lymph Nodes Enlarged Anterior Bilaterally] : anterior cervical [Supraclavicular Lymph Nodes Enlarged Bilaterally] : supraclavicular [No Spine Tenderness] : no tenderness to palpation of the vertebral spine

## 2021-02-12 ENCOUNTER — NON-APPOINTMENT (OUTPATIENT)
Age: 66
End: 2021-02-12

## 2021-02-12 DIAGNOSIS — R11.2 NAUSEA WITH VOMITING, UNSPECIFIED: ICD-10-CM

## 2021-02-12 DIAGNOSIS — Z51.11 ENCOUNTER FOR ANTINEOPLASTIC CHEMOTHERAPY: ICD-10-CM

## 2021-02-13 ENCOUNTER — APPOINTMENT (OUTPATIENT)
Dept: INFUSION THERAPY | Facility: HOSPITAL | Age: 66
End: 2021-02-13

## 2021-02-17 NOTE — ASSESSMENT
[Work-up necessary to assess local, regional or metastatic recurrence] : Work-up necessary to assess local, regional or metastatic recurrence [FreeTextEntry1] : N

## 2021-02-17 NOTE — HISTORY OF PRESENT ILLNESS
[FreeTextEntry1] : Mr. Sánchez Ferrer is a 66yo with hx of esophageal adenocarcinoma s/p CRT and esophagectomy 2018 recently presenting with metastatic disease throughout spine including cord compression due to deposits of epidural disease at level of inferior portion of T4, superior portion of T7, and entire T5-T6, s/p T4-5 and T6-7 laminectomies and posterolateral arthrodesis from t3-t12 . His previous RT as was done at [facility] under the care of Dr. Pickett.\par \par He completed 24 Gy/3 fxns to T4-T8 on 12/28/20. He presents for PTE. \par \par He notes resolution of pain and has no complaints today. Recieved chemotherapy today and feels tired but otherwise no complaints. No new weakness, sensory changes or bowel/ bladder incontinence.

## 2021-02-18 ENCOUNTER — APPOINTMENT (OUTPATIENT)
Dept: CT IMAGING | Facility: IMAGING CENTER | Age: 66
End: 2021-02-18

## 2021-02-18 ENCOUNTER — APPOINTMENT (OUTPATIENT)
Dept: NUCLEAR MEDICINE | Facility: IMAGING CENTER | Age: 66
End: 2021-02-18

## 2021-02-25 ENCOUNTER — LABORATORY RESULT (OUTPATIENT)
Age: 66
End: 2021-02-25

## 2021-02-25 ENCOUNTER — APPOINTMENT (OUTPATIENT)
Dept: INFUSION THERAPY | Facility: HOSPITAL | Age: 66
End: 2021-02-25

## 2021-02-25 ENCOUNTER — RESULT REVIEW (OUTPATIENT)
Age: 66
End: 2021-02-25

## 2021-02-25 LAB
BASOPHILS # BLD AUTO: 0.09 K/UL — SIGNIFICANT CHANGE UP (ref 0–0.2)
BASOPHILS NFR BLD AUTO: 2 % — SIGNIFICANT CHANGE UP (ref 0–2)
EOSINOPHIL # BLD AUTO: 0.39 K/UL — SIGNIFICANT CHANGE UP (ref 0–0.5)
EOSINOPHIL NFR BLD AUTO: 9 % — HIGH (ref 0–6)
HCT VFR BLD CALC: 34.3 % — LOW (ref 39–50)
HGB BLD-MCNC: 11.2 G/DL — LOW (ref 13–17)
LYMPHOCYTES # BLD AUTO: 0.96 K/UL — LOW (ref 1–3.3)
LYMPHOCYTES # BLD AUTO: 22 % — SIGNIFICANT CHANGE UP (ref 13–44)
MCHC RBC-ENTMCNC: 28.9 PG — SIGNIFICANT CHANGE UP (ref 27–34)
MCHC RBC-ENTMCNC: 32.7 G/DL — SIGNIFICANT CHANGE UP (ref 32–36)
MCV RBC AUTO: 88.4 FL — SIGNIFICANT CHANGE UP (ref 80–100)
MONOCYTES # BLD AUTO: 0.26 K/UL — SIGNIFICANT CHANGE UP (ref 0–0.9)
MONOCYTES NFR BLD AUTO: 6 % — SIGNIFICANT CHANGE UP (ref 2–14)
NEUTROPHILS # BLD AUTO: 2.63 K/UL — SIGNIFICANT CHANGE UP (ref 1.8–7.4)
NEUTROPHILS NFR BLD AUTO: 60 % — SIGNIFICANT CHANGE UP (ref 43–77)
NRBC # BLD: 0 /100 — SIGNIFICANT CHANGE UP (ref 0–0)
NRBC # BLD: SIGNIFICANT CHANGE UP /100 WBCS (ref 0–0)
PLAT MORPH BLD: NORMAL — SIGNIFICANT CHANGE UP
PLATELET # BLD AUTO: 313 K/UL — SIGNIFICANT CHANGE UP (ref 150–400)
RBC # BLD: 3.88 M/UL — LOW (ref 4.2–5.8)
RBC # FLD: 15.2 % — HIGH (ref 10.3–14.5)
RBC BLD AUTO: SIGNIFICANT CHANGE UP
VARIANT LYMPHS # BLD: 1 % — SIGNIFICANT CHANGE UP (ref 0–6)
WBC # BLD: 4.38 K/UL — SIGNIFICANT CHANGE UP (ref 3.8–10.5)
WBC # FLD AUTO: 4.38 K/UL — SIGNIFICANT CHANGE UP (ref 3.8–10.5)

## 2021-02-27 ENCOUNTER — APPOINTMENT (OUTPATIENT)
Dept: INFUSION THERAPY | Facility: HOSPITAL | Age: 66
End: 2021-02-27

## 2021-03-07 ENCOUNTER — OUTPATIENT (OUTPATIENT)
Dept: OUTPATIENT SERVICES | Facility: HOSPITAL | Age: 66
LOS: 1 days | Discharge: HOME | End: 2021-03-07

## 2021-03-07 DIAGNOSIS — Z98.890 OTHER SPECIFIED POSTPROCEDURAL STATES: Chronic | ICD-10-CM

## 2021-03-07 DIAGNOSIS — Z90.49 ACQUIRED ABSENCE OF OTHER SPECIFIED PARTS OF DIGESTIVE TRACT: Chronic | ICD-10-CM

## 2021-03-07 DIAGNOSIS — C15.9 MALIGNANT NEOPLASM OF ESOPHAGUS, UNSPECIFIED: ICD-10-CM

## 2021-03-08 NOTE — CONSULT NOTE ADULT - NEGATIVE GASTROINTESTINAL SYMPTOMS
vital signs no melena/no jaundice/no vomiting/no constipation/no nausea/no hematochezia/no hiccoughs/no steatorrhea/no diarrhea yes

## 2021-03-11 ENCOUNTER — LABORATORY RESULT (OUTPATIENT)
Age: 66
End: 2021-03-11

## 2021-03-11 ENCOUNTER — RESULT REVIEW (OUTPATIENT)
Age: 66
End: 2021-03-11

## 2021-03-11 ENCOUNTER — APPOINTMENT (OUTPATIENT)
Dept: INFUSION THERAPY | Facility: HOSPITAL | Age: 66
End: 2021-03-11

## 2021-03-11 ENCOUNTER — APPOINTMENT (OUTPATIENT)
Dept: HEMATOLOGY ONCOLOGY | Facility: CLINIC | Age: 66
End: 2021-03-11
Payer: MEDICAID

## 2021-03-11 LAB
BASOPHILS # BLD AUTO: 0.04 K/UL — SIGNIFICANT CHANGE UP (ref 0–0.2)
BASOPHILS NFR BLD AUTO: 1 % — SIGNIFICANT CHANGE UP (ref 0–2)
EOSINOPHIL # BLD AUTO: 0.53 K/UL — HIGH (ref 0–0.5)
EOSINOPHIL NFR BLD AUTO: 12 % — HIGH (ref 0–6)
HCT VFR BLD CALC: 36.6 % — LOW (ref 39–50)
HGB BLD-MCNC: 11.8 G/DL — LOW (ref 13–17)
LYMPHOCYTES # BLD AUTO: 0.79 K/UL — LOW (ref 1–3.3)
LYMPHOCYTES # BLD AUTO: 18 % — SIGNIFICANT CHANGE UP (ref 13–44)
MCHC RBC-ENTMCNC: 28.8 PG — SIGNIFICANT CHANGE UP (ref 27–34)
MCHC RBC-ENTMCNC: 32.2 G/DL — SIGNIFICANT CHANGE UP (ref 32–36)
MCV RBC AUTO: 89.3 FL — SIGNIFICANT CHANGE UP (ref 80–100)
MONOCYTES # BLD AUTO: 0.4 K/UL — SIGNIFICANT CHANGE UP (ref 0–0.9)
MONOCYTES NFR BLD AUTO: 9 % — SIGNIFICANT CHANGE UP (ref 2–14)
MYELOCYTES NFR BLD: 1 % — HIGH (ref 0–0)
NEUTROPHILS # BLD AUTO: 2.59 K/UL — SIGNIFICANT CHANGE UP (ref 1.8–7.4)
NEUTROPHILS NFR BLD AUTO: 59 % — SIGNIFICANT CHANGE UP (ref 43–77)
NRBC # BLD: 0 /100 — SIGNIFICANT CHANGE UP (ref 0–0)
NRBC # BLD: SIGNIFICANT CHANGE UP /100 WBCS (ref 0–0)
PLAT MORPH BLD: NORMAL — SIGNIFICANT CHANGE UP
PLATELET # BLD AUTO: 279 K/UL — SIGNIFICANT CHANGE UP (ref 150–400)
RBC # BLD: 4.1 M/UL — LOW (ref 4.2–5.8)
RBC # FLD: 16 % — HIGH (ref 10.3–14.5)
RBC BLD AUTO: SIGNIFICANT CHANGE UP
WBC # BLD: 4.39 K/UL — SIGNIFICANT CHANGE UP (ref 3.8–10.5)
WBC # FLD AUTO: 4.39 K/UL — SIGNIFICANT CHANGE UP (ref 3.8–10.5)

## 2021-03-11 PROCEDURE — 99214 OFFICE O/P EST MOD 30 MIN: CPT

## 2021-03-11 PROCEDURE — 99072 ADDL SUPL MATRL&STAF TM PHE: CPT

## 2021-03-11 NOTE — HISTORY OF PRESENT ILLNESS
[Disease: _____________________] : Disease: [unfilled] [M: ___] : M[unfilled] [AJCC Stage: ____] : AJCC Stage: [unfilled] [de-identified] : This is a 65-year-old man with history of esophagogastric carcinoma who presents for evaluation.  His history dates back to March 2018 when he developed a cough and decreased appetite.  A CAT scan of his chest revealed a mass in the distal esophagus with periesophageal and portacaval adenopathy.  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  The patient underwent neoadjuvant chemotherapy and radiation therapy with Taxol carboplatin.  A follow-up CAT scan in June 2018 revealed decreased mass in the esophagus.  In August 2018 the patient underwent [place] Raghu esophagogastrectomy with Dr. Cloud and Dr. Arciniega.  The pathology revealed no residual carcinoma with negative margins, with12 - lymph nodes (0/12).\par \par The patient then was well until November 2020 when he developed right rib pain and upper back pain.  Imaging testing revealed metastases to the thoracic spine and possible cord compression at T5-T6.  The patient underwent laminectomy of T4-T6 resection of the epidural tumor with T3-T12 fusion and endovascular embolization of the spinal tumor T5-T6.  The pathology revealed poorly differentiated adenocarcinoma.  Postoperatively his pain was relieved.  He has undergone radiation oncology consultation and is planned to receive SBRT treatment.  He now presents for discussion regarding further chemotherapy. [de-identified] : adenoca, Her2 pos [de-identified] : Her2 pos. [de-identified] : Since the last visit the patient continues on FOLFIRI chemotherapy along with Herceptin.  The patient does note diarrhea 1-2 times a day and requires Imodium as needed.

## 2021-03-11 NOTE — ASSESSMENT
[FreeTextEntry1] : The patient will continue treatment with FOLFIRI and Herceptin and be followed for side effects and toxicity.  He will undergo repeat CAT scans and bone scans to assess for response or progression of his disease.  The patient will also undergo echocardiograms to assess for cardiac effects of Herceptin.  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  He will return in 1 month or sooner as needed. [Palliative] : Goals of care discussed with patient: Palliative [Palliative Care Plan] : not applicable at this time

## 2021-03-12 ENCOUNTER — NON-APPOINTMENT (OUTPATIENT)
Age: 66
End: 2021-03-12

## 2021-03-12 DIAGNOSIS — R11.2 NAUSEA WITH VOMITING, UNSPECIFIED: ICD-10-CM

## 2021-03-12 DIAGNOSIS — Z51.11 ENCOUNTER FOR ANTINEOPLASTIC CHEMOTHERAPY: ICD-10-CM

## 2021-03-13 ENCOUNTER — APPOINTMENT (OUTPATIENT)
Dept: INFUSION THERAPY | Facility: HOSPITAL | Age: 66
End: 2021-03-13

## 2021-03-18 NOTE — H&P PST ADULT - MS GEN HX ROS MEA POS PC
Physical Therapy IRP Treatment     Primary Rehabilitation Diagnosis: ruptured aneurysm        Planned Discharge Destination: Home     SUBJECTIVE: Subjective: \"Can I rest a little bit? My head hurts\" (03/18/21 1400)       Patient's Personal Goal: go back to work     OBJECTIVE:  Precautions  Seizure Precautions: Yes (03/18/21 1530)  Other Precautions: Fall (03/18/21 1530)   Faces Scale: 0,     See below for current functional status overview.  See PT flowsheet for full details regarding the PT therapy provided.    ASSESSMENT:   Treatment today focused on LE strengthening, gait, and transfers training.  Patient is demonstrating fair progress supported by active participation in exercises despite fatigue and headache.    Patient limited at this time by headache, decreased standing balance, and decreased activity tolerance.  Patient will benefit from further skilled PT  for continued training with all functional mobility to help the patient meet goal of increased independence.     PT Identified Barriers to Discharge: lives alone, decreased insight into deficits, decreased safety awareness     This patient was unable to participate in 15 minutes of scheduled physical therapy time with this therapist due to headache.    EDUCATION:   On this date, education was provided to patient regarding transfers, ambulation and fall prevention.    The response to education was/were: Needs reinforcement.    LONG TERM GOALS:    Bed Mobility Discharge Goal: Patient will perform supine<>sit modified indep  Transfer Discharge Goal: Patient will perform sit<>stand with SBA  Ambulation Discharge Goal: Patient will ambulate 150 ft using appropriate device with SBA  Stairs Discharge Goal: Patient will negotiate 8 steps with SBA    PLAN:   Continue skilled PT, including the following Treatment/Interventions: Functional transfer training;Strengthening;Endurance training;Cognitive reorientation;Patient/Family training;Equipment eval/education;Bed  mobility;Gait training;Compensatory technique education;Continued evaluation;Stairs retraining;Neuromuscular re-education;Safety Education (03/15/21 1100)   PT Frequency: 5 days/week;6 days/week;7 days/week (03/15/21 1100), Frequency Comments: 1-1.5 hrs/day (03/15/21 1100)                   RECOMMENDATIONS FOR DISCHARGE:            PT/OT ADL Equipment for Discharge: TBD (03/15/21 1000)       FUNCTIONAL DATA OVERVIEW LAST 24 HOURS  Bed Mobility   Bed Mobility  Rolling to the Right: Supervision (Supv) (03/18/21 1400)  Rolling to the Left: Supervision (Supv) (03/18/21 1400)  Supine to Sit: Supervision (Supv) (03/18/21 1530)  Sit to Supine: Supervision (Supv) (03/18/21 1530)     Transfers  Transfers  Sit to Stand: Touching/Steadying Assistance (03/18/21 1530)  Stand to Sit: Touching/Steadying Assistance (03/18/21 1530)  Stand Pivot Transfers: Minimal Assist (Min) (03/18/21 1530)  Toilet Transfers: Minimal Assist (Min) (03/18/21 1530)  Assistive Device/: None (03/18/21 1530)  Transfer Comments 1: unsteady with transfers (03/18/21 1530)     Gait  Gait  Gait Assistance: Touching/Steadying Assistance (03/18/21 1530)  Assistive Device/: 2-wheeled walker (03/18/21 1530)  Ambulation Distance (Feet): 150 Feet (03/18/21 1530)  Gait Comments 1: demo flexed posture, patient is distractible (03/18/21 1530)  Gait Comments 2: min TC/VC given for upright posture and to pay attention to task (03/18/21 1530),     Gait - Second Trial  Gait Assistance: Moderate Assist (Mod) (03/18/21 1530)  Assistive Device/: None (03/18/21 1530)  Ambulation Distance (Feet): 40 Feet (03/18/21 1530)  Gait Comments 1: demo unsteady gait, with lateral trunk sway and dec B step length (03/18/21 1530)    Stairs        Wheelchair Mobility        Balance        Neuromuscular Re-education        Other Therapeutic Interventions  Other Therapeutic Intervention: sidelying/supine exs  to increase LE strength and endurance for  better mobilities (03/18/21 1400)     Additional Assessments Completed  na    At the end of the therapy session, patient is in bed with the following safety measures in place: alarm system in place/re-engaged.    Patient is located in the patient room and was handed off to RN. Patient's family was not present.      Therapist wearing eye protection and surgical mask during entire session.  Patient was wearing mask throughout duration of therapy session.      back pain

## 2021-03-23 NOTE — ED ADULT TRIAGE NOTE - AS TEMP SITE
PRINCIPAL DISCHARGE DIAGNOSIS  Diagnosis: Groin pain  Assessment and Plan of Treatment: s/p ivc filter  neurontin daily  pain control  pt at [facility]  fu with vascular sx      
oral

## 2021-03-25 ENCOUNTER — LABORATORY RESULT (OUTPATIENT)
Age: 66
End: 2021-03-25

## 2021-03-25 ENCOUNTER — APPOINTMENT (OUTPATIENT)
Dept: INFUSION THERAPY | Facility: HOSPITAL | Age: 66
End: 2021-03-25

## 2021-03-25 ENCOUNTER — RESULT REVIEW (OUTPATIENT)
Age: 66
End: 2021-03-25

## 2021-03-25 LAB
BASOPHILS # BLD AUTO: 0.03 K/UL — SIGNIFICANT CHANGE UP (ref 0–0.2)
BASOPHILS NFR BLD AUTO: 0.7 % — SIGNIFICANT CHANGE UP (ref 0–2)
EOSINOPHIL # BLD AUTO: 0.18 K/UL — SIGNIFICANT CHANGE UP (ref 0–0.5)
EOSINOPHIL NFR BLD AUTO: 4.3 % — SIGNIFICANT CHANGE UP (ref 0–6)
HCT VFR BLD CALC: 34.3 % — LOW (ref 39–50)
HGB BLD-MCNC: 11.4 G/DL — LOW (ref 13–17)
IMM GRANULOCYTES NFR BLD AUTO: 1.2 % — SIGNIFICANT CHANGE UP (ref 0–1.5)
LYMPHOCYTES # BLD AUTO: 0.78 K/UL — LOW (ref 1–3.3)
LYMPHOCYTES # BLD AUTO: 18.5 % — SIGNIFICANT CHANGE UP (ref 13–44)
MCHC RBC-ENTMCNC: 29.7 PG — SIGNIFICANT CHANGE UP (ref 27–34)
MCHC RBC-ENTMCNC: 33.2 G/DL — SIGNIFICANT CHANGE UP (ref 32–36)
MCV RBC AUTO: 89.3 FL — SIGNIFICANT CHANGE UP (ref 80–100)
MONOCYTES # BLD AUTO: 0.49 K/UL — SIGNIFICANT CHANGE UP (ref 0–0.9)
MONOCYTES NFR BLD AUTO: 11.6 % — SIGNIFICANT CHANGE UP (ref 2–14)
NEUTROPHILS # BLD AUTO: 2.69 K/UL — SIGNIFICANT CHANGE UP (ref 1.8–7.4)
NEUTROPHILS NFR BLD AUTO: 63.7 % — SIGNIFICANT CHANGE UP (ref 43–77)
NRBC # BLD: 0 /100 WBCS — SIGNIFICANT CHANGE UP (ref 0–0)
PLATELET # BLD AUTO: 269 K/UL — SIGNIFICANT CHANGE UP (ref 150–400)
RBC # BLD: 3.84 M/UL — LOW (ref 4.2–5.8)
RBC # FLD: 16.4 % — HIGH (ref 10.3–14.5)
WBC # BLD: 4.22 K/UL — SIGNIFICANT CHANGE UP (ref 3.8–10.5)
WBC # FLD AUTO: 4.22 K/UL — SIGNIFICANT CHANGE UP (ref 3.8–10.5)

## 2021-03-26 ENCOUNTER — NON-APPOINTMENT (OUTPATIENT)
Age: 66
End: 2021-03-26

## 2021-03-27 ENCOUNTER — APPOINTMENT (OUTPATIENT)
Dept: INFUSION THERAPY | Facility: HOSPITAL | Age: 66
End: 2021-03-27

## 2021-04-03 ENCOUNTER — OUTPATIENT (OUTPATIENT)
Dept: OUTPATIENT SERVICES | Facility: HOSPITAL | Age: 66
LOS: 1 days | Discharge: HOME | End: 2021-04-03

## 2021-04-03 DIAGNOSIS — Z90.49 ACQUIRED ABSENCE OF OTHER SPECIFIED PARTS OF DIGESTIVE TRACT: Chronic | ICD-10-CM

## 2021-04-03 DIAGNOSIS — Z98.890 OTHER SPECIFIED POSTPROCEDURAL STATES: Chronic | ICD-10-CM

## 2021-04-03 DIAGNOSIS — C15.9 MALIGNANT NEOPLASM OF ESOPHAGUS, UNSPECIFIED: ICD-10-CM

## 2021-04-08 ENCOUNTER — RESULT REVIEW (OUTPATIENT)
Age: 66
End: 2021-04-08

## 2021-04-08 ENCOUNTER — LABORATORY RESULT (OUTPATIENT)
Age: 66
End: 2021-04-08

## 2021-04-08 ENCOUNTER — APPOINTMENT (OUTPATIENT)
Dept: INFUSION THERAPY | Facility: HOSPITAL | Age: 66
End: 2021-04-08

## 2021-04-08 ENCOUNTER — APPOINTMENT (OUTPATIENT)
Dept: HEMATOLOGY ONCOLOGY | Facility: CLINIC | Age: 66
End: 2021-04-08
Payer: MEDICAID

## 2021-04-08 VITALS
HEART RATE: 92 BPM | HEIGHT: 70 IN | TEMPERATURE: 97.8 F | WEIGHT: 158.07 LBS | RESPIRATION RATE: 22 BRPM | BODY MASS INDEX: 22.63 KG/M2 | SYSTOLIC BLOOD PRESSURE: 129 MMHG | OXYGEN SATURATION: 96 % | DIASTOLIC BLOOD PRESSURE: 78 MMHG

## 2021-04-08 LAB
BASOPHILS # BLD AUTO: 0.03 K/UL — SIGNIFICANT CHANGE UP (ref 0–0.2)
BASOPHILS NFR BLD AUTO: 0.8 % — SIGNIFICANT CHANGE UP (ref 0–2)
EOSINOPHIL # BLD AUTO: 0.13 K/UL — SIGNIFICANT CHANGE UP (ref 0–0.5)
EOSINOPHIL NFR BLD AUTO: 3.5 % — SIGNIFICANT CHANGE UP (ref 0–6)
HCT VFR BLD CALC: 32.9 % — LOW (ref 39–50)
HGB BLD-MCNC: 10.6 G/DL — LOW (ref 13–17)
IMM GRANULOCYTES NFR BLD AUTO: 1.1 % — SIGNIFICANT CHANGE UP (ref 0–1.5)
LYMPHOCYTES # BLD AUTO: 0.76 K/UL — LOW (ref 1–3.3)
LYMPHOCYTES # BLD AUTO: 20.7 % — SIGNIFICANT CHANGE UP (ref 13–44)
MCHC RBC-ENTMCNC: 29.3 PG — SIGNIFICANT CHANGE UP (ref 27–34)
MCHC RBC-ENTMCNC: 32.2 G/DL — SIGNIFICANT CHANGE UP (ref 32–36)
MCV RBC AUTO: 90.9 FL — SIGNIFICANT CHANGE UP (ref 80–100)
MONOCYTES # BLD AUTO: 0.57 K/UL — SIGNIFICANT CHANGE UP (ref 0–0.9)
MONOCYTES NFR BLD AUTO: 15.5 % — HIGH (ref 2–14)
NEUTROPHILS # BLD AUTO: 2.15 K/UL — SIGNIFICANT CHANGE UP (ref 1.8–7.4)
NEUTROPHILS NFR BLD AUTO: 58.4 % — SIGNIFICANT CHANGE UP (ref 43–77)
NRBC # BLD: 0 /100 WBCS — SIGNIFICANT CHANGE UP (ref 0–0)
PLATELET # BLD AUTO: 275 K/UL — SIGNIFICANT CHANGE UP (ref 150–400)
RBC # BLD: 3.62 M/UL — LOW (ref 4.2–5.8)
RBC # FLD: 16.4 % — HIGH (ref 10.3–14.5)
WBC # BLD: 3.68 K/UL — LOW (ref 3.8–10.5)
WBC # FLD AUTO: 3.68 K/UL — LOW (ref 3.8–10.5)

## 2021-04-08 PROCEDURE — 99072 ADDL SUPL MATRL&STAF TM PHE: CPT

## 2021-04-08 PROCEDURE — 99213 OFFICE O/P EST LOW 20 MIN: CPT

## 2021-04-09 DIAGNOSIS — Z51.11 ENCOUNTER FOR ANTINEOPLASTIC CHEMOTHERAPY: ICD-10-CM

## 2021-04-09 DIAGNOSIS — R11.2 NAUSEA WITH VOMITING, UNSPECIFIED: ICD-10-CM

## 2021-04-10 ENCOUNTER — APPOINTMENT (OUTPATIENT)
Dept: INFUSION THERAPY | Facility: HOSPITAL | Age: 66
End: 2021-04-10

## 2021-04-12 NOTE — REVIEW OF SYSTEMS
[Diarrhea] : diarrhea [Negative] : Constitutional [Fatigue] : no fatigue [FreeTextEntry9] : Chronic lower back pain.

## 2021-04-12 NOTE — HISTORY OF PRESENT ILLNESS
[Disease: _____________________] : Disease: [unfilled] [M: ___] : M[unfilled] [AJCC Stage: ____] : AJCC Stage: [unfilled] [de-identified] : This is a 65-year-old man with history of esophagogastric carcinoma who presents for evaluation.  His history dates back to March 2018 when he developed a cough and decreased appetite.  A CAT scan of his chest revealed a mass in the distal esophagus with periesophageal and portacaval adenopathy.  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  The patient underwent neoadjuvant chemotherapy and radiation therapy with Taxol carboplatin.  A follow-up CAT scan in June 2018 revealed decreased mass in the esophagus.  In August 2018 the patient underwent [place] Raghu esophagogastrectomy with Dr. Cloud and Dr. Arciniega.  The pathology revealed no residual carcinoma with negative margins, with12 - lymph nodes (0/12).\par \par The patient then was well until November 2020 when he developed right rib pain and upper back pain.  Imaging testing revealed metastases to the thoracic spine and possible cord compression at T5-T6.  The patient underwent laminectomy of T4-T6 resection of the epidural tumor with T3-T12 fusion and endovascular embolization of the spinal tumor T5-T6.  The pathology revealed poorly differentiated adenocarcinoma.  Postoperatively his pain was relieved.  He has undergone radiation oncology consultation and is planned to receive SBRT treatment.  He now presents for discussion regarding further chemotherapy. [de-identified] : adenoca, Her2 pos [de-identified] : Her2 pos. [de-identified] : Patient is s/p FOLFIRI/Herceptin. Patient is here for f/u. He feels okay except chronic back pain. He still needs Oxycodone 5 mg for pain. Also, he has some neuropathy to finger and he would like Neurontin refill. Appetite is okay with + weight gain. BM's are okay.

## 2021-04-12 NOTE — ASSESSMENT
[Palliative] : Goals of care discussed with patient: Palliative [Palliative Care Plan] : not applicable at this time [FreeTextEntry1] : Continue  FOLFIRI /Herceptin. Will monitor for side effects and toxicity. Repeat CAT scans are ordered to assess for response or progression of his disease.  The patient repeat echocardiograms every 3 montis to assess for cardiac effects of Herceptin.  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  He will return in 1 month or sooner as needed.

## 2021-04-15 ENCOUNTER — RESULT REVIEW (OUTPATIENT)
Age: 66
End: 2021-04-15

## 2021-04-15 ENCOUNTER — APPOINTMENT (OUTPATIENT)
Dept: CT IMAGING | Facility: IMAGING CENTER | Age: 66
End: 2021-04-15
Payer: MEDICAID

## 2021-04-15 ENCOUNTER — OUTPATIENT (OUTPATIENT)
Dept: OUTPATIENT SERVICES | Facility: HOSPITAL | Age: 66
LOS: 1 days | End: 2021-04-15
Payer: MEDICAID

## 2021-04-15 DIAGNOSIS — C79.51 SECONDARY MALIGNANT NEOPLASM OF BONE: ICD-10-CM

## 2021-04-15 DIAGNOSIS — Z98.890 OTHER SPECIFIED POSTPROCEDURAL STATES: Chronic | ICD-10-CM

## 2021-04-15 DIAGNOSIS — Z90.49 ACQUIRED ABSENCE OF OTHER SPECIFIED PARTS OF DIGESTIVE TRACT: Chronic | ICD-10-CM

## 2021-04-15 PROCEDURE — 74177 CT ABD & PELVIS W/CONTRAST: CPT | Mod: 26

## 2021-04-15 PROCEDURE — 71260 CT THORAX DX C+: CPT

## 2021-04-15 PROCEDURE — 74177 CT ABD & PELVIS W/CONTRAST: CPT

## 2021-04-15 PROCEDURE — 71260 CT THORAX DX C+: CPT | Mod: 26

## 2021-04-16 ENCOUNTER — APPOINTMENT (OUTPATIENT)
Dept: CARDIOLOGY | Facility: CLINIC | Age: 66
End: 2021-04-16
Payer: MEDICAID

## 2021-04-16 PROCEDURE — 93306 TTE W/DOPPLER COMPLETE: CPT

## 2021-04-16 PROCEDURE — 93356 MYOCRD STRAIN IMG SPCKL TRCK: CPT

## 2021-04-16 PROCEDURE — 99072 ADDL SUPL MATRL&STAF TM PHE: CPT

## 2021-04-22 ENCOUNTER — RESULT REVIEW (OUTPATIENT)
Age: 66
End: 2021-04-22

## 2021-04-22 ENCOUNTER — LABORATORY RESULT (OUTPATIENT)
Age: 66
End: 2021-04-22

## 2021-04-22 ENCOUNTER — APPOINTMENT (OUTPATIENT)
Dept: INFUSION THERAPY | Facility: HOSPITAL | Age: 66
End: 2021-04-22

## 2021-04-22 DIAGNOSIS — Z98.890 OTHER SPECIFIED POSTPROCEDURAL STATES: ICD-10-CM

## 2021-04-22 LAB
BASOPHILS # BLD AUTO: 0.03 K/UL — SIGNIFICANT CHANGE UP (ref 0–0.2)
BASOPHILS NFR BLD AUTO: 1 % — SIGNIFICANT CHANGE UP (ref 0–2)
EOSINOPHIL # BLD AUTO: 0.1 K/UL — SIGNIFICANT CHANGE UP (ref 0–0.5)
EOSINOPHIL NFR BLD AUTO: 3 % — SIGNIFICANT CHANGE UP (ref 0–6)
HCT VFR BLD CALC: 33.8 % — LOW (ref 39–50)
HGB BLD-MCNC: 10.7 G/DL — LOW (ref 13–17)
LYMPHOCYTES # BLD AUTO: 0.8 K/UL — LOW (ref 1–3.3)
LYMPHOCYTES # BLD AUTO: 25 % — SIGNIFICANT CHANGE UP (ref 13–44)
MCHC RBC-ENTMCNC: 29.6 PG — SIGNIFICANT CHANGE UP (ref 27–34)
MCHC RBC-ENTMCNC: 31.7 G/DL — LOW (ref 32–36)
MCV RBC AUTO: 93.6 FL — SIGNIFICANT CHANGE UP (ref 80–100)
MONOCYTES # BLD AUTO: 0.38 K/UL — SIGNIFICANT CHANGE UP (ref 0–0.9)
MONOCYTES NFR BLD AUTO: 12 % — SIGNIFICANT CHANGE UP (ref 2–14)
NEUTROPHILS # BLD AUTO: 1.85 K/UL — SIGNIFICANT CHANGE UP (ref 1.8–7.4)
NEUTROPHILS NFR BLD AUTO: 58 % — SIGNIFICANT CHANGE UP (ref 43–77)
NRBC # BLD: 0 /100 — SIGNIFICANT CHANGE UP (ref 0–0)
NRBC # BLD: SIGNIFICANT CHANGE UP /100 WBCS (ref 0–0)
PLAT MORPH BLD: NORMAL — SIGNIFICANT CHANGE UP
PLATELET # BLD AUTO: 305 K/UL — SIGNIFICANT CHANGE UP (ref 150–400)
RBC # BLD: 3.61 M/UL — LOW (ref 4.2–5.8)
RBC # FLD: 16.5 % — HIGH (ref 10.3–14.5)
RBC BLD AUTO: SIGNIFICANT CHANGE UP
VARIANT LYMPHS # BLD: 1 % — SIGNIFICANT CHANGE UP (ref 0–6)
WBC # BLD: 3.19 K/UL — LOW (ref 3.8–10.5)
WBC # FLD AUTO: 3.19 K/UL — LOW (ref 3.8–10.5)

## 2021-04-23 ENCOUNTER — NON-APPOINTMENT (OUTPATIENT)
Age: 66
End: 2021-04-23

## 2021-04-24 ENCOUNTER — APPOINTMENT (OUTPATIENT)
Dept: INFUSION THERAPY | Facility: HOSPITAL | Age: 66
End: 2021-04-24

## 2021-04-30 ENCOUNTER — APPOINTMENT (OUTPATIENT)
Dept: SURGICAL ONCOLOGY | Facility: CLINIC | Age: 66
End: 2021-04-30
Payer: MEDICAID

## 2021-04-30 VITALS
WEIGHT: 147 LBS | HEIGHT: 70 IN | SYSTOLIC BLOOD PRESSURE: 117 MMHG | BODY MASS INDEX: 21.05 KG/M2 | DIASTOLIC BLOOD PRESSURE: 75 MMHG | RESPIRATION RATE: 22 BRPM | OXYGEN SATURATION: 96 % | HEART RATE: 87 BPM

## 2021-04-30 PROCEDURE — 99214 OFFICE O/P EST MOD 30 MIN: CPT

## 2021-04-30 PROCEDURE — 99072 ADDL SUPL MATRL&STAF TM PHE: CPT

## 2021-04-30 NOTE — ASSESSMENT
[FreeTextEntry1] : IMP:\par S/p [place]-Raghu esophago-gastrectomy 8/17/18.\par Now with metastatic disease to bone, completed SBRT to spine 12/2020, on FOLFIRI/Herceptin \par Left inguinal hernia containing sigmoid colon \par \par \par PLAN:\par Chemo/BW/Imaging as per Dr. Long\par RTO 6 months \par Pt. will call if hernia becomes incarcerated\par

## 2021-04-30 NOTE — PHYSICAL EXAM
[Normal] : supple, no neck mass and thyroid not enlarged [Normal Groin Lymph Nodes] : normal groin lymph nodes [Normal] : oriented to person, place and time, with appropriate affect [Normal Supraclavicular Lymph Nodes] : normal supraclavicular lymph nodes [de-identified] : soft, ND, NT; left inguinal bulge hernia and umbilical hernia [de-identified] : left inguinal bulge

## 2021-04-30 NOTE — REASON FOR VISIT
[Follow-Up Visit] : a follow-up visit for [Esophagus Cancer] : esophagus cancer [FreeTextEntry2] : left inguinal hernia containing sigmoid colon

## 2021-04-30 NOTE — HISTORY OF PRESENT ILLNESS
[de-identified] : 65 year-old male returns for follow up.  He is status post [place] Raghu Esophagogastrectomy, cholecystectomy, umbilical hernia repair on 8/17/18 along with Dr. Hebert.  Final pathology showed no residual carcinoma, 12 negative lymph nodes and negative margins.  \par \par Postop barium swallow on 8/23/18 showed a leak so a flexible bronchoscopy and EGD were performed and he was kept NPO.  ID was called for treatment of mediastinitis and a PICC was placed in anticipation of 4 weeks of Ertapenem and Micafungin.   A barium swallow performed on 9/11/18 showed resolution of the anastomotic leak.  \par \par The patient then was well until November 2020 when he developed right rib pain and upper back pain. Imaging testing revealed metastases to the thoracic spine and possible cord compression at T5-T6. The patient underwent laminectomy of T4-T6 resection of the epidural tumor with T3-T12 fusion and endovascular embolization of the spinal tumor T5-T6. The pathology revealed poorly differentiated adenocarcinoma. Postoperatively his pain was relieved.  Pt. received SBRT to spine under the care of Dr. Noyola (completed 12/28/2020).  He is on FOLFIRI/Herceptin under the care of Dr. Long. \par \par CT C/A/P 4/15/2021- IMPRESSION:\par 1. Status post esophagogastrectomy and gastric pull-through.\par 2. Interval improvement in pulmonary metastatic foci and para-esophageal mediastinal nodes when compared to the previous examination of 11/9/2020.\par 3. Extensive postoperative changes of the thoracic spine. Increased loss of height of T12 vertebral body compared with the prior thoracic CT dated 12/9/2020. Examination for epidural disease is very limited due to streak artifact.\par 4. Umbilical and bilateral inguinal hernias with the left inguinal hernia containing sigmoid colon.\par 5. Low-attenuation focus within the left scrotal sac. Please correlate with scrotal ultrasound.\par \par Labs 4/2021:\par LFT: WNL \par CEA: 8.9 ng/mL\par CA 19-9: 12 U/ml\par \par The patient is here to discuss the left inguinal hernia.  He states it bulges and it gets very hard and distended after eating. He has relief after having a BM. Denies abdominal pain, nausea, or vomiting. Reports a 90 lb weight loss since his diagnosis in 2018. \par \par PERTINENT HISTORY:\jana Pozo was initially referred by Dr. Brandon Gil.  He began to experience a persistent cough and appetite loss.  He was referred for a CT of the chest, abdomen and pelvis in March 2018.  Study demonstrated severe circumferential soft tissue thickening at the distal esophagus with associated thoracic, milly esophageal and portacaval lymphadenopathy.  There were non specific pulmonary findings, possibly reflecting pulmonary fibrosis, with associated emphysematous changes, and indication of a recent right 8th rib fracture.  \par \par He was referred for an endoscopy on 3/14/18 at [facility].   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  He had a colonoscopy as well which was unremarkable. \par \par He completed neoadjuvant chemoradiation on May 23, 2018 with Dr. Briones and Dr. Pickett.\par \par He completed a restaging CT of the chest, abdomen and pelvis on 6/25/18, which demonstrated few scattered mediastinal paratracheal lymph nodes.  The previously described posterior esophageal dominant lymph node with necrosis is decreased in size.  There is mild thickening of the esophageal wall. \par \par His past medical history also includes HLD and COPD (he has smoked for the past 51 years).  Prior surgeries include bilateral inguinal hernia repairs.  He also has a large umbilical hernia with no associated obstructive symptoms.\par \par PCP: Dr. Nolan Livingston

## 2021-05-03 ENCOUNTER — OUTPATIENT (OUTPATIENT)
Dept: OUTPATIENT SERVICES | Facility: HOSPITAL | Age: 66
LOS: 1 days | Discharge: HOME | End: 2021-05-03

## 2021-05-03 DIAGNOSIS — Z98.890 OTHER SPECIFIED POSTPROCEDURAL STATES: Chronic | ICD-10-CM

## 2021-05-03 DIAGNOSIS — Z90.49 ACQUIRED ABSENCE OF OTHER SPECIFIED PARTS OF DIGESTIVE TRACT: Chronic | ICD-10-CM

## 2021-05-03 DIAGNOSIS — C15.9 MALIGNANT NEOPLASM OF ESOPHAGUS, UNSPECIFIED: ICD-10-CM

## 2021-05-06 ENCOUNTER — LABORATORY RESULT (OUTPATIENT)
Age: 66
End: 2021-05-06

## 2021-05-06 ENCOUNTER — APPOINTMENT (OUTPATIENT)
Dept: INFUSION THERAPY | Facility: HOSPITAL | Age: 66
End: 2021-05-06

## 2021-05-06 ENCOUNTER — NON-APPOINTMENT (OUTPATIENT)
Age: 66
End: 2021-05-06

## 2021-05-06 ENCOUNTER — RESULT REVIEW (OUTPATIENT)
Age: 66
End: 2021-05-06

## 2021-05-06 LAB
BASOPHILS # BLD AUTO: 0.09 K/UL — SIGNIFICANT CHANGE UP (ref 0–0.2)
BASOPHILS NFR BLD AUTO: 2 % — SIGNIFICANT CHANGE UP (ref 0–2)
EOSINOPHIL # BLD AUTO: 0.21 K/UL — SIGNIFICANT CHANGE UP (ref 0–0.5)
EOSINOPHIL NFR BLD AUTO: 5 % — SIGNIFICANT CHANGE UP (ref 0–6)
HCT VFR BLD CALC: 35.6 % — LOW (ref 39–50)
HGB BLD-MCNC: 11.7 G/DL — LOW (ref 13–17)
LYMPHOCYTES # BLD AUTO: 1.07 K/UL — SIGNIFICANT CHANGE UP (ref 1–3.3)
LYMPHOCYTES # BLD AUTO: 25 % — SIGNIFICANT CHANGE UP (ref 13–44)
MCHC RBC-ENTMCNC: 30.4 PG — SIGNIFICANT CHANGE UP (ref 27–34)
MCHC RBC-ENTMCNC: 32.9 G/DL — SIGNIFICANT CHANGE UP (ref 32–36)
MCV RBC AUTO: 92.5 FL — SIGNIFICANT CHANGE UP (ref 80–100)
MONOCYTES # BLD AUTO: 0.38 K/UL — SIGNIFICANT CHANGE UP (ref 0–0.9)
MONOCYTES NFR BLD AUTO: 9 % — SIGNIFICANT CHANGE UP (ref 2–14)
NEUTROPHILS # BLD AUTO: 2.51 K/UL — SIGNIFICANT CHANGE UP (ref 1.8–7.4)
NEUTROPHILS NFR BLD AUTO: 59 % — SIGNIFICANT CHANGE UP (ref 43–77)
NRBC # BLD: 0 /100 — SIGNIFICANT CHANGE UP (ref 0–0)
NRBC # BLD: SIGNIFICANT CHANGE UP /100 WBCS (ref 0–0)
PLAT MORPH BLD: NORMAL — SIGNIFICANT CHANGE UP
PLATELET # BLD AUTO: 308 K/UL — SIGNIFICANT CHANGE UP (ref 150–400)
RBC # BLD: 3.85 M/UL — LOW (ref 4.2–5.8)
RBC # FLD: 16.9 % — HIGH (ref 10.3–14.5)
RBC BLD AUTO: SIGNIFICANT CHANGE UP
WBC # BLD: 4.26 K/UL — SIGNIFICANT CHANGE UP (ref 3.8–10.5)
WBC # FLD AUTO: 4.26 K/UL — SIGNIFICANT CHANGE UP (ref 3.8–10.5)

## 2021-05-07 ENCOUNTER — NON-APPOINTMENT (OUTPATIENT)
Age: 66
End: 2021-05-07

## 2021-05-07 DIAGNOSIS — Z51.11 ENCOUNTER FOR ANTINEOPLASTIC CHEMOTHERAPY: ICD-10-CM

## 2021-05-07 DIAGNOSIS — C18.9 MALIGNANT NEOPLASM OF COLON, UNSPECIFIED: ICD-10-CM

## 2021-05-07 DIAGNOSIS — C16.0 MALIGNANT NEOPLASM OF CARDIA: ICD-10-CM

## 2021-05-07 DIAGNOSIS — R11.2 NAUSEA WITH VOMITING, UNSPECIFIED: ICD-10-CM

## 2021-05-08 ENCOUNTER — APPOINTMENT (OUTPATIENT)
Dept: ULTRASOUND IMAGING | Facility: IMAGING CENTER | Age: 66
End: 2021-05-08
Payer: MEDICAID

## 2021-05-08 ENCOUNTER — RESULT REVIEW (OUTPATIENT)
Age: 66
End: 2021-05-08

## 2021-05-08 ENCOUNTER — APPOINTMENT (OUTPATIENT)
Dept: INFUSION THERAPY | Facility: HOSPITAL | Age: 66
End: 2021-05-08

## 2021-05-08 ENCOUNTER — OUTPATIENT (OUTPATIENT)
Dept: OUTPATIENT SERVICES | Facility: HOSPITAL | Age: 66
LOS: 1 days | End: 2021-05-08
Payer: MEDICAID

## 2021-05-08 DIAGNOSIS — Z90.49 ACQUIRED ABSENCE OF OTHER SPECIFIED PARTS OF DIGESTIVE TRACT: Chronic | ICD-10-CM

## 2021-05-08 DIAGNOSIS — Z98.890 OTHER SPECIFIED POSTPROCEDURAL STATES: Chronic | ICD-10-CM

## 2021-05-08 DIAGNOSIS — Z98.890 OTHER SPECIFIED POSTPROCEDURAL STATES: ICD-10-CM

## 2021-05-08 PROCEDURE — 76870 US EXAM SCROTUM: CPT | Mod: 26

## 2021-05-08 PROCEDURE — 76870 US EXAM SCROTUM: CPT

## 2021-05-20 ENCOUNTER — RESULT REVIEW (OUTPATIENT)
Age: 66
End: 2021-05-20

## 2021-05-20 ENCOUNTER — LABORATORY RESULT (OUTPATIENT)
Age: 66
End: 2021-05-20

## 2021-05-20 ENCOUNTER — APPOINTMENT (OUTPATIENT)
Dept: INFUSION THERAPY | Facility: HOSPITAL | Age: 66
End: 2021-05-20

## 2021-05-20 LAB
BASOPHILS # BLD AUTO: 0.04 K/UL — SIGNIFICANT CHANGE UP (ref 0–0.2)
BASOPHILS NFR BLD AUTO: 1 % — SIGNIFICANT CHANGE UP (ref 0–2)
EOSINOPHIL # BLD AUTO: 0.18 K/UL — SIGNIFICANT CHANGE UP (ref 0–0.5)
EOSINOPHIL NFR BLD AUTO: 4.6 % — SIGNIFICANT CHANGE UP (ref 0–6)
HCT VFR BLD CALC: 34.4 % — LOW (ref 39–50)
HGB BLD-MCNC: 10.9 G/DL — LOW (ref 13–17)
IMM GRANULOCYTES NFR BLD AUTO: 0.8 % — SIGNIFICANT CHANGE UP (ref 0–1.5)
LYMPHOCYTES # BLD AUTO: 0.89 K/UL — LOW (ref 1–3.3)
LYMPHOCYTES # BLD AUTO: 22.6 % — SIGNIFICANT CHANGE UP (ref 13–44)
MCHC RBC-ENTMCNC: 30 PG — SIGNIFICANT CHANGE UP (ref 27–34)
MCHC RBC-ENTMCNC: 31.7 G/DL — LOW (ref 32–36)
MCV RBC AUTO: 94.8 FL — SIGNIFICANT CHANGE UP (ref 80–100)
MONOCYTES # BLD AUTO: 0.41 K/UL — SIGNIFICANT CHANGE UP (ref 0–0.9)
MONOCYTES NFR BLD AUTO: 10.4 % — SIGNIFICANT CHANGE UP (ref 2–14)
NEUTROPHILS # BLD AUTO: 2.39 K/UL — SIGNIFICANT CHANGE UP (ref 1.8–7.4)
NEUTROPHILS NFR BLD AUTO: 60.6 % — SIGNIFICANT CHANGE UP (ref 43–77)
NRBC # BLD: 0 /100 WBCS — SIGNIFICANT CHANGE UP (ref 0–0)
PLATELET # BLD AUTO: 264 K/UL — SIGNIFICANT CHANGE UP (ref 150–400)
RBC # BLD: 3.63 M/UL — LOW (ref 4.2–5.8)
RBC # FLD: 16.2 % — HIGH (ref 10.3–14.5)
WBC # BLD: 3.94 K/UL — SIGNIFICANT CHANGE UP (ref 3.8–10.5)
WBC # FLD AUTO: 3.94 K/UL — SIGNIFICANT CHANGE UP (ref 3.8–10.5)

## 2021-05-21 ENCOUNTER — APPOINTMENT (OUTPATIENT)
Dept: INFUSION THERAPY | Facility: HOSPITAL | Age: 66
End: 2021-05-21

## 2021-05-22 ENCOUNTER — APPOINTMENT (OUTPATIENT)
Dept: INFUSION THERAPY | Facility: HOSPITAL | Age: 66
End: 2021-05-22

## 2021-05-30 ENCOUNTER — OUTPATIENT (OUTPATIENT)
Dept: OUTPATIENT SERVICES | Facility: HOSPITAL | Age: 66
LOS: 1 days | Discharge: HOME | End: 2021-05-30

## 2021-05-30 DIAGNOSIS — C15.9 MALIGNANT NEOPLASM OF ESOPHAGUS, UNSPECIFIED: ICD-10-CM

## 2021-05-30 DIAGNOSIS — Z90.49 ACQUIRED ABSENCE OF OTHER SPECIFIED PARTS OF DIGESTIVE TRACT: Chronic | ICD-10-CM

## 2021-05-30 DIAGNOSIS — Z98.890 OTHER SPECIFIED POSTPROCEDURAL STATES: Chronic | ICD-10-CM

## 2021-06-03 ENCOUNTER — LABORATORY RESULT (OUTPATIENT)
Age: 66
End: 2021-06-03

## 2021-06-03 ENCOUNTER — APPOINTMENT (OUTPATIENT)
Dept: INFUSION THERAPY | Facility: HOSPITAL | Age: 66
End: 2021-06-03

## 2021-06-03 ENCOUNTER — RESULT REVIEW (OUTPATIENT)
Age: 66
End: 2021-06-03

## 2021-06-03 LAB
BASOPHILS # BLD AUTO: 0.07 K/UL — SIGNIFICANT CHANGE UP (ref 0–0.2)
BASOPHILS NFR BLD AUTO: 2 % — SIGNIFICANT CHANGE UP (ref 0–2)
EOSINOPHIL # BLD AUTO: 0.04 K/UL — SIGNIFICANT CHANGE UP (ref 0–0.5)
EOSINOPHIL NFR BLD AUTO: 1 % — SIGNIFICANT CHANGE UP (ref 0–6)
HCT VFR BLD CALC: 34.5 % — LOW (ref 39–50)
HGB BLD-MCNC: 11.2 G/DL — LOW (ref 13–17)
LYMPHOCYTES # BLD AUTO: 0.67 K/UL — LOW (ref 1–3.3)
LYMPHOCYTES # BLD AUTO: 19 % — SIGNIFICANT CHANGE UP (ref 13–44)
MCHC RBC-ENTMCNC: 30.2 PG — SIGNIFICANT CHANGE UP (ref 27–34)
MCHC RBC-ENTMCNC: 32.5 G/DL — SIGNIFICANT CHANGE UP (ref 32–36)
MCV RBC AUTO: 93 FL — SIGNIFICANT CHANGE UP (ref 80–100)
MONOCYTES # BLD AUTO: 0.53 K/UL — SIGNIFICANT CHANGE UP (ref 0–0.9)
MONOCYTES NFR BLD AUTO: 15 % — HIGH (ref 2–14)
MYELOCYTES NFR BLD: 1 % — HIGH (ref 0–0)
NEUTROPHILS # BLD AUTO: 2.2 K/UL — SIGNIFICANT CHANGE UP (ref 1.8–7.4)
NEUTROPHILS NFR BLD AUTO: 62 % — SIGNIFICANT CHANGE UP (ref 43–77)
NRBC # BLD: 0 /100 — SIGNIFICANT CHANGE UP (ref 0–0)
NRBC # BLD: SIGNIFICANT CHANGE UP /100 WBCS (ref 0–0)
PLAT MORPH BLD: NORMAL — SIGNIFICANT CHANGE UP
PLATELET # BLD AUTO: 275 K/UL — SIGNIFICANT CHANGE UP (ref 150–400)
RBC # BLD: 3.71 M/UL — LOW (ref 4.2–5.8)
RBC # FLD: 16.1 % — HIGH (ref 10.3–14.5)
RBC BLD AUTO: SIGNIFICANT CHANGE UP
WBC # BLD: 3.55 K/UL — LOW (ref 3.8–10.5)
WBC # FLD AUTO: 3.55 K/UL — LOW (ref 3.8–10.5)

## 2021-06-04 DIAGNOSIS — Z51.11 ENCOUNTER FOR ANTINEOPLASTIC CHEMOTHERAPY: ICD-10-CM

## 2021-06-04 DIAGNOSIS — R11.2 NAUSEA WITH VOMITING, UNSPECIFIED: ICD-10-CM

## 2021-06-05 ENCOUNTER — APPOINTMENT (OUTPATIENT)
Dept: INFUSION THERAPY | Facility: HOSPITAL | Age: 66
End: 2021-06-05

## 2021-06-17 ENCOUNTER — LABORATORY RESULT (OUTPATIENT)
Age: 66
End: 2021-06-17

## 2021-06-17 ENCOUNTER — APPOINTMENT (OUTPATIENT)
Dept: INFUSION THERAPY | Facility: HOSPITAL | Age: 66
End: 2021-06-17

## 2021-06-17 ENCOUNTER — RESULT REVIEW (OUTPATIENT)
Age: 66
End: 2021-06-17

## 2021-06-17 ENCOUNTER — APPOINTMENT (OUTPATIENT)
Dept: RADIATION ONCOLOGY | Facility: CLINIC | Age: 66
End: 2021-06-17
Payer: MEDICAID

## 2021-06-17 VITALS
HEART RATE: 92 BPM | RESPIRATION RATE: 17 BRPM | TEMPERATURE: 97.6 F | SYSTOLIC BLOOD PRESSURE: 131 MMHG | DIASTOLIC BLOOD PRESSURE: 77 MMHG | BODY MASS INDEX: 21.68 KG/M2 | OXYGEN SATURATION: 97 % | WEIGHT: 151.12 LBS

## 2021-06-17 LAB
BASOPHILS # BLD AUTO: 0 K/UL — SIGNIFICANT CHANGE UP (ref 0–0.2)
BASOPHILS NFR BLD AUTO: 0 % — SIGNIFICANT CHANGE UP (ref 0–2)
EOSINOPHIL # BLD AUTO: 0.15 K/UL — SIGNIFICANT CHANGE UP (ref 0–0.5)
EOSINOPHIL NFR BLD AUTO: 4 % — SIGNIFICANT CHANGE UP (ref 0–6)
HCT VFR BLD CALC: 34.1 % — LOW (ref 39–50)
HGB BLD-MCNC: 10.8 G/DL — LOW (ref 13–17)
LYMPHOCYTES # BLD AUTO: 0.85 K/UL — LOW (ref 1–3.3)
LYMPHOCYTES # BLD AUTO: 23 % — SIGNIFICANT CHANGE UP (ref 13–44)
MCHC RBC-ENTMCNC: 29.5 PG — SIGNIFICANT CHANGE UP (ref 27–34)
MCHC RBC-ENTMCNC: 31.7 G/DL — LOW (ref 32–36)
MCV RBC AUTO: 93.2 FL — SIGNIFICANT CHANGE UP (ref 80–100)
MONOCYTES # BLD AUTO: 0.44 K/UL — SIGNIFICANT CHANGE UP (ref 0–0.9)
MONOCYTES NFR BLD AUTO: 12 % — SIGNIFICANT CHANGE UP (ref 2–14)
NEUTROPHILS # BLD AUTO: 2.24 K/UL — SIGNIFICANT CHANGE UP (ref 1.8–7.4)
NEUTROPHILS NFR BLD AUTO: 61 % — SIGNIFICANT CHANGE UP (ref 43–77)
NRBC # BLD: 0 /100 — SIGNIFICANT CHANGE UP (ref 0–0)
NRBC # BLD: SIGNIFICANT CHANGE UP /100 WBCS (ref 0–0)
PLAT MORPH BLD: NORMAL — SIGNIFICANT CHANGE UP
PLATELET # BLD AUTO: 280 K/UL — SIGNIFICANT CHANGE UP (ref 150–400)
RBC # BLD: 3.66 M/UL — LOW (ref 4.2–5.8)
RBC # FLD: 16.3 % — HIGH (ref 10.3–14.5)
RBC BLD AUTO: SIGNIFICANT CHANGE UP
WBC # BLD: 3.68 K/UL — LOW (ref 3.8–10.5)
WBC # FLD AUTO: 3.68 K/UL — LOW (ref 3.8–10.5)

## 2021-06-17 PROCEDURE — 99213 OFFICE O/P EST LOW 20 MIN: CPT

## 2021-06-17 NOTE — VITALS
[Maximal Pain Intensity: 0/10] : 0/10 [Least Pain Intensity: 0/10] : 0/10 [Pain Description/Quality: ___] : Pain description/quality: [unfilled] [Pain Duration: ___] : Pain duration: [unfilled] [Pain Location: ___] : Pain Location: [unfilled] [OTC] : OTC [Opioid] : opioid [80: Normal activity with effort; some signs or symptoms of disease.] : 80: Normal activity with effort; some signs or symptoms of disease.  [ECOG Performance Status: 1 - Restricted in physically strenuous activity but ambulatory and able to carry out work of a light or sedentary nature] : Performance Status: 1 - Restricted in physically strenuous activity but ambulatory and able to carry out work of a light or sedentary nature, e.g., light house work, office work [Pain Interferes with ADLs] : Pain does not interfere with activities of daily living

## 2021-06-17 NOTE — REVIEW OF SYSTEMS
[Constipation: Grade 0] : Constipation: Grade 0 [Diarrhea: Grade 0] : Diarrhea: Grade 0 [Fatigue: Grade 1 - Fatigue relieved by rest] : Fatigue: Grade 1 - Fatigue relieved by rest [Skin Hyperpigmentation: Grade 1 - Hyperpigmentation covering <10% BSA; no psychosocial impact] : Skin Hyperpigmentation: Grade 1 - Hyperpigmentation covering <10% BSA; no psychosocial impact [Dermatitis Radiation: Grade 0] : Dermatitis Radiation: Grade 0

## 2021-06-17 NOTE — HISTORY OF PRESENT ILLNESS
[FreeTextEntry1] : Mr. Sánchez Ferrer is a 66yo with hx of esophageal adenocarcinoma s/p CRT and esophagectomy 2018 recently presenting with metastatic disease throughout spine including cord compression due to deposits of epidural disease at level of inferior portion of T4, superior portion of T7, and entire T5-T6, s/p T4-5 and T6-7 laminectomies and posterolateral arthrodesis from T3-T12 . His previous RT as was done at [place] under the care of Dr. Pickett.  He completed 24 Gy/3 fxns to T4-T8 on 12/28/20.\par \par 4/15/2021 CT Chest/Abdomen/Pelvis: IMPRESSION:  1. Status post esophagogastrectomy and gastric pull-through.\par 2. Interval improvement in pulmonary metastatic foci and para-esophageal mediastinal nodes when compared to the previous examination of 11/9/2020.\par 3. Extensive postoperative changes of the thoracic spine. Increased loss of height of T12 vertebral body compared with the prior thoracic CT dated 12/9/2020. Examination for epidural disease is very limited due to streak artifact.\par 4. Umbilical and bilateral inguinal hernias with the left inguinal hernia containing sigmoid colon.\par 5. Low-attenuation focus within the left scrotal sac. Please correlate with scrotal ultrasound.\par \par He presents for routine follow up \par \par He notes occasional  pain and has no complaints today. Continues to receive chemotherapy and feels tired but otherwise no complaints. No new weakness, sensory changes or bowel/ bladder incontinence.

## 2021-06-18 DIAGNOSIS — C16.0 MALIGNANT NEOPLASM OF CARDIA: ICD-10-CM

## 2021-06-18 DIAGNOSIS — C18.9 MALIGNANT NEOPLASM OF COLON, UNSPECIFIED: ICD-10-CM

## 2021-06-19 ENCOUNTER — APPOINTMENT (OUTPATIENT)
Dept: INFUSION THERAPY | Facility: HOSPITAL | Age: 66
End: 2021-06-19

## 2021-06-24 NOTE — PRE-ANESTHESIA EVALUATION ADULT - HEART RATE (BEATS/MIN)
Pt left a VM.     Pt reported pacemaker site soreness. He reports that he shoveled for two hours yesterday.     Pt had a generator change on 2/5/19. Discussed his site, denies any drainage, increased swelling, etc.  Discussed that given that he does not really have any true arm restrictions given no new leads, at this time, he should institute them to ensure he does not do to much and tear open his incision. Reviewed restrictions and what to avoid including shoveling.     Wife and Pt verbalized understanding and will call with any questions.     Brooklyn Carranza RN BSN PHN  Device Nurse   [facility]    
[birth year]

## 2021-06-26 ENCOUNTER — OUTPATIENT (OUTPATIENT)
Dept: OUTPATIENT SERVICES | Facility: HOSPITAL | Age: 66
LOS: 1 days | Discharge: HOME | End: 2021-06-26

## 2021-06-26 DIAGNOSIS — Z98.890 OTHER SPECIFIED POSTPROCEDURAL STATES: Chronic | ICD-10-CM

## 2021-06-26 DIAGNOSIS — Z90.49 ACQUIRED ABSENCE OF OTHER SPECIFIED PARTS OF DIGESTIVE TRACT: Chronic | ICD-10-CM

## 2021-06-26 DIAGNOSIS — C15.9 MALIGNANT NEOPLASM OF ESOPHAGUS, UNSPECIFIED: ICD-10-CM

## 2021-07-01 ENCOUNTER — RESULT REVIEW (OUTPATIENT)
Age: 66
End: 2021-07-01

## 2021-07-01 ENCOUNTER — LABORATORY RESULT (OUTPATIENT)
Age: 66
End: 2021-07-01

## 2021-07-01 ENCOUNTER — APPOINTMENT (OUTPATIENT)
Dept: INFUSION THERAPY | Facility: HOSPITAL | Age: 66
End: 2021-07-01

## 2021-07-01 LAB
BASOPHILS # BLD AUTO: 0.03 K/UL — SIGNIFICANT CHANGE UP (ref 0–0.2)
BASOPHILS NFR BLD AUTO: 1 % — SIGNIFICANT CHANGE UP (ref 0–2)
EOSINOPHIL # BLD AUTO: 0.17 K/UL — SIGNIFICANT CHANGE UP (ref 0–0.5)
EOSINOPHIL NFR BLD AUTO: 5 % — SIGNIFICANT CHANGE UP (ref 0–6)
HCT VFR BLD CALC: 35.4 % — LOW (ref 39–50)
HGB BLD-MCNC: 11.4 G/DL — LOW (ref 13–17)
LYMPHOCYTES # BLD AUTO: 0.96 K/UL — LOW (ref 1–3.3)
LYMPHOCYTES # BLD AUTO: 28 % — SIGNIFICANT CHANGE UP (ref 13–44)
MCHC RBC-ENTMCNC: 30 PG — SIGNIFICANT CHANGE UP (ref 27–34)
MCHC RBC-ENTMCNC: 32.2 G/DL — SIGNIFICANT CHANGE UP (ref 32–36)
MCV RBC AUTO: 93.2 FL — SIGNIFICANT CHANGE UP (ref 80–100)
MONOCYTES # BLD AUTO: 0.34 K/UL — SIGNIFICANT CHANGE UP (ref 0–0.9)
MONOCYTES NFR BLD AUTO: 10 % — SIGNIFICANT CHANGE UP (ref 2–14)
NEUTROPHILS # BLD AUTO: 1.92 K/UL — SIGNIFICANT CHANGE UP (ref 1.8–7.4)
NEUTROPHILS NFR BLD AUTO: 56 % — SIGNIFICANT CHANGE UP (ref 43–77)
NRBC # BLD: 0 /100 — SIGNIFICANT CHANGE UP (ref 0–0)
NRBC # BLD: SIGNIFICANT CHANGE UP /100 WBCS (ref 0–0)
PLAT MORPH BLD: NORMAL — SIGNIFICANT CHANGE UP
PLATELET # BLD AUTO: 303 K/UL — SIGNIFICANT CHANGE UP (ref 150–400)
RBC # BLD: 3.8 M/UL — LOW (ref 4.2–5.8)
RBC # FLD: 15.9 % — HIGH (ref 10.3–14.5)
RBC BLD AUTO: SIGNIFICANT CHANGE UP
WBC # BLD: 3.43 K/UL — LOW (ref 3.8–10.5)
WBC # FLD AUTO: 3.43 K/UL — LOW (ref 3.8–10.5)

## 2021-07-02 DIAGNOSIS — R11.2 NAUSEA WITH VOMITING, UNSPECIFIED: ICD-10-CM

## 2021-07-02 DIAGNOSIS — Z51.11 ENCOUNTER FOR ANTINEOPLASTIC CHEMOTHERAPY: ICD-10-CM

## 2021-07-03 ENCOUNTER — APPOINTMENT (OUTPATIENT)
Dept: INFUSION THERAPY | Facility: HOSPITAL | Age: 66
End: 2021-07-03

## 2021-07-09 ENCOUNTER — APPOINTMENT (OUTPATIENT)
Dept: CARDIOLOGY | Facility: CLINIC | Age: 66
End: 2021-07-09
Payer: MEDICAID

## 2021-07-09 PROCEDURE — 93306 TTE W/DOPPLER COMPLETE: CPT

## 2021-07-09 PROCEDURE — 93356 MYOCRD STRAIN IMG SPCKL TRCK: CPT

## 2021-07-15 ENCOUNTER — RESULT REVIEW (OUTPATIENT)
Age: 66
End: 2021-07-15

## 2021-07-15 ENCOUNTER — LABORATORY RESULT (OUTPATIENT)
Age: 66
End: 2021-07-15

## 2021-07-15 ENCOUNTER — APPOINTMENT (OUTPATIENT)
Dept: HEMATOLOGY ONCOLOGY | Facility: CLINIC | Age: 66
End: 2021-07-15
Payer: MEDICAID

## 2021-07-15 ENCOUNTER — APPOINTMENT (OUTPATIENT)
Dept: INFUSION THERAPY | Facility: HOSPITAL | Age: 66
End: 2021-07-15

## 2021-07-15 LAB
BASOPHILS # BLD AUTO: 0.04 K/UL — SIGNIFICANT CHANGE UP (ref 0–0.2)
BASOPHILS NFR BLD AUTO: 1 % — SIGNIFICANT CHANGE UP (ref 0–2)
EOSINOPHIL # BLD AUTO: 0.08 K/UL — SIGNIFICANT CHANGE UP (ref 0–0.5)
EOSINOPHIL NFR BLD AUTO: 2 % — SIGNIFICANT CHANGE UP (ref 0–6)
HCT VFR BLD CALC: 34.8 % — LOW (ref 39–50)
HGB BLD-MCNC: 11.2 G/DL — LOW (ref 13–17)
LYMPHOCYTES # BLD AUTO: 1.11 K/UL — SIGNIFICANT CHANGE UP (ref 1–3.3)
LYMPHOCYTES # BLD AUTO: 27 % — SIGNIFICANT CHANGE UP (ref 13–44)
MCHC RBC-ENTMCNC: 30 PG — SIGNIFICANT CHANGE UP (ref 27–34)
MCHC RBC-ENTMCNC: 32.2 G/DL — SIGNIFICANT CHANGE UP (ref 32–36)
MCV RBC AUTO: 93.3 FL — SIGNIFICANT CHANGE UP (ref 80–100)
MONOCYTES # BLD AUTO: 0.37 K/UL — SIGNIFICANT CHANGE UP (ref 0–0.9)
MONOCYTES NFR BLD AUTO: 9 % — SIGNIFICANT CHANGE UP (ref 2–14)
NEUTROPHILS # BLD AUTO: 2.47 K/UL — SIGNIFICANT CHANGE UP (ref 1.8–7.4)
NEUTROPHILS NFR BLD AUTO: 60 % — SIGNIFICANT CHANGE UP (ref 43–77)
NRBC # BLD: 0 /100 — SIGNIFICANT CHANGE UP (ref 0–0)
NRBC # BLD: SIGNIFICANT CHANGE UP /100 WBCS (ref 0–0)
PLAT MORPH BLD: NORMAL — SIGNIFICANT CHANGE UP
PLATELET # BLD AUTO: 278 K/UL — SIGNIFICANT CHANGE UP (ref 150–400)
RBC # BLD: 3.73 M/UL — LOW (ref 4.2–5.8)
RBC # FLD: 15.9 % — HIGH (ref 10.3–14.5)
RBC BLD AUTO: SIGNIFICANT CHANGE UP
VARIANT LYMPHS # BLD: 1 % — SIGNIFICANT CHANGE UP (ref 0–6)
WBC # BLD: 4.11 K/UL — SIGNIFICANT CHANGE UP (ref 3.8–10.5)
WBC # FLD AUTO: 4.11 K/UL — SIGNIFICANT CHANGE UP (ref 3.8–10.5)

## 2021-07-15 PROCEDURE — 99213 OFFICE O/P EST LOW 20 MIN: CPT

## 2021-07-15 NOTE — REVIEW OF SYSTEMS
[Negative] : Allergic/Immunologic [FreeTextEntry8] : slow urine output [FreeTextEntry9] : has twinges in mid thoracic back

## 2021-07-15 NOTE — ASSESSMENT
[FreeTextEntry1] : Pt to continue on Folfiri and herceptin and be followed for side effects and toxicity. Pt to repeat Ct scans to assess for response or progression in disease. Pt is tolerating rx well. If CT shows disease control then treatment would be changed to maintenance herceptin.Pt to RTO in 1 month or sooner.                                                                                                                                                                                                                                                                                                                                                                                                                                                                                                                                                                                                                                                                                                                                                                                                                                                                                                                                                                                                                                                                                                                                                                                                                                                                                                                                                                                                                                                                                                [Palliative] : Goals of care discussed with patient: Palliative [Palliative Care Plan] : not applicable at this time

## 2021-07-15 NOTE — HISTORY OF PRESENT ILLNESS
[Disease: _____________________] : Disease: [unfilled] [M: ___] : M[unfilled] [AJCC Stage: ____] : AJCC Stage: [unfilled] [de-identified] : This is a 65-year-old man with history of esophagogastric carcinoma who presents for evaluation.  His history dates back to March 2018 when he developed a cough and decreased appetite.  A CAT scan of his chest revealed a mass in the distal esophagus with periesophageal and portacaval adenopathy.  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  The patient underwent neoadjuvant chemotherapy and radiation therapy with Taxol carboplatin.  A follow-up CAT scan in June 2018 revealed decreased mass in the esophagus.  In August 2018 the patient underwent [place] Raghu esophagogastrectomy with Dr. Cloud and Dr. Arciniega.  The pathology revealed no residual carcinoma with negative margins, with12 - lymph nodes (0/12).\par \par The patient then was well until November 2020 when he developed right rib pain and upper back pain.  Imaging testing revealed metastases to the thoracic spine and possible cord compression at T5-T6.  The patient underwent laminectomy of T4-T6 resection of the epidural tumor with T3-T12 fusion and endovascular embolization of the spinal tumor T5-T6.  The pathology revealed poorly differentiated adenocarcinoma.  Postoperatively his pain was relieved.  He has undergone radiation oncology consultation and is planned to receive SBRT treatment.  He now presents for discussion regarding further chemotherapy. [de-identified] : adenoca, Her2 pos [de-identified] : Her2 pos. [de-identified] : Since the last visit the pt remains stable on treatment with irinotecan and Folfiri and herceptin.

## 2021-07-17 ENCOUNTER — APPOINTMENT (OUTPATIENT)
Dept: INFUSION THERAPY | Facility: HOSPITAL | Age: 66
End: 2021-07-17

## 2021-07-28 ENCOUNTER — OUTPATIENT (OUTPATIENT)
Dept: OUTPATIENT SERVICES | Facility: HOSPITAL | Age: 66
LOS: 1 days | Discharge: HOME | End: 2021-07-28

## 2021-07-28 DIAGNOSIS — Z98.890 OTHER SPECIFIED POSTPROCEDURAL STATES: Chronic | ICD-10-CM

## 2021-07-28 DIAGNOSIS — C15.9 MALIGNANT NEOPLASM OF ESOPHAGUS, UNSPECIFIED: ICD-10-CM

## 2021-07-28 DIAGNOSIS — Z90.49 ACQUIRED ABSENCE OF OTHER SPECIFIED PARTS OF DIGESTIVE TRACT: Chronic | ICD-10-CM

## 2021-07-29 ENCOUNTER — RESULT REVIEW (OUTPATIENT)
Age: 66
End: 2021-07-29

## 2021-07-29 ENCOUNTER — APPOINTMENT (OUTPATIENT)
Dept: INFUSION THERAPY | Facility: HOSPITAL | Age: 66
End: 2021-07-29

## 2021-07-29 ENCOUNTER — LABORATORY RESULT (OUTPATIENT)
Age: 66
End: 2021-07-29

## 2021-07-29 LAB
BASOPHILS # BLD AUTO: 0.06 K/UL — SIGNIFICANT CHANGE UP (ref 0–0.2)
BASOPHILS NFR BLD AUTO: 2 % — SIGNIFICANT CHANGE UP (ref 0–2)
EOSINOPHIL # BLD AUTO: 0.18 K/UL — SIGNIFICANT CHANGE UP (ref 0–0.5)
EOSINOPHIL NFR BLD AUTO: 6 % — SIGNIFICANT CHANGE UP (ref 0–6)
HCT VFR BLD CALC: 33.5 % — LOW (ref 39–50)
HGB BLD-MCNC: 10.9 G/DL — LOW (ref 13–17)
LYMPHOCYTES # BLD AUTO: 0.89 K/UL — LOW (ref 1–3.3)
LYMPHOCYTES # BLD AUTO: 30 % — SIGNIFICANT CHANGE UP (ref 13–44)
MCHC RBC-ENTMCNC: 30.1 PG — SIGNIFICANT CHANGE UP (ref 27–34)
MCHC RBC-ENTMCNC: 32.5 G/DL — SIGNIFICANT CHANGE UP (ref 32–36)
MCV RBC AUTO: 92.5 FL — SIGNIFICANT CHANGE UP (ref 80–100)
MONOCYTES # BLD AUTO: 0.3 K/UL — SIGNIFICANT CHANGE UP (ref 0–0.9)
MONOCYTES NFR BLD AUTO: 10 % — SIGNIFICANT CHANGE UP (ref 2–14)
NEUTROPHILS # BLD AUTO: 1.54 K/UL — LOW (ref 1.8–7.4)
NEUTROPHILS NFR BLD AUTO: 52 % — SIGNIFICANT CHANGE UP (ref 43–77)
NRBC # BLD: 0 /100 — SIGNIFICANT CHANGE UP (ref 0–0)
NRBC # BLD: SIGNIFICANT CHANGE UP /100 WBCS (ref 0–0)
PLAT MORPH BLD: NORMAL — SIGNIFICANT CHANGE UP
PLATELET # BLD AUTO: 286 K/UL — SIGNIFICANT CHANGE UP (ref 150–400)
RBC # BLD: 3.62 M/UL — LOW (ref 4.2–5.8)
RBC # FLD: 16.1 % — HIGH (ref 10.3–14.5)
RBC BLD AUTO: SIGNIFICANT CHANGE UP
WBC # BLD: 2.96 K/UL — LOW (ref 3.8–10.5)
WBC # FLD AUTO: 2.96 K/UL — LOW (ref 3.8–10.5)

## 2021-07-30 ENCOUNTER — NON-APPOINTMENT (OUTPATIENT)
Age: 66
End: 2021-07-30

## 2021-07-30 DIAGNOSIS — Z51.11 ENCOUNTER FOR ANTINEOPLASTIC CHEMOTHERAPY: ICD-10-CM

## 2021-07-30 DIAGNOSIS — R11.2 NAUSEA WITH VOMITING, UNSPECIFIED: ICD-10-CM

## 2021-07-31 ENCOUNTER — APPOINTMENT (OUTPATIENT)
Dept: INFUSION THERAPY | Facility: HOSPITAL | Age: 66
End: 2021-07-31

## 2021-08-12 ENCOUNTER — RESULT REVIEW (OUTPATIENT)
Age: 66
End: 2021-08-12

## 2021-08-12 ENCOUNTER — APPOINTMENT (OUTPATIENT)
Dept: INFUSION THERAPY | Facility: HOSPITAL | Age: 66
End: 2021-08-12

## 2021-08-12 ENCOUNTER — LABORATORY RESULT (OUTPATIENT)
Age: 66
End: 2021-08-12

## 2021-08-12 LAB
BASOPHILS # BLD AUTO: 0.03 K/UL — SIGNIFICANT CHANGE UP (ref 0–0.2)
BASOPHILS NFR BLD AUTO: 1 % — SIGNIFICANT CHANGE UP (ref 0–2)
EOSINOPHIL # BLD AUTO: 0.06 K/UL — SIGNIFICANT CHANGE UP (ref 0–0.5)
EOSINOPHIL NFR BLD AUTO: 2 % — SIGNIFICANT CHANGE UP (ref 0–6)
HCT VFR BLD CALC: 32.8 % — LOW (ref 39–50)
HGB BLD-MCNC: 10.8 G/DL — LOW (ref 13–17)
LYMPHOCYTES # BLD AUTO: 0.87 K/UL — LOW (ref 1–3.3)
LYMPHOCYTES # BLD AUTO: 27 % — SIGNIFICANT CHANGE UP (ref 13–44)
MCHC RBC-ENTMCNC: 30.7 PG — SIGNIFICANT CHANGE UP (ref 27–34)
MCHC RBC-ENTMCNC: 32.9 G/DL — SIGNIFICANT CHANGE UP (ref 32–36)
MCV RBC AUTO: 93.2 FL — SIGNIFICANT CHANGE UP (ref 80–100)
MONOCYTES # BLD AUTO: 0.42 K/UL — SIGNIFICANT CHANGE UP (ref 0–0.9)
MONOCYTES NFR BLD AUTO: 13 % — SIGNIFICANT CHANGE UP (ref 2–14)
NEUTROPHILS # BLD AUTO: 1.85 K/UL — SIGNIFICANT CHANGE UP (ref 1.8–7.4)
NEUTROPHILS NFR BLD AUTO: 57 % — SIGNIFICANT CHANGE UP (ref 43–77)
NRBC # BLD: 0 /100 — SIGNIFICANT CHANGE UP (ref 0–0)
NRBC # BLD: SIGNIFICANT CHANGE UP /100 WBCS (ref 0–0)
PLAT MORPH BLD: NORMAL — SIGNIFICANT CHANGE UP
PLATELET # BLD AUTO: 327 K/UL — SIGNIFICANT CHANGE UP (ref 150–400)
RBC # BLD: 3.52 M/UL — LOW (ref 4.2–5.8)
RBC # FLD: 15.9 % — HIGH (ref 10.3–14.5)
RBC BLD AUTO: SIGNIFICANT CHANGE UP
WBC # BLD: 3.24 K/UL — LOW (ref 3.8–10.5)
WBC # FLD AUTO: 3.24 K/UL — LOW (ref 3.8–10.5)

## 2021-08-12 RX ORDER — ERGOCALCIFEROL 1.25 MG/1
0 CAPSULE ORAL
Qty: 0 | Refills: 0 | DISCHARGE

## 2021-08-13 DIAGNOSIS — C18.9 MALIGNANT NEOPLASM OF COLON, UNSPECIFIED: ICD-10-CM

## 2021-08-14 ENCOUNTER — APPOINTMENT (OUTPATIENT)
Dept: INFUSION THERAPY | Facility: HOSPITAL | Age: 66
End: 2021-08-14

## 2021-08-26 ENCOUNTER — LABORATORY RESULT (OUTPATIENT)
Age: 66
End: 2021-08-26

## 2021-08-26 ENCOUNTER — APPOINTMENT (OUTPATIENT)
Dept: INFUSION THERAPY | Facility: HOSPITAL | Age: 66
End: 2021-08-26

## 2021-08-26 ENCOUNTER — RESULT REVIEW (OUTPATIENT)
Age: 66
End: 2021-08-26

## 2021-08-26 LAB
BASOPHILS # BLD AUTO: 0.03 K/UL — SIGNIFICANT CHANGE UP (ref 0–0.2)
BASOPHILS NFR BLD AUTO: 0.8 % — SIGNIFICANT CHANGE UP (ref 0–2)
EOSINOPHIL # BLD AUTO: 0.16 K/UL — SIGNIFICANT CHANGE UP (ref 0–0.5)
EOSINOPHIL NFR BLD AUTO: 4.3 % — SIGNIFICANT CHANGE UP (ref 0–6)
HCT VFR BLD CALC: 34.9 % — LOW (ref 39–50)
HGB BLD-MCNC: 11.1 G/DL — LOW (ref 13–17)
IMM GRANULOCYTES NFR BLD AUTO: 0.5 % — SIGNIFICANT CHANGE UP (ref 0–1.5)
LYMPHOCYTES # BLD AUTO: 0.95 K/UL — LOW (ref 1–3.3)
LYMPHOCYTES # BLD AUTO: 25.3 % — SIGNIFICANT CHANGE UP (ref 13–44)
MCHC RBC-ENTMCNC: 29.9 PG — SIGNIFICANT CHANGE UP (ref 27–34)
MCHC RBC-ENTMCNC: 31.8 G/DL — LOW (ref 32–36)
MCV RBC AUTO: 94.1 FL — SIGNIFICANT CHANGE UP (ref 80–100)
MONOCYTES # BLD AUTO: 0.56 K/UL — SIGNIFICANT CHANGE UP (ref 0–0.9)
MONOCYTES NFR BLD AUTO: 14.9 % — HIGH (ref 2–14)
NEUTROPHILS # BLD AUTO: 2.04 K/UL — SIGNIFICANT CHANGE UP (ref 1.8–7.4)
NEUTROPHILS NFR BLD AUTO: 54.2 % — SIGNIFICANT CHANGE UP (ref 43–77)
NRBC # BLD: 0 /100 WBCS — SIGNIFICANT CHANGE UP (ref 0–0)
PLATELET # BLD AUTO: 295 K/UL — SIGNIFICANT CHANGE UP (ref 150–400)
RBC # BLD: 3.71 M/UL — LOW (ref 4.2–5.8)
RBC # FLD: 16.3 % — HIGH (ref 10.3–14.5)
WBC # BLD: 3.76 K/UL — LOW (ref 3.8–10.5)
WBC # FLD AUTO: 3.76 K/UL — LOW (ref 3.8–10.5)

## 2021-08-27 ENCOUNTER — OUTPATIENT (OUTPATIENT)
Dept: OUTPATIENT SERVICES | Facility: HOSPITAL | Age: 66
LOS: 1 days | Discharge: HOME | End: 2021-08-27

## 2021-08-27 DIAGNOSIS — Z90.49 ACQUIRED ABSENCE OF OTHER SPECIFIED PARTS OF DIGESTIVE TRACT: Chronic | ICD-10-CM

## 2021-08-27 DIAGNOSIS — Z98.890 OTHER SPECIFIED POSTPROCEDURAL STATES: Chronic | ICD-10-CM

## 2021-08-27 DIAGNOSIS — C18.9 MALIGNANT NEOPLASM OF COLON, UNSPECIFIED: ICD-10-CM

## 2021-08-28 ENCOUNTER — APPOINTMENT (OUTPATIENT)
Dept: INFUSION THERAPY | Facility: HOSPITAL | Age: 66
End: 2021-08-28

## 2021-09-01 ENCOUNTER — APPOINTMENT (OUTPATIENT)
Dept: CT IMAGING | Facility: IMAGING CENTER | Age: 66
End: 2021-09-01
Payer: MEDICAID

## 2021-09-01 ENCOUNTER — OUTPATIENT (OUTPATIENT)
Dept: OUTPATIENT SERVICES | Facility: HOSPITAL | Age: 66
LOS: 1 days | End: 2021-09-01
Payer: MEDICAID

## 2021-09-01 DIAGNOSIS — Z90.49 ACQUIRED ABSENCE OF OTHER SPECIFIED PARTS OF DIGESTIVE TRACT: Chronic | ICD-10-CM

## 2021-09-01 DIAGNOSIS — C79.51 SECONDARY MALIGNANT NEOPLASM OF BONE: ICD-10-CM

## 2021-09-01 DIAGNOSIS — Z98.890 OTHER SPECIFIED POSTPROCEDURAL STATES: Chronic | ICD-10-CM

## 2021-09-01 PROCEDURE — 74177 CT ABD & PELVIS W/CONTRAST: CPT | Mod: 26

## 2021-09-01 PROCEDURE — 74177 CT ABD & PELVIS W/CONTRAST: CPT

## 2021-09-01 PROCEDURE — 71260 CT THORAX DX C+: CPT

## 2021-09-01 PROCEDURE — 71260 CT THORAX DX C+: CPT | Mod: 26

## 2021-09-08 ENCOUNTER — APPOINTMENT (OUTPATIENT)
Dept: HEMATOLOGY ONCOLOGY | Facility: CLINIC | Age: 66
End: 2021-09-08
Payer: MEDICAID

## 2021-09-08 VITALS
SYSTOLIC BLOOD PRESSURE: 123 MMHG | BODY MASS INDEX: 20.72 KG/M2 | WEIGHT: 144.4 LBS | DIASTOLIC BLOOD PRESSURE: 74 MMHG | OXYGEN SATURATION: 99 % | HEART RATE: 79 BPM | TEMPERATURE: 97.6 F | RESPIRATION RATE: 17 BRPM

## 2021-09-08 PROCEDURE — 99214 OFFICE O/P EST MOD 30 MIN: CPT

## 2021-09-08 NOTE — ASSESSMENT
[FreeTextEntry1] : A discussion took place with the patient and his wife on teleconference regarding further management.  His repeat CAT scans shows no evidence of active disease.  The bone reveals degenerative changes only with no obvious metastases.  The patient has been on FOLFIRI and Herceptin.  The plan at this time is to hold off on the 5-FU and irinotecan and continue patient on Herceptin alone every 3 weeks.  He will continue to be monitored for side effects and toxicity.  He will return in 6 weeks or sooner as needed. [Palliative] : Goals of care discussed with patient: Palliative [Palliative Care Plan] : not applicable at this time

## 2021-09-08 NOTE — HISTORY OF PRESENT ILLNESS
[Disease: _____________________] : Disease: [unfilled] [M: ___] : M[unfilled] [AJCC Stage: ____] : AJCC Stage: [unfilled] [de-identified] : This is a 65-year-old man with history of esophagogastric carcinoma who presents for evaluation.  His history dates back to March 2018 when he developed a cough and decreased appetite.  A CAT scan of his chest revealed a mass in the distal esophagus with periesophageal and portacaval adenopathy.  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  The patient underwent neoadjuvant chemotherapy and radiation therapy with Taxol carboplatin.  A follow-up CAT scan in June 2018 revealed decreased mass in the esophagus.  In August 2018 the patient underwent [place] Raghu esophagogastrectomy with Dr. Cloud and Dr. Arciniega.  The pathology revealed no residual carcinoma with negative margins, with12 - lymph nodes (0/12).\par \par The patient then was well until November 2020 when he developed right rib pain and upper back pain.  Imaging testing revealed metastases to the thoracic spine and possible cord compression at T5-T6.  The patient underwent laminectomy of T4-T6 resection of the epidural tumor with T3-T12 fusion and endovascular embolization of the spinal tumor T5-T6.  The pathology revealed poorly differentiated adenocarcinoma.  Postoperatively his pain was relieved.  He has undergone radiation oncology consultation and is planned to receive SBRT treatment.  He now presents for discussion regarding further chemotherapy. [de-identified] : adenoca, Her2 pos [de-identified] : Her2 pos. [de-identified] : Since the last visit the patient remains well and underwent follow-up CAT scan and returns to discuss the results.  He has been tolerating his treatment well.

## 2021-09-13 ENCOUNTER — LABORATORY RESULT (OUTPATIENT)
Age: 66
End: 2021-09-13

## 2021-09-13 ENCOUNTER — APPOINTMENT (OUTPATIENT)
Dept: INFUSION THERAPY | Facility: HOSPITAL | Age: 66
End: 2021-09-13

## 2021-09-15 ENCOUNTER — APPOINTMENT (OUTPATIENT)
Dept: INFUSION THERAPY | Facility: HOSPITAL | Age: 66
End: 2021-09-15

## 2021-09-16 ENCOUNTER — LABORATORY RESULT (OUTPATIENT)
Age: 66
End: 2021-09-16

## 2021-09-16 ENCOUNTER — APPOINTMENT (OUTPATIENT)
Dept: INFUSION THERAPY | Facility: HOSPITAL | Age: 66
End: 2021-09-16

## 2021-09-17 DIAGNOSIS — R11.2 NAUSEA WITH VOMITING, UNSPECIFIED: ICD-10-CM

## 2021-09-17 DIAGNOSIS — Z51.11 ENCOUNTER FOR ANTINEOPLASTIC CHEMOTHERAPY: ICD-10-CM

## 2021-10-04 ENCOUNTER — OUTPATIENT (OUTPATIENT)
Dept: OUTPATIENT SERVICES | Facility: HOSPITAL | Age: 66
LOS: 1 days | Discharge: HOME | End: 2021-10-04

## 2021-10-04 DIAGNOSIS — C18.9 MALIGNANT NEOPLASM OF COLON, UNSPECIFIED: ICD-10-CM

## 2021-10-04 DIAGNOSIS — Z90.49 ACQUIRED ABSENCE OF OTHER SPECIFIED PARTS OF DIGESTIVE TRACT: Chronic | ICD-10-CM

## 2021-10-04 DIAGNOSIS — Z98.890 OTHER SPECIFIED POSTPROCEDURAL STATES: Chronic | ICD-10-CM

## 2021-10-07 ENCOUNTER — APPOINTMENT (OUTPATIENT)
Dept: RADIATION ONCOLOGY | Facility: CLINIC | Age: 66
End: 2021-10-07
Payer: MEDICAID

## 2021-10-07 ENCOUNTER — APPOINTMENT (OUTPATIENT)
Dept: INFUSION THERAPY | Facility: HOSPITAL | Age: 66
End: 2021-10-07

## 2021-10-07 VITALS
RESPIRATION RATE: 16 BRPM | SYSTOLIC BLOOD PRESSURE: 128 MMHG | WEIGHT: 145.84 LBS | DIASTOLIC BLOOD PRESSURE: 78 MMHG | HEIGHT: 70 IN | HEART RATE: 86 BPM | TEMPERATURE: 97.52 F | OXYGEN SATURATION: 99 % | BODY MASS INDEX: 20.88 KG/M2

## 2021-10-07 PROCEDURE — 99213 OFFICE O/P EST LOW 20 MIN: CPT | Mod: GC

## 2021-10-07 RX ORDER — GABAPENTIN 300 MG/1
300 CAPSULE ORAL
Qty: 30 | Refills: 0 | Status: DISCONTINUED | COMMUNITY
Start: 2020-11-19 | End: 2021-10-07

## 2021-10-07 RX ORDER — ONDANSETRON 4 MG/1
4 TABLET, ORALLY DISINTEGRATING ORAL
Qty: 18 | Refills: 0 | Status: DISCONTINUED | COMMUNITY
Start: 2020-11-19 | End: 2021-10-07

## 2021-10-07 RX ORDER — METOCLOPRAMIDE 10 MG/1
10 TABLET ORAL EVERY 6 HOURS
Qty: 120 | Refills: 3 | Status: DISCONTINUED | COMMUNITY
Start: 2021-01-14 | End: 2021-10-07

## 2021-10-07 NOTE — REASON FOR VISIT
[Routine Follow-Up] : routine follow-up visit for [Bone Metastasis] : bone metastasis [Spouse] : spouse

## 2021-10-08 DIAGNOSIS — R11.2 NAUSEA WITH VOMITING, UNSPECIFIED: ICD-10-CM

## 2021-10-08 DIAGNOSIS — Z51.11 ENCOUNTER FOR ANTINEOPLASTIC CHEMOTHERAPY: ICD-10-CM

## 2021-10-11 ENCOUNTER — APPOINTMENT (OUTPATIENT)
Dept: CARDIOLOGY | Facility: CLINIC | Age: 66
End: 2021-10-11
Payer: MEDICAID

## 2021-10-11 PROCEDURE — 93306 TTE W/DOPPLER COMPLETE: CPT

## 2021-10-11 PROCEDURE — 93356 MYOCRD STRAIN IMG SPCKL TRCK: CPT

## 2021-10-11 NOTE — VITALS
[Maximal Pain Intensity: 5/10] : 5/10 [Least Pain Intensity: 0/10] : 0/10 [Pain Description/Quality: ___] : Pain description/quality: [unfilled] [Pain Duration: ___] : Pain duration: [unfilled] [Pain Location: ___] : Pain Location: [unfilled] [OTC] : OTC [Opioid] : opioid [80: Normal activity with effort; some signs or symptoms of disease.] : 80: Normal activity with effort; some signs or symptoms of disease.  [ECOG Performance Status: 1 - Restricted in physically strenuous activity but ambulatory and able to carry out work of a light or sedentary nature] : Performance Status: 1 - Restricted in physically strenuous activity but ambulatory and able to carry out work of a light or sedentary nature, e.g., light house work, office work [Pain Interferes with ADLs] : Pain does not interfere with activities of daily living

## 2021-10-11 NOTE — HISTORY OF PRESENT ILLNESS
[FreeTextEntry1] : Mr. Sánchez Ferrer is a 66yo with hx of esophageal adenocarcinoma s/p CRT and esophagectomy 2018 recently presenting with metastatic disease throughout spine including cord compression due to deposits of epidural disease at level of inferior portion of T4, superior portion of T7, and entire T5-T6, s/p T4-5 and T6-7 laminectomies and posterolateral arthrodesis from T3-T12 . His previous RT as was done at [facility] under the care of Dr. Pickett.  He completed 24 Gy/3 fxns to T4-T8 on 12/28/20.\par \par 9/1/2021 CT Chest, Abdomen and Pelvis: IMPRESSION:  Pulmonary nodules are overall unchanged since prior. Stable mild mediastinal adenopathy.\par No new findings in the abdomen and pelvis.\par \par He presents for routine follow up. Notes some intermittent dizziness. Also notes that he is regaining his appetite, 3 weeks s/p completion of chemo. His weight is stable with respect to last month, 145 lbs compared to 144 lbs. Of note, has PET/CT scheduled 10/14/21.

## 2021-10-14 ENCOUNTER — OUTPATIENT (OUTPATIENT)
Dept: OUTPATIENT SERVICES | Facility: HOSPITAL | Age: 66
LOS: 1 days | End: 2021-10-14
Payer: MEDICAID

## 2021-10-14 ENCOUNTER — APPOINTMENT (OUTPATIENT)
Dept: NUCLEAR MEDICINE | Facility: IMAGING CENTER | Age: 66
End: 2021-10-14
Payer: MEDICAID

## 2021-10-14 ENCOUNTER — RESULT REVIEW (OUTPATIENT)
Age: 66
End: 2021-10-14

## 2021-10-14 DIAGNOSIS — Z90.49 ACQUIRED ABSENCE OF OTHER SPECIFIED PARTS OF DIGESTIVE TRACT: Chronic | ICD-10-CM

## 2021-10-14 DIAGNOSIS — C79.51 SECONDARY MALIGNANT NEOPLASM OF BONE: ICD-10-CM

## 2021-10-14 DIAGNOSIS — C16.0 MALIGNANT NEOPLASM OF CARDIA: ICD-10-CM

## 2021-10-14 DIAGNOSIS — Z98.890 OTHER SPECIFIED POSTPROCEDURAL STATES: Chronic | ICD-10-CM

## 2021-10-14 PROCEDURE — 78815 PET IMAGE W/CT SKULL-THIGH: CPT | Mod: 26,PS

## 2021-10-14 PROCEDURE — 78815 PET IMAGE W/CT SKULL-THIGH: CPT

## 2021-10-14 PROCEDURE — A9552: CPT

## 2021-10-18 ENCOUNTER — NON-APPOINTMENT (OUTPATIENT)
Age: 66
End: 2021-10-18

## 2021-10-21 ENCOUNTER — APPOINTMENT (OUTPATIENT)
Dept: HEMATOLOGY ONCOLOGY | Facility: CLINIC | Age: 66
End: 2021-10-21
Payer: MEDICAID

## 2021-10-21 VITALS
HEIGHT: 67.05 IN | DIASTOLIC BLOOD PRESSURE: 75 MMHG | SYSTOLIC BLOOD PRESSURE: 108 MMHG | BODY MASS INDEX: 22.83 KG/M2 | OXYGEN SATURATION: 97 % | WEIGHT: 145.48 LBS | TEMPERATURE: 98.2 F | HEART RATE: 102 BPM | RESPIRATION RATE: 16 BRPM

## 2021-10-21 PROCEDURE — 99214 OFFICE O/P EST MOD 30 MIN: CPT

## 2021-10-21 NOTE — REVIEW OF SYSTEMS
[Fatigue] : fatigue [SOB on Exertion] : shortness of breath during exertion [Negative] : Allergic/Immunologic [FreeTextEntry6] : Patient has chronic COPD. [FreeTextEntry9] : Chronic low backache which is stable.

## 2021-10-21 NOTE — ASSESSMENT
[FreeTextEntry1] : The results of the CAT scan and PET scan show lung nodules which are stable and thoracic adenopathy which is also been present previously.  The bones do not show any uptake on PET scan indicating response to treatment.  Overall his disease appears to be stable and the recommendation at this time is to continue his maintenance HER-2 therapy with Herceptin every 3 weeks with follow-up echocardiograms every 3 months.  The patient will continue to be monitored for side effects and toxicity and will return in 6 weeks for follow-up evaluation. [Palliative] : Goals of care discussed with patient: Palliative [Palliative Care Plan] : not applicable at this time

## 2021-10-21 NOTE — HISTORY OF PRESENT ILLNESS
[Disease: _____________________] : Disease: [unfilled] [M: ___] : M[unfilled] [AJCC Stage: ____] : AJCC Stage: [unfilled] [de-identified] : This is a 65-year-old man with history of esophagogastric carcinoma who presents for evaluation.  His history dates back to March 2018 when he developed a cough and decreased appetite.  A CAT scan of his chest revealed a mass in the distal esophagus with periesophageal and portacaval adenopathy.  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  The patient underwent neoadjuvant chemotherapy and radiation therapy with Taxol carboplatin.  A follow-up CAT scan in June 2018 revealed decreased mass in the esophagus.  In August 2018 the patient underwent [place] Raghu esophagogastrectomy with Dr. Cloud and Dr. Arciniega.  The pathology revealed no residual carcinoma with negative margins, with12 - lymph nodes (0/12).\par \par The patient then was well until November 2020 when he developed right rib pain and upper back pain.  Imaging testing revealed metastases to the thoracic spine and possible cord compression at T5-T6.  The patient underwent laminectomy of T4-T6 resection of the epidural tumor with T3-T12 fusion and endovascular embolization of the spinal tumor T5-T6.  The pathology revealed poorly differentiated adenocarcinoma.  Postoperatively his pain was relieved.  He has undergone radiation oncology consultation and is planned to receive SBRT treatment.  He now presents for discussion regarding further chemotherapy. [de-identified] : adenoca, Her2 pos [de-identified] : Her2 pos. [de-identified] : Since the last visit the patient continues on maintenance Herceptin and returns to discuss the results of his CAT scan and PET scan.

## 2021-10-28 ENCOUNTER — APPOINTMENT (OUTPATIENT)
Dept: INFUSION THERAPY | Facility: HOSPITAL | Age: 66
End: 2021-10-28

## 2021-10-28 ENCOUNTER — LABORATORY RESULT (OUTPATIENT)
Age: 66
End: 2021-10-28

## 2021-10-28 ENCOUNTER — RESULT REVIEW (OUTPATIENT)
Age: 66
End: 2021-10-28

## 2021-10-28 LAB
BASOPHILS # BLD AUTO: 0.06 K/UL — SIGNIFICANT CHANGE UP (ref 0–0.2)
BASOPHILS NFR BLD AUTO: 1 % — SIGNIFICANT CHANGE UP (ref 0–2)
EOSINOPHIL # BLD AUTO: 0.29 K/UL — SIGNIFICANT CHANGE UP (ref 0–0.5)
EOSINOPHIL NFR BLD AUTO: 4.7 % — SIGNIFICANT CHANGE UP (ref 0–6)
HCT VFR BLD CALC: 37.3 % — LOW (ref 39–50)
HGB BLD-MCNC: 11.8 G/DL — LOW (ref 13–17)
IMM GRANULOCYTES NFR BLD AUTO: 0.2 % — SIGNIFICANT CHANGE UP (ref 0–1.5)
LYMPHOCYTES # BLD AUTO: 1.02 K/UL — SIGNIFICANT CHANGE UP (ref 1–3.3)
LYMPHOCYTES # BLD AUTO: 16.6 % — SIGNIFICANT CHANGE UP (ref 13–44)
MCHC RBC-ENTMCNC: 29.7 PG — SIGNIFICANT CHANGE UP (ref 27–34)
MCHC RBC-ENTMCNC: 31.6 G/DL — LOW (ref 32–36)
MCV RBC AUTO: 94 FL — SIGNIFICANT CHANGE UP (ref 80–100)
MONOCYTES # BLD AUTO: 0.72 K/UL — SIGNIFICANT CHANGE UP (ref 0–0.9)
MONOCYTES NFR BLD AUTO: 11.7 % — SIGNIFICANT CHANGE UP (ref 2–14)
NEUTROPHILS # BLD AUTO: 4.05 K/UL — SIGNIFICANT CHANGE UP (ref 1.8–7.4)
NEUTROPHILS NFR BLD AUTO: 65.8 % — SIGNIFICANT CHANGE UP (ref 43–77)
NRBC # BLD: 0 /100 WBCS — SIGNIFICANT CHANGE UP (ref 0–0)
PLATELET # BLD AUTO: 328 K/UL — SIGNIFICANT CHANGE UP (ref 150–400)
RBC # BLD: 3.97 M/UL — LOW (ref 4.2–5.8)
RBC # FLD: 14.1 % — SIGNIFICANT CHANGE UP (ref 10.3–14.5)
WBC # BLD: 6.15 K/UL — SIGNIFICANT CHANGE UP (ref 3.8–10.5)
WBC # FLD AUTO: 6.15 K/UL — SIGNIFICANT CHANGE UP (ref 3.8–10.5)

## 2021-10-29 ENCOUNTER — NON-APPOINTMENT (OUTPATIENT)
Age: 66
End: 2021-10-29

## 2021-11-15 ENCOUNTER — OUTPATIENT (OUTPATIENT)
Dept: OUTPATIENT SERVICES | Facility: HOSPITAL | Age: 66
LOS: 1 days | Discharge: HOME | End: 2021-11-15

## 2021-11-15 DIAGNOSIS — Z90.49 ACQUIRED ABSENCE OF OTHER SPECIFIED PARTS OF DIGESTIVE TRACT: Chronic | ICD-10-CM

## 2021-11-15 DIAGNOSIS — Z98.890 OTHER SPECIFIED POSTPROCEDURAL STATES: Chronic | ICD-10-CM

## 2021-11-15 DIAGNOSIS — C18.9 MALIGNANT NEOPLASM OF COLON, UNSPECIFIED: ICD-10-CM

## 2021-11-18 ENCOUNTER — RESULT REVIEW (OUTPATIENT)
Age: 66
End: 2021-11-18

## 2021-11-18 ENCOUNTER — LABORATORY RESULT (OUTPATIENT)
Age: 66
End: 2021-11-18

## 2021-11-18 ENCOUNTER — APPOINTMENT (OUTPATIENT)
Dept: INFUSION THERAPY | Facility: HOSPITAL | Age: 66
End: 2021-11-18

## 2021-11-18 LAB
BASOPHILS # BLD AUTO: 0.08 K/UL — SIGNIFICANT CHANGE UP (ref 0–0.2)
BASOPHILS NFR BLD AUTO: 1.1 % — SIGNIFICANT CHANGE UP (ref 0–2)
EOSINOPHIL # BLD AUTO: 0.58 K/UL — HIGH (ref 0–0.5)
EOSINOPHIL NFR BLD AUTO: 8 % — HIGH (ref 0–6)
HCT VFR BLD CALC: 37.7 % — LOW (ref 39–50)
HGB BLD-MCNC: 12 G/DL — LOW (ref 13–17)
IMM GRANULOCYTES NFR BLD AUTO: 0.3 % — SIGNIFICANT CHANGE UP (ref 0–1.5)
LYMPHOCYTES # BLD AUTO: 1.11 K/UL — SIGNIFICANT CHANGE UP (ref 1–3.3)
LYMPHOCYTES # BLD AUTO: 15.3 % — SIGNIFICANT CHANGE UP (ref 13–44)
MCHC RBC-ENTMCNC: 29.3 PG — SIGNIFICANT CHANGE UP (ref 27–34)
MCHC RBC-ENTMCNC: 31.8 G/DL — LOW (ref 32–36)
MCV RBC AUTO: 92.2 FL — SIGNIFICANT CHANGE UP (ref 80–100)
MONOCYTES # BLD AUTO: 0.85 K/UL — SIGNIFICANT CHANGE UP (ref 0–0.9)
MONOCYTES NFR BLD AUTO: 11.7 % — SIGNIFICANT CHANGE UP (ref 2–14)
NEUTROPHILS # BLD AUTO: 4.62 K/UL — SIGNIFICANT CHANGE UP (ref 1.8–7.4)
NEUTROPHILS NFR BLD AUTO: 63.6 % — SIGNIFICANT CHANGE UP (ref 43–77)
NRBC # BLD: 0 /100 WBCS — SIGNIFICANT CHANGE UP (ref 0–0)
PLATELET # BLD AUTO: 280 K/UL — SIGNIFICANT CHANGE UP (ref 150–400)
RBC # BLD: 4.09 M/UL — LOW (ref 4.2–5.8)
RBC # FLD: 13.8 % — SIGNIFICANT CHANGE UP (ref 10.3–14.5)
WBC # BLD: 7.26 K/UL — SIGNIFICANT CHANGE UP (ref 3.8–10.5)
WBC # FLD AUTO: 7.26 K/UL — SIGNIFICANT CHANGE UP (ref 3.8–10.5)

## 2021-11-19 DIAGNOSIS — Z51.11 ENCOUNTER FOR ANTINEOPLASTIC CHEMOTHERAPY: ICD-10-CM

## 2021-11-19 DIAGNOSIS — R11.2 NAUSEA WITH VOMITING, UNSPECIFIED: ICD-10-CM

## 2021-12-09 ENCOUNTER — LABORATORY RESULT (OUTPATIENT)
Age: 66
End: 2021-12-09

## 2021-12-09 ENCOUNTER — RESULT REVIEW (OUTPATIENT)
Age: 66
End: 2021-12-09

## 2021-12-09 ENCOUNTER — APPOINTMENT (OUTPATIENT)
Dept: INFUSION THERAPY | Facility: HOSPITAL | Age: 66
End: 2021-12-09

## 2021-12-09 LAB
BASOPHILS # BLD AUTO: 0.07 K/UL — SIGNIFICANT CHANGE UP (ref 0–0.2)
BASOPHILS NFR BLD AUTO: 1.1 % — SIGNIFICANT CHANGE UP (ref 0–2)
EOSINOPHIL # BLD AUTO: 0.35 K/UL — SIGNIFICANT CHANGE UP (ref 0–0.5)
EOSINOPHIL NFR BLD AUTO: 5.4 % — SIGNIFICANT CHANGE UP (ref 0–6)
HCT VFR BLD CALC: 39.4 % — SIGNIFICANT CHANGE UP (ref 39–50)
HGB BLD-MCNC: 12.7 G/DL — LOW (ref 13–17)
IMM GRANULOCYTES NFR BLD AUTO: 0.2 % — SIGNIFICANT CHANGE UP (ref 0–1.5)
LYMPHOCYTES # BLD AUTO: 0.93 K/UL — LOW (ref 1–3.3)
LYMPHOCYTES # BLD AUTO: 14.4 % — SIGNIFICANT CHANGE UP (ref 13–44)
MCHC RBC-ENTMCNC: 29.5 PG — SIGNIFICANT CHANGE UP (ref 27–34)
MCHC RBC-ENTMCNC: 32.2 G/DL — SIGNIFICANT CHANGE UP (ref 32–36)
MCV RBC AUTO: 91.4 FL — SIGNIFICANT CHANGE UP (ref 80–100)
MONOCYTES # BLD AUTO: 0.66 K/UL — SIGNIFICANT CHANGE UP (ref 0–0.9)
MONOCYTES NFR BLD AUTO: 10.2 % — SIGNIFICANT CHANGE UP (ref 2–14)
NEUTROPHILS # BLD AUTO: 4.43 K/UL — SIGNIFICANT CHANGE UP (ref 1.8–7.4)
NEUTROPHILS NFR BLD AUTO: 68.7 % — SIGNIFICANT CHANGE UP (ref 43–77)
NRBC # BLD: 0 /100 WBCS — SIGNIFICANT CHANGE UP (ref 0–0)
PLATELET # BLD AUTO: 262 K/UL — SIGNIFICANT CHANGE UP (ref 150–400)
RBC # BLD: 4.31 M/UL — SIGNIFICANT CHANGE UP (ref 4.2–5.8)
RBC # FLD: 13.7 % — SIGNIFICANT CHANGE UP (ref 10.3–14.5)
WBC # BLD: 6.45 K/UL — SIGNIFICANT CHANGE UP (ref 3.8–10.5)
WBC # FLD AUTO: 6.45 K/UL — SIGNIFICANT CHANGE UP (ref 3.8–10.5)

## 2021-12-27 ENCOUNTER — OUTPATIENT (OUTPATIENT)
Dept: OUTPATIENT SERVICES | Facility: HOSPITAL | Age: 66
LOS: 1 days | Discharge: HOME | End: 2021-12-27

## 2021-12-27 DIAGNOSIS — Z90.49 ACQUIRED ABSENCE OF OTHER SPECIFIED PARTS OF DIGESTIVE TRACT: Chronic | ICD-10-CM

## 2021-12-27 DIAGNOSIS — Z98.890 OTHER SPECIFIED POSTPROCEDURAL STATES: Chronic | ICD-10-CM

## 2021-12-27 DIAGNOSIS — C18.9 MALIGNANT NEOPLASM OF COLON, UNSPECIFIED: ICD-10-CM

## 2021-12-30 ENCOUNTER — RESULT REVIEW (OUTPATIENT)
Age: 66
End: 2021-12-30

## 2021-12-30 ENCOUNTER — APPOINTMENT (OUTPATIENT)
Dept: INFUSION THERAPY | Facility: HOSPITAL | Age: 66
End: 2021-12-30

## 2021-12-30 LAB
ALBUMIN SERPL ELPH-MCNC: 3.6 G/DL — SIGNIFICANT CHANGE UP (ref 3.3–5)
ALP SERPL-CCNC: 129 U/L — HIGH (ref 40–120)
ALT FLD-CCNC: 10 U/L — SIGNIFICANT CHANGE UP (ref 10–45)
ANION GAP SERPL CALC-SCNC: 10 MMOL/L — SIGNIFICANT CHANGE UP (ref 5–17)
AST SERPL-CCNC: 16 U/L — SIGNIFICANT CHANGE UP (ref 10–40)
BASOPHILS # BLD AUTO: 0.06 K/UL — SIGNIFICANT CHANGE UP (ref 0–0.2)
BASOPHILS NFR BLD AUTO: 1 % — SIGNIFICANT CHANGE UP (ref 0–2)
BILIRUB SERPL-MCNC: 0.4 MG/DL — SIGNIFICANT CHANGE UP (ref 0.2–1.2)
BUN SERPL-MCNC: 13 MG/DL — SIGNIFICANT CHANGE UP (ref 7–23)
CALCIUM SERPL-MCNC: 9 MG/DL — SIGNIFICANT CHANGE UP (ref 8.4–10.5)
CEA SERPL-MCNC: 7.4 NG/ML — HIGH (ref 0–3.8)
CHLORIDE SERPL-SCNC: 105 MMOL/L — SIGNIFICANT CHANGE UP (ref 96–108)
CO2 SERPL-SCNC: 25 MMOL/L — SIGNIFICANT CHANGE UP (ref 22–31)
CREAT SERPL-MCNC: 0.86 MG/DL — SIGNIFICANT CHANGE UP (ref 0.5–1.3)
EOSINOPHIL # BLD AUTO: 0.32 K/UL — SIGNIFICANT CHANGE UP (ref 0–0.5)
EOSINOPHIL NFR BLD AUTO: 5.5 % — SIGNIFICANT CHANGE UP (ref 0–6)
GLUCOSE SERPL-MCNC: 112 MG/DL — HIGH (ref 70–99)
HCT VFR BLD CALC: 38.5 % — LOW (ref 39–50)
HGB BLD-MCNC: 12.3 G/DL — LOW (ref 13–17)
IMM GRANULOCYTES NFR BLD AUTO: 0.3 % — SIGNIFICANT CHANGE UP (ref 0–1.5)
LYMPHOCYTES # BLD AUTO: 0.98 K/UL — LOW (ref 1–3.3)
LYMPHOCYTES # BLD AUTO: 16.9 % — SIGNIFICANT CHANGE UP (ref 13–44)
MCHC RBC-ENTMCNC: 28.9 PG — SIGNIFICANT CHANGE UP (ref 27–34)
MCHC RBC-ENTMCNC: 31.9 G/DL — LOW (ref 32–36)
MCV RBC AUTO: 90.6 FL — SIGNIFICANT CHANGE UP (ref 80–100)
MONOCYTES # BLD AUTO: 0.66 K/UL — SIGNIFICANT CHANGE UP (ref 0–0.9)
MONOCYTES NFR BLD AUTO: 11.4 % — SIGNIFICANT CHANGE UP (ref 2–14)
NEUTROPHILS # BLD AUTO: 3.77 K/UL — SIGNIFICANT CHANGE UP (ref 1.8–7.4)
NEUTROPHILS NFR BLD AUTO: 64.9 % — SIGNIFICANT CHANGE UP (ref 43–77)
NRBC # BLD: 0 /100 WBCS — SIGNIFICANT CHANGE UP (ref 0–0)
PLATELET # BLD AUTO: 280 K/UL — SIGNIFICANT CHANGE UP (ref 150–400)
POTASSIUM SERPL-MCNC: 4.3 MMOL/L — SIGNIFICANT CHANGE UP (ref 3.5–5.3)
POTASSIUM SERPL-SCNC: 4.3 MMOL/L — SIGNIFICANT CHANGE UP (ref 3.5–5.3)
PROT SERPL-MCNC: 6 G/DL — SIGNIFICANT CHANGE UP (ref 6–8.3)
RBC # BLD: 4.25 M/UL — SIGNIFICANT CHANGE UP (ref 4.2–5.8)
RBC # FLD: 14.1 % — SIGNIFICANT CHANGE UP (ref 10.3–14.5)
SODIUM SERPL-SCNC: 140 MMOL/L — SIGNIFICANT CHANGE UP (ref 135–145)
WBC # BLD: 5.81 K/UL — SIGNIFICANT CHANGE UP (ref 3.8–10.5)
WBC # FLD AUTO: 5.81 K/UL — SIGNIFICANT CHANGE UP (ref 3.8–10.5)

## 2022-01-02 DIAGNOSIS — Z51.11 ENCOUNTER FOR ANTINEOPLASTIC CHEMOTHERAPY: ICD-10-CM

## 2022-01-02 DIAGNOSIS — R11.2 NAUSEA WITH VOMITING, UNSPECIFIED: ICD-10-CM

## 2022-01-18 ENCOUNTER — APPOINTMENT (OUTPATIENT)
Dept: CARDIOLOGY | Facility: CLINIC | Age: 67
End: 2022-01-18
Payer: MEDICAID

## 2022-01-18 PROCEDURE — 93306 TTE W/DOPPLER COMPLETE: CPT

## 2022-01-18 PROCEDURE — 93356 MYOCRD STRAIN IMG SPCKL TRCK: CPT

## 2022-01-20 ENCOUNTER — RESULT REVIEW (OUTPATIENT)
Age: 67
End: 2022-01-20

## 2022-01-20 ENCOUNTER — APPOINTMENT (OUTPATIENT)
Dept: HEMATOLOGY ONCOLOGY | Facility: CLINIC | Age: 67
End: 2022-01-20
Payer: MEDICAID

## 2022-01-20 ENCOUNTER — APPOINTMENT (OUTPATIENT)
Dept: INFUSION THERAPY | Facility: HOSPITAL | Age: 67
End: 2022-01-20

## 2022-01-20 VITALS
DIASTOLIC BLOOD PRESSURE: 75 MMHG | SYSTOLIC BLOOD PRESSURE: 110 MMHG | TEMPERATURE: 98.1 F | BODY MASS INDEX: 23.01 KG/M2 | RESPIRATION RATE: 17 BRPM | WEIGHT: 146.59 LBS | HEART RATE: 85 BPM | HEIGHT: 66.93 IN | OXYGEN SATURATION: 96 %

## 2022-01-20 LAB
ALBUMIN SERPL ELPH-MCNC: 3.9 G/DL — SIGNIFICANT CHANGE UP (ref 3.3–5)
ALP SERPL-CCNC: 130 U/L — HIGH (ref 40–120)
ALT FLD-CCNC: 13 U/L — SIGNIFICANT CHANGE UP (ref 10–45)
ANION GAP SERPL CALC-SCNC: 13 MMOL/L — SIGNIFICANT CHANGE UP (ref 5–17)
AST SERPL-CCNC: 22 U/L — SIGNIFICANT CHANGE UP (ref 10–40)
BASOPHILS # BLD AUTO: 0.1 K/UL — SIGNIFICANT CHANGE UP (ref 0–0.2)
BASOPHILS NFR BLD AUTO: 1.5 % — SIGNIFICANT CHANGE UP (ref 0–2)
BILIRUB SERPL-MCNC: 0.5 MG/DL — SIGNIFICANT CHANGE UP (ref 0.2–1.2)
BUN SERPL-MCNC: 14 MG/DL — SIGNIFICANT CHANGE UP (ref 7–23)
CALCIUM SERPL-MCNC: 8.7 MG/DL — SIGNIFICANT CHANGE UP (ref 8.4–10.5)
CHLORIDE SERPL-SCNC: 106 MMOL/L — SIGNIFICANT CHANGE UP (ref 96–108)
CO2 SERPL-SCNC: 24 MMOL/L — SIGNIFICANT CHANGE UP (ref 22–31)
CREAT SERPL-MCNC: 0.82 MG/DL — SIGNIFICANT CHANGE UP (ref 0.5–1.3)
EOSINOPHIL # BLD AUTO: 0.29 K/UL — SIGNIFICANT CHANGE UP (ref 0–0.5)
EOSINOPHIL NFR BLD AUTO: 4.4 % — SIGNIFICANT CHANGE UP (ref 0–6)
GLUCOSE SERPL-MCNC: 103 MG/DL — HIGH (ref 70–99)
HCT VFR BLD CALC: 37.9 % — LOW (ref 39–50)
HGB BLD-MCNC: 12.3 G/DL — LOW (ref 13–17)
IMM GRANULOCYTES NFR BLD AUTO: 0.5 % — SIGNIFICANT CHANGE UP (ref 0–1.5)
LYMPHOCYTES # BLD AUTO: 1.1 K/UL — SIGNIFICANT CHANGE UP (ref 1–3.3)
LYMPHOCYTES # BLD AUTO: 16.8 % — SIGNIFICANT CHANGE UP (ref 13–44)
MCHC RBC-ENTMCNC: 29.2 PG — SIGNIFICANT CHANGE UP (ref 27–34)
MCHC RBC-ENTMCNC: 32.5 G/DL — SIGNIFICANT CHANGE UP (ref 32–36)
MCV RBC AUTO: 90 FL — SIGNIFICANT CHANGE UP (ref 80–100)
MONOCYTES # BLD AUTO: 0.76 K/UL — SIGNIFICANT CHANGE UP (ref 0–0.9)
MONOCYTES NFR BLD AUTO: 11.6 % — SIGNIFICANT CHANGE UP (ref 2–14)
NEUTROPHILS # BLD AUTO: 4.28 K/UL — SIGNIFICANT CHANGE UP (ref 1.8–7.4)
NEUTROPHILS NFR BLD AUTO: 65.2 % — SIGNIFICANT CHANGE UP (ref 43–77)
NRBC # BLD: 0 /100 WBCS — SIGNIFICANT CHANGE UP (ref 0–0)
PLATELET # BLD AUTO: 320 K/UL — SIGNIFICANT CHANGE UP (ref 150–400)
POTASSIUM SERPL-MCNC: 5 MMOL/L — SIGNIFICANT CHANGE UP (ref 3.5–5.3)
POTASSIUM SERPL-SCNC: 5 MMOL/L — SIGNIFICANT CHANGE UP (ref 3.5–5.3)
PROT SERPL-MCNC: 6.8 G/DL — SIGNIFICANT CHANGE UP (ref 6–8.3)
RBC # BLD: 4.21 M/UL — SIGNIFICANT CHANGE UP (ref 4.2–5.8)
RBC # FLD: 14.7 % — HIGH (ref 10.3–14.5)
SODIUM SERPL-SCNC: 143 MMOL/L — SIGNIFICANT CHANGE UP (ref 135–145)
WBC # BLD: 6.56 K/UL — SIGNIFICANT CHANGE UP (ref 3.8–10.5)
WBC # FLD AUTO: 6.56 K/UL — SIGNIFICANT CHANGE UP (ref 3.8–10.5)

## 2022-01-20 PROCEDURE — 99214 OFFICE O/P EST MOD 30 MIN: CPT

## 2022-01-22 NOTE — HISTORY OF PRESENT ILLNESS
[Disease: _____________________] : Disease: [unfilled] [M: ___] : M[unfilled] [AJCC Stage: ____] : AJCC Stage: [unfilled] [de-identified] : This is a 65-year-old man with history of esophagogastric carcinoma who presents for evaluation.  His history dates back to March 2018 when he developed a cough and decreased appetite.  A CAT scan of his chest revealed a mass in the distal esophagus with periesophageal and portacaval adenopathy.  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  The patient underwent neoadjuvant chemotherapy and radiation therapy with Taxol carboplatin.  A follow-up CAT scan in June 2018 revealed decreased mass in the esophagus.  In August 2018 the patient underwent [place] Raghu esophagogastrectomy with Dr. Cloud and Dr. Arciniega.  The pathology revealed no residual carcinoma with negative margins, with12 - lymph nodes (0/12).\par \par The patient then was well until November 2020 when he developed right rib pain and upper back pain.  Imaging testing revealed metastases to the thoracic spine and possible cord compression at T5-T6.  The patient underwent laminectomy of T4-T6 resection of the epidural tumor with T3-T12 fusion and endovascular embolization of the spinal tumor T5-T6.  The pathology revealed poorly differentiated adenocarcinoma.  Postoperatively his pain was relieved.  He has undergone radiation oncology consultation and is planned to receive SBRT treatment.  He now presents for discussion regarding further chemotherapy. [de-identified] : adenoca, Her2 pos [de-identified] : Her2 pos. [de-identified] : Patient is here for f/u. He feels okay occasional discomfort to back due spine surgery.  Appetite is good. BM's are normal. Denies any abdomen pain, diarrhea or constipation. He takes Oxycodone 5 mg and Gabapentin as needed for pain.

## 2022-01-22 NOTE — ASSESSMENT
[Palliative] : Goals of care discussed with patient: Palliative [Palliative Care Plan] : not applicable at this time [FreeTextEntry1] : Patient will continue Herceptin. Will monitor for side effects/toxicities. Overall his disease appears to be stable and the recommendation at this time is to continue his maintenance HER-2 therapy with Herceptin every 3 weeks with follow-up echocardiograms every 3 Reviewed labs, CBC, CMP. Ordered repeat CT-Scan of abdomen/pelvis/chest to assess progression vs regression disease, f/u. RTO in 6 weeks.

## 2022-02-03 ENCOUNTER — OUTPATIENT (OUTPATIENT)
Dept: OUTPATIENT SERVICES | Facility: HOSPITAL | Age: 67
LOS: 1 days | End: 2022-02-03
Payer: MEDICAID

## 2022-02-03 ENCOUNTER — APPOINTMENT (OUTPATIENT)
Dept: CT IMAGING | Facility: IMAGING CENTER | Age: 67
End: 2022-02-03
Payer: MEDICAID

## 2022-02-03 DIAGNOSIS — C79.51 SECONDARY MALIGNANT NEOPLASM OF BONE: ICD-10-CM

## 2022-02-03 DIAGNOSIS — Z98.890 OTHER SPECIFIED POSTPROCEDURAL STATES: Chronic | ICD-10-CM

## 2022-02-03 DIAGNOSIS — Z90.49 ACQUIRED ABSENCE OF OTHER SPECIFIED PARTS OF DIGESTIVE TRACT: Chronic | ICD-10-CM

## 2022-02-03 PROCEDURE — 74177 CT ABD & PELVIS W/CONTRAST: CPT

## 2022-02-03 PROCEDURE — 74177 CT ABD & PELVIS W/CONTRAST: CPT | Mod: 26

## 2022-02-03 PROCEDURE — 71260 CT THORAX DX C+: CPT | Mod: 26

## 2022-02-03 PROCEDURE — 71260 CT THORAX DX C+: CPT

## 2022-02-08 ENCOUNTER — OUTPATIENT (OUTPATIENT)
Dept: OUTPATIENT SERVICES | Facility: HOSPITAL | Age: 67
LOS: 1 days | Discharge: HOME | End: 2022-02-08

## 2022-02-08 DIAGNOSIS — Z90.49 ACQUIRED ABSENCE OF OTHER SPECIFIED PARTS OF DIGESTIVE TRACT: Chronic | ICD-10-CM

## 2022-02-08 DIAGNOSIS — C18.9 MALIGNANT NEOPLASM OF COLON, UNSPECIFIED: ICD-10-CM

## 2022-02-08 DIAGNOSIS — Z98.890 OTHER SPECIFIED POSTPROCEDURAL STATES: Chronic | ICD-10-CM

## 2022-02-10 ENCOUNTER — APPOINTMENT (OUTPATIENT)
Dept: INFUSION THERAPY | Facility: HOSPITAL | Age: 67
End: 2022-02-10

## 2022-02-11 DIAGNOSIS — Z51.11 ENCOUNTER FOR ANTINEOPLASTIC CHEMOTHERAPY: ICD-10-CM

## 2022-02-11 DIAGNOSIS — R11.2 NAUSEA WITH VOMITING, UNSPECIFIED: ICD-10-CM

## 2022-03-03 ENCOUNTER — RESULT REVIEW (OUTPATIENT)
Age: 67
End: 2022-03-03

## 2022-03-03 ENCOUNTER — APPOINTMENT (OUTPATIENT)
Dept: INFUSION THERAPY | Facility: HOSPITAL | Age: 67
End: 2022-03-03

## 2022-03-03 ENCOUNTER — APPOINTMENT (OUTPATIENT)
Dept: HEMATOLOGY ONCOLOGY | Facility: CLINIC | Age: 67
End: 2022-03-03
Payer: MEDICAID

## 2022-03-03 VITALS
HEART RATE: 106 BPM | RESPIRATION RATE: 16 BRPM | WEIGHT: 147.71 LBS | DIASTOLIC BLOOD PRESSURE: 88 MMHG | BODY MASS INDEX: 23.18 KG/M2 | TEMPERATURE: 97.4 F | HEIGHT: 66.93 IN | OXYGEN SATURATION: 96 % | SYSTOLIC BLOOD PRESSURE: 127 MMHG

## 2022-03-03 LAB
ALBUMIN SERPL ELPH-MCNC: 4 G/DL — SIGNIFICANT CHANGE UP (ref 3.3–5)
ALP SERPL-CCNC: 123 U/L — HIGH (ref 40–120)
ALT FLD-CCNC: 14 U/L — SIGNIFICANT CHANGE UP (ref 10–45)
ANION GAP SERPL CALC-SCNC: 11 MMOL/L — SIGNIFICANT CHANGE UP (ref 5–17)
AST SERPL-CCNC: 21 U/L — SIGNIFICANT CHANGE UP (ref 10–40)
BASOPHILS # BLD AUTO: 0.08 K/UL — SIGNIFICANT CHANGE UP (ref 0–0.2)
BASOPHILS NFR BLD AUTO: 0.9 % — SIGNIFICANT CHANGE UP (ref 0–2)
BILIRUB SERPL-MCNC: 0.4 MG/DL — SIGNIFICANT CHANGE UP (ref 0.2–1.2)
BUN SERPL-MCNC: 15 MG/DL — SIGNIFICANT CHANGE UP (ref 7–23)
CALCIUM SERPL-MCNC: 8.9 MG/DL — SIGNIFICANT CHANGE UP (ref 8.4–10.5)
CANCER AG19-9 SERPL-ACNC: 15 U/ML — SIGNIFICANT CHANGE UP
CEA SERPL-MCNC: 8.2 NG/ML — HIGH (ref 0–3.8)
CHLORIDE SERPL-SCNC: 105 MMOL/L — SIGNIFICANT CHANGE UP (ref 96–108)
CO2 SERPL-SCNC: 24 MMOL/L — SIGNIFICANT CHANGE UP (ref 22–31)
CREAT SERPL-MCNC: 0.83 MG/DL — SIGNIFICANT CHANGE UP (ref 0.5–1.3)
EGFR: 97 ML/MIN/1.73M2 — SIGNIFICANT CHANGE UP
EOSINOPHIL # BLD AUTO: 0.26 K/UL — SIGNIFICANT CHANGE UP (ref 0–0.5)
EOSINOPHIL NFR BLD AUTO: 3 % — SIGNIFICANT CHANGE UP (ref 0–6)
GLUCOSE SERPL-MCNC: 83 MG/DL — SIGNIFICANT CHANGE UP (ref 70–99)
HCT VFR BLD CALC: 39.2 % — SIGNIFICANT CHANGE UP (ref 39–50)
HGB BLD-MCNC: 12.5 G/DL — LOW (ref 13–17)
IMM GRANULOCYTES NFR BLD AUTO: 0.3 % — SIGNIFICANT CHANGE UP (ref 0–1.5)
LYMPHOCYTES # BLD AUTO: 1.39 K/UL — SIGNIFICANT CHANGE UP (ref 1–3.3)
LYMPHOCYTES # BLD AUTO: 16.2 % — SIGNIFICANT CHANGE UP (ref 13–44)
MCHC RBC-ENTMCNC: 28.9 PG — SIGNIFICANT CHANGE UP (ref 27–34)
MCHC RBC-ENTMCNC: 31.9 G/DL — LOW (ref 32–36)
MCV RBC AUTO: 90.7 FL — SIGNIFICANT CHANGE UP (ref 80–100)
MONOCYTES # BLD AUTO: 0.83 K/UL — SIGNIFICANT CHANGE UP (ref 0–0.9)
MONOCYTES NFR BLD AUTO: 9.7 % — SIGNIFICANT CHANGE UP (ref 2–14)
NEUTROPHILS # BLD AUTO: 6.01 K/UL — SIGNIFICANT CHANGE UP (ref 1.8–7.4)
NEUTROPHILS NFR BLD AUTO: 69.9 % — SIGNIFICANT CHANGE UP (ref 43–77)
NRBC # BLD: 0 /100 WBCS — SIGNIFICANT CHANGE UP (ref 0–0)
PLATELET # BLD AUTO: 345 K/UL — SIGNIFICANT CHANGE UP (ref 150–400)
POTASSIUM SERPL-MCNC: 4.3 MMOL/L — SIGNIFICANT CHANGE UP (ref 3.5–5.3)
POTASSIUM SERPL-SCNC: 4.3 MMOL/L — SIGNIFICANT CHANGE UP (ref 3.5–5.3)
PROT SERPL-MCNC: 6.7 G/DL — SIGNIFICANT CHANGE UP (ref 6–8.3)
RBC # BLD: 4.32 M/UL — SIGNIFICANT CHANGE UP (ref 4.2–5.8)
RBC # FLD: 14.6 % — HIGH (ref 10.3–14.5)
SODIUM SERPL-SCNC: 140 MMOL/L — SIGNIFICANT CHANGE UP (ref 135–145)
WBC # BLD: 8.6 K/UL — SIGNIFICANT CHANGE UP (ref 3.8–10.5)
WBC # FLD AUTO: 8.6 K/UL — SIGNIFICANT CHANGE UP (ref 3.8–10.5)

## 2022-03-03 PROCEDURE — 99213 OFFICE O/P EST LOW 20 MIN: CPT

## 2022-03-04 ENCOUNTER — NON-APPOINTMENT (OUTPATIENT)
Age: 67
End: 2022-03-04

## 2022-03-04 NOTE — HISTORY OF PRESENT ILLNESS
[Disease: _____________________] : Disease: [unfilled] [M: ___] : M[unfilled] [AJCC Stage: ____] : AJCC Stage: [unfilled] [de-identified] : This is a 65-year-old man with history of esophagogastric carcinoma who presents for evaluation.  His history dates back to March 2018 when he developed a cough and decreased appetite.  A CAT scan of his chest revealed a mass in the distal esophagus with periesophageal and portacaval adenopathy.  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  The patient underwent neoadjuvant chemotherapy and radiation therapy with Taxol carboplatin.  A follow-up CAT scan in June 2018 revealed decreased mass in the esophagus.  In August 2018 the patient underwent [place] Raghu esophagogastrectomy with Dr. Cloud and Dr. Arciniega.  The pathology revealed no residual carcinoma with negative margins, with12 - lymph nodes (0/12).\par \par The patient then was well until November 2020 when he developed right rib pain and upper back pain.  Imaging testing revealed metastases to the thoracic spine and possible cord compression at T5-T6.  The patient underwent laminectomy of T4-T6 resection of the epidural tumor with T3-T12 fusion and endovascular embolization of the spinal tumor T5-T6.  The pathology revealed poorly differentiated adenocarcinoma.  Postoperatively his pain was relieved.  He has undergone radiation oncology consultation and is planned to receive SBRT treatment.  He now presents for discussion regarding further chemotherapy. [de-identified] : adenoca, Her2 pos [de-identified] : Her2 pos. [de-identified] : Since he last visit the pt had repeat CT and has inc nodes in chest. Pt has inc back pain  whch is chronic since back surgery

## 2022-03-04 NOTE — ASSESSMENT
[FreeTextEntry1] : The patient has evidence of increased activity in thoracic adenopathy.  Outside of his usual chronic back pain the patient is feeling well.  We did discuss going back on the FOLFIRI and Herceptin which was able to contain his disease previously.  He will be monitored for side effects and toxicity.  He will continue echo monitoring.  He will return to the office in 1 month or sooner as needed. [Palliative] : Goals of care discussed with patient: Palliative [Palliative Care Plan] : not applicable at this time

## 2022-03-05 ENCOUNTER — APPOINTMENT (OUTPATIENT)
Dept: INFUSION THERAPY | Facility: HOSPITAL | Age: 67
End: 2022-03-05

## 2022-03-15 ENCOUNTER — OUTPATIENT (OUTPATIENT)
Dept: OUTPATIENT SERVICES | Facility: HOSPITAL | Age: 67
LOS: 1 days | Discharge: HOME | End: 2022-03-15

## 2022-03-15 DIAGNOSIS — C18.9 MALIGNANT NEOPLASM OF COLON, UNSPECIFIED: ICD-10-CM

## 2022-03-15 DIAGNOSIS — Z98.890 OTHER SPECIFIED POSTPROCEDURAL STATES: Chronic | ICD-10-CM

## 2022-03-15 DIAGNOSIS — Z90.49 ACQUIRED ABSENCE OF OTHER SPECIFIED PARTS OF DIGESTIVE TRACT: Chronic | ICD-10-CM

## 2022-03-17 ENCOUNTER — RESULT REVIEW (OUTPATIENT)
Age: 67
End: 2022-03-17

## 2022-03-17 ENCOUNTER — APPOINTMENT (OUTPATIENT)
Dept: INFUSION THERAPY | Facility: HOSPITAL | Age: 67
End: 2022-03-17

## 2022-03-17 DIAGNOSIS — Z51.11 ENCOUNTER FOR ANTINEOPLASTIC CHEMOTHERAPY: ICD-10-CM

## 2022-03-17 DIAGNOSIS — R11.2 NAUSEA WITH VOMITING, UNSPECIFIED: ICD-10-CM

## 2022-03-17 LAB
ALBUMIN SERPL ELPH-MCNC: 3.9 G/DL — SIGNIFICANT CHANGE UP (ref 3.3–5)
ALP SERPL-CCNC: 136 U/L — HIGH (ref 40–120)
ALT FLD-CCNC: 11 U/L — SIGNIFICANT CHANGE UP (ref 10–45)
ANION GAP SERPL CALC-SCNC: 9 MMOL/L — SIGNIFICANT CHANGE UP (ref 5–17)
AST SERPL-CCNC: 20 U/L — SIGNIFICANT CHANGE UP (ref 10–40)
BASOPHILS # BLD AUTO: 0.08 K/UL — SIGNIFICANT CHANGE UP (ref 0–0.2)
BASOPHILS NFR BLD AUTO: 1.1 % — SIGNIFICANT CHANGE UP (ref 0–2)
BILIRUB SERPL-MCNC: 0.5 MG/DL — SIGNIFICANT CHANGE UP (ref 0.2–1.2)
BUN SERPL-MCNC: 12 MG/DL — SIGNIFICANT CHANGE UP (ref 7–23)
CALCIUM SERPL-MCNC: 8.9 MG/DL — SIGNIFICANT CHANGE UP (ref 8.4–10.5)
CHLORIDE SERPL-SCNC: 106 MMOL/L — SIGNIFICANT CHANGE UP (ref 96–108)
CO2 SERPL-SCNC: 25 MMOL/L — SIGNIFICANT CHANGE UP (ref 22–31)
CREAT SERPL-MCNC: 0.74 MG/DL — SIGNIFICANT CHANGE UP (ref 0.5–1.3)
EGFR: 101 ML/MIN/1.73M2 — SIGNIFICANT CHANGE UP
EOSINOPHIL # BLD AUTO: 0.34 K/UL — SIGNIFICANT CHANGE UP (ref 0–0.5)
EOSINOPHIL NFR BLD AUTO: 4.8 % — SIGNIFICANT CHANGE UP (ref 0–6)
GLUCOSE SERPL-MCNC: 86 MG/DL — SIGNIFICANT CHANGE UP (ref 70–99)
HCT VFR BLD CALC: 39.6 % — SIGNIFICANT CHANGE UP (ref 39–50)
HGB BLD-MCNC: 12.7 G/DL — LOW (ref 13–17)
IMM GRANULOCYTES NFR BLD AUTO: 0.4 % — SIGNIFICANT CHANGE UP (ref 0–1.5)
LYMPHOCYTES # BLD AUTO: 1.27 K/UL — SIGNIFICANT CHANGE UP (ref 1–3.3)
LYMPHOCYTES # BLD AUTO: 18 % — SIGNIFICANT CHANGE UP (ref 13–44)
MCHC RBC-ENTMCNC: 28.7 PG — SIGNIFICANT CHANGE UP (ref 27–34)
MCHC RBC-ENTMCNC: 32.1 G/DL — SIGNIFICANT CHANGE UP (ref 32–36)
MCV RBC AUTO: 89.6 FL — SIGNIFICANT CHANGE UP (ref 80–100)
MONOCYTES # BLD AUTO: 0.86 K/UL — SIGNIFICANT CHANGE UP (ref 0–0.9)
MONOCYTES NFR BLD AUTO: 12.2 % — SIGNIFICANT CHANGE UP (ref 2–14)
NEUTROPHILS # BLD AUTO: 4.48 K/UL — SIGNIFICANT CHANGE UP (ref 1.8–7.4)
NEUTROPHILS NFR BLD AUTO: 63.5 % — SIGNIFICANT CHANGE UP (ref 43–77)
NRBC # BLD: 0 /100 WBCS — SIGNIFICANT CHANGE UP (ref 0–0)
PLATELET # BLD AUTO: 310 K/UL — SIGNIFICANT CHANGE UP (ref 150–400)
POTASSIUM SERPL-MCNC: 4.2 MMOL/L — SIGNIFICANT CHANGE UP (ref 3.5–5.3)
POTASSIUM SERPL-SCNC: 4.2 MMOL/L — SIGNIFICANT CHANGE UP (ref 3.5–5.3)
PROT SERPL-MCNC: 6.5 G/DL — SIGNIFICANT CHANGE UP (ref 6–8.3)
RBC # BLD: 4.42 M/UL — SIGNIFICANT CHANGE UP (ref 4.2–5.8)
RBC # FLD: 14.6 % — HIGH (ref 10.3–14.5)
SODIUM SERPL-SCNC: 140 MMOL/L — SIGNIFICANT CHANGE UP (ref 135–145)
WBC # BLD: 7.06 K/UL — SIGNIFICANT CHANGE UP (ref 3.8–10.5)
WBC # FLD AUTO: 7.06 K/UL — SIGNIFICANT CHANGE UP (ref 3.8–10.5)

## 2022-03-19 ENCOUNTER — APPOINTMENT (OUTPATIENT)
Dept: INFUSION THERAPY | Facility: HOSPITAL | Age: 67
End: 2022-03-19

## 2022-03-22 ENCOUNTER — APPOINTMENT (OUTPATIENT)
Dept: HEMATOLOGY ONCOLOGY | Facility: CLINIC | Age: 67
End: 2022-03-22
Payer: MEDICAID

## 2022-03-22 DIAGNOSIS — R63.0 ANOREXIA: ICD-10-CM

## 2022-03-22 DIAGNOSIS — G89.3 NEOPLASM RELATED PAIN (ACUTE) (CHRONIC): ICD-10-CM

## 2022-03-22 PROCEDURE — 99214 OFFICE O/P EST MOD 30 MIN: CPT | Mod: 95

## 2022-03-25 PROBLEM — R63.0 APPETITE LOSS: Status: ACTIVE | Noted: 2021-01-16

## 2022-03-25 PROBLEM — G89.3 PAIN, NEOPLASM-RELATED: Status: ACTIVE | Noted: 2021-01-16

## 2022-03-25 NOTE — HISTORY OF PRESENT ILLNESS
[Home] : at home, [unfilled] , at the time of the visit. [Medical Office: ([facility])___] : at the medical office located in  [Spouse] : spouse [Verbal consent obtained from patient] : the patient, [unfilled] [FreeTextEntry1] : 66yoM with esophagogastric carcinoma presents for follow-up palliative care visit, referred by Dr. Long.\par \par He was first diagnosed with the cancer in 2018, underwent crispin-adjuvant chemoRT followed by [place] Raghu esophagogastrectomy in 8/2018.  \par \par The patient then was well until November 2020 when he developed right rib pain and upper back pain. Imaging testing revealed metastases to the thoracic spine and possible cord compression at T5-T6. The patient underwent laminectomy of T4-T6 resection of the epidural tumor with T3-T12 fusion and endovascular embolization of the spinal tumor T5-T6. The pathology revealed poorly differentiated adenocarcinoma. Postoperatively his pain was relieved. He has undergone radiation oncology consultation and is planned to receive SBRT treatment. \par \par Main issue for which he is referred is pain - he feels pain from the low back to the mid back, paraspinal region.  He describes it as if he got hit with a bat. At the worst it goes up to about 7/10.  He took Tylenol 1300mg this AM and a hot shower, he rates the pain currently at 3/10.  He has oxycodone 5mg for PRN use but does not like to use it. He tends to use it once at night. 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 \par He uses Tylenol 1300mg BID.  \par \par He was using medical cannabis in the past when on chemotherapy, which helped him with keeping his appetite up.  He is interested in re-certification \par \par He feels intermittently nauseous and regurgitates mucous after which he feels better. \par \par Interval History: \par Patient presents for medical cannabis recertification, last seen 1/2021.  In the inteirm, scans demonstrated POD. He will be initated on FOLFIRI and Herceptin retreat.  He continues to experience chronic back pain for which he finds medical cannabis to provide sufifcient relief for.  He uses PRN Oxycodone 5mg approximately once to twice a week but does not like to use it. He prefers medical cannabis. \par \par ROS:\par + nausea - PRN Ondansetron helps\par + appetite is low - he eats small portions, but infrequently. \par +wakes up every 2-3 hours with pain, readjusts his position and tends to fall back asleep again \par +fatigue\par +weakness\par +occasional imbalance when he stands up from sitting\par Denies constipation, diarrhea.\par \par Patient is , lives with his wife. They have one daughter - He enjoys playing [employer].\par \par [employer]-Stop Ref#: [id number]

## 2022-03-25 NOTE — ASSESSMENT
[FreeTextEntry1] : 66yoM with:\par \par 1. Stage IV Gastro-Esophageal cancer - To re-initiate FOLFIRI/Herceptin; follow up with Med Onc\par \par 2. Neoplasm-related pain\par - Medical cannabis recertification completed today. Re-Counseled that vaporized cannabis is not the preferred route of administration due to the fact that both short-term and long-term risks associated with vaporizing oils are not yet fully understood.\par -  C/w Tylenol, Gabapentin, PRN Oxycodone.\par \par 3. Anorexia/cachexia of malignancy - C/w medical cannabis regimen. \par \par 4. Encounter for Palliative Care - Emotional support provided.\par No formal HCP on file but patient states his wife is his main medical decision maker.  Recommend completion of HCP at next treatment.  Will request Med Onc assistance in completing form. \par \par Follow up PRN, call sooner with questions or issues.\par \par

## 2022-03-31 ENCOUNTER — RESULT REVIEW (OUTPATIENT)
Age: 67
End: 2022-03-31

## 2022-03-31 ENCOUNTER — APPOINTMENT (OUTPATIENT)
Dept: INFUSION THERAPY | Facility: HOSPITAL | Age: 67
End: 2022-03-31

## 2022-03-31 LAB
ALBUMIN SERPL ELPH-MCNC: 4 G/DL — SIGNIFICANT CHANGE UP (ref 3.3–5)
ALP SERPL-CCNC: 127 U/L — HIGH (ref 40–120)
ALT FLD-CCNC: 12 U/L — SIGNIFICANT CHANGE UP (ref 10–45)
ANION GAP SERPL CALC-SCNC: 14 MMOL/L — SIGNIFICANT CHANGE UP (ref 5–17)
AST SERPL-CCNC: 18 U/L — SIGNIFICANT CHANGE UP (ref 10–40)
BASOPHILS # BLD AUTO: 0.04 K/UL — SIGNIFICANT CHANGE UP (ref 0–0.2)
BASOPHILS NFR BLD AUTO: 0.9 % — SIGNIFICANT CHANGE UP (ref 0–2)
BILIRUB SERPL-MCNC: 0.4 MG/DL — SIGNIFICANT CHANGE UP (ref 0.2–1.2)
BUN SERPL-MCNC: 14 MG/DL — SIGNIFICANT CHANGE UP (ref 7–23)
CALCIUM SERPL-MCNC: 9 MG/DL — SIGNIFICANT CHANGE UP (ref 8.4–10.5)
CHLORIDE SERPL-SCNC: 107 MMOL/L — SIGNIFICANT CHANGE UP (ref 96–108)
CO2 SERPL-SCNC: 21 MMOL/L — LOW (ref 22–31)
CREAT SERPL-MCNC: 0.84 MG/DL — SIGNIFICANT CHANGE UP (ref 0.5–1.3)
EGFR: 97 ML/MIN/1.73M2 — SIGNIFICANT CHANGE UP
EOSINOPHIL # BLD AUTO: 0.24 K/UL — SIGNIFICANT CHANGE UP (ref 0–0.5)
EOSINOPHIL NFR BLD AUTO: 5.5 % — SIGNIFICANT CHANGE UP (ref 0–6)
GLUCOSE SERPL-MCNC: 119 MG/DL — HIGH (ref 70–99)
HCT VFR BLD CALC: 37.1 % — LOW (ref 39–50)
HGB BLD-MCNC: 12 G/DL — LOW (ref 13–17)
IMM GRANULOCYTES NFR BLD AUTO: 0.2 % — SIGNIFICANT CHANGE UP (ref 0–1.5)
LYMPHOCYTES # BLD AUTO: 1.07 K/UL — SIGNIFICANT CHANGE UP (ref 1–3.3)
LYMPHOCYTES # BLD AUTO: 24.5 % — SIGNIFICANT CHANGE UP (ref 13–44)
MCHC RBC-ENTMCNC: 29.1 PG — SIGNIFICANT CHANGE UP (ref 27–34)
MCHC RBC-ENTMCNC: 32.3 G/DL — SIGNIFICANT CHANGE UP (ref 32–36)
MCV RBC AUTO: 90 FL — SIGNIFICANT CHANGE UP (ref 80–100)
MONOCYTES # BLD AUTO: 0.52 K/UL — SIGNIFICANT CHANGE UP (ref 0–0.9)
MONOCYTES NFR BLD AUTO: 11.9 % — SIGNIFICANT CHANGE UP (ref 2–14)
NEUTROPHILS # BLD AUTO: 2.49 K/UL — SIGNIFICANT CHANGE UP (ref 1.8–7.4)
NEUTROPHILS NFR BLD AUTO: 57 % — SIGNIFICANT CHANGE UP (ref 43–77)
NRBC # BLD: 0 /100 WBCS — SIGNIFICANT CHANGE UP (ref 0–0)
PLATELET # BLD AUTO: 259 K/UL — SIGNIFICANT CHANGE UP (ref 150–400)
POTASSIUM SERPL-MCNC: 4.1 MMOL/L — SIGNIFICANT CHANGE UP (ref 3.5–5.3)
POTASSIUM SERPL-SCNC: 4.1 MMOL/L — SIGNIFICANT CHANGE UP (ref 3.5–5.3)
PROT SERPL-MCNC: 6 G/DL — SIGNIFICANT CHANGE UP (ref 6–8.3)
RBC # BLD: 4.12 M/UL — LOW (ref 4.2–5.8)
RBC # FLD: 14.4 % — SIGNIFICANT CHANGE UP (ref 10.3–14.5)
SODIUM SERPL-SCNC: 142 MMOL/L — SIGNIFICANT CHANGE UP (ref 135–145)
WBC # BLD: 4.37 K/UL — SIGNIFICANT CHANGE UP (ref 3.8–10.5)
WBC # FLD AUTO: 4.37 K/UL — SIGNIFICANT CHANGE UP (ref 3.8–10.5)

## 2022-04-01 ENCOUNTER — NON-APPOINTMENT (OUTPATIENT)
Age: 67
End: 2022-04-01

## 2022-04-02 ENCOUNTER — APPOINTMENT (OUTPATIENT)
Dept: INFUSION THERAPY | Facility: HOSPITAL | Age: 67
End: 2022-04-02

## 2022-04-04 ENCOUNTER — APPOINTMENT (OUTPATIENT)
Dept: HEMATOLOGY ONCOLOGY | Facility: CLINIC | Age: 67
End: 2022-04-04
Payer: MEDICAID

## 2022-04-04 PROCEDURE — 99214 OFFICE O/P EST MOD 30 MIN: CPT | Mod: 95

## 2022-04-04 RX ORDER — PROCHLORPERAZINE MALEATE 10 MG/1
10 TABLET ORAL EVERY 6 HOURS
Qty: 60 | Refills: 3 | Status: ACTIVE | COMMUNITY
Start: 2022-04-04 | End: 1900-01-01

## 2022-04-04 NOTE — HISTORY OF PRESENT ILLNESS
[Disease: _____________________] : Disease: [unfilled] [M: ___] : M[unfilled] [AJCC Stage: ____] : AJCC Stage: [unfilled] [de-identified] : This is a 65-year-old man with history of esophagogastric carcinoma who presents for evaluation.  His history dates back to March 2018 when he developed a cough and decreased appetite.  A CAT scan of his chest revealed a mass in the distal esophagus with periesophageal and portacaval adenopathy.  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  The patient underwent neoadjuvant chemotherapy and radiation therapy with Taxol carboplatin.  A follow-up CAT scan in June 2018 revealed decreased mass in the esophagus.  In August 2018 the patient underwent [place] Raghu esophagogastrectomy with Dr. Cloud and Dr. Arciinega.  The pathology revealed no residual carcinoma with negative margins, with12 - lymph nodes (0/12).\par \par The patient then was well until November 2020 when he developed right rib pain and upper back pain.  Imaging testing revealed metastases to the thoracic spine and possible cord compression at T5-T6.  The patient underwent laminectomy of T4-T6 resection of the epidural tumor with T3-T12 fusion and endovascular embolization of the spinal tumor T5-T6.  The pathology revealed poorly differentiated adenocarcinoma.  Postoperatively his pain was relieved.  He has undergone radiation oncology consultation and is planned to receive SBRT treatment.  He now presents for discussion regarding further chemotherapy. [de-identified] : adenoca, Her2 pos [de-identified] : Her2 pos. [de-identified] : Patient receives Folfiri/Herceptin. He c/o for 1 day post treatment that he had increased nausea, body aches, and 1 episode of diarrhea. Presently, he feels better. Appetite is okay. He does require occasionally Oxycodone 5 mg occasional at bedtime for lower back pain. Denies any other complaints.

## 2022-04-04 NOTE — ASSESSMENT
[Palliative] : Goals of care discussed with patient: Palliative [Palliative Care Plan] : not applicable at this time [FreeTextEntry1] : The patient will continue FOLFIRI and Herceptin every 2 weeks. He will be monitored for side effects and toxicity.  He will continue echo monitoring every 3 months. Reviewed labs. Will repeat CT-Scan after 2-3 months of treatment. He will return to the office in 1 month or sooner as needed.

## 2022-04-04 NOTE — REASON FOR VISIT
[Home] : at home, [unfilled] , at the time of the visit. [Medical Office: ([facility])___] : at the medical office located in  [Spouse] : spouse [Verbal consent obtained from patient] : the patient, [unfilled] [Follow-Up Visit] : a follow-up [FreeTextEntry2] : ca GE junction

## 2022-04-14 ENCOUNTER — RESULT REVIEW (OUTPATIENT)
Age: 67
End: 2022-04-14

## 2022-04-14 ENCOUNTER — APPOINTMENT (OUTPATIENT)
Dept: INFUSION THERAPY | Facility: HOSPITAL | Age: 67
End: 2022-04-14

## 2022-04-14 LAB
BASOPHILS # BLD AUTO: 0.05 K/UL — SIGNIFICANT CHANGE UP (ref 0–0.2)
BASOPHILS NFR BLD AUTO: 1.6 % — SIGNIFICANT CHANGE UP (ref 0–2)
EOSINOPHIL # BLD AUTO: 0.12 K/UL — SIGNIFICANT CHANGE UP (ref 0–0.5)
EOSINOPHIL NFR BLD AUTO: 3.8 % — SIGNIFICANT CHANGE UP (ref 0–6)
HCT VFR BLD CALC: 36.1 % — LOW (ref 39–50)
HGB BLD-MCNC: 11.6 G/DL — LOW (ref 13–17)
IMM GRANULOCYTES NFR BLD AUTO: 0.6 % — SIGNIFICANT CHANGE UP (ref 0–1.5)
LYMPHOCYTES # BLD AUTO: 1.09 K/UL — SIGNIFICANT CHANGE UP (ref 1–3.3)
LYMPHOCYTES # BLD AUTO: 34.5 % — SIGNIFICANT CHANGE UP (ref 13–44)
MCHC RBC-ENTMCNC: 28.6 PG — SIGNIFICANT CHANGE UP (ref 27–34)
MCHC RBC-ENTMCNC: 32.1 G/DL — SIGNIFICANT CHANGE UP (ref 32–36)
MCV RBC AUTO: 89.1 FL — SIGNIFICANT CHANGE UP (ref 80–100)
MONOCYTES # BLD AUTO: 0.43 K/UL — SIGNIFICANT CHANGE UP (ref 0–0.9)
MONOCYTES NFR BLD AUTO: 13.6 % — SIGNIFICANT CHANGE UP (ref 2–14)
NEUTROPHILS # BLD AUTO: 1.45 K/UL — LOW (ref 1.8–7.4)
NEUTROPHILS NFR BLD AUTO: 45.9 % — SIGNIFICANT CHANGE UP (ref 43–77)
NRBC # BLD: 0 /100 WBCS — SIGNIFICANT CHANGE UP (ref 0–0)
PLATELET # BLD AUTO: 308 K/UL — SIGNIFICANT CHANGE UP (ref 150–400)
RBC # BLD: 4.05 M/UL — LOW (ref 4.2–5.8)
RBC # FLD: 14.8 % — HIGH (ref 10.3–14.5)
WBC # BLD: 3.16 K/UL — LOW (ref 3.8–10.5)
WBC # FLD AUTO: 3.16 K/UL — LOW (ref 3.8–10.5)

## 2022-04-15 ENCOUNTER — OUTPATIENT (OUTPATIENT)
Dept: OUTPATIENT SERVICES | Facility: HOSPITAL | Age: 67
LOS: 1 days | Discharge: HOME | End: 2022-04-15

## 2022-04-15 ENCOUNTER — NON-APPOINTMENT (OUTPATIENT)
Age: 67
End: 2022-04-15

## 2022-04-15 DIAGNOSIS — Z90.49 ACQUIRED ABSENCE OF OTHER SPECIFIED PARTS OF DIGESTIVE TRACT: Chronic | ICD-10-CM

## 2022-04-15 DIAGNOSIS — Z98.890 OTHER SPECIFIED POSTPROCEDURAL STATES: Chronic | ICD-10-CM

## 2022-04-15 DIAGNOSIS — C18.9 MALIGNANT NEOPLASM OF COLON, UNSPECIFIED: ICD-10-CM

## 2022-04-21 ENCOUNTER — RESULT REVIEW (OUTPATIENT)
Age: 67
End: 2022-04-21

## 2022-04-21 ENCOUNTER — APPOINTMENT (OUTPATIENT)
Dept: INFUSION THERAPY | Facility: HOSPITAL | Age: 67
End: 2022-04-21

## 2022-04-21 LAB
BASOPHILS # BLD AUTO: 0.09 K/UL — SIGNIFICANT CHANGE UP (ref 0–0.2)
BASOPHILS NFR BLD AUTO: 1.9 % — SIGNIFICANT CHANGE UP (ref 0–2)
EOSINOPHIL # BLD AUTO: 0.21 K/UL — SIGNIFICANT CHANGE UP (ref 0–0.5)
EOSINOPHIL NFR BLD AUTO: 4.5 % — SIGNIFICANT CHANGE UP (ref 0–6)
HCT VFR BLD CALC: 37.8 % — LOW (ref 39–50)
HGB BLD-MCNC: 12.4 G/DL — LOW (ref 13–17)
IMM GRANULOCYTES NFR BLD AUTO: 0.9 % — SIGNIFICANT CHANGE UP (ref 0–1.5)
LYMPHOCYTES # BLD AUTO: 1.06 K/UL — SIGNIFICANT CHANGE UP (ref 1–3.3)
LYMPHOCYTES # BLD AUTO: 22.6 % — SIGNIFICANT CHANGE UP (ref 13–44)
MCHC RBC-ENTMCNC: 29.4 PG — SIGNIFICANT CHANGE UP (ref 27–34)
MCHC RBC-ENTMCNC: 32.8 G/DL — SIGNIFICANT CHANGE UP (ref 32–36)
MCV RBC AUTO: 89.6 FL — SIGNIFICANT CHANGE UP (ref 80–100)
MONOCYTES # BLD AUTO: 1 K/UL — HIGH (ref 0–0.9)
MONOCYTES NFR BLD AUTO: 21.3 % — HIGH (ref 2–14)
NEUTROPHILS # BLD AUTO: 2.29 K/UL — SIGNIFICANT CHANGE UP (ref 1.8–7.4)
NEUTROPHILS NFR BLD AUTO: 48.8 % — SIGNIFICANT CHANGE UP (ref 43–77)
NRBC # BLD: 0 /100 WBCS — SIGNIFICANT CHANGE UP (ref 0–0)
PLATELET # BLD AUTO: 340 K/UL — SIGNIFICANT CHANGE UP (ref 150–400)
RBC # BLD: 4.22 M/UL — SIGNIFICANT CHANGE UP (ref 4.2–5.8)
RBC # FLD: 16.2 % — HIGH (ref 10.3–14.5)
WBC # BLD: 4.69 K/UL — SIGNIFICANT CHANGE UP (ref 3.8–10.5)
WBC # FLD AUTO: 4.69 K/UL — SIGNIFICANT CHANGE UP (ref 3.8–10.5)

## 2022-04-22 ENCOUNTER — NON-APPOINTMENT (OUTPATIENT)
Age: 67
End: 2022-04-22

## 2022-04-22 DIAGNOSIS — Z51.11 ENCOUNTER FOR ANTINEOPLASTIC CHEMOTHERAPY: ICD-10-CM

## 2022-04-22 DIAGNOSIS — R11.2 NAUSEA WITH VOMITING, UNSPECIFIED: ICD-10-CM

## 2022-04-23 ENCOUNTER — APPOINTMENT (OUTPATIENT)
Dept: INFUSION THERAPY | Facility: HOSPITAL | Age: 67
End: 2022-04-23

## 2022-04-28 ENCOUNTER — APPOINTMENT (OUTPATIENT)
Dept: INFUSION THERAPY | Facility: HOSPITAL | Age: 67
End: 2022-04-28

## 2022-04-30 ENCOUNTER — APPOINTMENT (OUTPATIENT)
Dept: INFUSION THERAPY | Facility: HOSPITAL | Age: 67
End: 2022-04-30

## 2022-05-04 ENCOUNTER — RESULT REVIEW (OUTPATIENT)
Age: 67
End: 2022-05-04

## 2022-05-04 ENCOUNTER — APPOINTMENT (OUTPATIENT)
Dept: CARDIOLOGY | Facility: CLINIC | Age: 67
End: 2022-05-04
Payer: MEDICAID

## 2022-05-04 ENCOUNTER — APPOINTMENT (OUTPATIENT)
Dept: HEMATOLOGY ONCOLOGY | Facility: CLINIC | Age: 67
End: 2022-05-04
Payer: MEDICAID

## 2022-05-04 VITALS
HEIGHT: 66.93 IN | RESPIRATION RATE: 24 BRPM | TEMPERATURE: 97.3 F | SYSTOLIC BLOOD PRESSURE: 102 MMHG | HEART RATE: 85 BPM | WEIGHT: 148.81 LBS | DIASTOLIC BLOOD PRESSURE: 71 MMHG | OXYGEN SATURATION: 97 % | BODY MASS INDEX: 23.36 KG/M2

## 2022-05-04 LAB
BASOPHILS # BLD AUTO: 0.05 K/UL — SIGNIFICANT CHANGE UP (ref 0–0.2)
BASOPHILS NFR BLD AUTO: 0.9 % — SIGNIFICANT CHANGE UP (ref 0–2)
EOSINOPHIL # BLD AUTO: 0.34 K/UL — SIGNIFICANT CHANGE UP (ref 0–0.5)
EOSINOPHIL NFR BLD AUTO: 6.2 % — HIGH (ref 0–6)
HCT VFR BLD CALC: 38.7 % — LOW (ref 39–50)
HGB BLD-MCNC: 12.2 G/DL — LOW (ref 13–17)
IMM GRANULOCYTES NFR BLD AUTO: 0.2 % — SIGNIFICANT CHANGE UP (ref 0–1.5)
LYMPHOCYTES # BLD AUTO: 1.03 K/UL — SIGNIFICANT CHANGE UP (ref 1–3.3)
LYMPHOCYTES # BLD AUTO: 18.8 % — SIGNIFICANT CHANGE UP (ref 13–44)
MCHC RBC-ENTMCNC: 29.1 PG — SIGNIFICANT CHANGE UP (ref 27–34)
MCHC RBC-ENTMCNC: 31.5 G/DL — LOW (ref 32–36)
MCV RBC AUTO: 92.4 FL — SIGNIFICANT CHANGE UP (ref 80–100)
MONOCYTES # BLD AUTO: 0.46 K/UL — SIGNIFICANT CHANGE UP (ref 0–0.9)
MONOCYTES NFR BLD AUTO: 8.4 % — SIGNIFICANT CHANGE UP (ref 2–14)
NEUTROPHILS # BLD AUTO: 3.59 K/UL — SIGNIFICANT CHANGE UP (ref 1.8–7.4)
NEUTROPHILS NFR BLD AUTO: 65.5 % — SIGNIFICANT CHANGE UP (ref 43–77)
NRBC # BLD: 0 /100 WBCS — SIGNIFICANT CHANGE UP (ref 0–0)
PLATELET # BLD AUTO: 273 K/UL — SIGNIFICANT CHANGE UP (ref 150–400)
RBC # BLD: 4.19 M/UL — LOW (ref 4.2–5.8)
RBC # FLD: 16.2 % — HIGH (ref 10.3–14.5)
WBC # BLD: 5.48 K/UL — SIGNIFICANT CHANGE UP (ref 3.8–10.5)
WBC # FLD AUTO: 5.48 K/UL — SIGNIFICANT CHANGE UP (ref 3.8–10.5)

## 2022-05-04 PROCEDURE — 93306 TTE W/DOPPLER COMPLETE: CPT

## 2022-05-04 PROCEDURE — 93356 MYOCRD STRAIN IMG SPCKL TRCK: CPT

## 2022-05-04 PROCEDURE — 99213 OFFICE O/P EST LOW 20 MIN: CPT

## 2022-05-04 NOTE — HISTORY OF PRESENT ILLNESS
[Disease: _____________________] : Disease: [unfilled] [M: ___] : M[unfilled] [AJCC Stage: ____] : AJCC Stage: [unfilled] [de-identified] : This is a 65-year-old man with history of esophagogastric carcinoma who presents for evaluation.  His history dates back to March 2018 when he developed a cough and decreased appetite.  A CAT scan of his chest revealed a mass in the distal esophagus with periesophageal and portacaval adenopathy.  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  The patient underwent neoadjuvant chemotherapy and radiation therapy with Taxol carboplatin.  A follow-up CAT scan in June 2018 revealed decreased mass in the esophagus.  In August 2018 the patient underwent [place] Raghu esophagogastrectomy with Dr. Cloud and Dr. Arciniega.  The pathology revealed no residual carcinoma with negative margins, with12 - lymph nodes (0/12).\par \par The patient then was well until November 2020 when he developed right rib pain and upper back pain.  Imaging testing revealed metastases to the thoracic spine and possible cord compression at T5-T6.  The patient underwent laminectomy of T4-T6 resection of the epidural tumor with T3-T12 fusion and endovascular embolization of the spinal tumor T5-T6.  The pathology revealed poorly differentiated adenocarcinoma.  Postoperatively his pain was relieved.  He has undergone radiation oncology consultation and is planned to receive SBRT treatment.  He now presents for discussion regarding further chemotherapy. [de-identified] : adenoca, Her2 pos [de-identified] : Her2 pos. [de-identified] : Since the ladt visit the pt continues on chemo and tolerates rx well  but has dec appetite. Pt has backaches from spinal surgery. Pt had ECHO today for his Her2 pos treatment

## 2022-05-04 NOTE — ASSESSMENT
[FreeTextEntry1] : Pt to continue on chemo with Folfiri and herceptin and will monitor ECHO for cardiac function.Pt to repeat CT scans ion 1 month to assess for response or progression of his disease.Pt to RTO in 1 month or sooner if needed. [Palliative] : Goals of care discussed with patient: Palliative [Palliative Care Plan] : not applicable at this time

## 2022-05-05 ENCOUNTER — RESULT REVIEW (OUTPATIENT)
Age: 67
End: 2022-05-05

## 2022-05-05 ENCOUNTER — APPOINTMENT (OUTPATIENT)
Dept: INFUSION THERAPY | Facility: HOSPITAL | Age: 67
End: 2022-05-05

## 2022-05-05 LAB
ALBUMIN SERPL ELPH-MCNC: 3.6 G/DL — SIGNIFICANT CHANGE UP (ref 3.3–5)
ALP SERPL-CCNC: 123 U/L — HIGH (ref 40–120)
ALT FLD-CCNC: 12 U/L — SIGNIFICANT CHANGE UP (ref 10–45)
ANION GAP SERPL CALC-SCNC: 12 MMOL/L — SIGNIFICANT CHANGE UP (ref 5–17)
AST SERPL-CCNC: 27 U/L — SIGNIFICANT CHANGE UP (ref 10–40)
BILIRUB SERPL-MCNC: 0.6 MG/DL — SIGNIFICANT CHANGE UP (ref 0.2–1.2)
BUN SERPL-MCNC: 8 MG/DL — SIGNIFICANT CHANGE UP (ref 7–23)
CALCIUM SERPL-MCNC: 8.9 MG/DL — SIGNIFICANT CHANGE UP (ref 8.4–10.5)
CANCER AG19-9 SERPL-ACNC: 14 U/ML — SIGNIFICANT CHANGE UP
CEA SERPL-MCNC: 9.6 NG/ML — HIGH (ref 0–3.8)
CHLORIDE SERPL-SCNC: 105 MMOL/L — SIGNIFICANT CHANGE UP (ref 96–108)
CO2 SERPL-SCNC: 23 MMOL/L — SIGNIFICANT CHANGE UP (ref 22–31)
CREAT SERPL-MCNC: 0.77 MG/DL — SIGNIFICANT CHANGE UP (ref 0.5–1.3)
EGFR: 99 ML/MIN/1.73M2 — SIGNIFICANT CHANGE UP
GLUCOSE SERPL-MCNC: 86 MG/DL — SIGNIFICANT CHANGE UP (ref 70–99)
POTASSIUM SERPL-MCNC: 4 MMOL/L — SIGNIFICANT CHANGE UP (ref 3.5–5.3)
POTASSIUM SERPL-SCNC: 4 MMOL/L — SIGNIFICANT CHANGE UP (ref 3.5–5.3)
PROT SERPL-MCNC: 6.5 G/DL — SIGNIFICANT CHANGE UP (ref 6–8.3)
SODIUM SERPL-SCNC: 139 MMOL/L — SIGNIFICANT CHANGE UP (ref 135–145)

## 2022-05-07 ENCOUNTER — APPOINTMENT (OUTPATIENT)
Dept: INFUSION THERAPY | Facility: HOSPITAL | Age: 67
End: 2022-05-07

## 2022-05-13 LAB
ALBUMIN SERPL ELPH-MCNC: 4 G/DL
ALP BLD-CCNC: 135 U/L
ALT SERPL-CCNC: 12 U/L
ANION GAP SERPL CALC-SCNC: 12 MMOL/L
AST SERPL-CCNC: 19 U/L
BILIRUB SERPL-MCNC: 0.4 MG/DL
BUN SERPL-MCNC: 10 MG/DL
CALCIUM SERPL-MCNC: 8.7 MG/DL
CHLORIDE SERPL-SCNC: 108 MMOL/L
CO2 SERPL-SCNC: 25 MMOL/L
CREAT SERPL-MCNC: 0.79 MG/DL
EGFR: 99 ML/MIN/1.73M2
GLUCOSE SERPL-MCNC: 105 MG/DL
POTASSIUM SERPL-SCNC: 4.2 MMOL/L
PROT SERPL-MCNC: 6.4 G/DL
SODIUM SERPL-SCNC: 145 MMOL/L

## 2022-05-16 ENCOUNTER — OUTPATIENT (OUTPATIENT)
Dept: OUTPATIENT SERVICES | Facility: HOSPITAL | Age: 67
LOS: 1 days | Discharge: HOME | End: 2022-05-16

## 2022-05-16 DIAGNOSIS — Z90.49 ACQUIRED ABSENCE OF OTHER SPECIFIED PARTS OF DIGESTIVE TRACT: Chronic | ICD-10-CM

## 2022-05-16 DIAGNOSIS — C18.9 MALIGNANT NEOPLASM OF COLON, UNSPECIFIED: ICD-10-CM

## 2022-05-16 DIAGNOSIS — Z98.890 OTHER SPECIFIED POSTPROCEDURAL STATES: Chronic | ICD-10-CM

## 2022-05-19 ENCOUNTER — RESULT REVIEW (OUTPATIENT)
Age: 67
End: 2022-05-19

## 2022-05-19 ENCOUNTER — APPOINTMENT (OUTPATIENT)
Dept: INFUSION THERAPY | Facility: HOSPITAL | Age: 67
End: 2022-05-19

## 2022-05-19 LAB
ALBUMIN SERPL ELPH-MCNC: 3.8 G/DL — SIGNIFICANT CHANGE UP (ref 3.3–5)
ALP SERPL-CCNC: 120 U/L — SIGNIFICANT CHANGE UP (ref 40–120)
ALT FLD-CCNC: 9 U/L — LOW (ref 10–45)
ANION GAP SERPL CALC-SCNC: 13 MMOL/L — SIGNIFICANT CHANGE UP (ref 5–17)
AST SERPL-CCNC: 18 U/L — SIGNIFICANT CHANGE UP (ref 10–40)
BASOPHILS # BLD AUTO: 0.04 K/UL — SIGNIFICANT CHANGE UP (ref 0–0.2)
BASOPHILS NFR BLD AUTO: 1.1 % — SIGNIFICANT CHANGE UP (ref 0–2)
BILIRUB SERPL-MCNC: 0.6 MG/DL — SIGNIFICANT CHANGE UP (ref 0.2–1.2)
BUN SERPL-MCNC: 9 MG/DL — SIGNIFICANT CHANGE UP (ref 7–23)
CALCIUM SERPL-MCNC: 8.8 MG/DL — SIGNIFICANT CHANGE UP (ref 8.4–10.5)
CHLORIDE SERPL-SCNC: 107 MMOL/L — SIGNIFICANT CHANGE UP (ref 96–108)
CO2 SERPL-SCNC: 23 MMOL/L — SIGNIFICANT CHANGE UP (ref 22–31)
CREAT SERPL-MCNC: 0.74 MG/DL — SIGNIFICANT CHANGE UP (ref 0.5–1.3)
EGFR: 100 ML/MIN/1.73M2 — SIGNIFICANT CHANGE UP
EOSINOPHIL # BLD AUTO: 0.17 K/UL — SIGNIFICANT CHANGE UP (ref 0–0.5)
EOSINOPHIL NFR BLD AUTO: 4.8 % — SIGNIFICANT CHANGE UP (ref 0–6)
GLUCOSE SERPL-MCNC: 95 MG/DL — SIGNIFICANT CHANGE UP (ref 70–99)
HCT VFR BLD CALC: 34.4 % — LOW (ref 39–50)
HGB BLD-MCNC: 11.2 G/DL — LOW (ref 13–17)
IMM GRANULOCYTES NFR BLD AUTO: 0.3 % — SIGNIFICANT CHANGE UP (ref 0–1.5)
LYMPHOCYTES # BLD AUTO: 0.98 K/UL — LOW (ref 1–3.3)
LYMPHOCYTES # BLD AUTO: 27.6 % — SIGNIFICANT CHANGE UP (ref 13–44)
MCHC RBC-ENTMCNC: 28.9 PG — SIGNIFICANT CHANGE UP (ref 27–34)
MCHC RBC-ENTMCNC: 32.6 G/DL — SIGNIFICANT CHANGE UP (ref 32–36)
MCV RBC AUTO: 88.9 FL — SIGNIFICANT CHANGE UP (ref 80–100)
MONOCYTES # BLD AUTO: 0.54 K/UL — SIGNIFICANT CHANGE UP (ref 0–0.9)
MONOCYTES NFR BLD AUTO: 15.2 % — HIGH (ref 2–14)
NEUTROPHILS # BLD AUTO: 1.81 K/UL — SIGNIFICANT CHANGE UP (ref 1.8–7.4)
NEUTROPHILS NFR BLD AUTO: 51 % — SIGNIFICANT CHANGE UP (ref 43–77)
NRBC # BLD: 0 /100 WBCS — SIGNIFICANT CHANGE UP (ref 0–0)
PLATELET # BLD AUTO: 317 K/UL — SIGNIFICANT CHANGE UP (ref 150–400)
POTASSIUM SERPL-MCNC: 3.8 MMOL/L — SIGNIFICANT CHANGE UP (ref 3.5–5.3)
POTASSIUM SERPL-SCNC: 3.8 MMOL/L — SIGNIFICANT CHANGE UP (ref 3.5–5.3)
PROT SERPL-MCNC: 6.4 G/DL — SIGNIFICANT CHANGE UP (ref 6–8.3)
RBC # BLD: 3.87 M/UL — LOW (ref 4.2–5.8)
RBC # FLD: 15.9 % — HIGH (ref 10.3–14.5)
SODIUM SERPL-SCNC: 143 MMOL/L — SIGNIFICANT CHANGE UP (ref 135–145)
WBC # BLD: 3.55 K/UL — LOW (ref 3.8–10.5)
WBC # FLD AUTO: 3.55 K/UL — LOW (ref 3.8–10.5)

## 2022-05-20 DIAGNOSIS — R11.2 NAUSEA WITH VOMITING, UNSPECIFIED: ICD-10-CM

## 2022-05-20 DIAGNOSIS — Z51.11 ENCOUNTER FOR ANTINEOPLASTIC CHEMOTHERAPY: ICD-10-CM

## 2022-05-21 ENCOUNTER — APPOINTMENT (OUTPATIENT)
Dept: INFUSION THERAPY | Facility: HOSPITAL | Age: 67
End: 2022-05-21

## 2022-06-01 ENCOUNTER — RESULT REVIEW (OUTPATIENT)
Age: 67
End: 2022-06-01

## 2022-06-01 ENCOUNTER — APPOINTMENT (OUTPATIENT)
Dept: HEMATOLOGY ONCOLOGY | Facility: CLINIC | Age: 67
End: 2022-06-01
Payer: MEDICAID

## 2022-06-01 VITALS
BODY MASS INDEX: 22.9 KG/M2 | DIASTOLIC BLOOD PRESSURE: 76 MMHG | OXYGEN SATURATION: 98 % | HEIGHT: 66.93 IN | TEMPERATURE: 98.3 F | RESPIRATION RATE: 16 BRPM | WEIGHT: 145.92 LBS | SYSTOLIC BLOOD PRESSURE: 121 MMHG | HEART RATE: 77 BPM

## 2022-06-01 LAB
BASOPHILS # BLD AUTO: 0.05 K/UL — SIGNIFICANT CHANGE UP (ref 0–0.2)
BASOPHILS NFR BLD AUTO: 1.3 % — SIGNIFICANT CHANGE UP (ref 0–2)
EOSINOPHIL # BLD AUTO: 0.13 K/UL — SIGNIFICANT CHANGE UP (ref 0–0.5)
EOSINOPHIL NFR BLD AUTO: 3.5 % — SIGNIFICANT CHANGE UP (ref 0–6)
HCT VFR BLD CALC: 33.5 % — LOW (ref 39–50)
HGB BLD-MCNC: 10.9 G/DL — LOW (ref 13–17)
IMM GRANULOCYTES NFR BLD AUTO: 1.3 % — SIGNIFICANT CHANGE UP (ref 0–1.5)
LYMPHOCYTES # BLD AUTO: 0.95 K/UL — LOW (ref 1–3.3)
LYMPHOCYTES # BLD AUTO: 25.3 % — SIGNIFICANT CHANGE UP (ref 13–44)
MCHC RBC-ENTMCNC: 29.1 PG — SIGNIFICANT CHANGE UP (ref 27–34)
MCHC RBC-ENTMCNC: 32.5 G/DL — SIGNIFICANT CHANGE UP (ref 32–36)
MCV RBC AUTO: 89.6 FL — SIGNIFICANT CHANGE UP (ref 80–100)
MONOCYTES # BLD AUTO: 0.47 K/UL — SIGNIFICANT CHANGE UP (ref 0–0.9)
MONOCYTES NFR BLD AUTO: 12.5 % — SIGNIFICANT CHANGE UP (ref 2–14)
NEUTROPHILS # BLD AUTO: 2.1 K/UL — SIGNIFICANT CHANGE UP (ref 1.8–7.4)
NEUTROPHILS NFR BLD AUTO: 56.1 % — SIGNIFICANT CHANGE UP (ref 43–77)
NRBC # BLD: 0 /100 WBCS — SIGNIFICANT CHANGE UP (ref 0–0)
PLATELET # BLD AUTO: 324 K/UL — SIGNIFICANT CHANGE UP (ref 150–400)
RBC # BLD: 3.74 M/UL — LOW (ref 4.2–5.8)
RBC # FLD: 16.7 % — HIGH (ref 10.3–14.5)
WBC # BLD: 3.75 K/UL — LOW (ref 3.8–10.5)
WBC # FLD AUTO: 3.75 K/UL — LOW (ref 3.8–10.5)

## 2022-06-01 PROCEDURE — 99214 OFFICE O/P EST MOD 30 MIN: CPT

## 2022-06-04 ENCOUNTER — RX RENEWAL (OUTPATIENT)
Age: 67
End: 2022-06-04

## 2022-06-07 ENCOUNTER — APPOINTMENT (OUTPATIENT)
Dept: CT IMAGING | Facility: CLINIC | Age: 67
End: 2022-06-07
Payer: MEDICAID

## 2022-06-07 ENCOUNTER — RESULT REVIEW (OUTPATIENT)
Age: 67
End: 2022-06-07

## 2022-06-07 ENCOUNTER — OUTPATIENT (OUTPATIENT)
Dept: OUTPATIENT SERVICES | Facility: HOSPITAL | Age: 67
LOS: 1 days | End: 2022-06-07
Payer: MEDICAID

## 2022-06-07 DIAGNOSIS — C79.51 SECONDARY MALIGNANT NEOPLASM OF BONE: ICD-10-CM

## 2022-06-07 DIAGNOSIS — Z00.8 ENCOUNTER FOR OTHER GENERAL EXAMINATION: ICD-10-CM

## 2022-06-07 DIAGNOSIS — Z98.890 OTHER SPECIFIED POSTPROCEDURAL STATES: Chronic | ICD-10-CM

## 2022-06-07 DIAGNOSIS — Z90.49 ACQUIRED ABSENCE OF OTHER SPECIFIED PARTS OF DIGESTIVE TRACT: Chronic | ICD-10-CM

## 2022-06-07 PROCEDURE — 71260 CT THORAX DX C+: CPT | Mod: 26

## 2022-06-07 PROCEDURE — 74177 CT ABD & PELVIS W/CONTRAST: CPT | Mod: 26

## 2022-06-07 PROCEDURE — 71260 CT THORAX DX C+: CPT

## 2022-06-07 PROCEDURE — 74177 CT ABD & PELVIS W/CONTRAST: CPT

## 2022-06-08 ENCOUNTER — APPOINTMENT (OUTPATIENT)
Dept: INFUSION THERAPY | Facility: HOSPITAL | Age: 67
End: 2022-06-08

## 2022-06-10 ENCOUNTER — APPOINTMENT (OUTPATIENT)
Dept: INFUSION THERAPY | Facility: HOSPITAL | Age: 67
End: 2022-06-10

## 2022-06-22 ENCOUNTER — RESULT REVIEW (OUTPATIENT)
Age: 67
End: 2022-06-22

## 2022-06-22 ENCOUNTER — APPOINTMENT (OUTPATIENT)
Dept: INFUSION THERAPY | Facility: HOSPITAL | Age: 67
End: 2022-06-22

## 2022-06-22 ENCOUNTER — NON-APPOINTMENT (OUTPATIENT)
Age: 67
End: 2022-06-22

## 2022-06-22 LAB
ALBUMIN SERPL ELPH-MCNC: 3.6 G/DL — SIGNIFICANT CHANGE UP (ref 3.3–5)
ALP SERPL-CCNC: 132 U/L — HIGH (ref 40–120)
ALT FLD-CCNC: 9 U/L — LOW (ref 10–45)
ANION GAP SERPL CALC-SCNC: 10 MMOL/L — SIGNIFICANT CHANGE UP (ref 5–17)
AST SERPL-CCNC: 19 U/L — SIGNIFICANT CHANGE UP (ref 10–40)
BASOPHILS # BLD AUTO: 0.09 K/UL — SIGNIFICANT CHANGE UP (ref 0–0.2)
BASOPHILS NFR BLD AUTO: 1 % — SIGNIFICANT CHANGE UP (ref 0–2)
BILIRUB SERPL-MCNC: 0.5 MG/DL — SIGNIFICANT CHANGE UP (ref 0.2–1.2)
BUN SERPL-MCNC: 10 MG/DL — SIGNIFICANT CHANGE UP (ref 7–23)
CALCIUM SERPL-MCNC: 8.6 MG/DL — SIGNIFICANT CHANGE UP (ref 8.4–10.5)
CANCER AG19-9 SERPL-ACNC: 17 U/ML — SIGNIFICANT CHANGE UP
CEA SERPL-MCNC: 10.3 NG/ML — HIGH (ref 0–3.8)
CHLORIDE SERPL-SCNC: 106 MMOL/L — SIGNIFICANT CHANGE UP (ref 96–108)
CO2 SERPL-SCNC: 25 MMOL/L — SIGNIFICANT CHANGE UP (ref 22–31)
CREAT SERPL-MCNC: 0.71 MG/DL — SIGNIFICANT CHANGE UP (ref 0.5–1.3)
EGFR: 101 ML/MIN/1.73M2 — SIGNIFICANT CHANGE UP
EOSINOPHIL # BLD AUTO: 0.38 K/UL — SIGNIFICANT CHANGE UP (ref 0–0.5)
EOSINOPHIL NFR BLD AUTO: 4.1 % — SIGNIFICANT CHANGE UP (ref 0–6)
GLUCOSE SERPL-MCNC: 76 MG/DL — SIGNIFICANT CHANGE UP (ref 70–99)
HCT VFR BLD CALC: 37.4 % — LOW (ref 39–50)
HGB BLD-MCNC: 12.2 G/DL — LOW (ref 13–17)
IMM GRANULOCYTES NFR BLD AUTO: 0.4 % — SIGNIFICANT CHANGE UP (ref 0–1.5)
LYMPHOCYTES # BLD AUTO: 1.49 K/UL — SIGNIFICANT CHANGE UP (ref 1–3.3)
LYMPHOCYTES # BLD AUTO: 16.2 % — SIGNIFICANT CHANGE UP (ref 13–44)
MCHC RBC-ENTMCNC: 29.3 PG — SIGNIFICANT CHANGE UP (ref 27–34)
MCHC RBC-ENTMCNC: 32.6 G/DL — SIGNIFICANT CHANGE UP (ref 32–36)
MCV RBC AUTO: 89.7 FL — SIGNIFICANT CHANGE UP (ref 80–100)
MONOCYTES # BLD AUTO: 0.9 K/UL — SIGNIFICANT CHANGE UP (ref 0–0.9)
MONOCYTES NFR BLD AUTO: 9.8 % — SIGNIFICANT CHANGE UP (ref 2–14)
NEUTROPHILS # BLD AUTO: 6.32 K/UL — SIGNIFICANT CHANGE UP (ref 1.8–7.4)
NEUTROPHILS NFR BLD AUTO: 68.5 % — SIGNIFICANT CHANGE UP (ref 43–77)
NRBC # BLD: 0 /100 WBCS — SIGNIFICANT CHANGE UP (ref 0–0)
PLATELET # BLD AUTO: 374 K/UL — SIGNIFICANT CHANGE UP (ref 150–400)
POTASSIUM SERPL-MCNC: 4.5 MMOL/L — SIGNIFICANT CHANGE UP (ref 3.5–5.3)
POTASSIUM SERPL-SCNC: 4.5 MMOL/L — SIGNIFICANT CHANGE UP (ref 3.5–5.3)
PROT SERPL-MCNC: 6.4 G/DL — SIGNIFICANT CHANGE UP (ref 6–8.3)
RBC # BLD: 4.17 M/UL — LOW (ref 4.2–5.8)
RBC # FLD: 16.2 % — HIGH (ref 10.3–14.5)
SODIUM SERPL-SCNC: 141 MMOL/L — SIGNIFICANT CHANGE UP (ref 135–145)
WBC # BLD: 9.22 K/UL — SIGNIFICANT CHANGE UP (ref 3.8–10.5)
WBC # FLD AUTO: 9.22 K/UL — SIGNIFICANT CHANGE UP (ref 3.8–10.5)

## 2022-06-24 ENCOUNTER — APPOINTMENT (OUTPATIENT)
Dept: INFUSION THERAPY | Facility: HOSPITAL | Age: 67
End: 2022-06-24

## 2022-06-27 ENCOUNTER — APPOINTMENT (OUTPATIENT)
Dept: RADIATION ONCOLOGY | Facility: CLINIC | Age: 67
End: 2022-06-27

## 2022-06-27 ENCOUNTER — OUTPATIENT (OUTPATIENT)
Dept: OUTPATIENT SERVICES | Facility: HOSPITAL | Age: 67
LOS: 1 days | Discharge: HOME | End: 2022-06-27

## 2022-06-27 VITALS
DIASTOLIC BLOOD PRESSURE: 73 MMHG | RESPIRATION RATE: 16 BRPM | OXYGEN SATURATION: 97 % | SYSTOLIC BLOOD PRESSURE: 116 MMHG | TEMPERATURE: 97.7 F | WEIGHT: 142.64 LBS | HEART RATE: 78 BPM | BODY MASS INDEX: 22.39 KG/M2

## 2022-06-27 DIAGNOSIS — Z90.49 ACQUIRED ABSENCE OF OTHER SPECIFIED PARTS OF DIGESTIVE TRACT: Chronic | ICD-10-CM

## 2022-06-27 DIAGNOSIS — Z98.890 OTHER SPECIFIED POSTPROCEDURAL STATES: Chronic | ICD-10-CM

## 2022-06-27 PROCEDURE — 99214 OFFICE O/P EST MOD 30 MIN: CPT

## 2022-06-27 NOTE — VITALS
[Maximal Pain Intensity: 7/10] : 7/10 [Least Pain Intensity: 0/10] : 0/10 [Pain Description/Quality: ___] : Pain description/quality: [unfilled] [Pain Duration: ___] : Pain duration: [unfilled] [Pain Location: ___] : Pain Location: [unfilled] [Pain Interferes with ADLs] : Pain interferes with activities of daily living. [Opioid] : opioid [80: Normal activity with effort; some signs or symptoms of disease.] : 80: Normal activity with effort; some signs or symptoms of disease.  [ECOG Performance Status: 1 - Restricted in physically strenuous activity but ambulatory and able to carry out work of a light or sedentary nature] : Performance Status: 1 - Restricted in physically strenuous activity but ambulatory and able to carry out work of a light or sedentary nature, e.g., light house work, office work

## 2022-06-28 PROCEDURE — 77263 THER RADIOLOGY TX PLNG CPLX: CPT

## 2022-06-28 NOTE — HISTORY OF PRESENT ILLNESS
[FreeTextEntry1] : Mr. Sánchez Ferrer is a 68yo with hx of esophageal adenocarcinoma s/p CRT and esophagectomy 2018 recently presenting with metastatic disease throughout spine including cord compression due to deposits of epidural disease at level of inferior portion of T4, superior portion of T7, and entire T5-T6, s/p T4-5 and T6-7 laminectomies and posterolateral arthrodesis from T3-T12 . His previous RT as was done at [place] under the care of Dr. Pickett. He completed 24 Gy/3 fxns to T4-T8 on 12/28/20.\par \par Pt had CT of chest,abdomen and pelvis with and without contrast on 6/7/22. It showed:    \par Mediastinal adenopathy with interval increase. Scattered subcentimeter pulmonary nodules without change.\par \par Presents today for re-evaluation. Occasional back pain. Taking oxycodone as needed with relief. Appetite is good. Receiving chemo with Dr. Long.

## 2022-06-28 NOTE — REVIEW OF SYSTEMS
[Fatigue] : fatigue [Shortness Of Breath] : shortness of breath [SOB on Exertion] : shortness of breath during exertion [Muscle Pain] : muscle pain [Muscle Weakness] : muscle weakness [Negative] : Allergic/Immunologic [Disturbance Of Gait] : no gait disturbance

## 2022-06-30 ENCOUNTER — NON-APPOINTMENT (OUTPATIENT)
Age: 67
End: 2022-06-30

## 2022-07-06 ENCOUNTER — RESULT REVIEW (OUTPATIENT)
Age: 67
End: 2022-07-06

## 2022-07-06 ENCOUNTER — APPOINTMENT (OUTPATIENT)
Dept: INFUSION THERAPY | Facility: HOSPITAL | Age: 67
End: 2022-07-06

## 2022-07-06 ENCOUNTER — APPOINTMENT (OUTPATIENT)
Dept: HEMATOLOGY ONCOLOGY | Facility: CLINIC | Age: 67
End: 2022-07-06

## 2022-07-06 ENCOUNTER — NON-APPOINTMENT (OUTPATIENT)
Age: 67
End: 2022-07-06

## 2022-07-06 LAB
ALBUMIN SERPL ELPH-MCNC: 3.5 G/DL — SIGNIFICANT CHANGE UP (ref 3.3–5)
ALP SERPL-CCNC: 117 U/L — SIGNIFICANT CHANGE UP (ref 40–120)
ALT FLD-CCNC: 11 U/L — SIGNIFICANT CHANGE UP (ref 10–45)
ANION GAP SERPL CALC-SCNC: 11 MMOL/L — SIGNIFICANT CHANGE UP (ref 5–17)
AST SERPL-CCNC: 18 U/L — SIGNIFICANT CHANGE UP (ref 10–40)
BASOPHILS # BLD AUTO: 0.04 K/UL — SIGNIFICANT CHANGE UP (ref 0–0.2)
BASOPHILS NFR BLD AUTO: 0.9 % — SIGNIFICANT CHANGE UP (ref 0–2)
BILIRUB SERPL-MCNC: 0.6 MG/DL — SIGNIFICANT CHANGE UP (ref 0.2–1.2)
BUN SERPL-MCNC: 9 MG/DL — SIGNIFICANT CHANGE UP (ref 7–23)
CALCIUM SERPL-MCNC: 8.7 MG/DL — SIGNIFICANT CHANGE UP (ref 8.4–10.5)
CHLORIDE SERPL-SCNC: 107 MMOL/L — SIGNIFICANT CHANGE UP (ref 96–108)
CO2 SERPL-SCNC: 24 MMOL/L — SIGNIFICANT CHANGE UP (ref 22–31)
CREAT SERPL-MCNC: 0.73 MG/DL — SIGNIFICANT CHANGE UP (ref 0.5–1.3)
EGFR: 100 ML/MIN/1.73M2 — SIGNIFICANT CHANGE UP
EOSINOPHIL # BLD AUTO: 0.26 K/UL — SIGNIFICANT CHANGE UP (ref 0–0.5)
EOSINOPHIL NFR BLD AUTO: 5.7 % — SIGNIFICANT CHANGE UP (ref 0–6)
GLUCOSE SERPL-MCNC: 154 MG/DL — HIGH (ref 70–99)
HCT VFR BLD CALC: 34.9 % — LOW (ref 39–50)
HGB BLD-MCNC: 11.2 G/DL — LOW (ref 13–17)
IMM GRANULOCYTES NFR BLD AUTO: 0.7 % — SIGNIFICANT CHANGE UP (ref 0–1.5)
LYMPHOCYTES # BLD AUTO: 1.08 K/UL — SIGNIFICANT CHANGE UP (ref 1–3.3)
LYMPHOCYTES # BLD AUTO: 23.7 % — SIGNIFICANT CHANGE UP (ref 13–44)
MCHC RBC-ENTMCNC: 28.7 PG — SIGNIFICANT CHANGE UP (ref 27–34)
MCHC RBC-ENTMCNC: 32.1 G/DL — SIGNIFICANT CHANGE UP (ref 32–36)
MCV RBC AUTO: 89.5 FL — SIGNIFICANT CHANGE UP (ref 80–100)
MONOCYTES # BLD AUTO: 0.45 K/UL — SIGNIFICANT CHANGE UP (ref 0–0.9)
MONOCYTES NFR BLD AUTO: 9.9 % — SIGNIFICANT CHANGE UP (ref 2–14)
NEUTROPHILS # BLD AUTO: 2.7 K/UL — SIGNIFICANT CHANGE UP (ref 1.8–7.4)
NEUTROPHILS NFR BLD AUTO: 59.1 % — SIGNIFICANT CHANGE UP (ref 43–77)
NRBC # BLD: 0 /100 WBCS — SIGNIFICANT CHANGE UP (ref 0–0)
PLATELET # BLD AUTO: 247 K/UL — SIGNIFICANT CHANGE UP (ref 150–400)
POTASSIUM SERPL-MCNC: 3.7 MMOL/L — SIGNIFICANT CHANGE UP (ref 3.5–5.3)
POTASSIUM SERPL-SCNC: 3.7 MMOL/L — SIGNIFICANT CHANGE UP (ref 3.5–5.3)
PROT SERPL-MCNC: 6.1 G/DL — SIGNIFICANT CHANGE UP (ref 6–8.3)
RBC # BLD: 3.9 M/UL — LOW (ref 4.2–5.8)
RBC # FLD: 15.5 % — HIGH (ref 10.3–14.5)
SODIUM SERPL-SCNC: 142 MMOL/L — SIGNIFICANT CHANGE UP (ref 135–145)
WBC # BLD: 4.56 K/UL — SIGNIFICANT CHANGE UP (ref 3.8–10.5)
WBC # FLD AUTO: 4.56 K/UL — SIGNIFICANT CHANGE UP (ref 3.8–10.5)

## 2022-07-08 ENCOUNTER — APPOINTMENT (OUTPATIENT)
Dept: INFUSION THERAPY | Facility: HOSPITAL | Age: 67
End: 2022-07-08

## 2022-07-08 ENCOUNTER — NON-APPOINTMENT (OUTPATIENT)
Age: 67
End: 2022-07-08

## 2022-07-11 ENCOUNTER — OUTPATIENT (OUTPATIENT)
Dept: OUTPATIENT SERVICES | Facility: HOSPITAL | Age: 67
LOS: 1 days | Discharge: HOME | End: 2022-07-11

## 2022-07-11 DIAGNOSIS — C18.9 MALIGNANT NEOPLASM OF COLON, UNSPECIFIED: ICD-10-CM

## 2022-07-11 DIAGNOSIS — Z90.49 ACQUIRED ABSENCE OF OTHER SPECIFIED PARTS OF DIGESTIVE TRACT: Chronic | ICD-10-CM

## 2022-07-11 DIAGNOSIS — Z98.890 OTHER SPECIFIED POSTPROCEDURAL STATES: Chronic | ICD-10-CM

## 2022-07-14 PROCEDURE — 77300 RADIATION THERAPY DOSE PLAN: CPT | Mod: 26

## 2022-07-14 PROCEDURE — 77301 RADIOTHERAPY DOSE PLAN IMRT: CPT | Mod: 26

## 2022-07-14 PROCEDURE — 77338 DESIGN MLC DEVICE FOR IMRT: CPT | Mod: 26

## 2022-07-14 PROCEDURE — 77293 RESPIRATOR MOTION MGMT SIMUL: CPT | Mod: 26

## 2022-07-19 PROCEDURE — G6002: CPT | Mod: 26

## 2022-07-20 ENCOUNTER — RESULT REVIEW (OUTPATIENT)
Age: 67
End: 2022-07-20

## 2022-07-20 ENCOUNTER — APPOINTMENT (OUTPATIENT)
Dept: HEMATOLOGY ONCOLOGY | Facility: CLINIC | Age: 67
End: 2022-07-20

## 2022-07-20 ENCOUNTER — NON-APPOINTMENT (OUTPATIENT)
Age: 67
End: 2022-07-20

## 2022-07-20 ENCOUNTER — APPOINTMENT (OUTPATIENT)
Dept: INFUSION THERAPY | Facility: HOSPITAL | Age: 67
End: 2022-07-20

## 2022-07-20 LAB
ALBUMIN SERPL ELPH-MCNC: 3.6 G/DL — SIGNIFICANT CHANGE UP (ref 3.3–5)
ALP SERPL-CCNC: 119 U/L — SIGNIFICANT CHANGE UP (ref 40–120)
ALT FLD-CCNC: 7 U/L — LOW (ref 10–45)
ANION GAP SERPL CALC-SCNC: 8 MMOL/L — SIGNIFICANT CHANGE UP (ref 5–17)
AST SERPL-CCNC: 16 U/L — SIGNIFICANT CHANGE UP (ref 10–40)
BASOPHILS # BLD AUTO: 0.03 K/UL — SIGNIFICANT CHANGE UP (ref 0–0.2)
BASOPHILS NFR BLD AUTO: 0.9 % — SIGNIFICANT CHANGE UP (ref 0–2)
BILIRUB SERPL-MCNC: 0.5 MG/DL — SIGNIFICANT CHANGE UP (ref 0.2–1.2)
BUN SERPL-MCNC: 11 MG/DL — SIGNIFICANT CHANGE UP (ref 7–23)
CALCIUM SERPL-MCNC: 8.9 MG/DL — SIGNIFICANT CHANGE UP (ref 8.4–10.5)
CANCER AG19-9 SERPL-ACNC: 24 U/ML — SIGNIFICANT CHANGE UP
CEA SERPL-MCNC: 9.4 NG/ML — HIGH (ref 0–3.8)
CHLORIDE SERPL-SCNC: 107 MMOL/L — SIGNIFICANT CHANGE UP (ref 96–108)
CO2 SERPL-SCNC: 26 MMOL/L — SIGNIFICANT CHANGE UP (ref 22–31)
CREAT SERPL-MCNC: 0.74 MG/DL — SIGNIFICANT CHANGE UP (ref 0.5–1.3)
EGFR: 100 ML/MIN/1.73M2 — SIGNIFICANT CHANGE UP
EOSINOPHIL # BLD AUTO: 0.1 K/UL — SIGNIFICANT CHANGE UP (ref 0–0.5)
EOSINOPHIL NFR BLD AUTO: 3.2 % — SIGNIFICANT CHANGE UP (ref 0–6)
GLUCOSE SERPL-MCNC: 87 MG/DL — SIGNIFICANT CHANGE UP (ref 70–99)
HCT VFR BLD CALC: 33.7 % — LOW (ref 39–50)
HGB BLD-MCNC: 10.7 G/DL — LOW (ref 13–17)
IMM GRANULOCYTES NFR BLD AUTO: 0.3 % — SIGNIFICANT CHANGE UP (ref 0–1.5)
LYMPHOCYTES # BLD AUTO: 0.81 K/UL — LOW (ref 1–3.3)
LYMPHOCYTES # BLD AUTO: 25.6 % — SIGNIFICANT CHANGE UP (ref 13–44)
MCHC RBC-ENTMCNC: 29.1 PG — SIGNIFICANT CHANGE UP (ref 27–34)
MCHC RBC-ENTMCNC: 31.8 G/DL — LOW (ref 32–36)
MCV RBC AUTO: 91.6 FL — SIGNIFICANT CHANGE UP (ref 80–100)
MONOCYTES # BLD AUTO: 0.43 K/UL — SIGNIFICANT CHANGE UP (ref 0–0.9)
MONOCYTES NFR BLD AUTO: 13.6 % — SIGNIFICANT CHANGE UP (ref 2–14)
NEUTROPHILS # BLD AUTO: 1.79 K/UL — LOW (ref 1.8–7.4)
NEUTROPHILS NFR BLD AUTO: 56.4 % — SIGNIFICANT CHANGE UP (ref 43–77)
NRBC # BLD: 0 /100 WBCS — SIGNIFICANT CHANGE UP (ref 0–0)
PLATELET # BLD AUTO: 242 K/UL — SIGNIFICANT CHANGE UP (ref 150–400)
POTASSIUM SERPL-MCNC: 4 MMOL/L — SIGNIFICANT CHANGE UP (ref 3.5–5.3)
POTASSIUM SERPL-SCNC: 4 MMOL/L — SIGNIFICANT CHANGE UP (ref 3.5–5.3)
PROT SERPL-MCNC: 6 G/DL — SIGNIFICANT CHANGE UP (ref 6–8.3)
RBC # BLD: 3.68 M/UL — LOW (ref 4.2–5.8)
RBC # FLD: 15.9 % — HIGH (ref 10.3–14.5)
SODIUM SERPL-SCNC: 142 MMOL/L — SIGNIFICANT CHANGE UP (ref 135–145)
WBC # BLD: 3.17 K/UL — LOW (ref 3.8–10.5)
WBC # FLD AUTO: 3.17 K/UL — LOW (ref 3.8–10.5)

## 2022-07-21 ENCOUNTER — NON-APPOINTMENT (OUTPATIENT)
Age: 67
End: 2022-07-21

## 2022-07-21 DIAGNOSIS — R11.2 NAUSEA WITH VOMITING, UNSPECIFIED: ICD-10-CM

## 2022-07-21 DIAGNOSIS — Z51.11 ENCOUNTER FOR ANTINEOPLASTIC CHEMOTHERAPY: ICD-10-CM

## 2022-07-21 PROCEDURE — G6002: CPT | Mod: 26

## 2022-07-21 NOTE — REVIEW OF SYSTEMS
[Dysphagia: Grade 0] : Dysphagia: Grade 0 [Fatigue: Grade 1 - Fatigue relieved by rest] : Fatigue: Grade 1 - Fatigue relieved by rest [Cough: Grade 0] : Cough: Grade 0 [Dyspnea: Grade 1 - Shortness of breath with moderate exertion] : Dyspnea: Grade 1 - Shortness of breath with moderate exertion [Hoarseness: Grade 0] : Hoarseness: Grade 0 [Skin Hyperpigmentation: Grade 0] : Skin Hyperpigmentation: Grade 0 [Dermatitis Radiation: Grade 0] : Dermatitis Radiation: Grade 0

## 2022-07-22 ENCOUNTER — APPOINTMENT (OUTPATIENT)
Dept: INFUSION THERAPY | Facility: HOSPITAL | Age: 67
End: 2022-07-22

## 2022-07-22 PROCEDURE — G6002: CPT | Mod: 26

## 2022-07-25 PROCEDURE — 77427 RADIATION TX MANAGEMENT X5: CPT

## 2022-07-25 PROCEDURE — G6002: CPT | Mod: 26

## 2022-07-26 PROCEDURE — 77014: CPT | Mod: 26

## 2022-07-26 NOTE — HISTORY OF PRESENT ILLNESS
[Disease: _____________________] : Disease: [unfilled] [M: ___] : M[unfilled] [AJCC Stage: ____] : AJCC Stage: [unfilled] [de-identified] : This is a 65-year-old man with history of esophagogastric carcinoma who presents for evaluation.  His history dates back to March 2018 when he developed a cough and decreased appetite.  A CAT scan of his chest revealed a mass in the distal esophagus with periesophageal and portacaval adenopathy.  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  The patient underwent neoadjuvant chemotherapy and radiation therapy with Taxol carboplatin.  A follow-up CAT scan in June 2018 revealed decreased mass in the esophagus.  In August 2018 the patient underwent [place] Raghu esophagogastrectomy with Dr. Cloud and Dr. Arciniega.  The pathology revealed no residual carcinoma with negative margins, with12 - lymph nodes (0/12).\par \par The patient then was well until November 2020 when he developed right rib pain and upper back pain.  Imaging testing revealed metastases to the thoracic spine and possible cord compression at T5-T6.  The patient underwent laminectomy of T4-T6 resection of the epidural tumor with T3-T12 fusion and endovascular embolization of the spinal tumor T5-T6.  The pathology revealed poorly differentiated adenocarcinoma.  Postoperatively his pain was relieved.  He has undergone radiation oncology consultation and is planned to receive SBRT treatment.  He now presents for discussion regarding further chemotherapy. [de-identified] : adenoca, Her2 pos [de-identified] : Her2 =3+ positive\par PD-L1 Immunohistochemistry: Tumor Proportion Score+ 5%, Interpretation: positive, HER2 IHC 3+ Positive. \par Tumor Mutational [place]+11 Muts/Mb\par Microsatellite status- MS-Stable\par ERBB2 amplification, CCNE1 amplification,  MYC amplication, CCND3 amplification, VEGFA amplication [de-identified] : s/p Ongoing Folfiri/Herceptin treatment every 2 weeks. Patient feels okay except occasional back pain. He takes Oxycodone 5 mg as needed which is not every day. Appetite is fine. Denies any diarrhea or constipation.

## 2022-07-26 NOTE — REVIEW OF SYSTEMS
[Fatigue] : fatigue [Negative] : Allergic/Immunologic [FreeTextEntry7] : occ heartburn [FreeTextEntry9] : chronic lower back pain

## 2022-07-26 NOTE — HISTORY OF PRESENT ILLNESS
[FreeTextEntry1] : Mr. Sánchez Ferrer is a 68yo with hx of esophageal adenocarcinoma s/p CRT and esophagectomy 2018 recently presenting with metastatic disease throughout spine including cord compression due to deposits of epidural disease at level of inferior portion of T4, superior portion of T7, and entire T5-T6, s/p T4-5 and T6-7 laminectomies and posterolateral arthrodesis from T3-T12 . His previous RT as was done at [facility] under the care of Dr. Pickett. He completed 24 Gy/3 fxns to T4-T8 on 12/28/20.\par \par Pt had CT of chest,abdomen and pelvis with and without contrast on 6/7/22. It showed:    \par Mediastinal adenopathy with interval increase. Scattered subcentimeter pulmonary nodules without change.\par \par 7/21/2022 Presents today for OTV. Completed fx 2/15 to mediastinal nodes. Doing well. Denies pain, coughing, shortness of breath no change from baseline.

## 2022-07-26 NOTE — ASSESSMENT
[Palliative] : Goals of care discussed with patient: Palliative [Palliative Care Plan] : not applicable at this time [FreeTextEntry1] : Will continue Folfiri/Herceptin every 2 weeks.  Will monitor for side effects/toxicities. Review recent  ECHO for cardiac function which EF is stable to resume treatment. .Ordered repeat CT scans of abdomen/pelvis/chest to  assess for response or progression of his disease, f/u. Reviewed labs CBC, CM"P, CEA. Pt to RTO in 1 month or sooner if needed.

## 2022-07-26 NOTE — DISEASE MANAGEMENT
[FreeTextEntry4] : bone mets [TTNM] : x [NTNM] : x [MTNM] : 1 [de-identified] : 630 [[place] [address]] : 4691 [de-identified] : Mediastinal nodes

## 2022-07-27 ENCOUNTER — NON-APPOINTMENT (OUTPATIENT)
Age: 67
End: 2022-07-27

## 2022-07-27 VITALS
WEIGHT: 143.41 LBS | HEIGHT: 66 IN | TEMPERATURE: 96.98 F | HEART RATE: 91 BPM | DIASTOLIC BLOOD PRESSURE: 74 MMHG | BODY MASS INDEX: 23.05 KG/M2 | OXYGEN SATURATION: 99 % | RESPIRATION RATE: 17 BRPM | SYSTOLIC BLOOD PRESSURE: 123 MMHG

## 2022-07-27 PROCEDURE — G6002: CPT | Mod: 26

## 2022-07-27 NOTE — HISTORY OF PRESENT ILLNESS
[FreeTextEntry1] : Mr. Sánchez Ferrer is a 66yo with hx of esophageal adenocarcinoma s/p CRT and esophagectomy 2018 recently presenting with metastatic disease throughout spine including cord compression due to deposits of epidural disease at level of inferior portion of T4, superior portion of T7, and entire T5-T6, s/p T4-5 and T6-7 laminectomies and posterolateral arthrodesis from T3-T12 . His previous RT as was done at [place] under the care of Dr. Pickett. He completed 24 Gy/3 fxns to T4-T8 on 12/28/20.\par \par Pt had CT of chest,abdomen and pelvis with and without contrast on 6/7/22. It showed:    \par Mediastinal adenopathy with interval increase. Scattered subcentimeter pulmonary nodules without change.\par \par 7/21/2022 Presents today for OTV. Completed fx 2/15 to mediastinal nodes. Doing well. Denies pain, coughing, shortness of breath no change from baseline. \par \par 7/27/2022 OTV. Completed fx 6/15. Doing well. No c/o difficulty eating or swallowing. C/o lack of appetite. Maintaining weight.

## 2022-07-27 NOTE — DISEASE MANAGEMENT
[Clinical] : TNM Stage: c [IV] : IV [FreeTextEntry4] : bone mets [TTNM] : x [NTNM] : x [MTNM] : 1 [de-identified] : 3081 [[place] [address]] : 1652 [de-identified] : Mediastinal nodes

## 2022-07-28 PROCEDURE — G6002: CPT | Mod: 26

## 2022-07-29 PROCEDURE — G6002: CPT | Mod: 26

## 2022-08-01 PROCEDURE — G6002: CPT | Mod: 26

## 2022-08-01 PROCEDURE — 77427 RADIATION TX MANAGEMENT X5: CPT

## 2022-08-02 PROCEDURE — 77014: CPT | Mod: 26

## 2022-08-03 ENCOUNTER — NON-APPOINTMENT (OUTPATIENT)
Age: 67
End: 2022-08-03

## 2022-08-03 ENCOUNTER — APPOINTMENT (OUTPATIENT)
Dept: HEMATOLOGY ONCOLOGY | Facility: CLINIC | Age: 67
End: 2022-08-03

## 2022-08-03 ENCOUNTER — APPOINTMENT (OUTPATIENT)
Dept: INFUSION THERAPY | Facility: HOSPITAL | Age: 67
End: 2022-08-03

## 2022-08-03 ENCOUNTER — RESULT REVIEW (OUTPATIENT)
Age: 67
End: 2022-08-03

## 2022-08-03 LAB
ALBUMIN SERPL ELPH-MCNC: 3.5 G/DL — SIGNIFICANT CHANGE UP (ref 3.3–5)
ALP SERPL-CCNC: 131 U/L — HIGH (ref 40–120)
ALT FLD-CCNC: 10 U/L — SIGNIFICANT CHANGE UP (ref 10–45)
ANION GAP SERPL CALC-SCNC: 10 MMOL/L — SIGNIFICANT CHANGE UP (ref 5–17)
AST SERPL-CCNC: 17 U/L — SIGNIFICANT CHANGE UP (ref 10–40)
BASOPHILS # BLD AUTO: 0.03 K/UL — SIGNIFICANT CHANGE UP (ref 0–0.2)
BASOPHILS NFR BLD AUTO: 1.1 % — SIGNIFICANT CHANGE UP (ref 0–2)
BILIRUB SERPL-MCNC: 0.9 MG/DL — SIGNIFICANT CHANGE UP (ref 0.2–1.2)
BUN SERPL-MCNC: 9 MG/DL — SIGNIFICANT CHANGE UP (ref 7–23)
CALCIUM SERPL-MCNC: 8.9 MG/DL — SIGNIFICANT CHANGE UP (ref 8.4–10.5)
CHLORIDE SERPL-SCNC: 104 MMOL/L — SIGNIFICANT CHANGE UP (ref 96–108)
CO2 SERPL-SCNC: 26 MMOL/L — SIGNIFICANT CHANGE UP (ref 22–31)
CREAT SERPL-MCNC: 0.82 MG/DL — SIGNIFICANT CHANGE UP (ref 0.5–1.3)
EGFR: 97 ML/MIN/1.73M2 — SIGNIFICANT CHANGE UP
EOSINOPHIL # BLD AUTO: 0.11 K/UL — SIGNIFICANT CHANGE UP (ref 0–0.5)
EOSINOPHIL NFR BLD AUTO: 4.2 % — SIGNIFICANT CHANGE UP (ref 0–6)
GLUCOSE SERPL-MCNC: 94 MG/DL — SIGNIFICANT CHANGE UP (ref 70–99)
HCT VFR BLD CALC: 35.1 % — LOW (ref 39–50)
HGB BLD-MCNC: 11.5 G/DL — LOW (ref 13–17)
IMM GRANULOCYTES NFR BLD AUTO: 1.1 % — SIGNIFICANT CHANGE UP (ref 0–1.5)
LYMPHOCYTES # BLD AUTO: 0.65 K/UL — LOW (ref 1–3.3)
LYMPHOCYTES # BLD AUTO: 24.7 % — SIGNIFICANT CHANGE UP (ref 13–44)
MCHC RBC-ENTMCNC: 29.3 PG — SIGNIFICANT CHANGE UP (ref 27–34)
MCHC RBC-ENTMCNC: 32.8 G/DL — SIGNIFICANT CHANGE UP (ref 32–36)
MCV RBC AUTO: 89.3 FL — SIGNIFICANT CHANGE UP (ref 80–100)
MONOCYTES # BLD AUTO: 0.42 K/UL — SIGNIFICANT CHANGE UP (ref 0–0.9)
MONOCYTES NFR BLD AUTO: 16 % — HIGH (ref 2–14)
NEUTROPHILS # BLD AUTO: 1.39 K/UL — LOW (ref 1.8–7.4)
NEUTROPHILS NFR BLD AUTO: 52.9 % — SIGNIFICANT CHANGE UP (ref 43–77)
NRBC # BLD: 0 /100 WBCS — SIGNIFICANT CHANGE UP (ref 0–0)
PLATELET # BLD AUTO: 290 K/UL — SIGNIFICANT CHANGE UP (ref 150–400)
POTASSIUM SERPL-MCNC: 3.9 MMOL/L — SIGNIFICANT CHANGE UP (ref 3.5–5.3)
POTASSIUM SERPL-SCNC: 3.9 MMOL/L — SIGNIFICANT CHANGE UP (ref 3.5–5.3)
PROT SERPL-MCNC: 6.1 G/DL — SIGNIFICANT CHANGE UP (ref 6–8.3)
RBC # BLD: 3.93 M/UL — LOW (ref 4.2–5.8)
RBC # FLD: 16.2 % — HIGH (ref 10.3–14.5)
SODIUM SERPL-SCNC: 139 MMOL/L — SIGNIFICANT CHANGE UP (ref 135–145)
WBC # BLD: 2.63 K/UL — LOW (ref 3.8–10.5)
WBC # FLD AUTO: 2.63 K/UL — LOW (ref 3.8–10.5)

## 2022-08-03 PROCEDURE — G6002: CPT | Mod: 26

## 2022-08-03 NOTE — HISTORY OF PRESENT ILLNESS
[FreeTextEntry1] : Mr. Sánchez Ferrer is a 66yo with hx of esophageal adenocarcinoma s/p CRT and esophagectomy 2018 recently presenting with metastatic disease throughout spine including cord compression due to deposits of epidural disease at level of inferior portion of T4, superior portion of T7, and entire T5-T6, s/p T4-5 and T6-7 laminectomies and posterolateral arthrodesis from T3-T12 . His previous RT as was done at [place] under the care of Dr. Pickett. He completed 24 Gy/3 fxns to T4-T8 on 12/28/20.\par \par Pt had CT of chest,abdomen and pelvis with and without contrast on 6/7/22. It showed:    \par Mediastinal adenopathy with interval increase. Scattered subcentimeter pulmonary nodules without change.\par \par 7/21/2022 Presents today for OTV. Completed fx 2/15 to mediastinal nodes. Doing well. Denies pain, coughing, shortness of breath no change from baseline. \par \par 7/27/2022 OTV. Completed fx 6/15. Doing well. No c/o difficulty eating or swallowing. C/o lack of appetite. Maintaining weight. \par \par 8/2/2022 OTV. Completed fx 11/15. Continues to have lack of appetite. Denies difficulty eating or swallowing. Coughing and shortness of breath no changes from baseline.

## 2022-08-04 PROCEDURE — G6002: CPT | Mod: 26

## 2022-08-05 ENCOUNTER — APPOINTMENT (OUTPATIENT)
Dept: INFUSION THERAPY | Facility: HOSPITAL | Age: 67
End: 2022-08-05

## 2022-08-05 PROCEDURE — G6002: CPT | Mod: 26

## 2022-08-05 PROCEDURE — 77427 RADIATION TX MANAGEMENT X5: CPT

## 2022-08-08 PROCEDURE — G6002: CPT | Mod: 26

## 2022-08-08 NOTE — REASON FOR VISIT
Aelx Dewey called to request a refill on his medication. Last office visit : 2/23/2022   Next office visit : 8/10/2022     Last UDS:   Amphetamine Screen, Urine   Date Value Ref Range Status   05/16/2019 Neg  Final     Barbiturate Screen, Urine   Date Value Ref Range Status   08/17/2021 neg  Final     Benzodiazepine Screen, Urine   Date Value Ref Range Status   08/17/2021 neg  Final     Buprenorphine Urine   Date Value Ref Range Status   08/17/2021 neg  Final     Cocaine Metabolite Screen, Urine   Date Value Ref Range Status   08/17/2021 neg  Final     Gabapentin Screen, Urine   Date Value Ref Range Status   08/17/2021 neg  Final     MDMA, Urine   Date Value Ref Range Status   08/17/2021 neg  Final     Methamphetamine, Urine   Date Value Ref Range Status   08/17/2021 neg  Final     Opiate Scrn, Ur   Date Value Ref Range Status   08/17/2021 neg  Final     Oxycodone Screen, Ur   Date Value Ref Range Status   08/17/2021 neg  Final     PCP Screen, Urine   Date Value Ref Range Status   08/17/2021 neg  Final     Propoxyphene Screen, Urine   Date Value Ref Range Status   08/17/2021 neg  Final     THC Screen, Urine   Date Value Ref Range Status   08/17/2021 neg  Final     Tricyclic Antidepressants, Urine   Date Value Ref Range Status   08/17/2021 post  Final       Last Julita Alvarez: 6/23/22  Medication Contract: not on file- needs updated   Last Fill: 7/5/22    Requested Prescriptions     Pending Prescriptions Disp Refills    gabapentin (NEURONTIN) 300 MG capsule [Pharmacy Med Name: GABAPENTIN 300MG CAPSULES] 60 capsule      Sig: TAKE 1 CAPSULE BY MOUTH TWICE DAILY         Please approve or refuse this medication.    [employer] [Follow-Up] : a follow-up visit

## 2022-08-09 PROCEDURE — 77014: CPT | Mod: 26

## 2022-08-10 ENCOUNTER — RESULT REVIEW (OUTPATIENT)
Age: 67
End: 2022-08-10

## 2022-08-10 ENCOUNTER — APPOINTMENT (OUTPATIENT)
Dept: HEMATOLOGY ONCOLOGY | Facility: CLINIC | Age: 67
End: 2022-08-10

## 2022-08-10 VITALS
OXYGEN SATURATION: 96 % | SYSTOLIC BLOOD PRESSURE: 108 MMHG | TEMPERATURE: 208.76 F | RESPIRATION RATE: 16 BRPM | HEIGHT: 65.98 IN | WEIGHT: 140.41 LBS | BODY MASS INDEX: 22.57 KG/M2 | DIASTOLIC BLOOD PRESSURE: 73 MMHG | HEART RATE: 75 BPM

## 2022-08-10 LAB
BASOPHILS # BLD AUTO: 0.08 K/UL — SIGNIFICANT CHANGE UP (ref 0–0.2)
BASOPHILS NFR BLD AUTO: 1.6 % — SIGNIFICANT CHANGE UP (ref 0–2)
EOSINOPHIL # BLD AUTO: 0.21 K/UL — SIGNIFICANT CHANGE UP (ref 0–0.5)
EOSINOPHIL NFR BLD AUTO: 4.2 % — SIGNIFICANT CHANGE UP (ref 0–6)
HCT VFR BLD CALC: 38.2 % — LOW (ref 39–50)
HGB BLD-MCNC: 12.3 G/DL — LOW (ref 13–17)
IMM GRANULOCYTES NFR BLD AUTO: 0.6 % — SIGNIFICANT CHANGE UP (ref 0–1.5)
LYMPHOCYTES # BLD AUTO: 0.84 K/UL — LOW (ref 1–3.3)
LYMPHOCYTES # BLD AUTO: 16.7 % — SIGNIFICANT CHANGE UP (ref 13–44)
MCHC RBC-ENTMCNC: 28.9 PG — SIGNIFICANT CHANGE UP (ref 27–34)
MCHC RBC-ENTMCNC: 32.2 G/DL — SIGNIFICANT CHANGE UP (ref 32–36)
MCV RBC AUTO: 89.9 FL — SIGNIFICANT CHANGE UP (ref 80–100)
MONOCYTES # BLD AUTO: 1.18 K/UL — HIGH (ref 0–0.9)
MONOCYTES NFR BLD AUTO: 23.4 % — HIGH (ref 2–14)
NEUTROPHILS # BLD AUTO: 2.7 K/UL — SIGNIFICANT CHANGE UP (ref 1.8–7.4)
NEUTROPHILS NFR BLD AUTO: 53.5 % — SIGNIFICANT CHANGE UP (ref 43–77)
NRBC # BLD: 0 /100 WBCS — SIGNIFICANT CHANGE UP (ref 0–0)
PLATELET # BLD AUTO: 368 K/UL — SIGNIFICANT CHANGE UP (ref 150–400)
RBC # BLD: 4.25 M/UL — SIGNIFICANT CHANGE UP (ref 4.2–5.8)
RBC # FLD: 17.6 % — HIGH (ref 10.3–14.5)
WBC # BLD: 5.04 K/UL — SIGNIFICANT CHANGE UP (ref 3.8–10.5)
WBC # FLD AUTO: 5.04 K/UL — SIGNIFICANT CHANGE UP (ref 3.8–10.5)

## 2022-08-10 PROCEDURE — 99214 OFFICE O/P EST MOD 30 MIN: CPT

## 2022-08-10 RX ORDER — GABAPENTIN 100 MG/1
100 CAPSULE ORAL
Qty: 60 | Refills: 2 | Status: ACTIVE | COMMUNITY
Start: 2021-04-08 | End: 1900-01-01

## 2022-08-13 NOTE — ASSESSMENT
[FreeTextEntry1] : The patient will continue therapy and resume his treatment with irinotecan 5-FU leucovorin and Herceptin and be monitored for side effects and toxicity.  He was encouraged to increase his caloric intake and also speak with the nutritionist to get advice on adding calories to his diet.  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  The patient will return to the office in 1 month or sooner as needed.  He will undergo follow-up CAT scans to assess for response or progression of his disease. [Palliative] : Goals of care discussed with patient: Palliative [Palliative Care Plan] : not applicable at this time

## 2022-08-13 NOTE — HISTORY OF PRESENT ILLNESS
[Disease: _____________________] : Disease: [unfilled] [M: ___] : M[unfilled] [AJCC Stage: ____] : AJCC Stage: [unfilled] [de-identified] : This is a 65-year-old man with history of esophagogastric carcinoma who presents for evaluation.  His history dates back to March 2018 when he developed a cough and decreased appetite.  A CAT scan of his chest revealed a mass in the distal esophagus with periesophageal and portacaval adenopathy.  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  The patient underwent neoadjuvant chemotherapy and radiation therapy with Taxol carboplatin.  A follow-up CAT scan in June 2018 revealed decreased mass in the esophagus.  In August 2018 the patient underwent [place] Raghu esophagogastrectomy with Dr. Cloud and Dr. Arciniega.  The pathology revealed no residual carcinoma with negative margins, with12 - lymph nodes (0/12).\par \par The patient then was well until November 2020 when he developed right rib pain and upper back pain.  Imaging testing revealed metastases to the thoracic spine and possible cord compression at T5-T6.  The patient underwent laminectomy of T4-T6 resection of the epidural tumor with T3-T12 fusion and endovascular embolization of the spinal tumor T5-T6.  The pathology revealed poorly differentiated adenocarcinoma.  Postoperatively his pain was relieved.  He has undergone radiation oncology consultation and is planned to receive SBRT treatment.  He now presents for discussion regarding further chemotherapy. [de-identified] : adenoca, Her2 pos [de-identified] : Her2 pos. [de-identified] : Since the last visit the pt has completed course of RT 23 rx 2018 and now 15 rx to mediastinal nodes.  The patient returns to discuss further management.\par

## 2022-08-15 ENCOUNTER — APPOINTMENT (OUTPATIENT)
Dept: CARDIOLOGY | Facility: CLINIC | Age: 67
End: 2022-08-15

## 2022-08-15 PROCEDURE — 93306 TTE W/DOPPLER COMPLETE: CPT

## 2022-08-15 PROCEDURE — 93356 MYOCRD STRAIN IMG SPCKL TRCK: CPT

## 2022-08-17 ENCOUNTER — RESULT REVIEW (OUTPATIENT)
Age: 67
End: 2022-08-17

## 2022-08-17 ENCOUNTER — APPOINTMENT (OUTPATIENT)
Dept: HEMATOLOGY ONCOLOGY | Facility: CLINIC | Age: 67
End: 2022-08-17

## 2022-08-17 ENCOUNTER — APPOINTMENT (OUTPATIENT)
Dept: INFUSION THERAPY | Facility: HOSPITAL | Age: 67
End: 2022-08-17

## 2022-08-17 LAB
ALBUMIN SERPL ELPH-MCNC: 3.7 G/DL — SIGNIFICANT CHANGE UP (ref 3.3–5)
ALP SERPL-CCNC: 127 U/L — HIGH (ref 40–120)
ALT FLD-CCNC: 7 U/L — LOW (ref 10–45)
ANION GAP SERPL CALC-SCNC: 12 MMOL/L — SIGNIFICANT CHANGE UP (ref 5–17)
AST SERPL-CCNC: 14 U/L — SIGNIFICANT CHANGE UP (ref 10–40)
BASOPHILS # BLD AUTO: 0.12 K/UL — SIGNIFICANT CHANGE UP (ref 0–0.2)
BASOPHILS NFR BLD AUTO: 1.3 % — SIGNIFICANT CHANGE UP (ref 0–2)
BILIRUB SERPL-MCNC: 0.4 MG/DL — SIGNIFICANT CHANGE UP (ref 0.2–1.2)
BUN SERPL-MCNC: 10 MG/DL — SIGNIFICANT CHANGE UP (ref 7–23)
CALCIUM SERPL-MCNC: 8.9 MG/DL — SIGNIFICANT CHANGE UP (ref 8.4–10.5)
CANCER AG19-9 SERPL-ACNC: 19 U/ML — SIGNIFICANT CHANGE UP
CEA SERPL-MCNC: 9.2 NG/ML — HIGH (ref 0–3.8)
CHLORIDE SERPL-SCNC: 104 MMOL/L — SIGNIFICANT CHANGE UP (ref 96–108)
CO2 SERPL-SCNC: 24 MMOL/L — SIGNIFICANT CHANGE UP (ref 22–31)
CREAT SERPL-MCNC: 0.8 MG/DL — SIGNIFICANT CHANGE UP (ref 0.5–1.3)
EGFR: 98 ML/MIN/1.73M2 — SIGNIFICANT CHANGE UP
EOSINOPHIL # BLD AUTO: 0.18 K/UL — SIGNIFICANT CHANGE UP (ref 0–0.5)
EOSINOPHIL NFR BLD AUTO: 2 % — SIGNIFICANT CHANGE UP (ref 0–6)
GLUCOSE SERPL-MCNC: 110 MG/DL — HIGH (ref 70–99)
HCT VFR BLD CALC: 37 % — LOW (ref 39–50)
HGB BLD-MCNC: 11.8 G/DL — LOW (ref 13–17)
IMM GRANULOCYTES NFR BLD AUTO: 0.4 % — SIGNIFICANT CHANGE UP (ref 0–1.5)
LYMPHOCYTES # BLD AUTO: 0.71 K/UL — LOW (ref 1–3.3)
LYMPHOCYTES # BLD AUTO: 7.8 % — LOW (ref 13–44)
MCHC RBC-ENTMCNC: 29.6 PG — SIGNIFICANT CHANGE UP (ref 27–34)
MCHC RBC-ENTMCNC: 31.9 G/DL — LOW (ref 32–36)
MCV RBC AUTO: 93 FL — SIGNIFICANT CHANGE UP (ref 80–100)
MONOCYTES # BLD AUTO: 0.98 K/UL — HIGH (ref 0–0.9)
MONOCYTES NFR BLD AUTO: 10.8 % — SIGNIFICANT CHANGE UP (ref 2–14)
NEUTROPHILS # BLD AUTO: 7.03 K/UL — SIGNIFICANT CHANGE UP (ref 1.8–7.4)
NEUTROPHILS NFR BLD AUTO: 77.7 % — HIGH (ref 43–77)
NRBC # BLD: 0 /100 WBCS — SIGNIFICANT CHANGE UP (ref 0–0)
PLATELET # BLD AUTO: 355 K/UL — SIGNIFICANT CHANGE UP (ref 150–400)
POTASSIUM SERPL-MCNC: 4.2 MMOL/L — SIGNIFICANT CHANGE UP (ref 3.5–5.3)
POTASSIUM SERPL-SCNC: 4.2 MMOL/L — SIGNIFICANT CHANGE UP (ref 3.5–5.3)
PROT SERPL-MCNC: 6.2 G/DL — SIGNIFICANT CHANGE UP (ref 6–8.3)
RBC # BLD: 3.98 M/UL — LOW (ref 4.2–5.8)
RBC # FLD: 16.7 % — HIGH (ref 10.3–14.5)
SODIUM SERPL-SCNC: 141 MMOL/L — SIGNIFICANT CHANGE UP (ref 135–145)
WBC # BLD: 9.06 K/UL — SIGNIFICANT CHANGE UP (ref 3.8–10.5)
WBC # FLD AUTO: 9.06 K/UL — SIGNIFICANT CHANGE UP (ref 3.8–10.5)

## 2022-08-19 ENCOUNTER — APPOINTMENT (OUTPATIENT)
Dept: INFUSION THERAPY | Facility: HOSPITAL | Age: 67
End: 2022-08-19

## 2022-08-31 ENCOUNTER — APPOINTMENT (OUTPATIENT)
Dept: INFUSION THERAPY | Facility: HOSPITAL | Age: 67
End: 2022-08-31

## 2022-08-31 ENCOUNTER — NON-APPOINTMENT (OUTPATIENT)
Age: 67
End: 2022-08-31

## 2022-08-31 ENCOUNTER — RESULT REVIEW (OUTPATIENT)
Age: 67
End: 2022-08-31

## 2022-08-31 LAB
BASOPHILS # BLD AUTO: 0.05 K/UL — SIGNIFICANT CHANGE UP (ref 0–0.2)
BASOPHILS NFR BLD AUTO: 1.2 % — SIGNIFICANT CHANGE UP (ref 0–2)
EOSINOPHIL # BLD AUTO: 0.33 K/UL — SIGNIFICANT CHANGE UP (ref 0–0.5)
EOSINOPHIL NFR BLD AUTO: 7.7 % — HIGH (ref 0–6)
HCT VFR BLD CALC: 35.8 % — LOW (ref 39–50)
HGB BLD-MCNC: 11.5 G/DL — LOW (ref 13–17)
IMM GRANULOCYTES NFR BLD AUTO: 0.2 % — SIGNIFICANT CHANGE UP (ref 0–1.5)
LYMPHOCYTES # BLD AUTO: 0.7 K/UL — LOW (ref 1–3.3)
LYMPHOCYTES # BLD AUTO: 16.2 % — SIGNIFICANT CHANGE UP (ref 13–44)
MCHC RBC-ENTMCNC: 29.1 PG — SIGNIFICANT CHANGE UP (ref 27–34)
MCHC RBC-ENTMCNC: 32.1 G/DL — SIGNIFICANT CHANGE UP (ref 32–36)
MCV RBC AUTO: 90.6 FL — SIGNIFICANT CHANGE UP (ref 80–100)
MONOCYTES # BLD AUTO: 0.49 K/UL — SIGNIFICANT CHANGE UP (ref 0–0.9)
MONOCYTES NFR BLD AUTO: 11.4 % — SIGNIFICANT CHANGE UP (ref 2–14)
NEUTROPHILS # BLD AUTO: 2.73 K/UL — SIGNIFICANT CHANGE UP (ref 1.8–7.4)
NEUTROPHILS NFR BLD AUTO: 63.3 % — SIGNIFICANT CHANGE UP (ref 43–77)
NRBC # BLD: 0 /100 WBCS — SIGNIFICANT CHANGE UP (ref 0–0)
PLATELET # BLD AUTO: 255 K/UL — SIGNIFICANT CHANGE UP (ref 150–400)
RBC # BLD: 3.95 M/UL — LOW (ref 4.2–5.8)
RBC # FLD: 15.5 % — HIGH (ref 10.3–14.5)
WBC # BLD: 4.31 K/UL — SIGNIFICANT CHANGE UP (ref 3.8–10.5)
WBC # FLD AUTO: 4.31 K/UL — SIGNIFICANT CHANGE UP (ref 3.8–10.5)

## 2022-09-02 ENCOUNTER — APPOINTMENT (OUTPATIENT)
Dept: INFUSION THERAPY | Facility: HOSPITAL | Age: 67
End: 2022-09-02

## 2022-09-09 ENCOUNTER — OUTPATIENT (OUTPATIENT)
Dept: OUTPATIENT SERVICES | Facility: HOSPITAL | Age: 67
LOS: 1 days | Discharge: HOME | End: 2022-09-09

## 2022-09-09 ENCOUNTER — RX RENEWAL (OUTPATIENT)
Age: 67
End: 2022-09-09

## 2022-09-09 ENCOUNTER — APPOINTMENT (OUTPATIENT)
Dept: CARDIOLOGY | Facility: CLINIC | Age: 67
End: 2022-09-09

## 2022-09-09 ENCOUNTER — RESULT REVIEW (OUTPATIENT)
Age: 67
End: 2022-09-09

## 2022-09-09 VITALS
BODY MASS INDEX: 21.05 KG/M2 | TEMPERATURE: 207.5 F | OXYGEN SATURATION: 97 % | SYSTOLIC BLOOD PRESSURE: 108 MMHG | WEIGHT: 138.89 LBS | DIASTOLIC BLOOD PRESSURE: 67 MMHG | HEIGHT: 68 IN | RESPIRATION RATE: 16 BRPM | HEART RATE: 71 BPM

## 2022-09-09 DIAGNOSIS — Z98.890 OTHER SPECIFIED POSTPROCEDURAL STATES: Chronic | ICD-10-CM

## 2022-09-09 DIAGNOSIS — Z90.49 ACQUIRED ABSENCE OF OTHER SPECIFIED PARTS OF DIGESTIVE TRACT: Chronic | ICD-10-CM

## 2022-09-09 DIAGNOSIS — C18.9 MALIGNANT NEOPLASM OF COLON, UNSPECIFIED: ICD-10-CM

## 2022-09-09 LAB
ALBUMIN SERPL ELPH-MCNC: 4.2 G/DL
ALP BLD-CCNC: 127 U/L
ALT SERPL-CCNC: 11 U/L
ANION GAP SERPL CALC-SCNC: 16 MMOL/L
AST SERPL-CCNC: 20 U/L
BASOPHILS # BLD AUTO: 0.03 K/UL — SIGNIFICANT CHANGE UP (ref 0–0.2)
BASOPHILS NFR BLD AUTO: 1 % — SIGNIFICANT CHANGE UP (ref 0–2)
BILIRUB SERPL-MCNC: 0.5 MG/DL
BUN SERPL-MCNC: 10 MG/DL
CALCIUM SERPL-MCNC: 9.1 MG/DL
CHLORIDE SERPL-SCNC: 106 MMOL/L
CHOLEST SERPL-MCNC: 147 MG/DL
CO2 SERPL-SCNC: 22 MMOL/L
CREAT SERPL-MCNC: 0.74 MG/DL
EGFR: 100 ML/MIN/1.73M2
EOSINOPHIL # BLD AUTO: 0.09 K/UL — SIGNIFICANT CHANGE UP (ref 0–0.5)
EOSINOPHIL NFR BLD AUTO: 3.1 % — SIGNIFICANT CHANGE UP (ref 0–6)
ESTIMATED AVERAGE GLUCOSE: 111 MG/DL
GLUCOSE SERPL-MCNC: 108 MG/DL
HBA1C MFR BLD HPLC: 5.5 %
HCT VFR BLD CALC: 34.6 % — LOW (ref 39–50)
HDLC SERPL-MCNC: 55 MG/DL
HGB BLD-MCNC: 11.1 G/DL — LOW (ref 13–17)
IMM GRANULOCYTES NFR BLD AUTO: 0 % — SIGNIFICANT CHANGE UP (ref 0–1.5)
LDLC SERPL CALC-MCNC: 77 MG/DL
LYMPHOCYTES # BLD AUTO: 0.65 K/UL — LOW (ref 1–3.3)
LYMPHOCYTES # BLD AUTO: 22 % — SIGNIFICANT CHANGE UP (ref 13–44)
MCHC RBC-ENTMCNC: 29.3 PG — SIGNIFICANT CHANGE UP (ref 27–34)
MCHC RBC-ENTMCNC: 32.1 G/DL — SIGNIFICANT CHANGE UP (ref 32–36)
MCV RBC AUTO: 91.3 FL — SIGNIFICANT CHANGE UP (ref 80–100)
MONOCYTES # BLD AUTO: 0.3 K/UL — SIGNIFICANT CHANGE UP (ref 0–0.9)
MONOCYTES NFR BLD AUTO: 10.2 % — SIGNIFICANT CHANGE UP (ref 2–14)
NEUTROPHILS # BLD AUTO: 1.88 K/UL — SIGNIFICANT CHANGE UP (ref 1.8–7.4)
NEUTROPHILS NFR BLD AUTO: 63.7 % — SIGNIFICANT CHANGE UP (ref 43–77)
NONHDLC SERPL-MCNC: 91 MG/DL
NRBC # BLD: 0 /100 WBCS — SIGNIFICANT CHANGE UP (ref 0–0)
NT-PROBNP SERPL-MCNC: 181 PG/ML
PLATELET # BLD AUTO: 273 K/UL — SIGNIFICANT CHANGE UP (ref 150–400)
POTASSIUM SERPL-SCNC: 4.4 MMOL/L
PROT SERPL-MCNC: 6.8 G/DL
RBC # BLD: 3.79 M/UL — LOW (ref 4.2–5.8)
RBC # FLD: 15.6 % — HIGH (ref 10.3–14.5)
SODIUM SERPL-SCNC: 144 MMOL/L
TRIGL SERPL-MCNC: 73 MG/DL
TROPONIN-T, HIGH SENSITIVITY: 14 NG/L
WBC # BLD: 2.95 K/UL — LOW (ref 3.8–10.5)
WBC # FLD AUTO: 2.95 K/UL — LOW (ref 3.8–10.5)

## 2022-09-09 PROCEDURE — 93010 ELECTROCARDIOGRAM REPORT: CPT

## 2022-09-09 PROCEDURE — 93000 ELECTROCARDIOGRAM COMPLETE: CPT

## 2022-09-09 PROCEDURE — 99406 BEHAV CHNG SMOKING 3-10 MIN: CPT

## 2022-09-09 PROCEDURE — 99205 OFFICE O/P NEW HI 60 MIN: CPT | Mod: 25

## 2022-09-11 ENCOUNTER — RX RENEWAL (OUTPATIENT)
Age: 67
End: 2022-09-11

## 2022-09-11 NOTE — HISTORY OF PRESENT ILLNESS
[FreeTextEntry1] : ANGIE ZAMORA is a 67 year smoker with metastatic GE junction adenocarcinoma diagnosed in 2018. He received neoadjuvant taxol/carboplatin and radiation followed by esophagogastrectomy. In 2020, he developed rib and back pain in the setting of new skeletal metastasis with spinal cord compression. He had laminectomy of T4-T6 resection of the epidural tumor with T3-T12 fusion and endovascular embolization of the spinal tumor, followed by adjuvant radiation to the spine and mediastinal lymph nodes. \par \par He began FOLFIRI with trastuzumab in January 2021, monitored by serial echocardiograms. Baseline LVEF was 62 %. On the most recent study, from  August 2022, there was new LV systolic dysfunction with a decline in the EF from 56 % to 45 %. GLS was -18 (from 19.2 on the prior study, and 21 at baseline).\par \par He denies any chest pain, palpitations, or corresponding new symptoms. He notes increased fatigue during the past several weeks, corresponding with mediastinal radiation therapy, finishing around the same time as the most recent echocardiogram. He denies any CHF symptoms like edema, orthopnea. \par \par He is not aware of any history of HTN, HLD, or diabetes, but believes he may have had borderline diabetes. (Type 2 Diabetes runs in his family.) He continues to smoke cigarettes - down to about 2-3 per day. Is trying to quit, but unable to do so.\par \par His current medications are oxycodone PRN and omeprazole. There is no family history of premature/early CAD or cardiomyopathy.\par \par Otherwise he complains of pain related to an inguinal hernia.\par \par No prior stress test. No history of CVD.\par \par \par \par Cardiovascular Summary:\par ----------------------------------------------\par ECG:\par 9/9/2022: NSR 65 bpm, normal ECG\par ----------------------------------------------\par Echo:\par 8/15/2022: EF 45 %, GLS - 18\par 5/4/2022: EF 56%, GLS - 19.2\par 1/18/2022: EF 57.4 %, GLS -18\par 10/11/2021: EF 55%, GLS -19.2\par 7/9/2021: EF 57 %, GLS -21.5\par 4/16/2021: EF 58 %, GLS -18.8\par 1/4/2021: EF 62 %, GLS -21\par ----------------------------------------------\par CT chest\par 6/7/2022: atherosclerotic calcifications of aorta and coronary arteries\par ----------------------------------------------

## 2022-09-11 NOTE — REVIEW OF SYSTEMS
[Feeling Fatigued] : feeling fatigued [Weight Loss (___ Lbs)] : [unfilled] ~Ulb weight loss [Change in Appetite] : change in appetite [Negative] : Heme/Lymph

## 2022-09-11 NOTE — COUNSELING
[Yes] : Risk of tobacco use and health benefits of smoking cessation discussed: Yes [FreeTextEntry1] : 5

## 2022-09-11 NOTE — PHYSICAL EXAM
[Well Developed] : well developed [No Acute Distress] : no acute distress [Normal Conjunctiva] : normal conjunctiva [No Xanthelasma] : no xanthelasma [Normal Venous Pressure] : normal venous pressure [No Carotid Bruit] : no carotid bruit [Normal S1, S2] : normal S1, S2 [No Murmur] : no murmur [No Rub] : no rub [No Gallop] : no gallop [Clear Lung Fields] : clear lung fields [Good Air Entry] : good air entry [No Respiratory Distress] : no respiratory distress  [Soft] : abdomen soft [Normal Gait] : normal gait [Gait - Sufficient for Exercise Testing] : gait - sufficient for exercise testing [No Edema] : no edema [No Cyanosis] : no cyanosis [No Clubbing] : no clubbing [No Varicosities] : no varicosities [No Rash] : no rash [Moves all extremities] : moves all extremities [Normal Speech] : normal speech [Alert and Oriented] : alert and oriented

## 2022-09-11 NOTE — REASON FOR VISIT
[FreeTextEntry3] : Dr. Long [FreeTextEntry1] : ANGIE ZAMORA is a 67 year man with metastatic HER2+ esophagogastric carcinoma referred for decline in ejection fraction during HER2 targeted therapy.\par \par Prior Cancer Treatments:\par ------------------------------------------------------------------------\par Chemo/targeted therapy:\par 2018: neoadjuvant carboplatin/Taxol\par 1/2021-present: 5FU and trastuzumab\par ------------------------------------------------------------------------\par Surgery:\par 8/2018 esophagogastrectomy\par 2020: laminectomy T4-T6 resection of epidural metastasis with T3-T12 fusion\par ------------------------------------------------------------------------\par Radiation:\par 12/28/2020: neoadjuvant 24 Gy/3 fxns to T4-T8\par [id number]/164546-3/8/2022: 4500 cGy to Mediastinal nodes

## 2022-09-11 NOTE — ASSESSMENT
[FreeTextEntry1] : 67 year old man, smoker, with new LVEF decline during adjuvant HER2 targeted therapy with FOLFOX for metastatic esophageal cancer. There are atherosclerotic calcifications on the CT scans of the chest. He denies any CHF symptoms or angina, but given CAD risk factors needs evaluation for coronary artery disease and to rule out ischemic cardiomyopathy as cause of LVEF decline.\par \par Will refer for nuclear stress test\par Start aspirin and statin for atherosclerosis - rosuvastatin 5 mg daily.\par Will check A1c and lipid panel to ensure these are optimized\par Pro-BNP and HstnT for risk stratification and for ongoing monitoring of HER2 therapy.\par \par Since he is asymptomatic of the cardiomyopathy, reasonable to continue HER2 therapy with ongoing monitoring by echo and biomarkers. Will optimize medical therapy for cardiomyopathy pending results of stress test.\par \par One issue may be the blood pressure, which is on the lower end of normal. He denies any orthostasis symptoms. But admits PO intake/appetite is not great. Will plan to add ACE/ARB as tolerated for cardioprotection, which may enable him to continue trastuzumab without developing progression of cardiomyopathy.\par \par Needs ischemic evaluation first. Will try to time this outside of his 5FU infusions (Wednesday-Friday, every other week.)\par \par Emphasized the crucial importance of smoking cessation with him.\par \par Above recommendations discussed with the patient and his significant other and all questions were answered to the best of my ability and to their apparent satisfaction.\par \par Follow up after above.\par \par \par

## 2022-09-13 ENCOUNTER — APPOINTMENT (OUTPATIENT)
Dept: HEMATOLOGY ONCOLOGY | Facility: CLINIC | Age: 67
End: 2022-09-13

## 2022-09-14 ENCOUNTER — RESULT REVIEW (OUTPATIENT)
Age: 67
End: 2022-09-14

## 2022-09-14 ENCOUNTER — APPOINTMENT (OUTPATIENT)
Dept: INFUSION THERAPY | Facility: HOSPITAL | Age: 67
End: 2022-09-14

## 2022-09-14 ENCOUNTER — APPOINTMENT (OUTPATIENT)
Dept: HEMATOLOGY ONCOLOGY | Facility: CLINIC | Age: 67
End: 2022-09-14

## 2022-09-14 VITALS
OXYGEN SATURATION: 98 % | HEART RATE: 93 BPM | WEIGHT: 143.96 LBS | TEMPERATURE: 206.6 F | RESPIRATION RATE: 16 BRPM | SYSTOLIC BLOOD PRESSURE: 123 MMHG | HEIGHT: 68 IN | BODY MASS INDEX: 21.82 KG/M2 | DIASTOLIC BLOOD PRESSURE: 80 MMHG

## 2022-09-14 LAB
ANISOCYTOSIS BLD QL: SLIGHT — SIGNIFICANT CHANGE UP
BASOPHILS # BLD AUTO: 0.1 K/UL — SIGNIFICANT CHANGE UP (ref 0–0.2)
BASOPHILS NFR BLD AUTO: 3 % — HIGH (ref 0–2)
ELLIPTOCYTES BLD QL SMEAR: SLIGHT — SIGNIFICANT CHANGE UP
EOSINOPHIL # BLD AUTO: 0.1 K/UL — SIGNIFICANT CHANGE UP (ref 0–0.5)
EOSINOPHIL NFR BLD AUTO: 3 % — SIGNIFICANT CHANGE UP (ref 0–6)
HCT VFR BLD CALC: 34.7 % — LOW (ref 39–50)
HGB BLD-MCNC: 11.2 G/DL — LOW (ref 13–17)
LG PLATELETS BLD QL AUTO: SLIGHT — SIGNIFICANT CHANGE UP
LYMPHOCYTES # BLD AUTO: 0.59 K/UL — LOW (ref 1–3.3)
LYMPHOCYTES # BLD AUTO: 18 % — SIGNIFICANT CHANGE UP (ref 13–44)
MCHC RBC-ENTMCNC: 29.6 PG — SIGNIFICANT CHANGE UP (ref 27–34)
MCHC RBC-ENTMCNC: 32.3 G/DL — SIGNIFICANT CHANGE UP (ref 32–36)
MCV RBC AUTO: 91.8 FL — SIGNIFICANT CHANGE UP (ref 80–100)
MONOCYTES # BLD AUTO: 0.68 K/UL — SIGNIFICANT CHANGE UP (ref 0–0.9)
MONOCYTES NFR BLD AUTO: 21 % — HIGH (ref 2–14)
NEUTROPHILS # BLD AUTO: 1.79 K/UL — LOW (ref 1.8–7.4)
NEUTROPHILS NFR BLD AUTO: 55 % — SIGNIFICANT CHANGE UP (ref 43–77)
NRBC # BLD: 0 /100 — SIGNIFICANT CHANGE UP (ref 0–0)
NRBC # BLD: SIGNIFICANT CHANGE UP /100 WBCS (ref 0–0)
PLAT MORPH BLD: NORMAL — SIGNIFICANT CHANGE UP
PLATELET # BLD AUTO: 306 K/UL — SIGNIFICANT CHANGE UP (ref 150–400)
POIKILOCYTOSIS BLD QL AUTO: SLIGHT — SIGNIFICANT CHANGE UP
RBC # BLD: 3.78 M/UL — LOW (ref 4.2–5.8)
RBC # FLD: 16 % — HIGH (ref 10.3–14.5)
RBC BLD AUTO: ABNORMAL
WBC # BLD: 3.26 K/UL — LOW (ref 3.8–10.5)
WBC # FLD AUTO: 3.26 K/UL — LOW (ref 3.8–10.5)

## 2022-09-14 PROCEDURE — 99213 OFFICE O/P EST LOW 20 MIN: CPT

## 2022-09-14 NOTE — HISTORY OF PRESENT ILLNESS
[Disease: _____________________] : Disease: [unfilled] [M: ___] : M[unfilled] [AJCC Stage: ____] : AJCC Stage: [unfilled] [de-identified] : This is a 65-year-old man with history of esophagogastric carcinoma who presents for evaluation.  His history dates back to March 2018 when he developed a cough and decreased appetite.  A CAT scan of his chest revealed a mass in the distal esophagus with periesophageal and portacaval adenopathy.  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  The patient underwent neoadjuvant chemotherapy and radiation therapy with Taxol carboplatin.  A follow-up CAT scan in June 2018 revealed decreased mass in the esophagus.  In August 2018 the patient underwent [place] Raghu esophagogastrectomy with Dr. Cloud and Dr. Arciniega.  The pathology revealed no residual carcinoma with negative margins, with12 - lymph nodes (0/12).\par \par The patient then was well until November 2020 when he developed right rib pain and upper back pain.  Imaging testing revealed metastases to the thoracic spine and possible cord compression at T5-T6.  The patient underwent laminectomy of T4-T6 resection of the epidural tumor with T3-T12 fusion and endovascular embolization of the spinal tumor T5-T6.  The pathology revealed poorly differentiated adenocarcinoma.  Postoperatively his pain was relieved.  He has undergone radiation oncology consultation and is planned to receive SBRT treatment.  He now presents for discussion regarding further chemotherapy. [de-identified] : adenoca, Her2 pos [de-identified] : Her2 pos. [de-identified] : Since the last visit the pt completed RT to active chest node. Pt had heat and pain in center of chest. and now better.

## 2022-09-14 NOTE — REVIEW OF SYSTEMS
[Negative] : Allergic/Immunologic [FreeTextEntry7] : has heartburn on omeprazole [FreeTextEntry9] : has chronic back pain on oxy  [de-identified] : has numbness of feet

## 2022-09-14 NOTE — ASSESSMENT
[FreeTextEntry1] : The patient has completed radiation to active thoracic adenopathy which he tolerated well.  He will continue his current chemotherapy regimen with irinotecan 5-FU leucovorin and Herceptin.  The patient will continue cardiac evaluation for ejection fraction.  The patient has also been started on statin drug for cholesterol plaque in the arteries.  He will continue to be monitored for side effects and toxicity.  He was encouraged to increase his caloric intake.  He will return to the office in 1 month or sooner. [Palliative] : Goals of care discussed with patient: Palliative [Palliative Care Plan] : not applicable at this time

## 2022-09-15 DIAGNOSIS — Z51.11 ENCOUNTER FOR ANTINEOPLASTIC CHEMOTHERAPY: ICD-10-CM

## 2022-09-15 DIAGNOSIS — R11.2 NAUSEA WITH VOMITING, UNSPECIFIED: ICD-10-CM

## 2022-09-16 ENCOUNTER — APPOINTMENT (OUTPATIENT)
Dept: INFUSION THERAPY | Facility: HOSPITAL | Age: 67
End: 2022-09-16

## 2022-09-19 ENCOUNTER — APPOINTMENT (OUTPATIENT)
Dept: CV DIAGNOSTICS | Facility: HOSPITAL | Age: 67
End: 2022-09-19

## 2022-09-28 ENCOUNTER — RESULT REVIEW (OUTPATIENT)
Age: 67
End: 2022-09-28

## 2022-09-28 ENCOUNTER — APPOINTMENT (OUTPATIENT)
Dept: INFUSION THERAPY | Facility: HOSPITAL | Age: 67
End: 2022-09-28

## 2022-09-28 LAB
ANISOCYTOSIS BLD QL: SLIGHT — SIGNIFICANT CHANGE UP
BASOPHILS # BLD AUTO: 0.02 K/UL — SIGNIFICANT CHANGE UP (ref 0–0.2)
BASOPHILS NFR BLD AUTO: 1 % — SIGNIFICANT CHANGE UP (ref 0–2)
ELLIPTOCYTES BLD QL SMEAR: SLIGHT — SIGNIFICANT CHANGE UP
EOSINOPHIL # BLD AUTO: 0.1 K/UL — SIGNIFICANT CHANGE UP (ref 0–0.5)
EOSINOPHIL NFR BLD AUTO: 4 % — SIGNIFICANT CHANGE UP (ref 0–6)
HCT VFR BLD CALC: 35.1 % — LOW (ref 39–50)
HGB BLD-MCNC: 11.2 G/DL — LOW (ref 13–17)
LG PLATELETS BLD QL AUTO: SLIGHT — SIGNIFICANT CHANGE UP
LYMPHOCYTES # BLD AUTO: 0.79 K/UL — LOW (ref 1–3.3)
LYMPHOCYTES # BLD AUTO: 32 % — SIGNIFICANT CHANGE UP (ref 13–44)
MCHC RBC-ENTMCNC: 29.6 PG — SIGNIFICANT CHANGE UP (ref 27–34)
MCHC RBC-ENTMCNC: 31.9 G/DL — LOW (ref 32–36)
MCV RBC AUTO: 92.6 FL — SIGNIFICANT CHANGE UP (ref 80–100)
MONOCYTES # BLD AUTO: 0.4 K/UL — SIGNIFICANT CHANGE UP (ref 0–0.9)
MONOCYTES NFR BLD AUTO: 16 % — HIGH (ref 2–14)
NEUTROPHILS # BLD AUTO: 1.17 K/UL — LOW (ref 1.8–7.4)
NEUTROPHILS NFR BLD AUTO: 47 % — SIGNIFICANT CHANGE UP (ref 43–77)
NRBC # BLD: 0 /100 — SIGNIFICANT CHANGE UP (ref 0–0)
NRBC # BLD: SIGNIFICANT CHANGE UP /100 WBCS (ref 0–0)
PLAT MORPH BLD: NORMAL — SIGNIFICANT CHANGE UP
PLATELET # BLD AUTO: 296 K/UL — SIGNIFICANT CHANGE UP (ref 150–400)
POIKILOCYTOSIS BLD QL AUTO: SLIGHT — SIGNIFICANT CHANGE UP
RBC # BLD: 3.79 M/UL — LOW (ref 4.2–5.8)
RBC # FLD: 15.9 % — HIGH (ref 10.3–14.5)
RBC BLD AUTO: ABNORMAL
WBC # BLD: 2.48 K/UL — LOW (ref 3.8–10.5)
WBC # FLD AUTO: 2.48 K/UL — LOW (ref 3.8–10.5)

## 2022-09-30 ENCOUNTER — APPOINTMENT (OUTPATIENT)
Dept: INFUSION THERAPY | Facility: HOSPITAL | Age: 67
End: 2022-09-30

## 2022-10-05 ENCOUNTER — APPOINTMENT (OUTPATIENT)
Dept: INFUSION THERAPY | Facility: HOSPITAL | Age: 67
End: 2022-10-05

## 2022-10-05 ENCOUNTER — RESULT REVIEW (OUTPATIENT)
Age: 67
End: 2022-10-05

## 2022-10-05 ENCOUNTER — NON-APPOINTMENT (OUTPATIENT)
Age: 67
End: 2022-10-05

## 2022-10-05 LAB
ALBUMIN SERPL ELPH-MCNC: 3.4 G/DL — SIGNIFICANT CHANGE UP (ref 3.3–5)
ALP SERPL-CCNC: 113 U/L — SIGNIFICANT CHANGE UP (ref 40–120)
ALT FLD-CCNC: 7 U/L — LOW (ref 10–45)
ANION GAP SERPL CALC-SCNC: 10 MMOL/L — SIGNIFICANT CHANGE UP (ref 5–17)
ANISOCYTOSIS BLD QL: SLIGHT — SIGNIFICANT CHANGE UP
AST SERPL-CCNC: 14 U/L — SIGNIFICANT CHANGE UP (ref 10–40)
BASOPHILS # BLD AUTO: 0.1 K/UL — SIGNIFICANT CHANGE UP (ref 0–0.2)
BASOPHILS NFR BLD AUTO: 2 % — SIGNIFICANT CHANGE UP (ref 0–2)
BILIRUB SERPL-MCNC: 0.6 MG/DL — SIGNIFICANT CHANGE UP (ref 0.2–1.2)
BUN SERPL-MCNC: 10 MG/DL — SIGNIFICANT CHANGE UP (ref 7–23)
CALCIUM SERPL-MCNC: 8.7 MG/DL — SIGNIFICANT CHANGE UP (ref 8.4–10.5)
CANCER AG19-9 SERPL-ACNC: 20 U/ML — SIGNIFICANT CHANGE UP
CEA SERPL-MCNC: 7.4 NG/ML — HIGH (ref 0–3.8)
CHLORIDE SERPL-SCNC: 106 MMOL/L — SIGNIFICANT CHANGE UP (ref 96–108)
CO2 SERPL-SCNC: 24 MMOL/L — SIGNIFICANT CHANGE UP (ref 22–31)
CREAT SERPL-MCNC: 0.72 MG/DL — SIGNIFICANT CHANGE UP (ref 0.5–1.3)
DACRYOCYTES BLD QL SMEAR: SLIGHT — SIGNIFICANT CHANGE UP
EGFR: 101 ML/MIN/1.73M2 — SIGNIFICANT CHANGE UP
ELLIPTOCYTES BLD QL SMEAR: SLIGHT — SIGNIFICANT CHANGE UP
EOSINOPHIL # BLD AUTO: 0.14 K/UL — SIGNIFICANT CHANGE UP (ref 0–0.5)
EOSINOPHIL NFR BLD AUTO: 3 % — SIGNIFICANT CHANGE UP (ref 0–6)
GLUCOSE SERPL-MCNC: 87 MG/DL — SIGNIFICANT CHANGE UP (ref 70–99)
HCT VFR BLD CALC: 36.2 % — LOW (ref 39–50)
HGB BLD-MCNC: 11.7 G/DL — LOW (ref 13–17)
LYMPHOCYTES # BLD AUTO: 1.05 K/UL — SIGNIFICANT CHANGE UP (ref 1–3.3)
LYMPHOCYTES # BLD AUTO: 22 % — SIGNIFICANT CHANGE UP (ref 13–44)
MCHC RBC-ENTMCNC: 29.9 PG — SIGNIFICANT CHANGE UP (ref 27–34)
MCHC RBC-ENTMCNC: 32.3 G/DL — SIGNIFICANT CHANGE UP (ref 32–36)
MCV RBC AUTO: 92.6 FL — SIGNIFICANT CHANGE UP (ref 80–100)
MONOCYTES # BLD AUTO: 1 K/UL — HIGH (ref 0–0.9)
MONOCYTES NFR BLD AUTO: 21 % — HIGH (ref 2–14)
NEUTROPHILS # BLD AUTO: 2.48 K/UL — SIGNIFICANT CHANGE UP (ref 1.8–7.4)
NEUTROPHILS NFR BLD AUTO: 52 % — SIGNIFICANT CHANGE UP (ref 43–77)
NRBC # BLD: 0 /100 — SIGNIFICANT CHANGE UP (ref 0–0)
NRBC # BLD: SIGNIFICANT CHANGE UP /100 WBCS (ref 0–0)
PLAT MORPH BLD: NORMAL — SIGNIFICANT CHANGE UP
PLATELET # BLD AUTO: 377 K/UL — SIGNIFICANT CHANGE UP (ref 150–400)
POIKILOCYTOSIS BLD QL AUTO: SLIGHT — SIGNIFICANT CHANGE UP
POTASSIUM SERPL-MCNC: 4.1 MMOL/L — SIGNIFICANT CHANGE UP (ref 3.5–5.3)
POTASSIUM SERPL-SCNC: 4.1 MMOL/L — SIGNIFICANT CHANGE UP (ref 3.5–5.3)
PROT SERPL-MCNC: 5.9 G/DL — LOW (ref 6–8.3)
RBC # BLD: 3.91 M/UL — LOW (ref 4.2–5.8)
RBC # FLD: 16.7 % — HIGH (ref 10.3–14.5)
RBC BLD AUTO: ABNORMAL
SODIUM SERPL-SCNC: 141 MMOL/L — SIGNIFICANT CHANGE UP (ref 135–145)
WBC # BLD: 4.77 K/UL — SIGNIFICANT CHANGE UP (ref 3.8–10.5)
WBC # FLD AUTO: 4.77 K/UL — SIGNIFICANT CHANGE UP (ref 3.8–10.5)

## 2022-10-07 ENCOUNTER — APPOINTMENT (OUTPATIENT)
Dept: INFUSION THERAPY | Facility: HOSPITAL | Age: 67
End: 2022-10-07

## 2022-10-10 ENCOUNTER — APPOINTMENT (OUTPATIENT)
Dept: CARDIOLOGY | Facility: CLINIC | Age: 67
End: 2022-10-10

## 2022-10-10 ENCOUNTER — APPOINTMENT (OUTPATIENT)
Dept: HEMATOLOGY ONCOLOGY | Facility: CLINIC | Age: 67
End: 2022-10-10

## 2022-10-10 ENCOUNTER — RESULT REVIEW (OUTPATIENT)
Age: 67
End: 2022-10-10

## 2022-10-10 VITALS
HEIGHT: 67.99 IN | BODY MASS INDEX: 20.72 KG/M2 | TEMPERATURE: 97.6 F | HEART RATE: 95 BPM | DIASTOLIC BLOOD PRESSURE: 76 MMHG | RESPIRATION RATE: 16 BRPM | SYSTOLIC BLOOD PRESSURE: 122 MMHG | OXYGEN SATURATION: 96 % | WEIGHT: 136.69 LBS

## 2022-10-10 LAB
BASOPHILS # BLD AUTO: 0.06 K/UL — SIGNIFICANT CHANGE UP (ref 0–0.2)
BASOPHILS NFR BLD AUTO: 0.9 % — SIGNIFICANT CHANGE UP (ref 0–2)
EOSINOPHIL # BLD AUTO: 0.08 K/UL — SIGNIFICANT CHANGE UP (ref 0–0.5)
EOSINOPHIL NFR BLD AUTO: 1.2 % — SIGNIFICANT CHANGE UP (ref 0–6)
HCT VFR BLD CALC: 35.9 % — LOW (ref 39–50)
HGB BLD-MCNC: 11.5 G/DL — LOW (ref 13–17)
IMM GRANULOCYTES NFR BLD AUTO: 0.7 % — SIGNIFICANT CHANGE UP (ref 0–0.9)
LYMPHOCYTES # BLD AUTO: 0.63 K/UL — LOW (ref 1–3.3)
LYMPHOCYTES # BLD AUTO: 9.1 % — LOW (ref 13–44)
MCHC RBC-ENTMCNC: 29.6 PG — SIGNIFICANT CHANGE UP (ref 27–34)
MCHC RBC-ENTMCNC: 32 G/DL — SIGNIFICANT CHANGE UP (ref 32–36)
MCV RBC AUTO: 92.5 FL — SIGNIFICANT CHANGE UP (ref 80–100)
MONOCYTES # BLD AUTO: 0.19 K/UL — SIGNIFICANT CHANGE UP (ref 0–0.9)
MONOCYTES NFR BLD AUTO: 2.7 % — SIGNIFICANT CHANGE UP (ref 2–14)
NEUTROPHILS # BLD AUTO: 5.92 K/UL — SIGNIFICANT CHANGE UP (ref 1.8–7.4)
NEUTROPHILS NFR BLD AUTO: 85.4 % — HIGH (ref 43–77)
NRBC # BLD: 0 /100 WBCS — SIGNIFICANT CHANGE UP (ref 0–0)
PLATELET # BLD AUTO: 332 K/UL — SIGNIFICANT CHANGE UP (ref 150–400)
RBC # BLD: 3.88 M/UL — LOW (ref 4.2–5.8)
RBC # FLD: 15.8 % — HIGH (ref 10.3–14.5)
WBC # BLD: 6.93 K/UL — SIGNIFICANT CHANGE UP (ref 3.8–10.5)
WBC # FLD AUTO: 6.93 K/UL — SIGNIFICANT CHANGE UP (ref 3.8–10.5)

## 2022-10-10 PROCEDURE — 99214 OFFICE O/P EST MOD 30 MIN: CPT

## 2022-10-10 PROCEDURE — 99214 OFFICE O/P EST MOD 30 MIN: CPT | Mod: 25

## 2022-10-10 PROCEDURE — 93000 ELECTROCARDIOGRAM COMPLETE: CPT

## 2022-10-10 PROCEDURE — 93010 ELECTROCARDIOGRAM REPORT: CPT

## 2022-10-10 NOTE — HISTORY OF PRESENT ILLNESS
[Disease: _____________________] : Disease: [unfilled] [M: ___] : M[unfilled] [AJCC Stage: ____] : AJCC Stage: [unfilled] [de-identified] : This is a 65-year-old man with history of esophagogastric carcinoma who presents for evaluation.  His history dates back to March 2018 when he developed a cough and decreased appetite.  A CAT scan of his chest revealed a mass in the distal esophagus with periesophageal and portacaval adenopathy.  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  The patient underwent neoadjuvant chemotherapy and radiation therapy with Taxol carboplatin.  A follow-up CAT scan in June 2018 revealed decreased mass in the esophagus.  In August 2018 the patient underwent [place] Raghu esophagogastrectomy with Dr. Cloud and Dr. Arciniega.  The pathology revealed no residual carcinoma with negative margins, with12 - lymph nodes (0/12).\par \par The patient then was well until November 2020 when he developed right rib pain and upper back pain.  Imaging testing revealed metastases to the thoracic spine and possible cord compression at T5-T6.  The patient underwent laminectomy of T4-T6 resection of the epidural tumor with T3-T12 fusion and endovascular embolization of the spinal tumor T5-T6.  The pathology revealed poorly differentiated adenocarcinoma.  Postoperatively his pain was relieved.  He has undergone radiation oncology consultation and is planned to receive SBRT treatment.  He now presents for discussion regarding further chemotherapy. [de-identified] : adenoca, Her2 pos [de-identified] : Her2 pos. [de-identified] : S/p on going Folfiri; Herceptin held due to decreased EF. Patient feels okay except occasional lower back pain. Appetite is okay. BM's are okay. Denies any nausea, vomiting, diarrhea or constipation. Patient will repeat Echo at end of month to reassess if Herceptin can be restarted.

## 2022-10-10 NOTE — HISTORY OF PRESENT ILLNESS
[FreeTextEntry1] : Interval History:\jana Was unable to complete stress test because of problem with transportation (now show for appointment.)\jana Denies any new symptoms, but has groin pain related to a hernia.\par \par History:\jana ZAMORA is a 67 year smoker with metastatic GE junction adenocarcinoma diagnosed in 2018. He received neoadjuvant taxol/carboplatin and radiation followed by esophagogastrectomy. In 2020, he developed rib and back pain in the setting of new skeletal metastasis with spinal cord compression. He had laminectomy of T4-T6 resection of the epidural tumor with T3-T12 fusion and endovascular embolization of the spinal tumor, followed by adjuvant radiation to the spine and mediastinal lymph nodes. \par \par He began FOLFIRI with trastuzumab in January 2021, monitored by serial echocardiograms. Baseline LVEF was 62 %. On the most recent study, in  August 2022, there was new LV systolic dysfunction with a decline in the EF from 56 % to 45 %. GLS was -18 (from 19.2 on the prior study, and 21 at baseline).\par \par He denies any chest pain, palpitations, or corresponding new symptoms. He notes increased fatigue, corresponding with mediastinal radiation therapy, finishing around the same time as the most recent echocardiogram. He denies any CHF symptoms like edema, orthopnea. \par \par He is not aware of any history of HTN, HLD, or diabetes, but believes he may have had borderline diabetes. (Type 2 Diabetes runs in his family.) He continues to smoke cigarettes - down to about 2-3 per day. Is trying to quit, but unable to do so.\par \par His current medications are oxycodone PRN and omeprazole. There is no family history of premature/early CAD or cardiomyopathy.\par \par Otherwise he complains of pain related to an inguinal hernia.\par \par No prior stress test. No history of CVD.\par \par \par \par Cardiovascular Summary:\par ----------------------------------------------\par ECG:\par 10/10/2022: sinus tachycardia 104 bpm, otherwise normal\par 9/9/2022: NSR 65 bpm, normal ECG\par ----------------------------------------------\par Echo:\par 8/15/2022: EF 45 %, GLS - 18\par 5/4/2022: EF 56%, GLS - 19.2\par 1/18/2022: EF 57.4 %, GLS -18\par 10/11/2021: EF 55%, GLS -19.2\par 7/9/2021: EF 57 %, GLS -21.5\par 4/16/2021: EF 58 %, GLS -18.8\par 1/4/2021: EF 62 %, GLS -21\par ----------------------------------------------\par CT chest\par 6/7/2022: atherosclerotic calcifications of aorta and coronary arteries\par ----------------------------------------------

## 2022-10-10 NOTE — ASSESSMENT
[FreeTextEntry1] : 67 year old man, smoker, with new LVEF decline during adjuvant HER2 targeted therapy with FOLFOX for metastatic esophageal cancer. There are atherosclerotic calcifications on the CT scans of the chest. He denies any CHF symptoms or angina, but given CAD risk factors needs evaluation for coronary artery disease and to rule out ischemic cardiomyopathy as cause of LVEF decline.\par \par Will refer for nuclear stress test (will coordinate rescheduling)\par A1c 5.5 %\par Lipids: LDL 77, HDL 55\par Pro- pg/mL, HsTnT 14 ng/L\par Continue aspirin and rosuvastatin 5 mg daily for now because of atherosclerosis pending results of stress test\par \par Since he is asymptomatic of the cardiomyopathy, reasonable to continue HER2 therapy with ongoing monitoring by echo and biomarkers. Will optimize medical therapy for cardiomyopathy pending results of stress test.\par \par One issue may be the blood pressure, which is on the lower end of normal. He denies any orthostasis symptoms. But admits PO intake/appetite is not great. Will plan to add ACE/ARB as tolerated for cardioprotection, which may enable him to continue trastuzumab without developing progression of cardiomyopathy.\par \par Emphasized the crucial importance of smoking cessation with him.\par \par Above recommendations discussed with the patient and his significant other and all questions were answered to the best of my ability and to their apparent satisfaction.\par \par Follow up in 3 months with me.\par \par \par \par \par

## 2022-10-10 NOTE — REASON FOR VISIT
[Spouse] : spouse [FreeTextEntry3] : Dr. Long [FreeTextEntry1] : ANGIE ZAMORA is a 67 year man with metastatic HER2+ esophagogastric carcinoma referred for decline in ejection fraction during HER2 targeted therapy who returns for follow up.\par \par Prior Cancer Treatments:\par ------------------------------------------------------------------------\par Chemo/targeted therapy:\par 2018: neoadjuvant carboplatin/Taxol\par 1/2021-present: 5FU and trastuzumab\par ------------------------------------------------------------------------\par Surgery:\par 8/2018 esophagogastrectomy\par 2020: laminectomy T4-T6 resection of epidural metastasis with T3-T12 fusion\par ------------------------------------------------------------------------\par Radiation:\par 12/28/2020: neoadjuvant 24 Gy/3 fxns to T4-T8\par 7/152022-8/8/2022: 4500 cGy to Mediastinal nodes

## 2022-10-10 NOTE — REVIEW OF SYSTEMS
[Negative] : Allergic/Immunologic [FreeTextEntry7] : has heartburn on omeprazole [FreeTextEntry9] : has chronic back pain on oxycodone as neeeded [de-identified] : has numbness of feet

## 2022-10-10 NOTE — ASSESSMENT
[Palliative] : Goals of care discussed with patient: Palliative [Palliative Care Plan] : not applicable at this time [FreeTextEntry1] : S/p radiation to active thoracic adenopathy. S/p on going Folfiri. Herceptin held due to decrease EF. Patient will repeat to check,  if EF is improved for possible restart. Ordered repeat CT-Scan of Abdomen/Pelvis/chest to assess for progression vs regression of disease. The patient will continue cardiac evaluation for ejection fraction.  The patient has also been started on statin drug for cholesterol plaque in the arteries.  He will continue to be monitored for side effects and toxicity. Reviewed labs. RTO in 1 month.

## 2022-10-12 ENCOUNTER — OUTPATIENT (OUTPATIENT)
Dept: OUTPATIENT SERVICES | Facility: HOSPITAL | Age: 67
LOS: 1 days | End: 2022-10-12

## 2022-10-12 ENCOUNTER — APPOINTMENT (OUTPATIENT)
Dept: CV DIAGNOSTICS | Facility: HOSPITAL | Age: 67
End: 2022-10-12

## 2022-10-12 DIAGNOSIS — I42.7 CARDIOMYOPATHY DUE TO DRUG AND EXTERNAL AGENT: ICD-10-CM

## 2022-10-12 DIAGNOSIS — Z90.49 ACQUIRED ABSENCE OF OTHER SPECIFIED PARTS OF DIGESTIVE TRACT: Chronic | ICD-10-CM

## 2022-10-12 DIAGNOSIS — Z98.890 OTHER SPECIFIED POSTPROCEDURAL STATES: Chronic | ICD-10-CM

## 2022-10-12 PROCEDURE — 78452 HT MUSCLE IMAGE SPECT MULT: CPT | Mod: 26,MH

## 2022-10-12 PROCEDURE — 93016 CV STRESS TEST SUPVJ ONLY: CPT | Mod: GC

## 2022-10-12 PROCEDURE — 93018 CV STRESS TEST I&R ONLY: CPT | Mod: GC

## 2022-10-19 ENCOUNTER — NON-APPOINTMENT (OUTPATIENT)
Age: 67
End: 2022-10-19

## 2022-10-19 ENCOUNTER — RESULT REVIEW (OUTPATIENT)
Age: 67
End: 2022-10-19

## 2022-10-19 ENCOUNTER — APPOINTMENT (OUTPATIENT)
Dept: INFUSION THERAPY | Facility: HOSPITAL | Age: 67
End: 2022-10-19

## 2022-10-19 LAB
BASOPHILS # BLD AUTO: 0.04 K/UL — SIGNIFICANT CHANGE UP (ref 0–0.2)
BASOPHILS NFR BLD AUTO: 1 % — SIGNIFICANT CHANGE UP (ref 0–2)
EOSINOPHIL # BLD AUTO: 0.44 K/UL — SIGNIFICANT CHANGE UP (ref 0–0.5)
EOSINOPHIL NFR BLD AUTO: 10.8 % — HIGH (ref 0–6)
HCT VFR BLD CALC: 34 % — LOW (ref 39–50)
HGB BLD-MCNC: 11 G/DL — LOW (ref 13–17)
IMM GRANULOCYTES NFR BLD AUTO: 0.7 % — SIGNIFICANT CHANGE UP (ref 0–0.9)
LYMPHOCYTES # BLD AUTO: 0.71 K/UL — LOW (ref 1–3.3)
LYMPHOCYTES # BLD AUTO: 17.4 % — SIGNIFICANT CHANGE UP (ref 13–44)
MCHC RBC-ENTMCNC: 30 PG — SIGNIFICANT CHANGE UP (ref 27–34)
MCHC RBC-ENTMCNC: 32.4 G/DL — SIGNIFICANT CHANGE UP (ref 32–36)
MCV RBC AUTO: 92.6 FL — SIGNIFICANT CHANGE UP (ref 80–100)
MONOCYTES # BLD AUTO: 0.48 K/UL — SIGNIFICANT CHANGE UP (ref 0–0.9)
MONOCYTES NFR BLD AUTO: 11.8 % — SIGNIFICANT CHANGE UP (ref 2–14)
NEUTROPHILS # BLD AUTO: 2.38 K/UL — SIGNIFICANT CHANGE UP (ref 1.8–7.4)
NEUTROPHILS NFR BLD AUTO: 58.3 % — SIGNIFICANT CHANGE UP (ref 43–77)
NRBC # BLD: 0 /100 WBCS — SIGNIFICANT CHANGE UP (ref 0–0)
PLATELET # BLD AUTO: 326 K/UL — SIGNIFICANT CHANGE UP (ref 150–400)
RBC # BLD: 3.67 M/UL — LOW (ref 4.2–5.8)
RBC # FLD: 15.2 % — HIGH (ref 10.3–14.5)
WBC # BLD: 4.08 K/UL — SIGNIFICANT CHANGE UP (ref 3.8–10.5)
WBC # FLD AUTO: 4.08 K/UL — SIGNIFICANT CHANGE UP (ref 3.8–10.5)

## 2022-10-21 ENCOUNTER — APPOINTMENT (OUTPATIENT)
Dept: INFUSION THERAPY | Facility: HOSPITAL | Age: 67
End: 2022-10-21

## 2022-10-25 ENCOUNTER — RESULT REVIEW (OUTPATIENT)
Age: 67
End: 2022-10-25

## 2022-10-31 ENCOUNTER — APPOINTMENT (OUTPATIENT)
Dept: CT IMAGING | Facility: IMAGING CENTER | Age: 67
End: 2022-10-31

## 2022-10-31 ENCOUNTER — OUTPATIENT (OUTPATIENT)
Dept: OUTPATIENT SERVICES | Facility: HOSPITAL | Age: 67
LOS: 1 days | End: 2022-10-31
Payer: MEDICAID

## 2022-10-31 DIAGNOSIS — C79.51 SECONDARY MALIGNANT NEOPLASM OF BONE: ICD-10-CM

## 2022-10-31 DIAGNOSIS — Z90.49 ACQUIRED ABSENCE OF OTHER SPECIFIED PARTS OF DIGESTIVE TRACT: Chronic | ICD-10-CM

## 2022-10-31 DIAGNOSIS — Z98.890 OTHER SPECIFIED POSTPROCEDURAL STATES: Chronic | ICD-10-CM

## 2022-10-31 DIAGNOSIS — Z00.8 ENCOUNTER FOR OTHER GENERAL EXAMINATION: ICD-10-CM

## 2022-10-31 PROCEDURE — 74177 CT ABD & PELVIS W/CONTRAST: CPT

## 2022-10-31 PROCEDURE — 71260 CT THORAX DX C+: CPT | Mod: 26

## 2022-10-31 PROCEDURE — 71260 CT THORAX DX C+: CPT

## 2022-10-31 PROCEDURE — 82565 ASSAY OF CREATININE: CPT

## 2022-10-31 PROCEDURE — 74177 CT ABD & PELVIS W/CONTRAST: CPT | Mod: 26

## 2022-11-01 ENCOUNTER — OUTPATIENT (OUTPATIENT)
Dept: OUTPATIENT SERVICES | Facility: HOSPITAL | Age: 67
LOS: 1 days | Discharge: HOME | End: 2022-11-01

## 2022-11-01 ENCOUNTER — APPOINTMENT (OUTPATIENT)
Dept: CARDIOLOGY | Facility: CLINIC | Age: 67
End: 2022-11-01

## 2022-11-01 DIAGNOSIS — C18.9 MALIGNANT NEOPLASM OF COLON, UNSPECIFIED: ICD-10-CM

## 2022-11-01 DIAGNOSIS — Z90.49 ACQUIRED ABSENCE OF OTHER SPECIFIED PARTS OF DIGESTIVE TRACT: Chronic | ICD-10-CM

## 2022-11-01 DIAGNOSIS — Z98.890 OTHER SPECIFIED POSTPROCEDURAL STATES: Chronic | ICD-10-CM

## 2022-11-01 PROCEDURE — 93306 TTE W/DOPPLER COMPLETE: CPT

## 2022-11-09 ENCOUNTER — RESULT REVIEW (OUTPATIENT)
Age: 67
End: 2022-11-09

## 2022-11-09 ENCOUNTER — LABORATORY RESULT (OUTPATIENT)
Age: 67
End: 2022-11-09

## 2022-11-09 ENCOUNTER — APPOINTMENT (OUTPATIENT)
Dept: HEMATOLOGY ONCOLOGY | Facility: CLINIC | Age: 67
End: 2022-11-09

## 2022-11-09 ENCOUNTER — APPOINTMENT (OUTPATIENT)
Dept: INFUSION THERAPY | Facility: HOSPITAL | Age: 67
End: 2022-11-09

## 2022-11-09 VITALS
HEIGHT: 67.32 IN | HEART RATE: 58 BPM | RESPIRATION RATE: 16 BRPM | BODY MASS INDEX: 22.02 KG/M2 | WEIGHT: 141.98 LBS | SYSTOLIC BLOOD PRESSURE: 124 MMHG | OXYGEN SATURATION: 99 % | TEMPERATURE: 97.7 F | DIASTOLIC BLOOD PRESSURE: 74 MMHG

## 2022-11-09 LAB
BASOPHILS # BLD AUTO: 0.06 K/UL — SIGNIFICANT CHANGE UP (ref 0–0.2)
BASOPHILS NFR BLD AUTO: 1.3 % — SIGNIFICANT CHANGE UP (ref 0–2)
EOSINOPHIL # BLD AUTO: 0.25 K/UL — SIGNIFICANT CHANGE UP (ref 0–0.5)
EOSINOPHIL NFR BLD AUTO: 5.4 % — SIGNIFICANT CHANGE UP (ref 0–6)
HCT VFR BLD CALC: 35.1 % — LOW (ref 39–50)
HGB BLD-MCNC: 11.1 G/DL — LOW (ref 13–17)
IMM GRANULOCYTES NFR BLD AUTO: 0.4 % — SIGNIFICANT CHANGE UP (ref 0–0.9)
LYMPHOCYTES # BLD AUTO: 0.81 K/UL — LOW (ref 1–3.3)
LYMPHOCYTES # BLD AUTO: 17.5 % — SIGNIFICANT CHANGE UP (ref 13–44)
MCHC RBC-ENTMCNC: 29.1 PG — SIGNIFICANT CHANGE UP (ref 27–34)
MCHC RBC-ENTMCNC: 31.6 G/DL — LOW (ref 32–36)
MCV RBC AUTO: 92.1 FL — SIGNIFICANT CHANGE UP (ref 80–100)
MONOCYTES # BLD AUTO: 0.98 K/UL — HIGH (ref 0–0.9)
MONOCYTES NFR BLD AUTO: 21.2 % — HIGH (ref 2–14)
NEUTROPHILS # BLD AUTO: 2.51 K/UL — SIGNIFICANT CHANGE UP (ref 1.8–7.4)
NEUTROPHILS NFR BLD AUTO: 54.2 % — SIGNIFICANT CHANGE UP (ref 43–77)
NRBC # BLD: 0 /100 WBCS — SIGNIFICANT CHANGE UP (ref 0–0)
PLATELET # BLD AUTO: 391 K/UL — SIGNIFICANT CHANGE UP (ref 150–400)
RBC # BLD: 3.81 M/UL — LOW (ref 4.2–5.8)
RBC # FLD: 16.1 % — HIGH (ref 10.3–14.5)
WBC # BLD: 4.63 K/UL — SIGNIFICANT CHANGE UP (ref 3.8–10.5)
WBC # FLD AUTO: 4.63 K/UL — SIGNIFICANT CHANGE UP (ref 3.8–10.5)

## 2022-11-09 PROCEDURE — 99214 OFFICE O/P EST MOD 30 MIN: CPT

## 2022-11-09 NOTE — REVIEW OF SYSTEMS
[Negative] : Allergic/Immunologic [FreeTextEntry6] : has symbicort for wheezing [FreeTextEntry7] : occ heartburn  [FreeTextEntry9] : has back pain on and off

## 2022-11-09 NOTE — ASSESSMENT
[FreeTextEntry1] : The patient continues on chemotherapy with FOLFIRI however the Herceptin had to be stopped because of decreased ejection fraction.  The patient is seeing Dr. Nelson for repeat echocardiogram and evaluation.  The patient is also complaining of symptomatic left inguinal hernia and wishes to consider surgical resection which will be discussed with Dr. Cloud and cardiology.  The patient underwent repeat CAT scan which now shows resolution of the mediastinal adenopathy which was treated locally and no evidence of other active disease.  The plan is to continue his FOLFIRI and add Herceptin or anti-HER2 therapy as indicated when the patient is cleared by cardiology.  We will also be changing his schedule to every 3 weeks as he feels stronger waiting the extra week between treatments. [Palliative] : Goals of care discussed with patient: Palliative [Palliative Care Plan] : not applicable at this time

## 2022-11-09 NOTE — HISTORY OF PRESENT ILLNESS
[Disease: _____________________] : Disease: [unfilled] [M: ___] : M[unfilled] [AJCC Stage: ____] : AJCC Stage: [unfilled] [de-identified] : This is a 65-year-old man with history of esophagogastric carcinoma who presents for evaluation.  His history dates back to March 2018 when he developed a cough and decreased appetite.  A CAT scan of his chest revealed a mass in the distal esophagus with periesophageal and portacaval adenopathy.  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  The patient underwent neoadjuvant chemotherapy and radiation therapy with Taxol carboplatin.  A follow-up CAT scan in June 2018 revealed decreased mass in the esophagus.  In August 2018 the patient underwent [place] [facility] esophagogastrectomy with Dr. Cloud and Dr. Arciniega.  The pathology revealed no residual carcinoma with negative margins, with12 - lymph nodes (0/12).\par \par The patient then was well until November 2020 when he developed right rib pain and upper back pain.  Imaging testing revealed metastases to the thoracic spine and possible cord compression at T5-T6.  The patient underwent laminectomy of T4-T6 resection of the epidural tumor with T3-T12 fusion and endovascular embolization of the spinal tumor T5-T6.  The pathology revealed poorly differentiated adenocarcinoma.  Postoperatively his pain was relieved.  He has undergone radiation oncology consultation and is planned to receive SBRT treatment.  He now presents for discussion regarding further chemotherapy. [de-identified] : adenoca, Her2 pos [de-identified] : Her2 pos. [de-identified] : Since the last visit the patient overall remained stable and has no new symptoms indicating progression of disease.  He does complain of a left inguinal hernia which is painful and causing difficulty in walking.  The patient requests information regarding hernia repair.

## 2022-11-10 DIAGNOSIS — Z51.11 ENCOUNTER FOR ANTINEOPLASTIC CHEMOTHERAPY: ICD-10-CM

## 2022-11-10 DIAGNOSIS — R11.2 NAUSEA WITH VOMITING, UNSPECIFIED: ICD-10-CM

## 2022-11-10 DIAGNOSIS — C16.0 MALIGNANT NEOPLASM OF CARDIA: ICD-10-CM

## 2022-11-11 ENCOUNTER — APPOINTMENT (OUTPATIENT)
Dept: INFUSION THERAPY | Facility: HOSPITAL | Age: 67
End: 2022-11-11

## 2022-11-25 ENCOUNTER — APPOINTMENT (OUTPATIENT)
Dept: CARDIOLOGY | Facility: CLINIC | Age: 67
End: 2022-11-25

## 2022-11-28 ENCOUNTER — NON-APPOINTMENT (OUTPATIENT)
Age: 67
End: 2022-11-28

## 2022-11-30 ENCOUNTER — RESULT REVIEW (OUTPATIENT)
Age: 67
End: 2022-11-30

## 2022-11-30 ENCOUNTER — APPOINTMENT (OUTPATIENT)
Dept: INFUSION THERAPY | Facility: HOSPITAL | Age: 67
End: 2022-11-30

## 2022-11-30 ENCOUNTER — NON-APPOINTMENT (OUTPATIENT)
Age: 67
End: 2022-11-30

## 2022-11-30 LAB
ALBUMIN SERPL ELPH-MCNC: 3.5 G/DL — SIGNIFICANT CHANGE UP (ref 3.3–5)
ALP SERPL-CCNC: 121 U/L — HIGH (ref 40–120)
ALT FLD-CCNC: 9 U/L — LOW (ref 10–45)
ANION GAP SERPL CALC-SCNC: 11 MMOL/L — SIGNIFICANT CHANGE UP (ref 5–17)
AST SERPL-CCNC: 16 U/L — SIGNIFICANT CHANGE UP (ref 10–40)
BASOPHILS # BLD AUTO: 0.06 K/UL — SIGNIFICANT CHANGE UP (ref 0–0.2)
BASOPHILS NFR BLD AUTO: 1 % — SIGNIFICANT CHANGE UP (ref 0–2)
BILIRUB SERPL-MCNC: 0.5 MG/DL — SIGNIFICANT CHANGE UP (ref 0.2–1.2)
BUN SERPL-MCNC: 12 MG/DL — SIGNIFICANT CHANGE UP (ref 7–23)
CALCIUM SERPL-MCNC: 9 MG/DL — SIGNIFICANT CHANGE UP (ref 8.4–10.5)
CEA SERPL-MCNC: 8.5 NG/ML — HIGH (ref 0–3.8)
CHLORIDE SERPL-SCNC: 106 MMOL/L — SIGNIFICANT CHANGE UP (ref 96–108)
CO2 SERPL-SCNC: 26 MMOL/L — SIGNIFICANT CHANGE UP (ref 22–31)
CREAT SERPL-MCNC: 0.79 MG/DL — SIGNIFICANT CHANGE UP (ref 0.5–1.3)
EGFR: 98 ML/MIN/1.73M2 — SIGNIFICANT CHANGE UP
EOSINOPHIL # BLD AUTO: 0.27 K/UL — SIGNIFICANT CHANGE UP (ref 0–0.5)
EOSINOPHIL NFR BLD AUTO: 4.5 % — SIGNIFICANT CHANGE UP (ref 0–6)
GLUCOSE SERPL-MCNC: 97 MG/DL — SIGNIFICANT CHANGE UP (ref 70–99)
HCT VFR BLD CALC: 35.4 % — LOW (ref 39–50)
HGB BLD-MCNC: 11.4 G/DL — LOW (ref 13–17)
IMM GRANULOCYTES NFR BLD AUTO: 0.7 % — SIGNIFICANT CHANGE UP (ref 0–0.9)
LYMPHOCYTES # BLD AUTO: 1.1 K/UL — SIGNIFICANT CHANGE UP (ref 1–3.3)
LYMPHOCYTES # BLD AUTO: 18.5 % — SIGNIFICANT CHANGE UP (ref 13–44)
MCHC RBC-ENTMCNC: 29.5 PG — SIGNIFICANT CHANGE UP (ref 27–34)
MCHC RBC-ENTMCNC: 32.2 G/DL — SIGNIFICANT CHANGE UP (ref 32–36)
MCV RBC AUTO: 91.5 FL — SIGNIFICANT CHANGE UP (ref 80–100)
MONOCYTES # BLD AUTO: 1.08 K/UL — HIGH (ref 0–0.9)
MONOCYTES NFR BLD AUTO: 18.2 % — HIGH (ref 2–14)
NEUTROPHILS # BLD AUTO: 3.39 K/UL — SIGNIFICANT CHANGE UP (ref 1.8–7.4)
NEUTROPHILS NFR BLD AUTO: 57.1 % — SIGNIFICANT CHANGE UP (ref 43–77)
NRBC # BLD: 0 /100 WBCS — SIGNIFICANT CHANGE UP (ref 0–0)
PLATELET # BLD AUTO: 382 K/UL — SIGNIFICANT CHANGE UP (ref 150–400)
POTASSIUM SERPL-MCNC: 4.4 MMOL/L — SIGNIFICANT CHANGE UP (ref 3.5–5.3)
POTASSIUM SERPL-SCNC: 4.4 MMOL/L — SIGNIFICANT CHANGE UP (ref 3.5–5.3)
PROT SERPL-MCNC: 6 G/DL — SIGNIFICANT CHANGE UP (ref 6–8.3)
RBC # BLD: 3.87 M/UL — LOW (ref 4.2–5.8)
RBC # FLD: 15.6 % — HIGH (ref 10.3–14.5)
SODIUM SERPL-SCNC: 142 MMOL/L — SIGNIFICANT CHANGE UP (ref 135–145)
WBC # BLD: 5.94 K/UL — SIGNIFICANT CHANGE UP (ref 3.8–10.5)
WBC # FLD AUTO: 5.94 K/UL — SIGNIFICANT CHANGE UP (ref 3.8–10.5)

## 2022-12-02 ENCOUNTER — NON-APPOINTMENT (OUTPATIENT)
Age: 67
End: 2022-12-02

## 2022-12-02 ENCOUNTER — APPOINTMENT (OUTPATIENT)
Dept: INFUSION THERAPY | Facility: HOSPITAL | Age: 67
End: 2022-12-02

## 2022-12-08 ENCOUNTER — RX RENEWAL (OUTPATIENT)
Age: 67
End: 2022-12-08

## 2022-12-14 ENCOUNTER — RESULT REVIEW (OUTPATIENT)
Age: 67
End: 2022-12-14

## 2022-12-14 ENCOUNTER — APPOINTMENT (OUTPATIENT)
Dept: INFUSION THERAPY | Facility: HOSPITAL | Age: 67
End: 2022-12-14

## 2022-12-14 ENCOUNTER — APPOINTMENT (OUTPATIENT)
Dept: HEMATOLOGY ONCOLOGY | Facility: CLINIC | Age: 67
End: 2022-12-14

## 2022-12-14 VITALS
SYSTOLIC BLOOD PRESSURE: 102 MMHG | DIASTOLIC BLOOD PRESSURE: 58 MMHG | OXYGEN SATURATION: 95 % | HEART RATE: 77 BPM | TEMPERATURE: 97.1 F | HEIGHT: 67.28 IN | BODY MASS INDEX: 21.31 KG/M2 | WEIGHT: 137.35 LBS | RESPIRATION RATE: 16 BRPM

## 2022-12-14 LAB
ALBUMIN SERPL ELPH-MCNC: 3.8 G/DL — SIGNIFICANT CHANGE UP (ref 3.3–5)
ALP SERPL-CCNC: 125 U/L — HIGH (ref 40–120)
ALT FLD-CCNC: 10 U/L — SIGNIFICANT CHANGE UP (ref 10–45)
ANION GAP SERPL CALC-SCNC: 11 MMOL/L — SIGNIFICANT CHANGE UP (ref 5–17)
AST SERPL-CCNC: 16 U/L — SIGNIFICANT CHANGE UP (ref 10–40)
BASOPHILS # BLD AUTO: 0.05 K/UL — SIGNIFICANT CHANGE UP (ref 0–0.2)
BASOPHILS NFR BLD AUTO: 0.9 % — SIGNIFICANT CHANGE UP (ref 0–2)
BILIRUB SERPL-MCNC: 0.6 MG/DL — SIGNIFICANT CHANGE UP (ref 0.2–1.2)
BUN SERPL-MCNC: 18 MG/DL — SIGNIFICANT CHANGE UP (ref 7–23)
CALCIUM SERPL-MCNC: 8.8 MG/DL — SIGNIFICANT CHANGE UP (ref 8.4–10.5)
CHLORIDE SERPL-SCNC: 104 MMOL/L — SIGNIFICANT CHANGE UP (ref 96–108)
CO2 SERPL-SCNC: 24 MMOL/L — SIGNIFICANT CHANGE UP (ref 22–31)
CREAT SERPL-MCNC: 0.87 MG/DL — SIGNIFICANT CHANGE UP (ref 0.5–1.3)
EGFR: 95 ML/MIN/1.73M2 — SIGNIFICANT CHANGE UP
EOSINOPHIL # BLD AUTO: 0.27 K/UL — SIGNIFICANT CHANGE UP (ref 0–0.5)
EOSINOPHIL NFR BLD AUTO: 4.9 % — SIGNIFICANT CHANGE UP (ref 0–6)
GLUCOSE SERPL-MCNC: 130 MG/DL — HIGH (ref 70–99)
HCT VFR BLD CALC: 37.5 % — LOW (ref 39–50)
HGB BLD-MCNC: 12 G/DL — LOW (ref 13–17)
IMM GRANULOCYTES NFR BLD AUTO: 0.5 % — SIGNIFICANT CHANGE UP (ref 0–0.9)
LYMPHOCYTES # BLD AUTO: 0.87 K/UL — LOW (ref 1–3.3)
LYMPHOCYTES # BLD AUTO: 15.8 % — SIGNIFICANT CHANGE UP (ref 13–44)
MCHC RBC-ENTMCNC: 28.9 PG — SIGNIFICANT CHANGE UP (ref 27–34)
MCHC RBC-ENTMCNC: 32 G/DL — SIGNIFICANT CHANGE UP (ref 32–36)
MCV RBC AUTO: 90.4 FL — SIGNIFICANT CHANGE UP (ref 80–100)
MONOCYTES # BLD AUTO: 0.64 K/UL — SIGNIFICANT CHANGE UP (ref 0–0.9)
MONOCYTES NFR BLD AUTO: 11.6 % — SIGNIFICANT CHANGE UP (ref 2–14)
NEUTROPHILS # BLD AUTO: 3.64 K/UL — SIGNIFICANT CHANGE UP (ref 1.8–7.4)
NEUTROPHILS NFR BLD AUTO: 66.3 % — SIGNIFICANT CHANGE UP (ref 43–77)
NRBC # BLD: 0 /100 WBCS — SIGNIFICANT CHANGE UP (ref 0–0)
PLATELET # BLD AUTO: 321 K/UL — SIGNIFICANT CHANGE UP (ref 150–400)
POTASSIUM SERPL-MCNC: 4 MMOL/L — SIGNIFICANT CHANGE UP (ref 3.5–5.3)
POTASSIUM SERPL-SCNC: 4 MMOL/L — SIGNIFICANT CHANGE UP (ref 3.5–5.3)
PROT SERPL-MCNC: 6.6 G/DL — SIGNIFICANT CHANGE UP (ref 6–8.3)
RBC # BLD: 4.15 M/UL — LOW (ref 4.2–5.8)
RBC # FLD: 14.8 % — HIGH (ref 10.3–14.5)
SODIUM SERPL-SCNC: 139 MMOL/L — SIGNIFICANT CHANGE UP (ref 135–145)
WBC # BLD: 5.5 K/UL — SIGNIFICANT CHANGE UP (ref 3.8–10.5)
WBC # FLD AUTO: 5.5 K/UL — SIGNIFICANT CHANGE UP (ref 3.8–10.5)

## 2022-12-14 PROCEDURE — 99214 OFFICE O/P EST MOD 30 MIN: CPT

## 2022-12-14 NOTE — REVIEW OF SYSTEMS
[Abdominal Pain] : abdominal pain [Negative] : Respiratory [Vomiting] : no vomiting [Constipation] : no constipation [Diarrhea] : no diarrhea [FreeTextEntry7] : Hernia discomfort Inguinal area. [FreeTextEntry9] : has back pain on and off

## 2022-12-14 NOTE — HISTORY OF PRESENT ILLNESS
[Disease: _____________________] : Disease: [unfilled] [M: ___] : M[unfilled] [AJCC Stage: ____] : AJCC Stage: [unfilled] [de-identified] : This is a 65-year-old man with history of esophagogastric carcinoma who presents for evaluation.  His history dates back to March 2018 when he developed a cough and decreased appetite.  A CAT scan of his chest revealed a mass in the distal esophagus with periesophageal and portacaval adenopathy.  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  The patient underwent neoadjuvant chemotherapy and radiation therapy with Taxol carboplatin.  A follow-up CAT scan in June 2018 revealed decreased mass in the esophagus.  In August 2018 the patient underwent [place] [facility] esophagogastrectomy with Dr. Cloud and Dr. Arciniega.  The pathology revealed no residual carcinoma with negative margins, with12 - lymph nodes (0/12).\par \par The patient then was well until November 2020 when he developed right rib pain and upper back pain.  Imaging testing revealed metastases to the thoracic spine and possible cord compression at T5-T6.  The patient underwent laminectomy of T4-T6 resection of the epidural tumor with T3-T12 fusion and endovascular embolization of the spinal tumor T5-T6.  The pathology revealed poorly differentiated adenocarcinoma.  Postoperatively his pain was relieved.  He has undergone radiation oncology consultation and is planned to receive SBRT treatment.  He now presents for discussion regarding further chemotherapy. [de-identified] : adenoca, Her2 pos [de-identified] : Her2 pos. [de-identified] : S/p on going FOLFIRI . Patient feels okay. Denies any increased weakness or fatigue. He has chronic lower back pain that is controlled with Oxycodone 5 mg prn. His appetite is decreased, but he has Cannabis to use. He still complains of a left inguinal hernia discomfort which is painful and causing difficulty in walking. He will make appointment with surgeon for possible repair.

## 2022-12-14 NOTE — PHYSICAL EXAM
[Restricted in physically strenuous activity but ambulatory and able to carry out work of a light or sedentary nature] : Status 1- Restricted in physically strenuous activity but ambulatory and able to carry out work of a light or sedentary nature, e.g., light house work, office work [Thin] : thin [Normal] : affect appropriate [de-identified] : +increase curvature of spine. Lordosis.

## 2022-12-14 NOTE — ASSESSMENT
[Palliative] : Goals of care discussed with patient: Palliative [Palliative Care Plan] : not applicable at this time [FreeTextEntry1] : On going FOLFIRI however the Herceptin had to be stopped because of decreased ejection fraction.  \par Patient had a stress test done by Dr. Nelson which was suggestive scar from ischemic cardiomyopathy.\par rather than from Herceptin. His EF was ~48%. Dr Nelson has cleared pt to resume Herceptin.\par Dr. Harish Long will review recommendations prior to restart. \par Patient still complaining of symptomatic left inguinal hernia and wishes for surgical resection.\par Patient will schedule a consultation with  Dr. Cloud for possible hernia repair. \par He will continue chemotherapy every 3 weeks as he feels stronger waiting the extra week between treatments.\par Return to office in 3 weeks.

## 2022-12-16 ENCOUNTER — APPOINTMENT (OUTPATIENT)
Dept: INFUSION THERAPY | Facility: HOSPITAL | Age: 67
End: 2022-12-16

## 2022-12-28 ENCOUNTER — RESULT REVIEW (OUTPATIENT)
Age: 67
End: 2022-12-28

## 2022-12-28 ENCOUNTER — APPOINTMENT (OUTPATIENT)
Dept: INFUSION THERAPY | Facility: HOSPITAL | Age: 67
End: 2022-12-28

## 2022-12-28 LAB
ALBUMIN SERPL ELPH-MCNC: 3.5 G/DL — SIGNIFICANT CHANGE UP (ref 3.3–5)
ALP SERPL-CCNC: 135 U/L — HIGH (ref 40–120)
ALT FLD-CCNC: 9 U/L — LOW (ref 10–45)
ANION GAP SERPL CALC-SCNC: 10 MMOL/L — SIGNIFICANT CHANGE UP (ref 5–17)
AST SERPL-CCNC: 15 U/L — SIGNIFICANT CHANGE UP (ref 10–40)
BASOPHILS # BLD AUTO: 0.05 K/UL — SIGNIFICANT CHANGE UP (ref 0–0.2)
BASOPHILS NFR BLD AUTO: 1.2 % — SIGNIFICANT CHANGE UP (ref 0–2)
BILIRUB SERPL-MCNC: 0.5 MG/DL — SIGNIFICANT CHANGE UP (ref 0.2–1.2)
BUN SERPL-MCNC: 13 MG/DL — SIGNIFICANT CHANGE UP (ref 7–23)
CALCIUM SERPL-MCNC: 8.7 MG/DL — SIGNIFICANT CHANGE UP (ref 8.4–10.5)
CANCER AG19-9 SERPL-ACNC: 25 U/ML — SIGNIFICANT CHANGE UP
CEA SERPL-MCNC: 9.9 NG/ML — HIGH (ref 0–3.8)
CHLORIDE SERPL-SCNC: 105 MMOL/L — SIGNIFICANT CHANGE UP (ref 96–108)
CO2 SERPL-SCNC: 26 MMOL/L — SIGNIFICANT CHANGE UP (ref 22–31)
CREAT SERPL-MCNC: 0.86 MG/DL — SIGNIFICANT CHANGE UP (ref 0.5–1.3)
EGFR: 96 ML/MIN/1.73M2 — SIGNIFICANT CHANGE UP
EOSINOPHIL # BLD AUTO: 0.16 K/UL — SIGNIFICANT CHANGE UP (ref 0–0.5)
EOSINOPHIL NFR BLD AUTO: 3.7 % — SIGNIFICANT CHANGE UP (ref 0–6)
GLUCOSE SERPL-MCNC: 102 MG/DL — HIGH (ref 70–99)
HCT VFR BLD CALC: 33.5 % — LOW (ref 39–50)
HGB BLD-MCNC: 11 G/DL — LOW (ref 13–17)
IMM GRANULOCYTES NFR BLD AUTO: 0.5 % — SIGNIFICANT CHANGE UP (ref 0–0.9)
LYMPHOCYTES # BLD AUTO: 0.64 K/UL — LOW (ref 1–3.3)
LYMPHOCYTES # BLD AUTO: 15 % — SIGNIFICANT CHANGE UP (ref 13–44)
MCHC RBC-ENTMCNC: 29.3 PG — SIGNIFICANT CHANGE UP (ref 27–34)
MCHC RBC-ENTMCNC: 32.8 G/DL — SIGNIFICANT CHANGE UP (ref 32–36)
MCV RBC AUTO: 89.3 FL — SIGNIFICANT CHANGE UP (ref 80–100)
MONOCYTES # BLD AUTO: 0.66 K/UL — SIGNIFICANT CHANGE UP (ref 0–0.9)
MONOCYTES NFR BLD AUTO: 15.5 % — HIGH (ref 2–14)
NEUTROPHILS # BLD AUTO: 2.74 K/UL — SIGNIFICANT CHANGE UP (ref 1.8–7.4)
NEUTROPHILS NFR BLD AUTO: 64.1 % — SIGNIFICANT CHANGE UP (ref 43–77)
NRBC # BLD: 0 /100 WBCS — SIGNIFICANT CHANGE UP (ref 0–0)
PLATELET # BLD AUTO: 378 K/UL — SIGNIFICANT CHANGE UP (ref 150–400)
POTASSIUM SERPL-MCNC: 4.5 MMOL/L — SIGNIFICANT CHANGE UP (ref 3.5–5.3)
POTASSIUM SERPL-SCNC: 4.5 MMOL/L — SIGNIFICANT CHANGE UP (ref 3.5–5.3)
PROT SERPL-MCNC: 6.2 G/DL — SIGNIFICANT CHANGE UP (ref 6–8.3)
RBC # BLD: 3.75 M/UL — LOW (ref 4.2–5.8)
RBC # FLD: 15.1 % — HIGH (ref 10.3–14.5)
SODIUM SERPL-SCNC: 140 MMOL/L — SIGNIFICANT CHANGE UP (ref 135–145)
WBC # BLD: 4.27 K/UL — SIGNIFICANT CHANGE UP (ref 3.8–10.5)
WBC # FLD AUTO: 4.27 K/UL — SIGNIFICANT CHANGE UP (ref 3.8–10.5)

## 2022-12-30 ENCOUNTER — APPOINTMENT (OUTPATIENT)
Dept: INFUSION THERAPY | Facility: HOSPITAL | Age: 67
End: 2022-12-30

## 2023-01-01 ENCOUNTER — APPOINTMENT (OUTPATIENT)
Dept: INFUSION THERAPY | Facility: HOSPITAL | Age: 68
End: 2023-01-01

## 2023-01-01 ENCOUNTER — RESULT REVIEW (OUTPATIENT)
Age: 68
End: 2023-01-01

## 2023-01-01 ENCOUNTER — OUTPATIENT (OUTPATIENT)
Dept: OUTPATIENT SERVICES | Facility: HOSPITAL | Age: 68
LOS: 1 days | Discharge: HOME | End: 2023-01-01

## 2023-01-01 ENCOUNTER — APPOINTMENT (OUTPATIENT)
Dept: CARDIOLOGY | Facility: CLINIC | Age: 68
End: 2023-01-01
Payer: MEDICAID

## 2023-01-01 ENCOUNTER — RX RENEWAL (OUTPATIENT)
Age: 68
End: 2023-01-01

## 2023-01-01 ENCOUNTER — APPOINTMENT (OUTPATIENT)
Dept: HEMATOLOGY ONCOLOGY | Facility: CLINIC | Age: 68
End: 2023-01-01
Payer: MEDICAID

## 2023-01-01 ENCOUNTER — NON-APPOINTMENT (OUTPATIENT)
Age: 68
End: 2023-01-01

## 2023-01-01 ENCOUNTER — APPOINTMENT (OUTPATIENT)
Age: 68
End: 2023-01-01

## 2023-01-01 ENCOUNTER — APPOINTMENT (OUTPATIENT)
Dept: NUCLEAR MEDICINE | Facility: IMAGING CENTER | Age: 68
End: 2023-01-01
Payer: MEDICAID

## 2023-01-01 ENCOUNTER — APPOINTMENT (OUTPATIENT)
Dept: RADIOLOGY | Facility: IMAGING CENTER | Age: 68
End: 2023-01-01
Payer: MEDICAID

## 2023-01-01 ENCOUNTER — APPOINTMENT (OUTPATIENT)
Dept: CT IMAGING | Facility: IMAGING CENTER | Age: 68
End: 2023-01-01
Payer: MEDICAID

## 2023-01-01 ENCOUNTER — APPOINTMENT (OUTPATIENT)
Age: 68
End: 2023-01-01
Payer: MEDICAID

## 2023-01-01 ENCOUNTER — APPOINTMENT (OUTPATIENT)
Dept: PULMONOLOGY | Facility: CLINIC | Age: 68
End: 2023-01-01
Payer: MEDICAID

## 2023-01-01 ENCOUNTER — APPOINTMENT (OUTPATIENT)
Dept: HEMATOLOGY ONCOLOGY | Facility: CLINIC | Age: 68
End: 2023-01-01

## 2023-01-01 ENCOUNTER — OUTPATIENT (OUTPATIENT)
Dept: OUTPATIENT SERVICES | Facility: HOSPITAL | Age: 68
LOS: 1 days | End: 2023-01-01
Payer: MEDICAID

## 2023-01-01 ENCOUNTER — TRANSCRIPTION ENCOUNTER (OUTPATIENT)
Age: 68
End: 2023-01-01

## 2023-01-01 ENCOUNTER — OUTPATIENT (OUTPATIENT)
Dept: OUTPATIENT SERVICES | Facility: HOSPITAL | Age: 68
LOS: 1 days | Discharge: HOME | End: 2023-01-01
Payer: MEDICAID

## 2023-01-01 ENCOUNTER — APPOINTMENT (OUTPATIENT)
Dept: CT IMAGING | Facility: CLINIC | Age: 68
End: 2023-01-01

## 2023-01-01 ENCOUNTER — INPATIENT (INPATIENT)
Facility: HOSPITAL | Age: 68
LOS: 5 days | Discharge: HOME | End: 2023-12-20
Attending: STUDENT IN AN ORGANIZED HEALTH CARE EDUCATION/TRAINING PROGRAM | Admitting: STUDENT IN AN ORGANIZED HEALTH CARE EDUCATION/TRAINING PROGRAM
Payer: MEDICARE

## 2023-01-01 ENCOUNTER — APPOINTMENT (OUTPATIENT)
Dept: SURGERY | Facility: HOSPITAL | Age: 68
End: 2023-01-01

## 2023-01-01 VITALS
SYSTOLIC BLOOD PRESSURE: 120 MMHG | HEART RATE: 78 BPM | DIASTOLIC BLOOD PRESSURE: 60 MMHG | OXYGEN SATURATION: 95 % | RESPIRATION RATE: 14 BRPM

## 2023-01-01 VITALS
HEART RATE: 97 BPM | DIASTOLIC BLOOD PRESSURE: 59 MMHG | SYSTOLIC BLOOD PRESSURE: 85 MMHG | BODY MASS INDEX: 19.3 KG/M2 | RESPIRATION RATE: 16 BRPM | WEIGHT: 123 LBS | HEIGHT: 67 IN | OXYGEN SATURATION: 93 %

## 2023-01-01 VITALS
WEIGHT: 130 LBS | TEMPERATURE: 97.3 F | SYSTOLIC BLOOD PRESSURE: 121 MMHG | HEIGHT: 67 IN | DIASTOLIC BLOOD PRESSURE: 74 MMHG | BODY MASS INDEX: 20.4 KG/M2 | RESPIRATION RATE: 15 BRPM | OXYGEN SATURATION: 92 % | HEART RATE: 67 BPM

## 2023-01-01 VITALS
HEIGHT: 67 IN | TEMPERATURE: 98 F | DIASTOLIC BLOOD PRESSURE: 72 MMHG | HEART RATE: 70 BPM | RESPIRATION RATE: 17 BRPM | WEIGHT: 100.09 LBS | SYSTOLIC BLOOD PRESSURE: 113 MMHG | OXYGEN SATURATION: 96 %

## 2023-01-01 VITALS
BODY MASS INDEX: 20.03 KG/M2 | WEIGHT: 127.87 LBS | RESPIRATION RATE: 16 BRPM | TEMPERATURE: 97.5 F | SYSTOLIC BLOOD PRESSURE: 101 MMHG | HEART RATE: 77 BPM | DIASTOLIC BLOOD PRESSURE: 62 MMHG | OXYGEN SATURATION: 90 %

## 2023-01-01 VITALS
BODY MASS INDEX: 18.56 KG/M2 | SYSTOLIC BLOOD PRESSURE: 105 MMHG | RESPIRATION RATE: 16 BRPM | DIASTOLIC BLOOD PRESSURE: 71 MMHG | HEART RATE: 97 BPM | TEMPERATURE: 97.4 F | OXYGEN SATURATION: 95 % | HEIGHT: 67.01 IN | WEIGHT: 118.25 LBS

## 2023-01-01 VITALS
HEART RATE: 77 BPM | SYSTOLIC BLOOD PRESSURE: 105 MMHG | WEIGHT: 123.02 LBS | OXYGEN SATURATION: 94 % | TEMPERATURE: 98 F | RESPIRATION RATE: 17 BRPM | HEIGHT: 67 IN | DIASTOLIC BLOOD PRESSURE: 53 MMHG

## 2023-01-01 VITALS
WEIGHT: 122 LBS | HEART RATE: 85 BPM | HEIGHT: 67 IN | BODY MASS INDEX: 19.15 KG/M2 | SYSTOLIC BLOOD PRESSURE: 88 MMHG | RESPIRATION RATE: 16 BRPM | DIASTOLIC BLOOD PRESSURE: 59 MMHG | OXYGEN SATURATION: 95 %

## 2023-01-01 VITALS
DIASTOLIC BLOOD PRESSURE: 70 MMHG | HEIGHT: 67 IN | OXYGEN SATURATION: 97 % | RESPIRATION RATE: 16 BRPM | WEIGHT: 123.02 LBS | HEART RATE: 94 BPM | TEMPERATURE: 98 F | SYSTOLIC BLOOD PRESSURE: 111 MMHG

## 2023-01-01 VITALS
SYSTOLIC BLOOD PRESSURE: 98 MMHG | DIASTOLIC BLOOD PRESSURE: 64 MMHG | BODY MASS INDEX: 19.33 KG/M2 | OXYGEN SATURATION: 97 % | TEMPERATURE: 97.5 F | WEIGHT: 123.46 LBS | RESPIRATION RATE: 16 BRPM | HEART RATE: 79 BPM

## 2023-01-01 VITALS
SYSTOLIC BLOOD PRESSURE: 106 MMHG | WEIGHT: 123.46 LBS | TEMPERATURE: 97 F | DIASTOLIC BLOOD PRESSURE: 72 MMHG | RESPIRATION RATE: 16 BRPM | HEIGHT: 67.01 IN | OXYGEN SATURATION: 92 % | BODY MASS INDEX: 19.38 KG/M2 | HEART RATE: 99 BPM

## 2023-01-01 VITALS
SYSTOLIC BLOOD PRESSURE: 112 MMHG | HEART RATE: 86 BPM | TEMPERATURE: 98 F | OXYGEN SATURATION: 100 % | RESPIRATION RATE: 18 BRPM | DIASTOLIC BLOOD PRESSURE: 63 MMHG

## 2023-01-01 VITALS
BODY MASS INDEX: 19.38 KG/M2 | HEIGHT: 67.01 IN | HEART RATE: 96 BPM | WEIGHT: 123.46 LBS | SYSTOLIC BLOOD PRESSURE: 99 MMHG | OXYGEN SATURATION: 94 % | TEMPERATURE: 97.3 F | RESPIRATION RATE: 16 BRPM | DIASTOLIC BLOOD PRESSURE: 72 MMHG

## 2023-01-01 VITALS
WEIGHT: 123.46 LBS | BODY MASS INDEX: 19.33 KG/M2 | OXYGEN SATURATION: 95 % | HEART RATE: 83 BPM | SYSTOLIC BLOOD PRESSURE: 107 MMHG | DIASTOLIC BLOOD PRESSURE: 69 MMHG | RESPIRATION RATE: 16 BRPM | TEMPERATURE: 97.5 F

## 2023-01-01 VITALS
DIASTOLIC BLOOD PRESSURE: 66 MMHG | TEMPERATURE: 98.1 F | WEIGHT: 124.12 LBS | HEART RATE: 100 BPM | OXYGEN SATURATION: 92 % | BODY MASS INDEX: 19.44 KG/M2 | SYSTOLIC BLOOD PRESSURE: 94 MMHG

## 2023-01-01 VITALS
OXYGEN SATURATION: 89 % | SYSTOLIC BLOOD PRESSURE: 120 MMHG | RESPIRATION RATE: 16 BRPM | TEMPERATURE: 98.2 F | BODY MASS INDEX: 21.75 KG/M2 | HEART RATE: 81 BPM | DIASTOLIC BLOOD PRESSURE: 73 MMHG | WEIGHT: 140.19 LBS | HEIGHT: 67.28 IN

## 2023-01-01 VITALS
DIASTOLIC BLOOD PRESSURE: 63 MMHG | SYSTOLIC BLOOD PRESSURE: 99 MMHG | HEIGHT: 67 IN | BODY MASS INDEX: 19.49 KG/M2 | WEIGHT: 124.19 LBS | TEMPERATURE: 97.2 F | HEART RATE: 71 BPM | RESPIRATION RATE: 16 BRPM | OXYGEN SATURATION: 96 %

## 2023-01-01 VITALS
SYSTOLIC BLOOD PRESSURE: 108 MMHG | BODY MASS INDEX: 18.12 KG/M2 | DIASTOLIC BLOOD PRESSURE: 70 MMHG | TEMPERATURE: 97.2 F | HEART RATE: 95 BPM | RESPIRATION RATE: 16 BRPM | OXYGEN SATURATION: 95 % | WEIGHT: 115.74 LBS

## 2023-01-01 DIAGNOSIS — Z98.890 OTHER SPECIFIED POSTPROCEDURAL STATES: Chronic | ICD-10-CM

## 2023-01-01 DIAGNOSIS — Z71.89 OTHER SPECIFIED COUNSELING: ICD-10-CM

## 2023-01-01 DIAGNOSIS — Z79.899 OTHER LONG TERM (CURRENT) DRUG THERAPY: ICD-10-CM

## 2023-01-01 DIAGNOSIS — I42.7 CARDIOMYOPATHY DUE TO DRUG AND EXTERNAL AGENT: ICD-10-CM

## 2023-01-01 DIAGNOSIS — R29.898 OTHER SYMPTOMS AND SIGNS INVOLVING THE MUSCULOSKELETAL SYSTEM: ICD-10-CM

## 2023-01-01 DIAGNOSIS — Z98.1 ARTHRODESIS STATUS: Chronic | ICD-10-CM

## 2023-01-01 DIAGNOSIS — I25.10 ATHEROSCLEROTIC HEART DISEASE OF NATIVE CORONARY ARTERY WITHOUT ANGINA PECTORIS: ICD-10-CM

## 2023-01-01 DIAGNOSIS — K40.90 UNILATERAL INGUINAL HERNIA, WITHOUT OBSTRUCTION OR GANGRENE, NOT SPECIFIED AS RECURRENT: ICD-10-CM

## 2023-01-01 DIAGNOSIS — Z51.5 ENCOUNTER FOR PALLIATIVE CARE: ICD-10-CM

## 2023-01-01 DIAGNOSIS — R05.9 COUGH, UNSPECIFIED: ICD-10-CM

## 2023-01-01 DIAGNOSIS — R11.2 NAUSEA WITH VOMITING, UNSPECIFIED: ICD-10-CM

## 2023-01-01 DIAGNOSIS — R93.89 ABNORMAL FINDINGS ON DIAGNOSTIC IMAGING OF OTHER SPECIFIED BODY STRUCTURES: ICD-10-CM

## 2023-01-01 DIAGNOSIS — Z90.49 ACQUIRED ABSENCE OF OTHER SPECIFIED PARTS OF DIGESTIVE TRACT: Chronic | ICD-10-CM

## 2023-01-01 DIAGNOSIS — C18.9 MALIGNANT NEOPLASM OF COLON, UNSPECIFIED: ICD-10-CM

## 2023-01-01 DIAGNOSIS — C79.51 SECONDARY MALIGNANT NEOPLASM OF BONE: ICD-10-CM

## 2023-01-01 DIAGNOSIS — Z29.9 ENCOUNTER FOR PROPHYLACTIC MEASURES, UNSPECIFIED: ICD-10-CM

## 2023-01-01 DIAGNOSIS — G89.3 NEOPLASM RELATED PAIN (ACUTE) (CHRONIC): ICD-10-CM

## 2023-01-01 DIAGNOSIS — K40.90 UNILATERAL INGUINAL HERNIA, W/OUT OBSTRUCTION OR GANGRENE, NOT SPECIFIED AS RECURRENT: ICD-10-CM

## 2023-01-01 DIAGNOSIS — Z51.11 ENCOUNTER FOR ANTINEOPLASTIC CHEMOTHERAPY: ICD-10-CM

## 2023-01-01 DIAGNOSIS — M54.50 LOW BACK PAIN, UNSPECIFIED: ICD-10-CM

## 2023-01-01 DIAGNOSIS — R06.09 OTHER FORMS OF DYSPNEA: ICD-10-CM

## 2023-01-01 DIAGNOSIS — J44.9 CHRONIC OBSTRUCTIVE PULMONARY DISEASE, UNSPECIFIED: ICD-10-CM

## 2023-01-01 DIAGNOSIS — C16.0 MALIGNANT NEOPLASM OF CARDIA: ICD-10-CM

## 2023-01-01 DIAGNOSIS — Z00.8 ENCOUNTER FOR OTHER GENERAL EXAMINATION: ICD-10-CM

## 2023-01-01 DIAGNOSIS — Z87.09 PERSONAL HISTORY OF OTHER DISEASES OF THE RESPIRATORY SYSTEM: ICD-10-CM

## 2023-01-01 DIAGNOSIS — R53.81 OTHER MALAISE: ICD-10-CM

## 2023-01-01 DIAGNOSIS — I70.90 UNSPECIFIED ATHEROSCLEROSIS: ICD-10-CM

## 2023-01-01 DIAGNOSIS — T45.1X5A CARDIOMYOPATHY DUE TO DRUG AND EXTERNAL AGENT: ICD-10-CM

## 2023-01-01 DIAGNOSIS — D64.9 ANEMIA, UNSPECIFIED: ICD-10-CM

## 2023-01-01 DIAGNOSIS — Z01.818 ENCOUNTER FOR OTHER PREPROCEDURAL EXAMINATION: ICD-10-CM

## 2023-01-01 DIAGNOSIS — C15.9 MALIGNANT NEOPLASM OF ESOPHAGUS, UNSPECIFIED: ICD-10-CM

## 2023-01-01 DIAGNOSIS — F41.9 ANXIETY DISORDER, UNSPECIFIED: ICD-10-CM

## 2023-01-01 DIAGNOSIS — K40.30 UNILATERAL INGUINAL HERNIA, WITH OBSTRUCTION, WITHOUT GANGRENE, NOT SPECIFIED AS RECURRENT: ICD-10-CM

## 2023-01-01 LAB
ALBUMIN SERPL ELPH-MCNC: 2.8 G/DL — LOW (ref 3.5–5)
ALBUMIN SERPL ELPH-MCNC: 3.1 G/DL — LOW (ref 3.3–5)
ALBUMIN SERPL ELPH-MCNC: 3.2 G/DL
ALBUMIN SERPL ELPH-MCNC: 3.2 G/DL — LOW (ref 3.3–5)
ALBUMIN SERPL ELPH-MCNC: 3.2 G/DL — LOW (ref 3.3–5)
ALBUMIN SERPL ELPH-MCNC: 3.3 G/DL — SIGNIFICANT CHANGE UP (ref 3.3–5)
ALBUMIN SERPL ELPH-MCNC: 3.4 G/DL
ALBUMIN SERPL ELPH-MCNC: 3.4 G/DL — SIGNIFICANT CHANGE UP (ref 3.3–5)
ALBUMIN SERPL ELPH-MCNC: 3.5 G/DL
ALBUMIN SERPL ELPH-MCNC: 3.5 G/DL
ALBUMIN SERPL ELPH-MCNC: 3.6 G/DL — SIGNIFICANT CHANGE UP (ref 3.3–5)
ALBUMIN SERPL ELPH-MCNC: 3.7 G/DL — SIGNIFICANT CHANGE UP (ref 3.3–5)
ALBUMIN SERPL ELPH-MCNC: 3.7 G/DL — SIGNIFICANT CHANGE UP (ref 3.3–5)
ALBUMIN SERPL ELPH-MCNC: 4.1 G/DL
ALP BLD-CCNC: 105 U/L
ALP BLD-CCNC: 114 U/L
ALP BLD-CCNC: 120 U/L
ALP BLD-CCNC: 89 U/L
ALP BLD-CCNC: 98 U/L
ALP SERPL-CCNC: 102 U/L — SIGNIFICANT CHANGE UP (ref 40–120)
ALP SERPL-CCNC: 102 U/L — SIGNIFICANT CHANGE UP (ref 40–120)
ALP SERPL-CCNC: 104 U/L — SIGNIFICANT CHANGE UP (ref 40–120)
ALP SERPL-CCNC: 106 U/L — SIGNIFICANT CHANGE UP (ref 40–120)
ALP SERPL-CCNC: 122 U/L — HIGH (ref 40–120)
ALP SERPL-CCNC: 78 U/L — SIGNIFICANT CHANGE UP (ref 40–120)
ALP SERPL-CCNC: 78 U/L — SIGNIFICANT CHANGE UP (ref 40–120)
ALP SERPL-CCNC: 81 U/L — SIGNIFICANT CHANGE UP (ref 40–120)
ALP SERPL-CCNC: 81 U/L — SIGNIFICANT CHANGE UP (ref 40–120)
ALP SERPL-CCNC: 82 U/L — SIGNIFICANT CHANGE UP (ref 40–120)
ALP SERPL-CCNC: 82 U/L — SIGNIFICANT CHANGE UP (ref 40–120)
ALP SERPL-CCNC: 84 U/L — SIGNIFICANT CHANGE UP (ref 40–120)
ALP SERPL-CCNC: 90 U/L — SIGNIFICANT CHANGE UP (ref 40–120)
ALP SERPL-CCNC: 94 U/L — SIGNIFICANT CHANGE UP (ref 40–120)
ALT FLD-CCNC: 10 U/L — SIGNIFICANT CHANGE UP (ref 4–41)
ALT FLD-CCNC: 10 U/L — SIGNIFICANT CHANGE UP (ref 4–41)
ALT FLD-CCNC: 13 U/L DA — SIGNIFICANT CHANGE UP (ref 10–60)
ALT FLD-CCNC: 6 U/L — LOW (ref 10–45)
ALT FLD-CCNC: 6 U/L — LOW (ref 10–45)
ALT FLD-CCNC: 8 U/L — LOW (ref 10–45)
ALT FLD-CCNC: 8 U/L — SIGNIFICANT CHANGE UP (ref 4–41)
ALT FLD-CCNC: 9 U/L — SIGNIFICANT CHANGE UP (ref 4–41)
ALT FLD-CCNC: 9 U/L — SIGNIFICANT CHANGE UP (ref 4–41)
ALT FLD-CCNC: <5 U/L — LOW (ref 10–45)
ALT SERPL-CCNC: 5 U/L
ALT SERPL-CCNC: 6 U/L
ALT SERPL-CCNC: 6 U/L
ALT SERPL-CCNC: 7 U/L
ALT SERPL-CCNC: <5 U/L
ANION GAP SERPL CALC-SCNC: 10 MMOL/L
ANION GAP SERPL CALC-SCNC: 10 MMOL/L — SIGNIFICANT CHANGE UP (ref 5–17)
ANION GAP SERPL CALC-SCNC: 10 MMOL/L — SIGNIFICANT CHANGE UP (ref 7–14)
ANION GAP SERPL CALC-SCNC: 10 MMOL/L — SIGNIFICANT CHANGE UP (ref 7–14)
ANION GAP SERPL CALC-SCNC: 11 MMOL/L
ANION GAP SERPL CALC-SCNC: 11 MMOL/L — SIGNIFICANT CHANGE UP (ref 5–17)
ANION GAP SERPL CALC-SCNC: 12 MMOL/L
ANION GAP SERPL CALC-SCNC: 13 MMOL/L — SIGNIFICANT CHANGE UP (ref 7–14)
ANION GAP SERPL CALC-SCNC: 13 MMOL/L — SIGNIFICANT CHANGE UP (ref 7–14)
ANION GAP SERPL CALC-SCNC: 4 MMOL/L — LOW (ref 5–17)
ANION GAP SERPL CALC-SCNC: 8 MMOL/L
ANION GAP SERPL CALC-SCNC: 8 MMOL/L
ANION GAP SERPL CALC-SCNC: 8 MMOL/L — SIGNIFICANT CHANGE UP (ref 7–14)
ANION GAP SERPL CALC-SCNC: 9 MMOL/L — SIGNIFICANT CHANGE UP (ref 5–17)
ANION GAP SERPL CALC-SCNC: 9 MMOL/L — SIGNIFICANT CHANGE UP (ref 5–17)
ANION GAP SERPL CALC-SCNC: 9 MMOL/L — SIGNIFICANT CHANGE UP (ref 7–14)
ANION GAP SERPL CALC-SCNC: 9 MMOL/L — SIGNIFICANT CHANGE UP (ref 7–14)
APPEARANCE UR: CLEAR — SIGNIFICANT CHANGE UP
APTT BLD: 29.8 SEC — SIGNIFICANT CHANGE UP (ref 24.5–35.6)
APTT BLD: 29.8 SEC — SIGNIFICANT CHANGE UP (ref 24.5–35.6)
APTT BLD: 32.9 SEC — SIGNIFICANT CHANGE UP (ref 24.5–35.6)
AST SERPL-CCNC: 10 U/L — SIGNIFICANT CHANGE UP (ref 10–40)
AST SERPL-CCNC: 11 U/L — SIGNIFICANT CHANGE UP (ref 4–40)
AST SERPL-CCNC: 11 U/L — SIGNIFICANT CHANGE UP (ref 4–40)
AST SERPL-CCNC: 12 U/L — SIGNIFICANT CHANGE UP (ref 10–40)
AST SERPL-CCNC: 12 U/L — SIGNIFICANT CHANGE UP (ref 4–40)
AST SERPL-CCNC: 12 U/L — SIGNIFICANT CHANGE UP (ref 4–40)
AST SERPL-CCNC: 13 U/L
AST SERPL-CCNC: 13 U/L — SIGNIFICANT CHANGE UP (ref 4–40)
AST SERPL-CCNC: 13 U/L — SIGNIFICANT CHANGE UP (ref 4–40)
AST SERPL-CCNC: 14 U/L
AST SERPL-CCNC: 15 U/L
AST SERPL-CCNC: 15 U/L
AST SERPL-CCNC: 15 U/L — SIGNIFICANT CHANGE UP (ref 10–40)
AST SERPL-CCNC: 16 U/L
AST SERPL-CCNC: 17 U/L — SIGNIFICANT CHANGE UP (ref 4–40)
AST SERPL-CCNC: 21 U/L — SIGNIFICANT CHANGE UP (ref 10–40)
AST SERPL-CCNC: 30 U/L — SIGNIFICANT CHANGE UP (ref 10–40)
AST SERPL-CCNC: 33 U/L — SIGNIFICANT CHANGE UP (ref 10–40)
AST SERPL-CCNC: 33 U/L — SIGNIFICANT CHANGE UP (ref 10–40)
BASOPHILS # BLD AUTO: 0 K/UL — SIGNIFICANT CHANGE UP (ref 0–0.2)
BASOPHILS # BLD AUTO: 0 K/UL — SIGNIFICANT CHANGE UP (ref 0–0.2)
BASOPHILS # BLD AUTO: 0.01 K/UL — SIGNIFICANT CHANGE UP (ref 0–0.2)
BASOPHILS # BLD AUTO: 0.03 K/UL — SIGNIFICANT CHANGE UP (ref 0–0.2)
BASOPHILS # BLD AUTO: 0.04 K/UL — SIGNIFICANT CHANGE UP (ref 0–0.2)
BASOPHILS # BLD AUTO: 0.04 K/UL — SIGNIFICANT CHANGE UP (ref 0–0.2)
BASOPHILS # BLD AUTO: 0.05 K/UL — SIGNIFICANT CHANGE UP (ref 0–0.2)
BASOPHILS # BLD AUTO: 0.06 K/UL — SIGNIFICANT CHANGE UP (ref 0–0.2)
BASOPHILS # BLD AUTO: 0.07 K/UL — SIGNIFICANT CHANGE UP (ref 0–0.2)
BASOPHILS # BLD AUTO: 0.07 K/UL — SIGNIFICANT CHANGE UP (ref 0–0.2)
BASOPHILS NFR BLD AUTO: 0 % — SIGNIFICANT CHANGE UP (ref 0–2)
BASOPHILS NFR BLD AUTO: 0 % — SIGNIFICANT CHANGE UP (ref 0–2)
BASOPHILS NFR BLD AUTO: 0.1 % — SIGNIFICANT CHANGE UP (ref 0–2)
BASOPHILS NFR BLD AUTO: 0.2 % — SIGNIFICANT CHANGE UP (ref 0–2)
BASOPHILS NFR BLD AUTO: 0.2 % — SIGNIFICANT CHANGE UP (ref 0–2)
BASOPHILS NFR BLD AUTO: 0.6 % — SIGNIFICANT CHANGE UP (ref 0–2)
BASOPHILS NFR BLD AUTO: 0.7 % — SIGNIFICANT CHANGE UP (ref 0–2)
BASOPHILS NFR BLD AUTO: 0.8 % — SIGNIFICANT CHANGE UP (ref 0–2)
BASOPHILS NFR BLD AUTO: 1 % — SIGNIFICANT CHANGE UP (ref 0–2)
BASOPHILS NFR BLD AUTO: 1.1 % — SIGNIFICANT CHANGE UP (ref 0–2)
BASOPHILS NFR BLD AUTO: 1.2 % — SIGNIFICANT CHANGE UP (ref 0–2)
BILIRUB SERPL-MCNC: 0.4 MG/DL
BILIRUB SERPL-MCNC: 0.4 MG/DL
BILIRUB SERPL-MCNC: 0.6 MG/DL
BILIRUB SERPL-MCNC: 0.6 MG/DL — SIGNIFICANT CHANGE UP (ref 0.2–1.2)
BILIRUB SERPL-MCNC: 0.7 MG/DL
BILIRUB SERPL-MCNC: 0.7 MG/DL — SIGNIFICANT CHANGE UP (ref 0.2–1.2)
BILIRUB SERPL-MCNC: 0.7 MG/DL — SIGNIFICANT CHANGE UP (ref 0.2–1.2)
BILIRUB SERPL-MCNC: 0.8 MG/DL — SIGNIFICANT CHANGE UP (ref 0.2–1.2)
BILIRUB SERPL-MCNC: 0.9 MG/DL — SIGNIFICANT CHANGE UP (ref 0.2–1.2)
BILIRUB SERPL-MCNC: 1.1 MG/DL
BILIRUB SERPL-MCNC: 1.4 MG/DL — HIGH (ref 0.2–1.2)
BILIRUB SERPL-MCNC: 1.4 MG/DL — HIGH (ref 0.2–1.2)
BILIRUB SERPL-MCNC: 1.8 MG/DL — HIGH (ref 0.2–1.2)
BILIRUB SERPL-MCNC: 1.8 MG/DL — HIGH (ref 0.2–1.2)
BILIRUB SERPL-MCNC: 2 MG/DL — HIGH (ref 0.2–1.2)
BILIRUB SERPL-MCNC: 2 MG/DL — HIGH (ref 0.2–1.2)
BILIRUB UR-MCNC: NEGATIVE — SIGNIFICANT CHANGE UP
BLD GP AB SCN SERPL QL: NEGATIVE — SIGNIFICANT CHANGE UP
BLD GP AB SCN SERPL QL: NEGATIVE — SIGNIFICANT CHANGE UP
BLD GP AB SCN SERPL QL: SIGNIFICANT CHANGE UP
BLD GP AB SCN SERPL QL: SIGNIFICANT CHANGE UP
BUN SERPL-MCNC: 11 MG/DL
BUN SERPL-MCNC: 11 MG/DL — SIGNIFICANT CHANGE UP (ref 7–23)
BUN SERPL-MCNC: 12 MG/DL
BUN SERPL-MCNC: 12 MG/DL — SIGNIFICANT CHANGE UP (ref 7–23)
BUN SERPL-MCNC: 13 MG/DL
BUN SERPL-MCNC: 13 MG/DL
BUN SERPL-MCNC: 13 MG/DL — SIGNIFICANT CHANGE UP (ref 7–18)
BUN SERPL-MCNC: 13 MG/DL — SIGNIFICANT CHANGE UP (ref 7–23)
BUN SERPL-MCNC: 14 MG/DL — SIGNIFICANT CHANGE UP (ref 7–23)
BUN SERPL-MCNC: 18 MG/DL
BUN SERPL-MCNC: 18 MG/DL — SIGNIFICANT CHANGE UP (ref 7–23)
BUN SERPL-MCNC: 20 MG/DL — SIGNIFICANT CHANGE UP (ref 7–23)
BUN SERPL-MCNC: 8 MG/DL — SIGNIFICANT CHANGE UP (ref 7–23)
CALCIUM SERPL-MCNC: 6.9 MG/DL — LOW (ref 8.4–10.5)
CALCIUM SERPL-MCNC: 6.9 MG/DL — LOW (ref 8.4–10.5)
CALCIUM SERPL-MCNC: 7.3 MG/DL — LOW (ref 8.4–10.5)
CALCIUM SERPL-MCNC: 7.3 MG/DL — LOW (ref 8.4–10.5)
CALCIUM SERPL-MCNC: 7.7 MG/DL — LOW (ref 8.4–10.5)
CALCIUM SERPL-MCNC: 7.7 MG/DL — LOW (ref 8.4–10.5)
CALCIUM SERPL-MCNC: 8 MG/DL
CALCIUM SERPL-MCNC: 8.1 MG/DL — LOW (ref 8.4–10.5)
CALCIUM SERPL-MCNC: 8.5 MG/DL
CALCIUM SERPL-MCNC: 8.5 MG/DL — SIGNIFICANT CHANGE UP (ref 8.4–10.5)
CALCIUM SERPL-MCNC: 8.6 MG/DL — SIGNIFICANT CHANGE UP (ref 8.4–10.5)
CALCIUM SERPL-MCNC: 8.7 MG/DL — SIGNIFICANT CHANGE UP (ref 8.4–10.5)
CALCIUM SERPL-MCNC: 8.9 MG/DL
CALCIUM SERPL-MCNC: 9 MG/DL — SIGNIFICANT CHANGE UP (ref 8.4–10.5)
CALCIUM SERPL-MCNC: 9 MG/DL — SIGNIFICANT CHANGE UP (ref 8.4–10.5)
CALCIUM SERPL-MCNC: 9.4 MG/DL
CALCIUM SERPL-MCNC: 9.7 MG/DL
CANCER AG19-9 SERPL-ACNC: 16 U/ML — SIGNIFICANT CHANGE UP
CANCER AG19-9 SERPL-ACNC: 16 U/ML — SIGNIFICANT CHANGE UP
CANCER AG19-9 SERPL-ACNC: 17 U/ML — SIGNIFICANT CHANGE UP
CANCER AG19-9 SERPL-ACNC: 18 U/ML
CANCER AG19-9 SERPL-ACNC: 19 U/ML
CANCER AG19-9 SERPL-ACNC: 20 U/ML — SIGNIFICANT CHANGE UP
CANCER AG19-9 SERPL-ACNC: 21 U/ML — SIGNIFICANT CHANGE UP
CANCER AG19-9 SERPL-ACNC: 27 U/ML
CEA SERPL-MCNC: 5.2 NG/ML
CEA SERPL-MCNC: 5.4 NG/ML
CEA SERPL-MCNC: 5.4 NG/ML — HIGH (ref 0–3.8)
CEA SERPL-MCNC: 5.5 NG/ML — HIGH (ref 0–3.8)
CEA SERPL-MCNC: 5.5 NG/ML — HIGH (ref 0–3.8)
CEA SERPL-MCNC: 5.6 NG/ML
CEA SERPL-MCNC: 5.6 NG/ML — HIGH (ref 0–3.8)
CEA SERPL-MCNC: 5.9 NG/ML
CEA SERPL-MCNC: 6 NG/ML
CEA SERPL-MCNC: 6.1 NG/ML — HIGH (ref 0–3.8)
CHLORIDE SERPL-SCNC: 103 MMOL/L — SIGNIFICANT CHANGE UP (ref 96–108)
CHLORIDE SERPL-SCNC: 103 MMOL/L — SIGNIFICANT CHANGE UP (ref 98–107)
CHLORIDE SERPL-SCNC: 105 MMOL/L
CHLORIDE SERPL-SCNC: 105 MMOL/L — SIGNIFICANT CHANGE UP (ref 98–107)
CHLORIDE SERPL-SCNC: 106 MMOL/L — SIGNIFICANT CHANGE UP (ref 96–108)
CHLORIDE SERPL-SCNC: 106 MMOL/L — SIGNIFICANT CHANGE UP (ref 98–107)
CHLORIDE SERPL-SCNC: 106 MMOL/L — SIGNIFICANT CHANGE UP (ref 98–107)
CHLORIDE SERPL-SCNC: 108 MMOL/L
CHLORIDE SERPL-SCNC: 108 MMOL/L — SIGNIFICANT CHANGE UP (ref 96–108)
CHLORIDE SERPL-SCNC: 108 MMOL/L — SIGNIFICANT CHANGE UP (ref 96–108)
CHLORIDE SERPL-SCNC: 110 MMOL/L
CHLORIDE SERPL-SCNC: 111 MMOL/L — HIGH (ref 96–108)
CO2 SERPL-SCNC: 22 MMOL/L — SIGNIFICANT CHANGE UP (ref 22–31)
CO2 SERPL-SCNC: 22 MMOL/L — SIGNIFICANT CHANGE UP (ref 22–31)
CO2 SERPL-SCNC: 24 MMOL/L
CO2 SERPL-SCNC: 24 MMOL/L
CO2 SERPL-SCNC: 24 MMOL/L — SIGNIFICANT CHANGE UP (ref 22–31)
CO2 SERPL-SCNC: 25 MMOL/L
CO2 SERPL-SCNC: 25 MMOL/L — SIGNIFICANT CHANGE UP (ref 22–31)
CO2 SERPL-SCNC: 26 MMOL/L
CO2 SERPL-SCNC: 26 MMOL/L
CO2 SERPL-SCNC: 26 MMOL/L — SIGNIFICANT CHANGE UP (ref 22–31)
CO2 SERPL-SCNC: 27 MMOL/L — SIGNIFICANT CHANGE UP (ref 22–31)
COLOR SPEC: SIGNIFICANT CHANGE UP
CREAT SERPL-MCNC: 0.42 MG/DL — LOW (ref 0.5–1.3)
CREAT SERPL-MCNC: 0.42 MG/DL — LOW (ref 0.5–1.3)
CREAT SERPL-MCNC: 0.43 MG/DL — LOW (ref 0.5–1.3)
CREAT SERPL-MCNC: 0.43 MG/DL — LOW (ref 0.5–1.3)
CREAT SERPL-MCNC: 0.44 MG/DL — LOW (ref 0.5–1.3)
CREAT SERPL-MCNC: 0.44 MG/DL — LOW (ref 0.5–1.3)
CREAT SERPL-MCNC: 0.5 MG/DL — SIGNIFICANT CHANGE UP (ref 0.5–1.3)
CREAT SERPL-MCNC: 0.5 MG/DL — SIGNIFICANT CHANGE UP (ref 0.5–1.3)
CREAT SERPL-MCNC: 0.52 MG/DL — SIGNIFICANT CHANGE UP (ref 0.5–1.3)
CREAT SERPL-MCNC: 0.61 MG/DL
CREAT SERPL-MCNC: 0.64 MG/DL — SIGNIFICANT CHANGE UP (ref 0.5–1.3)
CREAT SERPL-MCNC: 0.66 MG/DL
CREAT SERPL-MCNC: 0.66 MG/DL — SIGNIFICANT CHANGE UP (ref 0.5–1.3)
CREAT SERPL-MCNC: 0.7 MG/DL — SIGNIFICANT CHANGE UP (ref 0.5–1.3)
CREAT SERPL-MCNC: 0.74 MG/DL — SIGNIFICANT CHANGE UP (ref 0.5–1.3)
CREAT SERPL-MCNC: 0.76 MG/DL
CREAT SERPL-MCNC: 0.76 MG/DL — SIGNIFICANT CHANGE UP (ref 0.5–1.3)
CREAT SERPL-MCNC: 0.82 MG/DL — SIGNIFICANT CHANGE UP (ref 0.5–1.3)
CREAT SERPL-MCNC: 0.88 MG/DL
CREAT SERPL-MCNC: 0.89 MG/DL
CULTURE RESULTS: NO GROWTH — SIGNIFICANT CHANGE UP
DIFF PNL FLD: NEGATIVE — SIGNIFICANT CHANGE UP
EGFR: 100 ML/MIN/1.73M2 — SIGNIFICANT CHANGE UP
EGFR: 101 ML/MIN/1.73M2 — SIGNIFICANT CHANGE UP
EGFR: 103 ML/MIN/1.73M2
EGFR: 103 ML/MIN/1.73M2 — SIGNIFICANT CHANGE UP
EGFR: 104 ML/MIN/1.73M2 — SIGNIFICANT CHANGE UP
EGFR: 105 ML/MIN/1.73M2
EGFR: 110 ML/MIN/1.73M2 — SIGNIFICANT CHANGE UP
EGFR: 112 ML/MIN/1.73M2 — SIGNIFICANT CHANGE UP
EGFR: 112 ML/MIN/1.73M2 — SIGNIFICANT CHANGE UP
EGFR: 116 ML/MIN/1.73M2 — SIGNIFICANT CHANGE UP
EGFR: 116 ML/MIN/1.73M2 — SIGNIFICANT CHANGE UP
EGFR: 117 ML/MIN/1.73M2 — SIGNIFICANT CHANGE UP
EGFR: 117 ML/MIN/1.73M2 — SIGNIFICANT CHANGE UP
EGFR: 118 ML/MIN/1.73M2 — SIGNIFICANT CHANGE UP
EGFR: 118 ML/MIN/1.73M2 — SIGNIFICANT CHANGE UP
EGFR: 94 ML/MIN/1.73M2
EGFR: 94 ML/MIN/1.73M2
EGFR: 96 ML/MIN/1.73M2 — SIGNIFICANT CHANGE UP
EGFR: 99 ML/MIN/1.73M2
EGFR: 99 ML/MIN/1.73M2 — SIGNIFICANT CHANGE UP
EOSINOPHIL # BLD AUTO: 0 K/UL — SIGNIFICANT CHANGE UP (ref 0–0.5)
EOSINOPHIL # BLD AUTO: 0.11 K/UL — SIGNIFICANT CHANGE UP (ref 0–0.5)
EOSINOPHIL # BLD AUTO: 0.11 K/UL — SIGNIFICANT CHANGE UP (ref 0–0.5)
EOSINOPHIL # BLD AUTO: 0.13 K/UL — SIGNIFICANT CHANGE UP (ref 0–0.5)
EOSINOPHIL # BLD AUTO: 0.13 K/UL — SIGNIFICANT CHANGE UP (ref 0–0.5)
EOSINOPHIL # BLD AUTO: 0.17 K/UL — SIGNIFICANT CHANGE UP (ref 0–0.5)
EOSINOPHIL # BLD AUTO: 0.19 K/UL — SIGNIFICANT CHANGE UP (ref 0–0.5)
EOSINOPHIL # BLD AUTO: 0.23 K/UL — SIGNIFICANT CHANGE UP (ref 0–0.5)
EOSINOPHIL # BLD AUTO: 0.23 K/UL — SIGNIFICANT CHANGE UP (ref 0–0.5)
EOSINOPHIL # BLD AUTO: 0.24 K/UL — SIGNIFICANT CHANGE UP (ref 0–0.5)
EOSINOPHIL # BLD AUTO: 0.24 K/UL — SIGNIFICANT CHANGE UP (ref 0–0.5)
EOSINOPHIL # BLD AUTO: 0.25 K/UL — SIGNIFICANT CHANGE UP (ref 0–0.5)
EOSINOPHIL # BLD AUTO: 0.26 K/UL — SIGNIFICANT CHANGE UP (ref 0–0.5)
EOSINOPHIL NFR BLD AUTO: 0 % — SIGNIFICANT CHANGE UP (ref 0–6)
EOSINOPHIL NFR BLD AUTO: 1.8 % — SIGNIFICANT CHANGE UP (ref 0–6)
EOSINOPHIL NFR BLD AUTO: 2.1 % — SIGNIFICANT CHANGE UP (ref 0–6)
EOSINOPHIL NFR BLD AUTO: 2.9 % — SIGNIFICANT CHANGE UP (ref 0–6)
EOSINOPHIL NFR BLD AUTO: 2.9 % — SIGNIFICANT CHANGE UP (ref 0–6)
EOSINOPHIL NFR BLD AUTO: 3.1 % — SIGNIFICANT CHANGE UP (ref 0–6)
EOSINOPHIL NFR BLD AUTO: 3.3 % — SIGNIFICANT CHANGE UP (ref 0–6)
EOSINOPHIL NFR BLD AUTO: 3.3 % — SIGNIFICANT CHANGE UP (ref 0–6)
EOSINOPHIL NFR BLD AUTO: 3.7 % — SIGNIFICANT CHANGE UP (ref 0–6)
EOSINOPHIL NFR BLD AUTO: 3.7 % — SIGNIFICANT CHANGE UP (ref 0–6)
EOSINOPHIL NFR BLD AUTO: 4.9 % — SIGNIFICANT CHANGE UP (ref 0–6)
EOSINOPHIL NFR BLD AUTO: 5.6 % — SIGNIFICANT CHANGE UP (ref 0–6)
EOSINOPHIL NFR BLD AUTO: 6.4 % — HIGH (ref 0–6)
GLUCOSE SERPL-MCNC: 103 MG/DL
GLUCOSE SERPL-MCNC: 104 MG/DL — HIGH (ref 70–99)
GLUCOSE SERPL-MCNC: 105 MG/DL — HIGH (ref 70–99)
GLUCOSE SERPL-MCNC: 112 MG/DL — HIGH (ref 70–99)
GLUCOSE SERPL-MCNC: 112 MG/DL — HIGH (ref 70–99)
GLUCOSE SERPL-MCNC: 113 MG/DL — HIGH (ref 70–99)
GLUCOSE SERPL-MCNC: 113 MG/DL — HIGH (ref 70–99)
GLUCOSE SERPL-MCNC: 120 MG/DL
GLUCOSE SERPL-MCNC: 122 MG/DL — HIGH (ref 70–99)
GLUCOSE SERPL-MCNC: 137 MG/DL — HIGH (ref 70–99)
GLUCOSE SERPL-MCNC: 137 MG/DL — HIGH (ref 70–99)
GLUCOSE SERPL-MCNC: 150 MG/DL — HIGH (ref 70–99)
GLUCOSE SERPL-MCNC: 150 MG/DL — HIGH (ref 70–99)
GLUCOSE SERPL-MCNC: 156 MG/DL — HIGH (ref 70–99)
GLUCOSE SERPL-MCNC: 77 MG/DL — SIGNIFICANT CHANGE UP (ref 70–99)
GLUCOSE SERPL-MCNC: 78 MG/DL — SIGNIFICANT CHANGE UP (ref 70–99)
GLUCOSE SERPL-MCNC: 85 MG/DL — SIGNIFICANT CHANGE UP (ref 70–99)
GLUCOSE SERPL-MCNC: 85 MG/DL — SIGNIFICANT CHANGE UP (ref 70–99)
GLUCOSE SERPL-MCNC: 90 MG/DL
GLUCOSE SERPL-MCNC: 91 MG/DL — SIGNIFICANT CHANGE UP (ref 70–99)
GLUCOSE SERPL-MCNC: 91 MG/DL — SIGNIFICANT CHANGE UP (ref 70–99)
GLUCOSE SERPL-MCNC: 98 MG/DL
GLUCOSE SERPL-MCNC: 99 MG/DL
GLUCOSE UR QL: NEGATIVE MG/DL — SIGNIFICANT CHANGE UP
HCT VFR BLD CALC: 27.5 % — LOW (ref 39–50)
HCT VFR BLD CALC: 27.5 % — LOW (ref 39–50)
HCT VFR BLD CALC: 28.4 % — LOW (ref 39–50)
HCT VFR BLD CALC: 28.6 % — LOW (ref 39–50)
HCT VFR BLD CALC: 28.6 % — LOW (ref 39–50)
HCT VFR BLD CALC: 29.5 % — LOW (ref 39–50)
HCT VFR BLD CALC: 30.2 % — LOW (ref 39–50)
HCT VFR BLD CALC: 30.2 % — LOW (ref 39–50)
HCT VFR BLD CALC: 30.6 % — LOW (ref 39–50)
HCT VFR BLD CALC: 30.9 % — LOW (ref 39–50)
HCT VFR BLD CALC: 30.9 % — LOW (ref 39–50)
HCT VFR BLD CALC: 31.5 % — LOW (ref 39–50)
HCT VFR BLD CALC: 31.6 % — LOW (ref 39–50)
HCT VFR BLD CALC: 31.6 % — LOW (ref 39–50)
HCT VFR BLD CALC: 32.4 % — LOW (ref 39–50)
HCT VFR BLD CALC: 32.6 % — LOW (ref 39–50)
HCT VFR BLD CALC: 32.8 % — LOW (ref 39–50)
HCT VFR BLD CALC: 33.7 % — LOW (ref 39–50)
HCT VFR BLD CALC: 33.7 % — LOW (ref 39–50)
HCT VFR BLD CALC: 34.7 % — LOW (ref 39–50)
HCT VFR BLD CALC: 34.7 % — LOW (ref 39–50)
HCT VFR BLD CALC: 38.7 % — LOW (ref 39–50)
HCT VFR BLD CALC: 42.4 % — SIGNIFICANT CHANGE UP (ref 39–50)
HGB BLD-MCNC: 10 G/DL — LOW (ref 13–17)
HGB BLD-MCNC: 10 G/DL — LOW (ref 13–17)
HGB BLD-MCNC: 10.1 G/DL — LOW (ref 13–17)
HGB BLD-MCNC: 10.1 G/DL — LOW (ref 13–17)
HGB BLD-MCNC: 10.3 G/DL — LOW (ref 13–17)
HGB BLD-MCNC: 10.4 G/DL — LOW (ref 13–17)
HGB BLD-MCNC: 10.6 G/DL — LOW (ref 13–17)
HGB BLD-MCNC: 10.6 G/DL — LOW (ref 13–17)
HGB BLD-MCNC: 10.9 G/DL — LOW (ref 13–17)
HGB BLD-MCNC: 10.9 G/DL — LOW (ref 13–17)
HGB BLD-MCNC: 11.9 G/DL — LOW (ref 13–17)
HGB BLD-MCNC: 12.8 G/DL — LOW (ref 13–17)
HGB BLD-MCNC: 9 G/DL — LOW (ref 13–17)
HGB BLD-MCNC: 9.1 G/DL — LOW (ref 13–17)
HGB BLD-MCNC: 9.1 G/DL — LOW (ref 13–17)
HGB BLD-MCNC: 9.2 G/DL — LOW (ref 13–17)
HGB BLD-MCNC: 9.2 G/DL — LOW (ref 13–17)
HGB BLD-MCNC: 9.5 G/DL — LOW (ref 13–17)
HGB BLD-MCNC: 9.5 G/DL — LOW (ref 13–17)
HGB BLD-MCNC: 9.8 G/DL — LOW (ref 13–17)
HGB BLD-MCNC: 9.9 G/DL — LOW (ref 13–17)
IANC: 11.15 K/UL — HIGH (ref 1.8–7.4)
IANC: 11.15 K/UL — HIGH (ref 1.8–7.4)
IANC: 4.1 K/UL — SIGNIFICANT CHANGE UP (ref 1.8–7.4)
IANC: 4.1 K/UL — SIGNIFICANT CHANGE UP (ref 1.8–7.4)
IANC: 4.79 K/UL — SIGNIFICANT CHANGE UP (ref 1.8–7.4)
IANC: 4.79 K/UL — SIGNIFICANT CHANGE UP (ref 1.8–7.4)
IANC: 8.67 K/UL — HIGH (ref 1.8–7.4)
IANC: 8.67 K/UL — HIGH (ref 1.8–7.4)
IANC: 9.01 K/UL — HIGH (ref 1.8–7.4)
IANC: 9.01 K/UL — HIGH (ref 1.8–7.4)
IMM GRANULOCYTES NFR BLD AUTO: 0.1 % — SIGNIFICANT CHANGE UP (ref 0–0.9)
IMM GRANULOCYTES NFR BLD AUTO: 0.2 % — SIGNIFICANT CHANGE UP (ref 0–0.9)
IMM GRANULOCYTES NFR BLD AUTO: 0.3 % — SIGNIFICANT CHANGE UP (ref 0–0.9)
IMM GRANULOCYTES NFR BLD AUTO: 0.4 % — SIGNIFICANT CHANGE UP (ref 0–0.9)
IMM GRANULOCYTES NFR BLD AUTO: 0.6 % — SIGNIFICANT CHANGE UP (ref 0–0.9)
IMM GRANULOCYTES NFR BLD AUTO: 0.7 % — SIGNIFICANT CHANGE UP (ref 0–0.9)
IMM GRANULOCYTES NFR BLD AUTO: 0.8 % — SIGNIFICANT CHANGE UP (ref 0–0.9)
IMM GRANULOCYTES NFR BLD AUTO: 1.3 % — HIGH (ref 0–0.9)
IMM GRANULOCYTES NFR BLD AUTO: 1.3 % — HIGH (ref 0–0.9)
INR BLD: 1.2 RATIO — HIGH (ref 0.85–1.18)
INR BLD: 1.21 RATIO — HIGH (ref 0.85–1.18)
INR BLD: 1.21 RATIO — HIGH (ref 0.85–1.18)
KETONES UR-MCNC: ABNORMAL MG/DL
LEUKOCYTE ESTERASE UR-ACNC: NEGATIVE — SIGNIFICANT CHANGE UP
LYMPHOCYTES # BLD AUTO: 0.27 K/UL — LOW (ref 1–3.3)
LYMPHOCYTES # BLD AUTO: 0.27 K/UL — LOW (ref 1–3.3)
LYMPHOCYTES # BLD AUTO: 0.34 K/UL — LOW (ref 1–3.3)
LYMPHOCYTES # BLD AUTO: 0.34 K/UL — LOW (ref 1–3.3)
LYMPHOCYTES # BLD AUTO: 0.36 K/UL — LOW (ref 1–3.3)
LYMPHOCYTES # BLD AUTO: 0.36 K/UL — LOW (ref 1–3.3)
LYMPHOCYTES # BLD AUTO: 0.37 K/UL — LOW (ref 1–3.3)
LYMPHOCYTES # BLD AUTO: 0.37 K/UL — LOW (ref 1–3.3)
LYMPHOCYTES # BLD AUTO: 0.62 K/UL — LOW (ref 1–3.3)
LYMPHOCYTES # BLD AUTO: 0.62 K/UL — LOW (ref 1–3.3)
LYMPHOCYTES # BLD AUTO: 0.78 K/UL — LOW (ref 1–3.3)
LYMPHOCYTES # BLD AUTO: 0.78 K/UL — LOW (ref 1–3.3)
LYMPHOCYTES # BLD AUTO: 0.79 K/UL — LOW (ref 1–3.3)
LYMPHOCYTES # BLD AUTO: 0.79 K/UL — LOW (ref 1–3.3)
LYMPHOCYTES # BLD AUTO: 0.82 K/UL — LOW (ref 1–3.3)
LYMPHOCYTES # BLD AUTO: 0.84 K/UL — LOW (ref 1–3.3)
LYMPHOCYTES # BLD AUTO: 0.91 K/UL — LOW (ref 1–3.3)
LYMPHOCYTES # BLD AUTO: 0.93 K/UL — LOW (ref 1–3.3)
LYMPHOCYTES # BLD AUTO: 0.98 K/UL — LOW (ref 1–3.3)
LYMPHOCYTES # BLD AUTO: 0.98 K/UL — LOW (ref 1–3.3)
LYMPHOCYTES # BLD AUTO: 1.01 K/UL — SIGNIFICANT CHANGE UP (ref 1–3.3)
LYMPHOCYTES # BLD AUTO: 1.24 K/UL — SIGNIFICANT CHANGE UP (ref 1–3.3)
LYMPHOCYTES # BLD AUTO: 10.2 % — LOW (ref 13–44)
LYMPHOCYTES # BLD AUTO: 10.2 % — LOW (ref 13–44)
LYMPHOCYTES # BLD AUTO: 10.5 % — LOW (ref 13–44)
LYMPHOCYTES # BLD AUTO: 11.8 % — LOW (ref 13–44)
LYMPHOCYTES # BLD AUTO: 12.2 % — LOW (ref 13–44)
LYMPHOCYTES # BLD AUTO: 12.6 % — LOW (ref 13–44)
LYMPHOCYTES # BLD AUTO: 12.6 % — LOW (ref 13–44)
LYMPHOCYTES # BLD AUTO: 13.9 % — SIGNIFICANT CHANGE UP (ref 13–44)
LYMPHOCYTES # BLD AUTO: 14.6 % — SIGNIFICANT CHANGE UP (ref 13–44)
LYMPHOCYTES # BLD AUTO: 15.1 % — SIGNIFICANT CHANGE UP (ref 13–44)
LYMPHOCYTES # BLD AUTO: 15.1 % — SIGNIFICANT CHANGE UP (ref 13–44)
LYMPHOCYTES # BLD AUTO: 18.6 % — SIGNIFICANT CHANGE UP (ref 13–44)
LYMPHOCYTES # BLD AUTO: 2.8 % — LOW (ref 13–44)
LYMPHOCYTES # BLD AUTO: 2.8 % — LOW (ref 13–44)
LYMPHOCYTES # BLD AUTO: 2.9 % — LOW (ref 13–44)
LYMPHOCYTES # BLD AUTO: 2.9 % — LOW (ref 13–44)
LYMPHOCYTES # BLD AUTO: 20.1 % — SIGNIFICANT CHANGE UP (ref 13–44)
LYMPHOCYTES # BLD AUTO: 22 % — SIGNIFICANT CHANGE UP (ref 13–44)
LYMPHOCYTES # BLD AUTO: 3.8 % — LOW (ref 13–44)
LYMPHOCYTES # BLD AUTO: 3.8 % — LOW (ref 13–44)
LYMPHOCYTES # BLD AUTO: 7.8 % — LOW (ref 13–44)
LYMPHOCYTES # BLD AUTO: 7.8 % — LOW (ref 13–44)
LYMPHOCYTES # BLD AUTO: 9.6 % — LOW (ref 13–44)
LYMPHOCYTES # BLD AUTO: 9.6 % — LOW (ref 13–44)
MAGNESIUM SERPL-MCNC: 2.1 MG/DL — SIGNIFICANT CHANGE UP (ref 1.6–2.6)
MAGNESIUM SERPL-MCNC: 2.1 MG/DL — SIGNIFICANT CHANGE UP (ref 1.6–2.6)
MAGNESIUM SERPL-MCNC: 2.2 MG/DL — SIGNIFICANT CHANGE UP (ref 1.6–2.6)
MCHC RBC-ENTMCNC: 27.5 PG — SIGNIFICANT CHANGE UP (ref 27–34)
MCHC RBC-ENTMCNC: 28.1 PG — SIGNIFICANT CHANGE UP (ref 27–34)
MCHC RBC-ENTMCNC: 28.1 PG — SIGNIFICANT CHANGE UP (ref 27–34)
MCHC RBC-ENTMCNC: 28.2 PG — SIGNIFICANT CHANGE UP (ref 27–34)
MCHC RBC-ENTMCNC: 28.5 PG — SIGNIFICANT CHANGE UP (ref 27–34)
MCHC RBC-ENTMCNC: 28.9 PG — SIGNIFICANT CHANGE UP (ref 27–34)
MCHC RBC-ENTMCNC: 30.2 PG — SIGNIFICANT CHANGE UP (ref 27–34)
MCHC RBC-ENTMCNC: 30.2 PG — SIGNIFICANT CHANGE UP (ref 27–34)
MCHC RBC-ENTMCNC: 30.3 PG — SIGNIFICANT CHANGE UP (ref 27–34)
MCHC RBC-ENTMCNC: 30.3 PG — SIGNIFICANT CHANGE UP (ref 27–34)
MCHC RBC-ENTMCNC: 30.5 PG — SIGNIFICANT CHANGE UP (ref 27–34)
MCHC RBC-ENTMCNC: 30.5 PG — SIGNIFICANT CHANGE UP (ref 27–34)
MCHC RBC-ENTMCNC: 30.6 PG — SIGNIFICANT CHANGE UP (ref 27–34)
MCHC RBC-ENTMCNC: 30.6 PG — SIGNIFICANT CHANGE UP (ref 27–34)
MCHC RBC-ENTMCNC: 30.7 G/DL — LOW (ref 32–36)
MCHC RBC-ENTMCNC: 30.7 PG — SIGNIFICANT CHANGE UP (ref 27–34)
MCHC RBC-ENTMCNC: 30.9 G/DL — LOW (ref 32–36)
MCHC RBC-ENTMCNC: 31 GM/DL — LOW (ref 32–36)
MCHC RBC-ENTMCNC: 31 PG — SIGNIFICANT CHANGE UP (ref 27–34)
MCHC RBC-ENTMCNC: 31.1 PG — SIGNIFICANT CHANGE UP (ref 27–34)
MCHC RBC-ENTMCNC: 31.2 PG — SIGNIFICANT CHANGE UP (ref 27–34)
MCHC RBC-ENTMCNC: 31.4 G/DL — LOW (ref 32–36)
MCHC RBC-ENTMCNC: 31.5 G/DL — LOW (ref 32–36)
MCHC RBC-ENTMCNC: 31.5 G/DL — LOW (ref 32–36)
MCHC RBC-ENTMCNC: 31.5 GM/DL — LOW (ref 32–36)
MCHC RBC-ENTMCNC: 31.5 GM/DL — LOW (ref 32–36)
MCHC RBC-ENTMCNC: 31.7 G/DL — LOW (ref 32–36)
MCHC RBC-ENTMCNC: 32 G/DL — SIGNIFICANT CHANGE UP (ref 32–36)
MCHC RBC-ENTMCNC: 32.1 G/DL — SIGNIFICANT CHANGE UP (ref 32–36)
MCHC RBC-ENTMCNC: 32.1 G/DL — SIGNIFICANT CHANGE UP (ref 32–36)
MCHC RBC-ENTMCNC: 32.2 GM/DL — SIGNIFICANT CHANGE UP (ref 32–36)
MCHC RBC-ENTMCNC: 32.2 GM/DL — SIGNIFICANT CHANGE UP (ref 32–36)
MCHC RBC-ENTMCNC: 32.4 GM/DL — SIGNIFICANT CHANGE UP (ref 32–36)
MCHC RBC-ENTMCNC: 32.4 GM/DL — SIGNIFICANT CHANGE UP (ref 32–36)
MCHC RBC-ENTMCNC: 32.6 GM/DL — SIGNIFICANT CHANGE UP (ref 32–36)
MCHC RBC-ENTMCNC: 32.6 GM/DL — SIGNIFICANT CHANGE UP (ref 32–36)
MCHC RBC-ENTMCNC: 33 G/DL — SIGNIFICANT CHANGE UP (ref 32–36)
MCHC RBC-ENTMCNC: 33 G/DL — SIGNIFICANT CHANGE UP (ref 32–36)
MCHC RBC-ENTMCNC: 33.1 GM/DL — SIGNIFICANT CHANGE UP (ref 32–36)
MCHC RBC-ENTMCNC: 33.1 GM/DL — SIGNIFICANT CHANGE UP (ref 32–36)
MCHC RBC-ENTMCNC: 33.6 G/DL — SIGNIFICANT CHANGE UP (ref 32–36)
MCV RBC AUTO: 100.3 FL — HIGH (ref 80–100)
MCV RBC AUTO: 88.7 FL — SIGNIFICANT CHANGE UP (ref 80–100)
MCV RBC AUTO: 88.8 FL — SIGNIFICANT CHANGE UP (ref 80–100)
MCV RBC AUTO: 89.6 FL — SIGNIFICANT CHANGE UP (ref 80–100)
MCV RBC AUTO: 89.9 FL — SIGNIFICANT CHANGE UP (ref 80–100)
MCV RBC AUTO: 90 FL — SIGNIFICANT CHANGE UP (ref 80–100)
MCV RBC AUTO: 90.8 FL — SIGNIFICANT CHANGE UP (ref 80–100)
MCV RBC AUTO: 91.3 FL — SIGNIFICANT CHANGE UP (ref 80–100)
MCV RBC AUTO: 93.5 FL — SIGNIFICANT CHANGE UP (ref 80–100)
MCV RBC AUTO: 93.5 FL — SIGNIFICANT CHANGE UP (ref 80–100)
MCV RBC AUTO: 93.9 FL — SIGNIFICANT CHANGE UP (ref 80–100)
MCV RBC AUTO: 93.9 FL — SIGNIFICANT CHANGE UP (ref 80–100)
MCV RBC AUTO: 94.6 FL — SIGNIFICANT CHANGE UP (ref 80–100)
MCV RBC AUTO: 94.6 FL — SIGNIFICANT CHANGE UP (ref 80–100)
MCV RBC AUTO: 95.3 FL — SIGNIFICANT CHANGE UP (ref 80–100)
MCV RBC AUTO: 95.3 FL — SIGNIFICANT CHANGE UP (ref 80–100)
MCV RBC AUTO: 96 FL — SIGNIFICANT CHANGE UP (ref 80–100)
MCV RBC AUTO: 96 FL — SIGNIFICANT CHANGE UP (ref 80–100)
MCV RBC AUTO: 96.3 FL — SIGNIFICANT CHANGE UP (ref 80–100)
MCV RBC AUTO: 96.3 FL — SIGNIFICANT CHANGE UP (ref 80–100)
MCV RBC AUTO: 96.4 FL — SIGNIFICANT CHANGE UP (ref 80–100)
MCV RBC AUTO: 96.4 FL — SIGNIFICANT CHANGE UP (ref 80–100)
MCV RBC AUTO: 96.8 FL — SIGNIFICANT CHANGE UP (ref 80–100)
MCV RBC AUTO: 97.4 FL — SIGNIFICANT CHANGE UP (ref 80–100)
MCV RBC AUTO: 97.4 FL — SIGNIFICANT CHANGE UP (ref 80–100)
MONOCYTES # BLD AUTO: 0.1 K/UL — SIGNIFICANT CHANGE UP (ref 0–0.9)
MONOCYTES # BLD AUTO: 0.1 K/UL — SIGNIFICANT CHANGE UP (ref 0–0.9)
MONOCYTES # BLD AUTO: 0.3 K/UL — SIGNIFICANT CHANGE UP (ref 0–0.9)
MONOCYTES # BLD AUTO: 0.3 K/UL — SIGNIFICANT CHANGE UP (ref 0–0.9)
MONOCYTES # BLD AUTO: 0.44 K/UL — SIGNIFICANT CHANGE UP (ref 0–0.9)
MONOCYTES # BLD AUTO: 0.44 K/UL — SIGNIFICANT CHANGE UP (ref 0–0.9)
MONOCYTES # BLD AUTO: 0.47 K/UL — SIGNIFICANT CHANGE UP (ref 0–0.9)
MONOCYTES # BLD AUTO: 0.47 K/UL — SIGNIFICANT CHANGE UP (ref 0–0.9)
MONOCYTES # BLD AUTO: 0.5 K/UL — SIGNIFICANT CHANGE UP (ref 0–0.9)
MONOCYTES # BLD AUTO: 0.52 K/UL — SIGNIFICANT CHANGE UP (ref 0–0.9)
MONOCYTES # BLD AUTO: 0.65 K/UL — SIGNIFICANT CHANGE UP (ref 0–0.9)
MONOCYTES # BLD AUTO: 0.71 K/UL — SIGNIFICANT CHANGE UP (ref 0–0.9)
MONOCYTES # BLD AUTO: 0.72 K/UL — SIGNIFICANT CHANGE UP (ref 0–0.9)
MONOCYTES # BLD AUTO: 0.74 K/UL — SIGNIFICANT CHANGE UP (ref 0–0.9)
MONOCYTES # BLD AUTO: 0.76 K/UL — SIGNIFICANT CHANGE UP (ref 0–0.9)
MONOCYTES # BLD AUTO: 0.89 K/UL — SIGNIFICANT CHANGE UP (ref 0–0.9)
MONOCYTES # BLD AUTO: 0.93 K/UL — HIGH (ref 0–0.9)
MONOCYTES NFR BLD AUTO: 10 % — SIGNIFICANT CHANGE UP (ref 2–14)
MONOCYTES NFR BLD AUTO: 10.1 % — SIGNIFICANT CHANGE UP (ref 2–14)
MONOCYTES NFR BLD AUTO: 10.1 % — SIGNIFICANT CHANGE UP (ref 2–14)
MONOCYTES NFR BLD AUTO: 10.6 % — SIGNIFICANT CHANGE UP (ref 2–14)
MONOCYTES NFR BLD AUTO: 10.6 % — SIGNIFICANT CHANGE UP (ref 2–14)
MONOCYTES NFR BLD AUTO: 10.7 % — SIGNIFICANT CHANGE UP (ref 2–14)
MONOCYTES NFR BLD AUTO: 12.3 % — SIGNIFICANT CHANGE UP (ref 2–14)
MONOCYTES NFR BLD AUTO: 12.6 % — SIGNIFICANT CHANGE UP (ref 2–14)
MONOCYTES NFR BLD AUTO: 13.3 % — SIGNIFICANT CHANGE UP (ref 2–14)
MONOCYTES NFR BLD AUTO: 16.1 % — HIGH (ref 2–14)
MONOCYTES NFR BLD AUTO: 2.2 % — SIGNIFICANT CHANGE UP (ref 2–14)
MONOCYTES NFR BLD AUTO: 2.2 % — SIGNIFICANT CHANGE UP (ref 2–14)
MONOCYTES NFR BLD AUTO: 3.1 % — SIGNIFICANT CHANGE UP (ref 2–14)
MONOCYTES NFR BLD AUTO: 3.1 % — SIGNIFICANT CHANGE UP (ref 2–14)
MONOCYTES NFR BLD AUTO: 3.9 % — SIGNIFICANT CHANGE UP (ref 2–14)
MONOCYTES NFR BLD AUTO: 3.9 % — SIGNIFICANT CHANGE UP (ref 2–14)
MONOCYTES NFR BLD AUTO: 4.7 % — SIGNIFICANT CHANGE UP (ref 2–14)
MONOCYTES NFR BLD AUTO: 4.7 % — SIGNIFICANT CHANGE UP (ref 2–14)
MONOCYTES NFR BLD AUTO: 8.2 % — SIGNIFICANT CHANGE UP (ref 2–14)
MONOCYTES NFR BLD AUTO: 8.2 % — SIGNIFICANT CHANGE UP (ref 2–14)
MONOCYTES NFR BLD AUTO: 8.3 % — SIGNIFICANT CHANGE UP (ref 2–14)
MONOCYTES NFR BLD AUTO: 8.3 % — SIGNIFICANT CHANGE UP (ref 2–14)
MONOCYTES NFR BLD AUTO: 8.9 % — SIGNIFICANT CHANGE UP (ref 2–14)
MONOCYTES NFR BLD AUTO: 8.9 % — SIGNIFICANT CHANGE UP (ref 2–14)
MRSA PCR RESULT.: SIGNIFICANT CHANGE UP
NEUTROPHILS # BLD AUTO: 11.15 K/UL — HIGH (ref 1.8–7.4)
NEUTROPHILS # BLD AUTO: 11.15 K/UL — HIGH (ref 1.8–7.4)
NEUTROPHILS # BLD AUTO: 2.43 K/UL — SIGNIFICANT CHANGE UP (ref 1.8–7.4)
NEUTROPHILS # BLD AUTO: 2.46 K/UL — SIGNIFICANT CHANGE UP (ref 1.8–7.4)
NEUTROPHILS # BLD AUTO: 3.01 K/UL — SIGNIFICANT CHANGE UP (ref 1.8–7.4)
NEUTROPHILS # BLD AUTO: 3.61 K/UL — SIGNIFICANT CHANGE UP (ref 1.8–7.4)
NEUTROPHILS # BLD AUTO: 3.61 K/UL — SIGNIFICANT CHANGE UP (ref 1.8–7.4)
NEUTROPHILS # BLD AUTO: 4.1 K/UL — SIGNIFICANT CHANGE UP (ref 1.8–7.4)
NEUTROPHILS # BLD AUTO: 4.1 K/UL — SIGNIFICANT CHANGE UP (ref 1.8–7.4)
NEUTROPHILS # BLD AUTO: 4.76 K/UL — SIGNIFICANT CHANGE UP (ref 1.8–7.4)
NEUTROPHILS # BLD AUTO: 4.76 K/UL — SIGNIFICANT CHANGE UP (ref 1.8–7.4)
NEUTROPHILS # BLD AUTO: 4.79 K/UL — SIGNIFICANT CHANGE UP (ref 1.8–7.4)
NEUTROPHILS # BLD AUTO: 4.79 K/UL — SIGNIFICANT CHANGE UP (ref 1.8–7.4)
NEUTROPHILS # BLD AUTO: 4.86 K/UL — SIGNIFICANT CHANGE UP (ref 1.8–7.4)
NEUTROPHILS # BLD AUTO: 4.87 K/UL — SIGNIFICANT CHANGE UP (ref 1.8–7.4)
NEUTROPHILS # BLD AUTO: 5.61 K/UL — SIGNIFICANT CHANGE UP (ref 1.8–7.4)
NEUTROPHILS # BLD AUTO: 6.18 K/UL — SIGNIFICANT CHANGE UP (ref 1.8–7.4)
NEUTROPHILS # BLD AUTO: 6.65 K/UL — SIGNIFICANT CHANGE UP (ref 1.8–7.4)
NEUTROPHILS # BLD AUTO: 6.65 K/UL — SIGNIFICANT CHANGE UP (ref 1.8–7.4)
NEUTROPHILS # BLD AUTO: 8.04 K/UL — HIGH (ref 1.8–7.4)
NEUTROPHILS # BLD AUTO: 8.67 K/UL — HIGH (ref 1.8–7.4)
NEUTROPHILS # BLD AUTO: 8.67 K/UL — HIGH (ref 1.8–7.4)
NEUTROPHILS # BLD AUTO: 9.01 K/UL — HIGH (ref 1.8–7.4)
NEUTROPHILS # BLD AUTO: 9.01 K/UL — HIGH (ref 1.8–7.4)
NEUTROPHILS NFR BLD AUTO: 54.5 % — SIGNIFICANT CHANGE UP (ref 43–77)
NEUTROPHILS NFR BLD AUTO: 60.3 % — SIGNIFICANT CHANGE UP (ref 43–77)
NEUTROPHILS NFR BLD AUTO: 61.4 % — SIGNIFICANT CHANGE UP (ref 43–77)
NEUTROPHILS NFR BLD AUTO: 69.1 % — SIGNIFICANT CHANGE UP (ref 43–77)
NEUTROPHILS NFR BLD AUTO: 69.7 % — SIGNIFICANT CHANGE UP (ref 43–77)
NEUTROPHILS NFR BLD AUTO: 69.7 % — SIGNIFICANT CHANGE UP (ref 43–77)
NEUTROPHILS NFR BLD AUTO: 70.3 % — SIGNIFICANT CHANGE UP (ref 43–77)
NEUTROPHILS NFR BLD AUTO: 73.6 % — SIGNIFICANT CHANGE UP (ref 43–77)
NEUTROPHILS NFR BLD AUTO: 75.9 % — SIGNIFICANT CHANGE UP (ref 43–77)
NEUTROPHILS NFR BLD AUTO: 75.9 % — SIGNIFICANT CHANGE UP (ref 43–77)
NEUTROPHILS NFR BLD AUTO: 76.5 % — SIGNIFICANT CHANGE UP (ref 43–77)
NEUTROPHILS NFR BLD AUTO: 77.6 % — HIGH (ref 43–77)
NEUTROPHILS NFR BLD AUTO: 78.4 % — HIGH (ref 43–77)
NEUTROPHILS NFR BLD AUTO: 78.4 % — HIGH (ref 43–77)
NEUTROPHILS NFR BLD AUTO: 89.4 % — HIGH (ref 43–77)
NEUTROPHILS NFR BLD AUTO: 89.4 % — HIGH (ref 43–77)
NEUTROPHILS NFR BLD AUTO: 91.6 % — HIGH (ref 43–77)
NEUTROPHILS NFR BLD AUTO: 91.6 % — HIGH (ref 43–77)
NEUTROPHILS NFR BLD AUTO: 91.7 % — HIGH (ref 43–77)
NEUTROPHILS NFR BLD AUTO: 91.7 % — HIGH (ref 43–77)
NEUTROPHILS NFR BLD AUTO: 92.4 % — HIGH (ref 43–77)
NEUTROPHILS NFR BLD AUTO: 92.4 % — HIGH (ref 43–77)
NITRITE UR-MCNC: NEGATIVE — SIGNIFICANT CHANGE UP
NRBC # BLD: 0 /100 WBCS — SIGNIFICANT CHANGE UP (ref 0–0)
NRBC # FLD: 0 K/UL — SIGNIFICANT CHANGE UP (ref 0–0)
NRBC # FLD: 0.02 K/UL — HIGH (ref 0–0)
NRBC # FLD: 0.02 K/UL — HIGH (ref 0–0)
PH UR: 5.5 — SIGNIFICANT CHANGE UP (ref 5–8)
PHOSPHATE SERPL-MCNC: 1.3 MG/DL — LOW (ref 2.5–4.5)
PHOSPHATE SERPL-MCNC: 1.3 MG/DL — LOW (ref 2.5–4.5)
PHOSPHATE SERPL-MCNC: 1.5 MG/DL — LOW (ref 2.5–4.5)
PHOSPHATE SERPL-MCNC: 1.5 MG/DL — LOW (ref 2.5–4.5)
PHOSPHATE SERPL-MCNC: 1.8 MG/DL — LOW (ref 2.5–4.5)
PHOSPHATE SERPL-MCNC: 1.8 MG/DL — LOW (ref 2.5–4.5)
PHOSPHATE SERPL-MCNC: 2.2 MG/DL — LOW (ref 2.5–4.5)
PHOSPHATE SERPL-MCNC: 2.2 MG/DL — LOW (ref 2.5–4.5)
PLATELET # BLD AUTO: 215 K/UL — SIGNIFICANT CHANGE UP (ref 150–400)
PLATELET # BLD AUTO: 239 K/UL — SIGNIFICANT CHANGE UP (ref 150–400)
PLATELET # BLD AUTO: 239 K/UL — SIGNIFICANT CHANGE UP (ref 150–400)
PLATELET # BLD AUTO: 270 K/UL — SIGNIFICANT CHANGE UP (ref 150–400)
PLATELET # BLD AUTO: 271 K/UL — SIGNIFICANT CHANGE UP (ref 150–400)
PLATELET # BLD AUTO: 271 K/UL — SIGNIFICANT CHANGE UP (ref 150–400)
PLATELET # BLD AUTO: 273 K/UL — SIGNIFICANT CHANGE UP (ref 150–400)
PLATELET # BLD AUTO: 273 K/UL — SIGNIFICANT CHANGE UP (ref 150–400)
PLATELET # BLD AUTO: 275 K/UL — SIGNIFICANT CHANGE UP (ref 150–400)
PLATELET # BLD AUTO: 275 K/UL — SIGNIFICANT CHANGE UP (ref 150–400)
PLATELET # BLD AUTO: 281 K/UL — SIGNIFICANT CHANGE UP (ref 150–400)
PLATELET # BLD AUTO: 283 K/UL — SIGNIFICANT CHANGE UP (ref 150–400)
PLATELET # BLD AUTO: 283 K/UL — SIGNIFICANT CHANGE UP (ref 150–400)
PLATELET # BLD AUTO: 286 K/UL — SIGNIFICANT CHANGE UP (ref 150–400)
PLATELET # BLD AUTO: 286 K/UL — SIGNIFICANT CHANGE UP (ref 150–400)
PLATELET # BLD AUTO: 303 K/UL — SIGNIFICANT CHANGE UP (ref 150–400)
PLATELET # BLD AUTO: 304 K/UL — SIGNIFICANT CHANGE UP (ref 150–400)
PLATELET # BLD AUTO: 314 K/UL — SIGNIFICANT CHANGE UP (ref 150–400)
PLATELET # BLD AUTO: 338 K/UL — SIGNIFICANT CHANGE UP (ref 150–400)
PLATELET # BLD AUTO: 372 K/UL — SIGNIFICANT CHANGE UP (ref 150–400)
PLATELET # BLD AUTO: 423 K/UL — HIGH (ref 150–400)
PLATELET # BLD AUTO: 423 K/UL — HIGH (ref 150–400)
PLATELET # BLD AUTO: 428 K/UL — HIGH (ref 150–400)
POTASSIUM SERPL-MCNC: 3.6 MMOL/L — SIGNIFICANT CHANGE UP (ref 3.5–5.3)
POTASSIUM SERPL-MCNC: 3.6 MMOL/L — SIGNIFICANT CHANGE UP (ref 3.5–5.3)
POTASSIUM SERPL-MCNC: 3.7 MMOL/L — SIGNIFICANT CHANGE UP (ref 3.5–5.3)
POTASSIUM SERPL-MCNC: 3.7 MMOL/L — SIGNIFICANT CHANGE UP (ref 3.5–5.3)
POTASSIUM SERPL-MCNC: 3.8 MMOL/L — SIGNIFICANT CHANGE UP (ref 3.5–5.3)
POTASSIUM SERPL-MCNC: 3.8 MMOL/L — SIGNIFICANT CHANGE UP (ref 3.5–5.3)
POTASSIUM SERPL-MCNC: 4 MMOL/L — SIGNIFICANT CHANGE UP (ref 3.5–5.3)
POTASSIUM SERPL-MCNC: 4.1 MMOL/L — SIGNIFICANT CHANGE UP (ref 3.5–5.3)
POTASSIUM SERPL-MCNC: 4.2 MMOL/L — SIGNIFICANT CHANGE UP (ref 3.5–5.3)
POTASSIUM SERPL-MCNC: 4.4 MMOL/L — SIGNIFICANT CHANGE UP (ref 3.5–5.3)
POTASSIUM SERPL-MCNC: 4.4 MMOL/L — SIGNIFICANT CHANGE UP (ref 3.5–5.3)
POTASSIUM SERPL-MCNC: 4.8 MMOL/L — SIGNIFICANT CHANGE UP (ref 3.5–5.3)
POTASSIUM SERPL-MCNC: 4.9 MMOL/L — SIGNIFICANT CHANGE UP (ref 3.5–5.3)
POTASSIUM SERPL-SCNC: 3.6 MMOL/L — SIGNIFICANT CHANGE UP (ref 3.5–5.3)
POTASSIUM SERPL-SCNC: 3.6 MMOL/L — SIGNIFICANT CHANGE UP (ref 3.5–5.3)
POTASSIUM SERPL-SCNC: 3.7 MMOL/L — SIGNIFICANT CHANGE UP (ref 3.5–5.3)
POTASSIUM SERPL-SCNC: 3.7 MMOL/L — SIGNIFICANT CHANGE UP (ref 3.5–5.3)
POTASSIUM SERPL-SCNC: 3.8 MMOL/L — SIGNIFICANT CHANGE UP (ref 3.5–5.3)
POTASSIUM SERPL-SCNC: 3.8 MMOL/L — SIGNIFICANT CHANGE UP (ref 3.5–5.3)
POTASSIUM SERPL-SCNC: 4 MMOL/L — SIGNIFICANT CHANGE UP (ref 3.5–5.3)
POTASSIUM SERPL-SCNC: 4.1 MMOL/L — SIGNIFICANT CHANGE UP (ref 3.5–5.3)
POTASSIUM SERPL-SCNC: 4.2 MMOL/L — SIGNIFICANT CHANGE UP (ref 3.5–5.3)
POTASSIUM SERPL-SCNC: 4.4 MMOL/L — SIGNIFICANT CHANGE UP (ref 3.5–5.3)
POTASSIUM SERPL-SCNC: 4.4 MMOL/L — SIGNIFICANT CHANGE UP (ref 3.5–5.3)
POTASSIUM SERPL-SCNC: 4.6 MMOL/L
POTASSIUM SERPL-SCNC: 4.7 MMOL/L
POTASSIUM SERPL-SCNC: 4.8 MMOL/L
POTASSIUM SERPL-SCNC: 4.8 MMOL/L — SIGNIFICANT CHANGE UP (ref 3.5–5.3)
POTASSIUM SERPL-SCNC: 4.9 MMOL/L — SIGNIFICANT CHANGE UP (ref 3.5–5.3)
POTASSIUM SERPL-SCNC: 5 MMOL/L
POTASSIUM SERPL-SCNC: 5 MMOL/L
PROT SERPL-MCNC: 5.3 G/DL — LOW (ref 6–8.3)
PROT SERPL-MCNC: 5.3 G/DL — LOW (ref 6–8.3)
PROT SERPL-MCNC: 5.4 G/DL — LOW (ref 6–8.3)
PROT SERPL-MCNC: 5.5 G/DL
PROT SERPL-MCNC: 5.8 G/DL — LOW (ref 6–8.3)
PROT SERPL-MCNC: 5.9 G/DL — LOW (ref 6–8.3)
PROT SERPL-MCNC: 6 G/DL — SIGNIFICANT CHANGE UP (ref 6–8.3)
PROT SERPL-MCNC: 6.1 G/DL
PROT SERPL-MCNC: 6.1 G/DL
PROT SERPL-MCNC: 6.2 G/DL — SIGNIFICANT CHANGE UP (ref 6–8.3)
PROT SERPL-MCNC: 6.2 G/DL — SIGNIFICANT CHANGE UP (ref 6–8.3)
PROT SERPL-MCNC: 6.4 G/DL — SIGNIFICANT CHANGE UP (ref 6–8.3)
PROT SERPL-MCNC: 6.5 G/DL
PROT SERPL-MCNC: 6.5 G/DL — SIGNIFICANT CHANGE UP (ref 6–8.3)
PROT SERPL-MCNC: 6.9 G/DL
PROT UR-MCNC: NEGATIVE MG/DL — SIGNIFICANT CHANGE UP
PROTHROM AB SERPL-ACNC: 13.6 SEC — HIGH (ref 9.5–13)
RBC # BLD: 2.93 M/UL — LOW (ref 4.2–5.8)
RBC # BLD: 2.93 M/UL — LOW (ref 4.2–5.8)
RBC # BLD: 3 M/UL — LOW (ref 4.2–5.8)
RBC # BLD: 3 M/UL — LOW (ref 4.2–5.8)
RBC # BLD: 3.1 M/UL — LOW (ref 4.2–5.8)
RBC # BLD: 3.1 M/UL — LOW (ref 4.2–5.8)
RBC # BLD: 3.16 M/UL — LOW (ref 4.2–5.8)
RBC # BLD: 3.2 M/UL — LOW (ref 4.2–5.8)
RBC # BLD: 3.21 M/UL — LOW (ref 4.2–5.8)
RBC # BLD: 3.21 M/UL — LOW (ref 4.2–5.8)
RBC # BLD: 3.23 M/UL — LOW (ref 4.2–5.8)
RBC # BLD: 3.25 M/UL — LOW (ref 4.2–5.8)
RBC # BLD: 3.33 M/UL — LOW (ref 4.2–5.8)
RBC # BLD: 3.33 M/UL — LOW (ref 4.2–5.8)
RBC # BLD: 3.38 M/UL — LOW (ref 4.2–5.8)
RBC # BLD: 3.38 M/UL — LOW (ref 4.2–5.8)
RBC # BLD: 3.51 M/UL — LOW (ref 4.2–5.8)
RBC # BLD: 3.51 M/UL — LOW (ref 4.2–5.8)
RBC # BLD: 3.55 M/UL — LOW (ref 4.2–5.8)
RBC # BLD: 3.6 M/UL — LOW (ref 4.2–5.8)
RBC # BLD: 3.65 M/UL — LOW (ref 4.2–5.8)
RBC # BLD: 4.32 M/UL — SIGNIFICANT CHANGE UP (ref 4.2–5.8)
RBC # BLD: 4.67 M/UL — SIGNIFICANT CHANGE UP (ref 4.2–5.8)
RBC # FLD: 15.4 % — HIGH (ref 10.3–14.5)
RBC # FLD: 16 % — HIGH (ref 10.3–14.5)
RBC # FLD: 16 % — HIGH (ref 10.3–14.5)
RBC # FLD: 17.6 % — HIGH (ref 10.3–14.5)
RBC # FLD: 17.7 % — HIGH (ref 10.3–14.5)
RBC # FLD: 17.7 % — HIGH (ref 10.3–14.5)
RBC # FLD: 17.8 % — HIGH (ref 10.3–14.5)
RBC # FLD: 18 % — HIGH (ref 10.3–14.5)
RBC # FLD: 18.9 % — HIGH (ref 10.3–14.5)
RBC # FLD: 18.9 % — HIGH (ref 10.3–14.5)
RBC # FLD: 20.9 % — HIGH (ref 10.3–14.5)
RBC # FLD: 21 % — HIGH (ref 10.3–14.5)
RBC # FLD: 21 % — HIGH (ref 10.3–14.5)
RBC # FLD: 21.1 % — HIGH (ref 10.3–14.5)
RBC # FLD: 21.1 % — HIGH (ref 10.3–14.5)
RBC # FLD: 21.2 % — HIGH (ref 10.3–14.5)
RBC # FLD: 21.3 % — HIGH (ref 10.3–14.5)
RBC # FLD: 22.3 % — HIGH (ref 10.3–14.5)
RBC # FLD: 22.4 % — HIGH (ref 10.3–14.5)
RBC # FLD: 22.4 % — HIGH (ref 10.3–14.5)
RH IG SCN BLD-IMP: POSITIVE — SIGNIFICANT CHANGE UP
RH IG SCN BLD-IMP: POSITIVE — SIGNIFICANT CHANGE UP
S AUREUS DNA NOSE QL NAA+PROBE: SIGNIFICANT CHANGE UP
SODIUM SERPL-SCNC: 138 MMOL/L — SIGNIFICANT CHANGE UP (ref 135–145)
SODIUM SERPL-SCNC: 139 MMOL/L — SIGNIFICANT CHANGE UP (ref 135–145)
SODIUM SERPL-SCNC: 140 MMOL/L
SODIUM SERPL-SCNC: 140 MMOL/L — SIGNIFICANT CHANGE UP (ref 135–145)
SODIUM SERPL-SCNC: 141 MMOL/L
SODIUM SERPL-SCNC: 141 MMOL/L — SIGNIFICANT CHANGE UP (ref 135–145)
SODIUM SERPL-SCNC: 142 MMOL/L — SIGNIFICANT CHANGE UP (ref 135–145)
SODIUM SERPL-SCNC: 143 MMOL/L
SODIUM SERPL-SCNC: 144 MMOL/L — SIGNIFICANT CHANGE UP (ref 135–145)
SP GR SPEC: 1.02 — SIGNIFICANT CHANGE UP (ref 1–1.03)
SPECIMEN SOURCE: SIGNIFICANT CHANGE UP
UROBILINOGEN FLD QL: 1 MG/DL — SIGNIFICANT CHANGE UP (ref 0.2–1)
WBC # BLD: 10.5 K/UL — SIGNIFICANT CHANGE UP (ref 3.8–10.5)
WBC # BLD: 12.07 K/UL — HIGH (ref 3.8–10.5)
WBC # BLD: 12.07 K/UL — HIGH (ref 3.8–10.5)
WBC # BLD: 4.08 K/UL — SIGNIFICANT CHANGE UP (ref 3.8–10.5)
WBC # BLD: 4.46 K/UL — SIGNIFICANT CHANGE UP (ref 3.8–10.5)
WBC # BLD: 4.59 K/UL — SIGNIFICANT CHANGE UP (ref 3.8–10.5)
WBC # BLD: 4.59 K/UL — SIGNIFICANT CHANGE UP (ref 3.8–10.5)
WBC # BLD: 4.9 K/UL — SIGNIFICANT CHANGE UP (ref 3.8–10.5)
WBC # BLD: 5.17 K/UL — SIGNIFICANT CHANGE UP (ref 3.8–10.5)
WBC # BLD: 5.17 K/UL — SIGNIFICANT CHANGE UP (ref 3.8–10.5)
WBC # BLD: 6.08 K/UL — SIGNIFICANT CHANGE UP (ref 3.8–10.5)
WBC # BLD: 6.08 K/UL — SIGNIFICANT CHANGE UP (ref 3.8–10.5)
WBC # BLD: 6.26 K/UL — SIGNIFICANT CHANGE UP (ref 3.8–10.5)
WBC # BLD: 6.26 K/UL — SIGNIFICANT CHANGE UP (ref 3.8–10.5)
WBC # BLD: 6.92 K/UL — SIGNIFICANT CHANGE UP (ref 3.8–10.5)
WBC # BLD: 7.05 K/UL — SIGNIFICANT CHANGE UP (ref 3.8–10.5)
WBC # BLD: 7.62 K/UL — SIGNIFICANT CHANGE UP (ref 3.8–10.5)
WBC # BLD: 7.97 K/UL — SIGNIFICANT CHANGE UP (ref 3.8–10.5)
WBC # BLD: 8.23 K/UL — SIGNIFICANT CHANGE UP (ref 3.8–10.5)
WBC # BLD: 8.76 K/UL — SIGNIFICANT CHANGE UP (ref 3.8–10.5)
WBC # BLD: 8.76 K/UL — SIGNIFICANT CHANGE UP (ref 3.8–10.5)
WBC # BLD: 9.46 K/UL — SIGNIFICANT CHANGE UP (ref 3.8–10.5)
WBC # BLD: 9.46 K/UL — SIGNIFICANT CHANGE UP (ref 3.8–10.5)
WBC # BLD: 9.82 K/UL — SIGNIFICANT CHANGE UP (ref 3.8–10.5)
WBC # BLD: 9.82 K/UL — SIGNIFICANT CHANGE UP (ref 3.8–10.5)
WBC # FLD AUTO: 10.5 K/UL — SIGNIFICANT CHANGE UP (ref 3.8–10.5)
WBC # FLD AUTO: 12.07 K/UL — HIGH (ref 3.8–10.5)
WBC # FLD AUTO: 12.07 K/UL — HIGH (ref 3.8–10.5)
WBC # FLD AUTO: 4.08 K/UL — SIGNIFICANT CHANGE UP (ref 3.8–10.5)
WBC # FLD AUTO: 4.46 K/UL — SIGNIFICANT CHANGE UP (ref 3.8–10.5)
WBC # FLD AUTO: 4.59 K/UL — SIGNIFICANT CHANGE UP (ref 3.8–10.5)
WBC # FLD AUTO: 4.59 K/UL — SIGNIFICANT CHANGE UP (ref 3.8–10.5)
WBC # FLD AUTO: 4.9 K/UL — SIGNIFICANT CHANGE UP (ref 3.8–10.5)
WBC # FLD AUTO: 5.17 K/UL — SIGNIFICANT CHANGE UP (ref 3.8–10.5)
WBC # FLD AUTO: 5.17 K/UL — SIGNIFICANT CHANGE UP (ref 3.8–10.5)
WBC # FLD AUTO: 6.08 K/UL — SIGNIFICANT CHANGE UP (ref 3.8–10.5)
WBC # FLD AUTO: 6.08 K/UL — SIGNIFICANT CHANGE UP (ref 3.8–10.5)
WBC # FLD AUTO: 6.26 K/UL — SIGNIFICANT CHANGE UP (ref 3.8–10.5)
WBC # FLD AUTO: 6.26 K/UL — SIGNIFICANT CHANGE UP (ref 3.8–10.5)
WBC # FLD AUTO: 6.92 K/UL — SIGNIFICANT CHANGE UP (ref 3.8–10.5)
WBC # FLD AUTO: 7.05 K/UL — SIGNIFICANT CHANGE UP (ref 3.8–10.5)
WBC # FLD AUTO: 7.62 K/UL — SIGNIFICANT CHANGE UP (ref 3.8–10.5)
WBC # FLD AUTO: 7.97 K/UL — SIGNIFICANT CHANGE UP (ref 3.8–10.5)
WBC # FLD AUTO: 8.23 K/UL — SIGNIFICANT CHANGE UP (ref 3.8–10.5)
WBC # FLD AUTO: 8.76 K/UL — SIGNIFICANT CHANGE UP (ref 3.8–10.5)
WBC # FLD AUTO: 8.76 K/UL — SIGNIFICANT CHANGE UP (ref 3.8–10.5)
WBC # FLD AUTO: 9.46 K/UL — SIGNIFICANT CHANGE UP (ref 3.8–10.5)
WBC # FLD AUTO: 9.46 K/UL — SIGNIFICANT CHANGE UP (ref 3.8–10.5)
WBC # FLD AUTO: 9.82 K/UL — SIGNIFICANT CHANGE UP (ref 3.8–10.5)
WBC # FLD AUTO: 9.82 K/UL — SIGNIFICANT CHANGE UP (ref 3.8–10.5)

## 2023-01-01 PROCEDURE — 49505 PRP I/HERN INIT REDUC >5 YR: CPT | Mod: AS

## 2023-01-01 PROCEDURE — 72149 MRI LUMBAR SPINE W/DYE: CPT | Mod: 26

## 2023-01-01 PROCEDURE — 74177 CT ABD & PELVIS W/CONTRAST: CPT

## 2023-01-01 PROCEDURE — 71260 CT THORAX DX C+: CPT | Mod: 26

## 2023-01-01 PROCEDURE — 74177 CT ABD & PELVIS W/CONTRAST: CPT | Mod: 26

## 2023-01-01 PROCEDURE — 49591 RPR AA HRN 1ST < 3 CM RDC: CPT | Mod: AS

## 2023-01-01 PROCEDURE — 78815 PET IMAGE W/CT SKULL-THIGH: CPT | Mod: 26,PS

## 2023-01-01 PROCEDURE — 93005 ELECTROCARDIOGRAM TRACING: CPT

## 2023-01-01 PROCEDURE — 99222 1ST HOSP IP/OBS MODERATE 55: CPT | Mod: GC

## 2023-01-01 PROCEDURE — 99205 OFFICE O/P NEW HI 60 MIN: CPT

## 2023-01-01 PROCEDURE — 71250 CT THORAX DX C-: CPT | Mod: 26

## 2023-01-01 PROCEDURE — G0463: CPT

## 2023-01-01 PROCEDURE — 99214 OFFICE O/P EST MOD 30 MIN: CPT

## 2023-01-01 PROCEDURE — 99213 OFFICE O/P EST LOW 20 MIN: CPT

## 2023-01-01 PROCEDURE — 99285 EMERGENCY DEPT VISIT HI MDM: CPT | Mod: FS

## 2023-01-01 PROCEDURE — 99232 SBSQ HOSP IP/OBS MODERATE 35: CPT | Mod: FS

## 2023-01-01 PROCEDURE — 71250 CT THORAX DX C-: CPT

## 2023-01-01 PROCEDURE — 72157 MRI CHEST SPINE W/O & W/DYE: CPT | Mod: 26

## 2023-01-01 PROCEDURE — 49591 RPR AA HRN 1ST < 3 CM RDC: CPT

## 2023-01-01 PROCEDURE — 93000 ELECTROCARDIOGRAM COMPLETE: CPT

## 2023-01-01 PROCEDURE — 99232 SBSQ HOSP IP/OBS MODERATE 35: CPT | Mod: GC

## 2023-01-01 PROCEDURE — 99407 BEHAV CHNG SMOKING > 10 MIN: CPT

## 2023-01-01 PROCEDURE — 71100 X-RAY EXAM RIBS UNI 2 VIEWS: CPT | Mod: 26

## 2023-01-01 PROCEDURE — 99214 OFFICE O/P EST MOD 30 MIN: CPT | Mod: 95

## 2023-01-01 PROCEDURE — 93010 ELECTROCARDIOGRAM REPORT: CPT

## 2023-01-01 PROCEDURE — 36415 COLL VENOUS BLD VENIPUNCTURE: CPT

## 2023-01-01 PROCEDURE — 49650 LAP ING HERNIA REPAIR INIT: CPT | Mod: LT

## 2023-01-01 PROCEDURE — 99215 OFFICE O/P EST HI 40 MIN: CPT | Mod: 25

## 2023-01-01 PROCEDURE — 72148 MRI LUMBAR SPINE W/O DYE: CPT | Mod: 26,MA

## 2023-01-01 PROCEDURE — 93356 MYOCRD STRAIN IMG SPCKL TRCK: CPT

## 2023-01-01 PROCEDURE — 99223 1ST HOSP IP/OBS HIGH 75: CPT | Mod: AI

## 2023-01-01 PROCEDURE — 99497 ADVNCD CARE PLAN 30 MIN: CPT | Mod: 25

## 2023-01-01 PROCEDURE — 71260 CT THORAX DX C+: CPT

## 2023-01-01 PROCEDURE — C1781: CPT

## 2023-01-01 PROCEDURE — 99233 SBSQ HOSP IP/OBS HIGH 50: CPT

## 2023-01-01 PROCEDURE — 78815 PET IMAGE W/CT SKULL-THIGH: CPT

## 2023-01-01 PROCEDURE — 71100 X-RAY EXAM RIBS UNI 2 VIEWS: CPT

## 2023-01-01 PROCEDURE — 93306 TTE W/DOPPLER COMPLETE: CPT

## 2023-01-01 PROCEDURE — 86900 BLOOD TYPING SEROLOGIC ABO: CPT

## 2023-01-01 PROCEDURE — 49592 RPR AA HRN 1ST < 3 NCR/STRN: CPT

## 2023-01-01 PROCEDURE — 72156 MRI NECK SPINE W/O & W/DYE: CPT | Mod: 26

## 2023-01-01 PROCEDURE — 99498 ADVNCD CARE PLAN ADDL 30 MIN: CPT

## 2023-01-01 PROCEDURE — 49505 PRP I/HERN INIT REDUC >5 YR: CPT

## 2023-01-01 PROCEDURE — 99239 HOSP IP/OBS DSCHRG MGMT >30: CPT

## 2023-01-01 PROCEDURE — 86901 BLOOD TYPING SEROLOGIC RH(D): CPT

## 2023-01-01 PROCEDURE — 99222 1ST HOSP IP/OBS MODERATE 55: CPT

## 2023-01-01 PROCEDURE — 99223 1ST HOSP IP/OBS HIGH 75: CPT

## 2023-01-01 PROCEDURE — A9552: CPT

## 2023-01-01 PROCEDURE — 86850 RBC ANTIBODY SCREEN: CPT

## 2023-01-01 DEVICE — MESH HERNIA VENTRALEX ST SMALL 1.7": Type: IMPLANTABLE DEVICE | Site: LEFT | Status: FUNCTIONAL

## 2023-01-01 DEVICE — IMPLANTABLE DEVICE: Type: IMPLANTABLE DEVICE | Site: LEFT | Status: FUNCTIONAL

## 2023-01-01 RX ORDER — DEXAMETHASONE 0.5 MG/5ML
4 ELIXIR ORAL EVERY 6 HOURS
Refills: 0 | Status: DISCONTINUED | OUTPATIENT
Start: 2023-01-01 | End: 2023-01-01

## 2023-01-01 RX ORDER — POLYETHYLENE GLYCOL 3350 17 G/17G
17 POWDER, FOR SOLUTION ORAL
Refills: 0 | Status: DISCONTINUED | OUTPATIENT
Start: 2023-01-01 | End: 2023-01-01

## 2023-01-01 RX ORDER — CHLORHEXIDINE GLUCONATE 213 G/1000ML
1 SOLUTION TOPICAL DAILY
Refills: 0 | Status: DISCONTINUED | OUTPATIENT
Start: 2023-01-01 | End: 2023-01-01

## 2023-01-01 RX ORDER — SENNA PLUS 8.6 MG/1
2 TABLET ORAL AT BEDTIME
Refills: 0 | Status: DISCONTINUED | OUTPATIENT
Start: 2023-01-01 | End: 2023-01-01

## 2023-01-01 RX ORDER — FLUTICASONE FUROATE, UMECLIDINIUM BROMIDE AND VILANTEROL TRIFENATATE 200; 62.5; 25 UG/1; UG/1; UG/1
1 POWDER RESPIRATORY (INHALATION)
Refills: 0 | DISCHARGE

## 2023-01-01 RX ORDER — FENTANYL CITRATE 50 UG/ML
25 INJECTION INTRAVENOUS
Refills: 0 | Status: DISCONTINUED | OUTPATIENT
Start: 2023-01-01 | End: 2023-01-01

## 2023-01-01 RX ORDER — ASPIRIN/CALCIUM CARB/MAGNESIUM 324 MG
1 TABLET ORAL
Refills: 0 | DISCHARGE

## 2023-01-01 RX ORDER — BISOPROLOL FUMARATE 10 MG/1
1 TABLET, FILM COATED ORAL
Refills: 0 | DISCHARGE

## 2023-01-01 RX ORDER — DEXAMETHASONE 0.5 MG/5ML
6 ELIXIR ORAL ONCE
Refills: 0 | Status: DISCONTINUED | OUTPATIENT
Start: 2023-01-01 | End: 2023-01-01

## 2023-01-01 RX ORDER — GABAPENTIN 400 MG/1
200 CAPSULE ORAL
Refills: 0 | Status: DISCONTINUED | OUTPATIENT
Start: 2023-01-01 | End: 2023-01-01

## 2023-01-01 RX ORDER — OMEPRAZOLE 10 MG/1
1 CAPSULE, DELAYED RELEASE ORAL
Qty: 0 | Refills: 0 | DISCHARGE

## 2023-01-01 RX ORDER — GABAPENTIN 400 MG/1
2 CAPSULE ORAL
Refills: 0 | DISCHARGE

## 2023-01-01 RX ORDER — OXYCODONE HYDROCHLORIDE 5 MG/1
1 TABLET ORAL
Qty: 42 | Refills: 0
Start: 2023-01-01 | End: 2023-01-01

## 2023-01-01 RX ORDER — BUDESONIDE AND FORMOTEROL FUMARATE DIHYDRATE 160; 4.5 UG/1; UG/1
2 AEROSOL RESPIRATORY (INHALATION) DAILY
Refills: 0 | Status: DISCONTINUED | OUTPATIENT
Start: 2023-01-01 | End: 2023-01-01

## 2023-01-01 RX ORDER — LISINOPRIL 2.5 MG/1
2.5 TABLET ORAL
Qty: 90 | Refills: 3 | Status: ACTIVE | COMMUNITY
Start: 2022-10-19 | End: 1900-01-01

## 2023-01-01 RX ORDER — NALOXONE HYDROCHLORIDE 4 MG/.1ML
4 SPRAY NASAL
Qty: 1 | Refills: 0
Start: 2023-01-01

## 2023-01-01 RX ORDER — ROSUVASTATIN CALCIUM 5 MG/1
5 TABLET, FILM COATED ORAL
Qty: 90 | Refills: 1 | Status: ACTIVE | COMMUNITY
Start: 2022-09-09 | End: 1900-01-01

## 2023-01-01 RX ORDER — LISINOPRIL 2.5 MG/1
1 TABLET ORAL
Refills: 0 | DISCHARGE

## 2023-01-01 RX ORDER — FAMOTIDINE 10 MG/ML
20 INJECTION INTRAVENOUS
Refills: 0 | Status: DISCONTINUED | OUTPATIENT
Start: 2023-01-01 | End: 2023-01-01

## 2023-01-01 RX ORDER — MORPHINE SULFATE 50 MG/1
2 CAPSULE, EXTENDED RELEASE ORAL ONCE
Refills: 0 | Status: DISCONTINUED | OUTPATIENT
Start: 2023-01-01 | End: 2023-01-01

## 2023-01-01 RX ORDER — POLYETHYLENE GLYCOL 3350 17 G/17G
17 POWDER, FOR SOLUTION ORAL DAILY
Refills: 0 | Status: DISCONTINUED | OUTPATIENT
Start: 2023-01-01 | End: 2023-01-01

## 2023-01-01 RX ORDER — ASPIRIN ENTERIC COATED TABLETS 81 MG 81 MG/1
81 TABLET, DELAYED RELEASE ORAL DAILY
Qty: 90 | Refills: 3 | Status: ACTIVE | COMMUNITY
Start: 2022-09-09 | End: 1900-01-01

## 2023-01-01 RX ORDER — DEXAMETHASONE 0.5 MG/5ML
10 ELIXIR ORAL ONCE
Refills: 0 | Status: DISCONTINUED | OUTPATIENT
Start: 2023-01-01 | End: 2023-01-01

## 2023-01-01 RX ORDER — DEXAMETHASONE 0.5 MG/5ML
6 ELIXIR ORAL ONCE
Refills: 0 | Status: COMPLETED | OUTPATIENT
Start: 2023-01-01 | End: 2023-01-01

## 2023-01-01 RX ORDER — FLUTICASONE FUROATE, UMECLIDINIUM BROMIDE AND VILANTEROL TRIFENATATE 100; 62.5; 25 UG/1; UG/1; UG/1
100-62.5-25 POWDER RESPIRATORY (INHALATION)
Qty: 1 | Refills: 4 | Status: ACTIVE | COMMUNITY
Start: 2023-01-01 | End: 1900-01-01

## 2023-01-01 RX ORDER — FAMOTIDINE 20 MG/1
20 TABLET, FILM COATED ORAL
Qty: 60 | Refills: 5 | Status: ACTIVE | COMMUNITY
Start: 2023-01-01 | End: 1900-01-01

## 2023-01-01 RX ORDER — ONDANSETRON 8 MG/1
4 TABLET, FILM COATED ORAL EVERY 8 HOURS
Refills: 0 | Status: DISCONTINUED | OUTPATIENT
Start: 2023-01-01 | End: 2023-01-01

## 2023-01-01 RX ORDER — HEPARIN SODIUM 5000 [USP'U]/ML
5000 INJECTION INTRAVENOUS; SUBCUTANEOUS EVERY 12 HOURS
Refills: 0 | Status: DISCONTINUED | OUTPATIENT
Start: 2023-01-01 | End: 2023-01-01

## 2023-01-01 RX ORDER — ASPIRIN/CALCIUM CARB/MAGNESIUM 324 MG
81 TABLET ORAL DAILY
Refills: 0 | Status: DISCONTINUED | OUTPATIENT
Start: 2023-01-01 | End: 2023-01-01

## 2023-01-01 RX ORDER — OMEPRAZOLE 40 MG/1
40 CAPSULE, DELAYED RELEASE ORAL
Qty: 90 | Refills: 3 | Status: DISCONTINUED | COMMUNITY
Start: 1900-01-01 | End: 2023-01-01

## 2023-01-01 RX ORDER — IPRATROPIUM BROMIDE 0.5 MG/2.5ML
0.02 SOLUTION RESPIRATORY (INHALATION) 4 TIMES DAILY
Qty: 2 | Refills: 3 | Status: ACTIVE | COMMUNITY
Start: 2023-01-01 | End: 1900-01-01

## 2023-01-01 RX ORDER — METHYLPREDNISOLONE 4 MG/1
4 TABLET ORAL
Qty: 1 | Refills: 0 | Status: ACTIVE | COMMUNITY
Start: 2023-01-01 | End: 1900-01-01

## 2023-01-01 RX ORDER — ACETAMINOPHEN 500 MG
1000 TABLET ORAL ONCE
Refills: 0 | Status: DISCONTINUED | OUTPATIENT
Start: 2023-01-01 | End: 2023-01-01

## 2023-01-01 RX ORDER — LANOLIN ALCOHOL/MO/W.PET/CERES
3 CREAM (GRAM) TOPICAL AT BEDTIME
Refills: 0 | Status: DISCONTINUED | OUTPATIENT
Start: 2023-01-01 | End: 2023-01-01

## 2023-01-01 RX ORDER — ACETAMINOPHEN 500 MG
650 TABLET ORAL EVERY 6 HOURS
Refills: 0 | Status: DISCONTINUED | OUTPATIENT
Start: 2023-01-01 | End: 2023-01-01

## 2023-01-01 RX ORDER — BUDESONIDE AND FORMOTEROL FUMARATE DIHYDRATE 160; 4.5 UG/1; UG/1
160-4.5 AEROSOL RESPIRATORY (INHALATION)
Qty: 6 | Refills: 0 | Status: ACTIVE | COMMUNITY
Start: 2020-08-17 | End: 1900-01-01

## 2023-01-01 RX ORDER — HYDROMORPHONE HYDROCHLORIDE 2 MG/ML
0.5 INJECTION INTRAMUSCULAR; INTRAVENOUS; SUBCUTANEOUS
Refills: 0 | Status: DISCONTINUED | OUTPATIENT
Start: 2023-01-01 | End: 2023-01-01

## 2023-01-01 RX ORDER — ONDANSETRON 4 MG/1
4 TABLET, ORALLY DISINTEGRATING ORAL
Qty: 120 | Refills: 1 | Status: ACTIVE | COMMUNITY
Start: 2022-04-04 | End: 1900-01-01

## 2023-01-01 RX ORDER — ONDANSETRON 8 MG/1
4 TABLET, FILM COATED ORAL ONCE
Refills: 0 | Status: DISCONTINUED | OUTPATIENT
Start: 2023-01-01 | End: 2023-01-01

## 2023-01-01 RX ORDER — TRASTUZUMAB 150 MG/7.4ML
INJECTION, POWDER, LYOPHILIZED, FOR SOLUTION INTRAVENOUS
Refills: 0 | Status: COMPLETED | COMMUNITY
End: 2023-01-01

## 2023-01-01 RX ORDER — FAMOTIDINE 10 MG/ML
1 INJECTION INTRAVENOUS
Refills: 0 | DISCHARGE

## 2023-01-01 RX ORDER — OXYCODONE 5 MG/1
5 TABLET ORAL
Qty: 90 | Refills: 0 | Status: ACTIVE | COMMUNITY
Start: 2020-11-19 | End: 1900-01-01

## 2023-01-01 RX ORDER — GABAPENTIN 400 MG/1
400 CAPSULE ORAL
Refills: 0 | Status: DISCONTINUED | OUTPATIENT
Start: 2023-01-01 | End: 2023-01-01

## 2023-01-01 RX ORDER — DIAZEPAM 5 MG
5 TABLET ORAL ONCE
Refills: 0 | Status: DISCONTINUED | OUTPATIENT
Start: 2023-01-01 | End: 2023-01-01

## 2023-01-01 RX ORDER — OXYCODONE HYDROCHLORIDE 5 MG/1
5 TABLET ORAL EVERY 4 HOURS
Refills: 0 | Status: DISCONTINUED | OUTPATIENT
Start: 2023-01-01 | End: 2023-01-01

## 2023-01-01 RX ORDER — NALOXONE HYDROCHLORIDE 4 MG/.1ML
0.4 SPRAY NASAL ONCE
Refills: 0 | Status: DISCONTINUED | OUTPATIENT
Start: 2023-01-01 | End: 2023-01-01

## 2023-01-01 RX ORDER — CAPECITABINE 500 MG/1
500 TABLET ORAL
Qty: 70 | Refills: 0 | Status: ACTIVE | COMMUNITY
Start: 2023-01-01 | End: 1900-01-01

## 2023-01-01 RX ORDER — CYCLOBENZAPRINE HYDROCHLORIDE 10 MG/1
5 TABLET, FILM COATED ORAL THREE TIMES A DAY
Refills: 0 | Status: DISCONTINUED | OUTPATIENT
Start: 2023-01-01 | End: 2023-01-01

## 2023-01-01 RX ORDER — NICOTINE TRANSDERMAL SYSTEM 21 MG/24H
21 PATCH, EXTENDED RELEASE TRANSDERMAL DAILY
Qty: 60 | Refills: 1 | Status: ACTIVE | COMMUNITY
Start: 2023-01-01 | End: 1900-01-01

## 2023-01-01 RX ORDER — INFLUENZA VIRUS VACCINE 15; 15; 15; 15 UG/.5ML; UG/.5ML; UG/.5ML; UG/.5ML
0.7 SUSPENSION INTRAMUSCULAR ONCE
Refills: 0 | Status: DISCONTINUED | OUTPATIENT
Start: 2023-01-01 | End: 2023-01-01

## 2023-01-01 RX ORDER — IPRATROPIUM/ALBUTEROL SULFATE 18-103MCG
3 AEROSOL WITH ADAPTER (GRAM) INHALATION EVERY 6 HOURS
Refills: 0 | Status: DISCONTINUED | OUTPATIENT
Start: 2023-01-01 | End: 2023-01-01

## 2023-01-01 RX ORDER — SODIUM CHLORIDE 9 MG/ML
3 INJECTION INTRAMUSCULAR; INTRAVENOUS; SUBCUTANEOUS EVERY 8 HOURS
Refills: 0 | Status: DISCONTINUED | OUTPATIENT
Start: 2023-01-01 | End: 2023-01-01

## 2023-01-01 RX ORDER — LIDOCAINE 4 G/100G
1 CREAM TOPICAL EVERY 24 HOURS
Refills: 0 | Status: DISCONTINUED | OUTPATIENT
Start: 2023-01-01 | End: 2023-01-01

## 2023-01-01 RX ORDER — OXYCODONE HYDROCHLORIDE 5 MG/1
10 TABLET ORAL EVERY 4 HOURS
Refills: 0 | Status: DISCONTINUED | OUTPATIENT
Start: 2023-01-01 | End: 2023-01-01

## 2023-01-01 RX ORDER — BISOPROLOL FUMARATE 5 MG/1
5 TABLET, FILM COATED ORAL DAILY
Qty: 90 | Refills: 2 | Status: ACTIVE | COMMUNITY
Start: 2022-10-19 | End: 1900-01-01

## 2023-01-01 RX ORDER — ROSUVASTATIN CALCIUM 5 MG/1
1 TABLET ORAL
Refills: 0 | DISCHARGE

## 2023-01-01 RX ORDER — MORPHINE SULFATE 50 MG/1
4 CAPSULE, EXTENDED RELEASE ORAL ONCE
Refills: 0 | Status: DISCONTINUED | OUTPATIENT
Start: 2023-01-01 | End: 2023-01-01

## 2023-01-01 RX ORDER — SODIUM CHLORIDE 9 MG/ML
1000 INJECTION INTRAMUSCULAR; INTRAVENOUS; SUBCUTANEOUS
Refills: 0 | Status: DISCONTINUED | OUTPATIENT
Start: 2023-01-01 | End: 2023-01-01

## 2023-01-01 RX ORDER — POTASSIUM PHOSPHATE, MONOBASIC POTASSIUM PHOSPHATE, DIBASIC 236; 224 MG/ML; MG/ML
30 INJECTION, SOLUTION INTRAVENOUS ONCE
Refills: 0 | Status: DISCONTINUED | OUTPATIENT
Start: 2023-01-01 | End: 2023-01-01

## 2023-01-01 RX ADMIN — BUDESONIDE AND FORMOTEROL FUMARATE DIHYDRATE 2 PUFF(S): 160; 4.5 AEROSOL RESPIRATORY (INHALATION) at 10:25

## 2023-01-01 RX ADMIN — MORPHINE SULFATE 2 MILLIGRAM(S): 50 CAPSULE, EXTENDED RELEASE ORAL at 13:00

## 2023-01-01 RX ADMIN — Medication 5 MILLIGRAM(S): at 00:51

## 2023-01-01 RX ADMIN — OXYCODONE HYDROCHLORIDE 10 MILLIGRAM(S): 5 TABLET ORAL at 02:27

## 2023-01-01 RX ADMIN — OXYCODONE HYDROCHLORIDE 5 MILLIGRAM(S): 5 TABLET ORAL at 02:30

## 2023-01-01 RX ADMIN — CHLORHEXIDINE GLUCONATE 1 APPLICATION(S): 213 SOLUTION TOPICAL at 11:33

## 2023-01-01 RX ADMIN — BUDESONIDE AND FORMOTEROL FUMARATE DIHYDRATE 2 PUFF(S): 160; 4.5 AEROSOL RESPIRATORY (INHALATION) at 12:39

## 2023-01-01 RX ADMIN — BUDESONIDE AND FORMOTEROL FUMARATE DIHYDRATE 2 PUFF(S): 160; 4.5 AEROSOL RESPIRATORY (INHALATION) at 09:08

## 2023-01-01 RX ADMIN — SODIUM CHLORIDE 75 MILLILITER(S): 9 INJECTION INTRAMUSCULAR; INTRAVENOUS; SUBCUTANEOUS at 08:42

## 2023-01-01 RX ADMIN — FAMOTIDINE 20 MILLIGRAM(S): 10 INJECTION INTRAVENOUS at 17:50

## 2023-01-01 RX ADMIN — HEPARIN SODIUM 5000 UNIT(S): 5000 INJECTION INTRAVENOUS; SUBCUTANEOUS at 18:42

## 2023-01-01 RX ADMIN — BUDESONIDE AND FORMOTEROL FUMARATE DIHYDRATE 2 PUFF(S): 160; 4.5 AEROSOL RESPIRATORY (INHALATION) at 13:46

## 2023-01-01 RX ADMIN — Medication 4 MILLIGRAM(S): at 02:30

## 2023-01-01 RX ADMIN — FAMOTIDINE 20 MILLIGRAM(S): 10 INJECTION INTRAVENOUS at 09:41

## 2023-01-01 RX ADMIN — OXYCODONE HYDROCHLORIDE 5 MILLIGRAM(S): 5 TABLET ORAL at 23:00

## 2023-01-01 RX ADMIN — OXYCODONE HYDROCHLORIDE 10 MILLIGRAM(S): 5 TABLET ORAL at 16:09

## 2023-01-01 RX ADMIN — FAMOTIDINE 20 MILLIGRAM(S): 10 INJECTION INTRAVENOUS at 07:56

## 2023-01-01 RX ADMIN — GABAPENTIN 400 MILLIGRAM(S): 400 CAPSULE ORAL at 17:49

## 2023-01-01 RX ADMIN — Medication 4 MILLIGRAM(S): at 20:08

## 2023-01-01 RX ADMIN — FAMOTIDINE 20 MILLIGRAM(S): 10 INJECTION INTRAVENOUS at 18:42

## 2023-01-01 RX ADMIN — Medication 63.75 MILLIMOLE(S): at 13:46

## 2023-01-01 RX ADMIN — Medication 4 MILLIGRAM(S): at 07:55

## 2023-01-01 RX ADMIN — Medication 4 MILLIGRAM(S): at 19:21

## 2023-01-01 RX ADMIN — OXYCODONE HYDROCHLORIDE 10 MILLIGRAM(S): 5 TABLET ORAL at 01:27

## 2023-01-01 RX ADMIN — MORPHINE SULFATE 2 MILLIGRAM(S): 50 CAPSULE, EXTENDED RELEASE ORAL at 15:00

## 2023-01-01 RX ADMIN — HEPARIN SODIUM 5000 UNIT(S): 5000 INJECTION INTRAVENOUS; SUBCUTANEOUS at 17:22

## 2023-01-01 RX ADMIN — Medication 81 MILLIGRAM(S): at 11:32

## 2023-01-01 RX ADMIN — Medication 4 MILLIGRAM(S): at 14:13

## 2023-01-01 RX ADMIN — Medication 4 MILLIGRAM(S): at 10:46

## 2023-01-01 RX ADMIN — GABAPENTIN 200 MILLIGRAM(S): 400 CAPSULE ORAL at 20:05

## 2023-01-01 RX ADMIN — Medication 81 MILLIGRAM(S): at 12:45

## 2023-01-01 RX ADMIN — FAMOTIDINE 20 MILLIGRAM(S): 10 INJECTION INTRAVENOUS at 06:19

## 2023-01-01 RX ADMIN — Medication 81 MILLIGRAM(S): at 12:10

## 2023-01-01 RX ADMIN — GABAPENTIN 400 MILLIGRAM(S): 400 CAPSULE ORAL at 18:41

## 2023-01-01 RX ADMIN — HEPARIN SODIUM 5000 UNIT(S): 5000 INJECTION INTRAVENOUS; SUBCUTANEOUS at 07:56

## 2023-01-01 RX ADMIN — GABAPENTIN 200 MILLIGRAM(S): 400 CAPSULE ORAL at 07:13

## 2023-01-01 RX ADMIN — OXYCODONE HYDROCHLORIDE 5 MILLIGRAM(S): 5 TABLET ORAL at 01:30

## 2023-01-01 RX ADMIN — CHLORHEXIDINE GLUCONATE 1 APPLICATION(S): 213 SOLUTION TOPICAL at 12:08

## 2023-01-01 RX ADMIN — Medication 4 MILLIGRAM(S): at 13:40

## 2023-01-01 RX ADMIN — MORPHINE SULFATE 2 MILLIGRAM(S): 50 CAPSULE, EXTENDED RELEASE ORAL at 15:15

## 2023-01-01 RX ADMIN — MORPHINE SULFATE 2 MILLIGRAM(S): 50 CAPSULE, EXTENDED RELEASE ORAL at 12:45

## 2023-01-01 RX ADMIN — Medication 4 MILLIGRAM(S): at 14:00

## 2023-01-01 RX ADMIN — OXYCODONE HYDROCHLORIDE 5 MILLIGRAM(S): 5 TABLET ORAL at 22:02

## 2023-01-01 RX ADMIN — CHLORHEXIDINE GLUCONATE 1 APPLICATION(S): 213 SOLUTION TOPICAL at 12:29

## 2023-01-01 RX ADMIN — Medication 6 MILLIGRAM(S): at 12:45

## 2023-01-01 RX ADMIN — BUDESONIDE AND FORMOTEROL FUMARATE DIHYDRATE 2 PUFF(S): 160; 4.5 AEROSOL RESPIRATORY (INHALATION) at 10:44

## 2023-01-01 RX ADMIN — GABAPENTIN 400 MILLIGRAM(S): 400 CAPSULE ORAL at 18:33

## 2023-01-01 RX ADMIN — FAMOTIDINE 20 MILLIGRAM(S): 10 INJECTION INTRAVENOUS at 07:13

## 2023-01-01 RX ADMIN — Medication 4 MILLIGRAM(S): at 07:45

## 2023-01-01 RX ADMIN — Medication 85 MILLIMOLE(S): at 10:25

## 2023-01-01 RX ADMIN — Medication 81 MILLIGRAM(S): at 12:34

## 2023-01-01 RX ADMIN — Medication 4 MILLIGRAM(S): at 07:53

## 2023-01-01 RX ADMIN — OXYCODONE HYDROCHLORIDE 10 MILLIGRAM(S): 5 TABLET ORAL at 15:39

## 2023-01-01 RX ADMIN — HEPARIN SODIUM 5000 UNIT(S): 5000 INJECTION INTRAVENOUS; SUBCUTANEOUS at 17:50

## 2023-01-01 RX ADMIN — HEPARIN SODIUM 5000 UNIT(S): 5000 INJECTION INTRAVENOUS; SUBCUTANEOUS at 06:18

## 2023-01-01 RX ADMIN — POLYETHYLENE GLYCOL 3350 17 GRAM(S): 17 POWDER, FOR SOLUTION ORAL at 17:49

## 2023-01-01 RX ADMIN — Medication 4 MILLIGRAM(S): at 02:13

## 2023-01-01 RX ADMIN — CHLORHEXIDINE GLUCONATE 1 APPLICATION(S): 213 SOLUTION TOPICAL at 12:39

## 2023-01-01 RX ADMIN — POLYETHYLENE GLYCOL 3350 17 GRAM(S): 17 POWDER, FOR SOLUTION ORAL at 20:06

## 2023-01-01 RX ADMIN — Medication 81 MILLIGRAM(S): at 12:37

## 2023-01-01 RX ADMIN — HEPARIN SODIUM 5000 UNIT(S): 5000 INJECTION INTRAVENOUS; SUBCUTANEOUS at 20:06

## 2023-01-01 RX ADMIN — GABAPENTIN 400 MILLIGRAM(S): 400 CAPSULE ORAL at 07:56

## 2023-01-01 RX ADMIN — MORPHINE SULFATE 4 MILLIGRAM(S): 50 CAPSULE, EXTENDED RELEASE ORAL at 23:39

## 2023-01-01 RX ADMIN — Medication 4 MILLIGRAM(S): at 02:08

## 2023-01-01 RX ADMIN — FAMOTIDINE 20 MILLIGRAM(S): 10 INJECTION INTRAVENOUS at 06:15

## 2023-01-01 RX ADMIN — GABAPENTIN 400 MILLIGRAM(S): 400 CAPSULE ORAL at 17:21

## 2023-01-01 RX ADMIN — OXYCODONE HYDROCHLORIDE 5 MILLIGRAM(S): 5 TABLET ORAL at 04:30

## 2023-01-01 RX ADMIN — OXYCODONE HYDROCHLORIDE 5 MILLIGRAM(S): 5 TABLET ORAL at 05:56

## 2023-01-01 RX ADMIN — Medication 20 MILLIGRAM(S): at 00:51

## 2023-01-01 RX ADMIN — GABAPENTIN 400 MILLIGRAM(S): 400 CAPSULE ORAL at 06:19

## 2023-01-01 RX ADMIN — FAMOTIDINE 20 MILLIGRAM(S): 10 INJECTION INTRAVENOUS at 18:32

## 2023-01-01 RX ADMIN — Medication 4 MILLIGRAM(S): at 19:28

## 2023-01-01 RX ADMIN — HEPARIN SODIUM 5000 UNIT(S): 5000 INJECTION INTRAVENOUS; SUBCUTANEOUS at 06:15

## 2023-01-01 RX ADMIN — Medication 4 MILLIGRAM(S): at 13:47

## 2023-01-01 RX ADMIN — FAMOTIDINE 20 MILLIGRAM(S): 10 INJECTION INTRAVENOUS at 17:27

## 2023-01-01 RX ADMIN — GABAPENTIN 400 MILLIGRAM(S): 400 CAPSULE ORAL at 06:15

## 2023-01-01 RX ADMIN — Medication 4 MILLIGRAM(S): at 02:04

## 2023-01-01 RX ADMIN — Medication 4 MILLIGRAM(S): at 07:47

## 2023-01-01 RX ADMIN — Medication 4 MILLIGRAM(S): at 19:11

## 2023-01-05 ENCOUNTER — OUTPATIENT (OUTPATIENT)
Dept: OUTPATIENT SERVICES | Facility: HOSPITAL | Age: 68
LOS: 1 days | Discharge: HOME | End: 2023-01-05

## 2023-01-05 DIAGNOSIS — Z90.49 ACQUIRED ABSENCE OF OTHER SPECIFIED PARTS OF DIGESTIVE TRACT: Chronic | ICD-10-CM

## 2023-01-05 DIAGNOSIS — C18.9 MALIGNANT NEOPLASM OF COLON, UNSPECIFIED: ICD-10-CM

## 2023-01-05 DIAGNOSIS — Z98.890 OTHER SPECIFIED POSTPROCEDURAL STATES: Chronic | ICD-10-CM

## 2023-01-11 ENCOUNTER — RESULT REVIEW (OUTPATIENT)
Age: 68
End: 2023-01-11

## 2023-01-11 ENCOUNTER — APPOINTMENT (OUTPATIENT)
Dept: INFUSION THERAPY | Facility: HOSPITAL | Age: 68
End: 2023-01-11

## 2023-01-11 ENCOUNTER — APPOINTMENT (OUTPATIENT)
Dept: HEMATOLOGY ONCOLOGY | Facility: CLINIC | Age: 68
End: 2023-01-11

## 2023-01-11 LAB
BASOPHILS # BLD AUTO: 0.02 K/UL — SIGNIFICANT CHANGE UP (ref 0–0.2)
BASOPHILS NFR BLD AUTO: 1 % — SIGNIFICANT CHANGE UP (ref 0–2)
EOSINOPHIL # BLD AUTO: 0.11 K/UL — SIGNIFICANT CHANGE UP (ref 0–0.5)
EOSINOPHIL NFR BLD AUTO: 5.8 % — SIGNIFICANT CHANGE UP (ref 0–6)
HCT VFR BLD CALC: 31 % — LOW (ref 39–50)
HGB BLD-MCNC: 10.1 G/DL — LOW (ref 13–17)
IMM GRANULOCYTES NFR BLD AUTO: 0.5 % — SIGNIFICANT CHANGE UP (ref 0–0.9)
LYMPHOCYTES # BLD AUTO: 0.44 K/UL — LOW (ref 1–3.3)
LYMPHOCYTES # BLD AUTO: 23 % — SIGNIFICANT CHANGE UP (ref 13–44)
MCHC RBC-ENTMCNC: 29.3 PG — SIGNIFICANT CHANGE UP (ref 27–34)
MCHC RBC-ENTMCNC: 32.6 G/DL — SIGNIFICANT CHANGE UP (ref 32–36)
MCV RBC AUTO: 89.9 FL — SIGNIFICANT CHANGE UP (ref 80–100)
MONOCYTES # BLD AUTO: 0.26 K/UL — SIGNIFICANT CHANGE UP (ref 0–0.9)
MONOCYTES NFR BLD AUTO: 13.6 % — SIGNIFICANT CHANGE UP (ref 2–14)
NEUTROPHILS # BLD AUTO: 1.07 K/UL — LOW (ref 1.8–7.4)
NEUTROPHILS NFR BLD AUTO: 56.1 % — SIGNIFICANT CHANGE UP (ref 43–77)
NRBC # BLD: 0 /100 WBCS — SIGNIFICANT CHANGE UP (ref 0–0)
PLATELET # BLD AUTO: 234 K/UL — SIGNIFICANT CHANGE UP (ref 150–400)
RBC # BLD: 3.45 M/UL — LOW (ref 4.2–5.8)
RBC # FLD: 15.6 % — HIGH (ref 10.3–14.5)
WBC # BLD: 1.91 K/UL — LOW (ref 3.8–10.5)
WBC # FLD AUTO: 1.91 K/UL — LOW (ref 3.8–10.5)

## 2023-01-15 ENCOUNTER — RX RENEWAL (OUTPATIENT)
Age: 68
End: 2023-01-15

## 2023-01-16 ENCOUNTER — APPOINTMENT (OUTPATIENT)
Dept: AFTER HOURS CARE | Facility: EMERGENCY ROOM | Age: 68
End: 2023-01-16
Payer: MEDICAID

## 2023-01-16 DIAGNOSIS — R05.9 COUGH, UNSPECIFIED: ICD-10-CM

## 2023-01-16 PROCEDURE — 99204 OFFICE O/P NEW MOD 45 MIN: CPT | Mod: 95

## 2023-01-16 RX ORDER — AZITHROMYCIN 250 MG/1
250 TABLET, FILM COATED ORAL
Qty: 1 | Refills: 0 | Status: ACTIVE | COMMUNITY
Start: 2023-01-16 | End: 1900-01-01

## 2023-01-17 NOTE — PLAN
[No new medications perscribed] : Treat in place: No new medications prescribed [FreeTextEntry1] : 1.	f/u with oncology asap, take home covid test\par 2.	OTC: Tylenol or Motrin as needed for fever or pain management\par 3.	Monitor for red flag symptoms as discussed\par

## 2023-01-17 NOTE — HISTORY OF PRESENT ILLNESS
[Home] : at home, [unfilled] , at the time of the visit. [Other Location: e.g. Home (Enter Location, City,State)___] : at [unfilled] [Verbal consent obtained from patient] : the patient, [unfilled] [FreeTextEntry8] : 66 yo male with PMH metastatic spine cancer for evaluation of fatigue and difficulty breathing, not much appetite x 3 days ago. Denies fever. Has been taking jorge seltzer plus, advil, robitussin honey - feels better. \par Reports during evaluation last wednesday, oxygen was 90% and patient was asymptomatic, recommended to f/u oncology. Pt reports wife is sick also, same symptoms. Last dose meds last night. \par

## 2023-01-18 ENCOUNTER — APPOINTMENT (OUTPATIENT)
Dept: HEMATOLOGY ONCOLOGY | Facility: CLINIC | Age: 68
End: 2023-01-18

## 2023-01-18 ENCOUNTER — APPOINTMENT (OUTPATIENT)
Dept: HEMATOLOGY ONCOLOGY | Facility: CLINIC | Age: 68
End: 2023-01-18
Payer: MEDICAID

## 2023-01-18 PROCEDURE — 99214 OFFICE O/P EST MOD 30 MIN: CPT | Mod: 95

## 2023-01-18 NOTE — REVIEW OF SYSTEMS
[Cough] : cough [Abdominal Pain] : abdominal pain [Negative] : Allergic/Immunologic [Fever] : no fever [Chills] : no chills [Vomiting] : no vomiting [Constipation] : no constipation [Diarrhea] : no diarrhea [FreeTextEntry6] : h/o increase cough and SOB [FreeTextEntry7] : Hernia discomfort Inguinal area. [FreeTextEntry9] : has back pain on and off

## 2023-01-18 NOTE — HISTORY OF PRESENT ILLNESS
[Home] : at home, [unfilled] , at the time of the visit. [Medical Office: ([facility])___] : at the medical office located in  [Verbal consent obtained from patient] : the patient, [unfilled] [Disease: _____________________] : Disease: [unfilled] [M: ___] : M[unfilled] [AJCC Stage: ____] : AJCC Stage: [unfilled] [de-identified] : This is a 65-year-old man with history of esophagogastric carcinoma who presents for evaluation.  His history dates back to March 2018 when he developed a cough and decreased appetite.  A CAT scan of his chest revealed a mass in the distal esophagus with periesophageal and portacaval adenopathy.  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  The patient underwent neoadjuvant chemotherapy and radiation therapy with Taxol carboplatin.  A follow-up CAT scan in June 2018 revealed decreased mass in the esophagus.  In August 2018 the patient underwent [place] [facility] esophagogastrectomy with Dr. Cloud and Dr. Arciniega.  The pathology revealed no residual carcinoma with negative margins, with12 - lymph nodes (0/12).\par \par The patient then was well until November 2020 when he developed right rib pain and upper back pain.  Imaging testing revealed metastases to the thoracic spine and possible cord compression at T5-T6.  The patient underwent laminectomy of T4-T6 resection of the epidural tumor with T3-T12 fusion and endovascular embolization of the spinal tumor T5-T6.  The pathology revealed poorly differentiated adenocarcinoma.  Postoperatively his pain was relieved.  He has undergone radiation oncology consultation and is planned to receive SBRT treatment.  He now presents for discussion regarding further chemotherapy. [de-identified] : adenoca, Her2 pos [de-identified] : Her2 pos. [de-identified] : S/p on going FOLFIRI . Patient feels better. He has 3 days left of ZPak which was prescribed 2 days ago. Admits that he had dry cough with SOB. Colt any fever, chills, productive cough, wheezing, nasal congestion. Appetite has improved. Denies any vomiting or nausea.

## 2023-01-18 NOTE — PHYSICAL EXAM
[Restricted in physically strenuous activity but ambulatory and able to carry out work of a light or sedentary nature] : Status 1- Restricted in physically strenuous activity but ambulatory and able to carry out work of a light or sedentary nature, e.g., light house work, office work [de-identified] : Appear fine, AOX#, NAD

## 2023-01-18 NOTE — END OF VISIT
[Time Spent: ___ minutes] : I have spent [unfilled] minutes of time on the encounter.
[facility]

## 2023-01-18 NOTE — ASSESSMENT
[FreeTextEntry1] : H/o URI.Patient appear to being doing better\par  Patient is advised to complete Z-Alvarado as directed\par Advised to increase fluid intake\par Encouraged to eat a diet with increase protein intake\par RTO in 2 weeks.

## 2023-01-24 ENCOUNTER — APPOINTMENT (OUTPATIENT)
Dept: HEMATOLOGY ONCOLOGY | Facility: CLINIC | Age: 68
End: 2023-01-24
Payer: MEDICAID

## 2023-01-24 ENCOUNTER — APPOINTMENT (OUTPATIENT)
Dept: INFUSION THERAPY | Facility: HOSPITAL | Age: 68
End: 2023-01-24

## 2023-01-24 ENCOUNTER — APPOINTMENT (OUTPATIENT)
Dept: HEMATOLOGY ONCOLOGY | Facility: CLINIC | Age: 68
End: 2023-01-24

## 2023-01-24 PROCEDURE — 99214 OFFICE O/P EST MOD 30 MIN: CPT | Mod: 95

## 2023-01-24 NOTE — REASON FOR VISIT
[Home] : at home, [unfilled] , at the time of the visit. [Medical Office: ([facility])___] : at the medical office located in  [Follow-Up Visit] : a follow-up [FreeTextEntry2] : ca GE junction

## 2023-01-24 NOTE — REVIEW OF SYSTEMS
[Cough] : cough [Abdominal Pain] : abdominal pain [Negative] : Allergic/Immunologic [Fever] : no fever [Chills] : no chills [Wheezing] : no wheezing [Vomiting] : no vomiting [Constipation] : no constipation [Diarrhea] : no diarrhea [FreeTextEntry7] : Hernia discomfort Inguinal area. [FreeTextEntry9] : has back pain on and off

## 2023-01-24 NOTE — ASSESSMENT
[FreeTextEntry1] : Patient encourage to continue OTC cold/cough medication prn\par Patient was offered to have a Chest X-Ray, but he wants to be seen by wife PCP first on Friday 1/27/2023\par Encourage increased fluid intake\par Patient will call to reschedule the chemotherapy when he feels better\par Advised patient to be evaluated by Pulmonologist for h/o COPD and management\par RTO in 1 month.

## 2023-01-24 NOTE — PHYSICAL EXAM
[Restricted in physically strenuous activity but ambulatory and able to carry out work of a light or sedentary nature] : Status 1- Restricted in physically strenuous activity but ambulatory and able to carry out work of a light or sedentary nature, e.g., light house work, office work [de-identified] : Appears fine, AOX3, NAD. Patient has no breathy speech and no signs of pulling or retraction. He is sitting comfortably

## 2023-01-24 NOTE — HISTORY OF PRESENT ILLNESS
[Disease: _____________________] : Disease: [unfilled] [M: ___] : M[unfilled] [AJCC Stage: ____] : AJCC Stage: [unfilled] [de-identified] : This is a 65-year-old man with history of esophagogastric carcinoma who presents for evaluation.  His history dates back to March 2018 when he developed a cough and decreased appetite.  A CAT scan of his chest revealed a mass in the distal esophagus with periesophageal and portacaval adenopathy.  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  The patient underwent neoadjuvant chemotherapy and radiation therapy with Taxol carboplatin.  A follow-up CAT scan in June 2018 revealed decreased mass in the esophagus.  In August 2018 the patient underwent [place] [facility] esophagogastrectomy with Dr. Cloud and Dr. Arciniega.  The pathology revealed no residual carcinoma with negative margins, with12 - lymph nodes (0/12).\par \par The patient then was well until November 2020 when he developed right rib pain and upper back pain.  Imaging testing revealed metastases to the thoracic spine and possible cord compression at T5-T6.  The patient underwent laminectomy of T4-T6 resection of the epidural tumor with T3-T12 fusion and endovascular embolization of the spinal tumor T5-T6.  The pathology revealed poorly differentiated adenocarcinoma.  Postoperatively his pain was relieved.  He has undergone radiation oncology consultation and is planned to receive SBRT treatment.  He now presents for discussion regarding further chemotherapy. [de-identified] : adenoca, Her2 pos [de-identified] : Her2 pos. [de-identified] : Patient did not come for treatment today 2nd he still getting over a cold. He c/o increased SOB with exertion. Patient has completed antibiotic Z-Jaelyn with some improvement. Denies any fever, chills or wheezing.  Appetite is okay. Denies any vomiting or nausea.

## 2023-01-26 ENCOUNTER — APPOINTMENT (OUTPATIENT)
Dept: INFUSION THERAPY | Facility: HOSPITAL | Age: 68
End: 2023-01-26

## 2023-02-01 ENCOUNTER — RESULT REVIEW (OUTPATIENT)
Age: 68
End: 2023-02-01

## 2023-02-01 ENCOUNTER — APPOINTMENT (OUTPATIENT)
Dept: INFUSION THERAPY | Facility: HOSPITAL | Age: 68
End: 2023-02-01

## 2023-02-01 ENCOUNTER — NON-APPOINTMENT (OUTPATIENT)
Age: 68
End: 2023-02-01

## 2023-02-01 LAB
BASOPHILS # BLD AUTO: 0.11 K/UL — SIGNIFICANT CHANGE UP (ref 0–0.2)
BASOPHILS NFR BLD AUTO: 0.9 % — SIGNIFICANT CHANGE UP (ref 0–2)
EOSINOPHIL # BLD AUTO: 0.3 K/UL — SIGNIFICANT CHANGE UP (ref 0–0.5)
EOSINOPHIL NFR BLD AUTO: 2.5 % — SIGNIFICANT CHANGE UP (ref 0–6)
HCT VFR BLD CALC: 32.2 % — LOW (ref 39–50)
HGB BLD-MCNC: 10.3 G/DL — LOW (ref 13–17)
IMM GRANULOCYTES NFR BLD AUTO: 0.3 % — SIGNIFICANT CHANGE UP (ref 0–0.9)
LYMPHOCYTES # BLD AUTO: 1 K/UL — SIGNIFICANT CHANGE UP (ref 1–3.3)
LYMPHOCYTES # BLD AUTO: 8.2 % — LOW (ref 13–44)
MCHC RBC-ENTMCNC: 28.5 PG — SIGNIFICANT CHANGE UP (ref 27–34)
MCHC RBC-ENTMCNC: 32 G/DL — SIGNIFICANT CHANGE UP (ref 32–36)
MCV RBC AUTO: 89.2 FL — SIGNIFICANT CHANGE UP (ref 80–100)
MONOCYTES # BLD AUTO: 1.23 K/UL — HIGH (ref 0–0.9)
MONOCYTES NFR BLD AUTO: 10.1 % — SIGNIFICANT CHANGE UP (ref 2–14)
NEUTROPHILS # BLD AUTO: 9.46 K/UL — HIGH (ref 1.8–7.4)
NEUTROPHILS NFR BLD AUTO: 78 % — HIGH (ref 43–77)
NRBC # BLD: 0 /100 WBCS — SIGNIFICANT CHANGE UP (ref 0–0)
PLATELET # BLD AUTO: 386 K/UL — SIGNIFICANT CHANGE UP (ref 150–400)
RBC # BLD: 3.61 M/UL — LOW (ref 4.2–5.8)
RBC # FLD: 16.9 % — HIGH (ref 10.3–14.5)
WBC # BLD: 12.14 K/UL — HIGH (ref 3.8–10.5)
WBC # FLD AUTO: 12.14 K/UL — HIGH (ref 3.8–10.5)

## 2023-02-15 NOTE — REVIEW OF SYSTEMS
[Fatigue] : fatigue [SOB on Exertion] : shortness of breath during exertion [Negative] : Allergic/Immunologic [FreeTextEntry5] : Patient on cardiac meds for symptoms of heart failure and will undergo follow-up cardiology evaluation.

## 2023-02-15 NOTE — ASSESSMENT
[FreeTextEntry1] : The patient remains overall stable but has been having increased weakness and decreased appetite.  As a result we will be repeating his CAT scans to assess for response or progression of disease.  At the present time the patient continues on irinotecan leucovorin and 5-FU along with Herceptin.  He continues to be monitored with cardiology regarding his decreased ejection fraction.  The patient will be monitored for toxicity.  He will return to the office in 2 weeks or sooner as needed after completion of his CAT scans. [Palliative] : Goals of care discussed with patient: Palliative [Palliative Care Plan] : not applicable at this time

## 2023-02-15 NOTE — HISTORY OF PRESENT ILLNESS
[Disease: _____________________] : Disease: [unfilled] [M: ___] : M[unfilled] [AJCC Stage: ____] : AJCC Stage: [unfilled] [de-identified] : This is a 65-year-old man with history of esophagogastric carcinoma who presents for evaluation.  His history dates back to March 2018 when he developed a cough and decreased appetite.  A CAT scan of his chest revealed a mass in the distal esophagus with periesophageal and portacaval adenopathy.  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  The patient underwent neoadjuvant chemotherapy and radiation therapy with Taxol carboplatin.  A follow-up CAT scan in June 2018 revealed decreased mass in the esophagus.  In August 2018 the patient underwent [place] Raghu esophagogastrectomy with Dr. Cloud and Dr. Arciniega.  The pathology revealed no residual carcinoma with negative margins, with12 - lymph nodes (0/12).\par \par The patient then was well until November 2020 when he developed right rib pain and upper back pain.  Imaging testing revealed metastases to the thoracic spine and possible cord compression at T5-T6.  The patient underwent laminectomy of T4-T6 resection of the epidural tumor with T3-T12 fusion and endovascular embolization of the spinal tumor T5-T6.  The pathology revealed poorly differentiated adenocarcinoma.  Postoperatively his pain was relieved.  He has undergone radiation oncology consultation and is planned to receive SBRT treatment.  He now presents for discussion regarding further chemotherapy. [de-identified] : adenoca, Her2 pos [de-identified] : Her2 pos. [de-identified] : Since the last visit the patient continues on irinotecan 5-FU leucovorin and Herceptin for his HER2 positive metastatic GE junction tumor with bone metastases.  The patient complains of weakness and decreased appetite however his weight is increased.  He returns to discuss further CAT scan imaging.

## 2023-03-27 NOTE — DISEASE MANAGEMENT
[Clinical] : TNM Stage: c [IV] : IV [FreeTextEntry4] : bone mets [TTNM] : x [NTNM] : x [MTNM] : 1 [de-identified] : 7225 [[place] [id number]] : 3963 [de-identified] : Mediastinal nodes Composite Graft Text: The defect edges were debeveled with a #15 scalpel blade.  Given the location of the defect, shape of the defect, the proximity to free margins and the fact the defect was full thickness a composite graft was deemed most appropriate.  The defect was outline and then transferred to the donor site.  A full thickness graft was then excised from the donor site. The graft was then placed in the primary defect, oriented appropriately and then sutured into place.  The secondary defect was then repaired using a primary closure.

## 2023-04-11 NOTE — ASSESSMENT
[FreeTextEntry1] : A discussion took place on telehealth conference call with the patient and his wife regarding further management.  In reviewing the follow-up CAT scans and imaging there is minimal disease activity at this time but the patient does have known bone metastases and also notable disease in the lungs with limited activity currently.  The patient states the chemotherapy which is FOLFIRI and Herceptin causes extreme weakness fatigue diarrhea and is affecting his quality of life.  As result the chemo will be temporarily held until after the pulmonary consultation and evaluation.  The patient will also continue cardiac follow-up with Dr. Nelson as he has follow-up echocardiograms periodically to evaluate for ejection fraction for his HER2 therapy.  We also discussed offering immunotherapy to the patient which will be evaluated further.  Because of persistent neuropathy it would be difficult to change his treatment to the Taxol Cyramza which is also recommended salvage therapy for his disease.  The the patient also has an abdominal hernia which is being followed by his surgeon Dr. Cloud.  Once we have more information further recommendations will be made. [Palliative] : Goals of care discussed with patient: Palliative [Palliative Care Plan] : not applicable at this time

## 2023-04-11 NOTE — HISTORY OF PRESENT ILLNESS
[Disease: _____________________] : Disease: [unfilled] [M: ___] : M[unfilled] [AJCC Stage: ____] : AJCC Stage: [unfilled] [de-identified] : This is a 65-year-old man with history of esophagogastric carcinoma who presents for evaluation.  His history dates back to March 2018 when he developed a cough and decreased appetite.  A CAT scan of his chest revealed a mass in the distal esophagus with periesophageal and portacaval adenopathy.  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  The patient underwent neoadjuvant chemotherapy and radiation therapy with Taxol carboplatin.  A follow-up CAT scan in June 2018 revealed decreased mass in the esophagus.  In August 2018 the patient underwent [place] Raghu esophagogastrectomy with Dr. Cloud and Dr. Arciniega.  The pathology revealed no residual carcinoma with negative margins, with12 - lymph nodes (0/12).\par \par The patient then was well until November 2020 when he developed right rib pain and upper back pain.  Imaging testing revealed metastases to the thoracic spine and possible cord compression at T5-T6.  The patient underwent laminectomy of T4-T6 resection of the epidural tumor with T3-T12 fusion and endovascular embolization of the spinal tumor T5-T6.  The pathology revealed poorly differentiated adenocarcinoma.  Postoperatively his pain was relieved.  He has undergone radiation oncology consultation and is planned to receive SBRT treatment.  He now presents for discussion regarding further chemotherapy. [de-identified] : adenoca, Her2 pos [de-identified] : Her2 pos. [de-identified] : Since the last visit the patient wanted to have a telehealth appointment to discuss further management.  Currently he complains of back aches originating from the spine and also has coughing for which he takes Symbicort at night.  He also notes intermittent shortness of breath and has abnormal CAT scan of the chest showing lung infiltrates and is awaiting pulmonary consultation.

## 2023-04-11 NOTE — REVIEW OF SYSTEMS
[Recent Change In Weight] : ~T recent weight change [Shortness Of Breath] : shortness of breath [Cough] : cough [SOB on Exertion] : shortness of breath during exertion [Negative] : Allergic/Immunologic [FreeTextEntry4] : The patient has postnasal drip.

## 2023-04-11 NOTE — REASON FOR VISIT
[Home] : at home, [unfilled] , at the time of the visit. [Medical Office: ([facility])___] : at the medical office located in  [Spouse] : spouse [Follow-Up Visit] : a follow-up [FreeTextEntry2] : ca of GE junctionb

## 2023-04-26 PROBLEM — J44.9 COPD (CHRONIC OBSTRUCTIVE PULMONARY DISEASE): Status: ACTIVE | Noted: 2018-07-24

## 2023-04-26 PROBLEM — R93.89 ABNORMAL CHEST CT: Status: ACTIVE | Noted: 2023-01-01

## 2023-04-26 NOTE — DISCUSSION/SUMMARY
[FreeTextEntry1] : Complex case of a 67-year-old male gastroesophageal cancer, spinal mets, on chemotherapy, current smoker, known COPD, chronic dyspnea with exertion, some form of "bronchitis" in the winter, and a CT of the chest from March which shows chronic bilateral lower lobe fibrotic changes, and some superimposed patchy new opacities.\par Oxygen saturation is good.\par \par Unclear what those patchy opacities are.  Possible that he actually had pneumonia over the winter and not bronchitis, treated with antibiotics and this may represent some improvement.  Could also be inflammatory.  Ongoing tobacco use.  Perhaps aspiration.\par \par At this time, I am recommending\par Short course of steroids, Medrol pack prescribed\par Begin Trelegy for the COPD.  Instead of Symbicort\par Nebulized ipratropium (given albuterol out of allergy); to assist with airway clearance\par Plan for repeat CT of the chest in about 2 weeks after completion of the Medrol pack, this will also be a good 6-week follow-up from the March study.\par Complete smoking cessation advised.  Including cigarettes.  And preferably marijuana, which would be better off being taken in the double route in my opinion given his underlying lung disease.\par Follow-up based on above

## 2023-04-26 NOTE — HISTORY OF PRESENT ILLNESS
[Current] : current [TextBox_4] : 67-year-old male referred for consultation by oncology for abnormal CT chest, dyspnea with exertion.  Patient is accompanied by his wife at today's visit.\par \par Patient is 67-year-old male history of esophagogastric carcinoma, surgical resection, spine mets, currently on chemotherapy, spine surgeries.\par Is also a current smoker.\par he has been smoking since age 12.  Previously heavy.  Currently down to 3 to 4 cigarettes a day.  Says he is overall lost his taste for them\par He has a diagnosis of COPD.  He has Symbicort but he only uses it as needed, to save it so that this prescription does not run out.  Unclear why that would be, according to his wife the prescriptions have been sent improperly so they run out.  He also sometimes feels there is mucus that gets stuck in his chest and he has trouble coughing out.\par Patient reports an allergy to albuterol, apparently post one of his spinal surgeries he was given it twice and both times led to some form of respiratory or cardiac complication.  Unclear.\par \par He had a pulmonary function test here in 2018 which showed moderate obstruction with hyperinflation and a DLCO of 36\par \par Patient also had an episode of "bronchitis" in the winter months.  Both of them did and were treated with antibiotics.\par \par He has chronic dyspnea with exertion, as well as chronic side effects from the chemo.\par \par His last chemo was 3 weeks ago.  Currently considering changing to immunotherapy\par \par Patient had a CT of the chest in March.  It showed bilateral lower lobe bronchiectasis and fibrosis, which was not unchanged.  Additionally noted were some superimposed patchy upper lobe opacities of unclear etiology.

## 2023-05-21 NOTE — HISTORY OF PRESENT ILLNESS
[Disease: _____________________] : Disease: [unfilled] [M: ___] : M[unfilled] [AJCC Stage: ____] : AJCC Stage: [unfilled] [de-identified] : This is a 65-year-old man with history of esophagogastric carcinoma who presents for evaluation.  His history dates back to March 2018 when he developed a cough and decreased appetite.  A CAT scan of his chest revealed a mass in the distal esophagus with periesophageal and portacaval adenopathy.  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  The patient underwent neoadjuvant chemotherapy and radiation therapy with Taxol carboplatin.  A follow-up CAT scan in June 2018 revealed decreased mass in the esophagus.  In August 2018 the patient underwent [place] Raghu esophagogastrectomy with Dr. Cloud and Dr. Arciniega.  The pathology revealed no residual carcinoma with negative margins, with12 - lymph nodes (0/12).\par \par The patient then was well until November 2020 when he developed right rib pain and upper back pain.  Imaging testing revealed metastases to the thoracic spine and possible cord compression at T5-T6.  The patient underwent laminectomy of T4-T6 resection of the epidural tumor with T3-T12 fusion and endovascular embolization of the spinal tumor T5-T6.  The pathology revealed poorly differentiated adenocarcinoma.  Postoperatively his pain was relieved.  He has undergone radiation oncology consultation and is planned to receive SBRT treatment.  He now presents for discussion regarding further chemotherapy. [de-identified] : adenoca, Her2 pos [de-identified] : Her2 pos. [de-identified] : Since the last visit the pt saw Pulmonary and pt now on trilogy and nebulizor

## 2023-05-21 NOTE — REVIEW OF SYSTEMS
[SOB on Exertion] : shortness of breath during exertion [Diarrhea: Grade 0] : Diarrhea: Grade 0 [Negative] : Allergic/Immunologic [FreeTextEntry7] : on omeprazole and tums for reflux. Pt has hernia pain after eating [FreeTextEntry9] : has backache

## 2023-05-21 NOTE — ASSESSMENT
[FreeTextEntry1] : The patient has undergone pulmonary evaluation and felt to have COPD for which she is on Trelegy and nebulizer therapy.  He states he continues to lose weight despite eating.  His shortness of breath is improved and he is able to carry out his activities.  The plan at this time is to reevaluate activity of his metastatic HER2 positive GE junction carcinoma and he will undergo PET/CT or CAT scans depending on insurance coverage.  Once we have more information further recommendations will be made regarding initiating further treatment. [Palliative] : Goals of care discussed with patient: Palliative [Palliative Care Plan] : not applicable at this time

## 2023-07-07 NOTE — ASSESSMENT
[FreeTextEntry1] : Pt had PETCT:\par \par \par FINDINGS:\par \par HEAD/NECK: Physiologic FDG activity in visualized brain, head, and neck.\par \par THORAX:\par -Mediastinal nodes such as a group of pretracheal nodes measuring 2.8 x 1.7 cm altogether with SUV 3.6 (image 89); measured 2.3 x 1.0 cm altogether with SUV 3.2 on the prior PET/CT and 2.3 x 1.6 cm on the most recent CT chest.\par -Left internal mammary node (image 88), 0.7 cm with SUV 1.7.\par \par -Posterior to the esophagogastric anastomotic site within the right subcarinal region (image 84), soft tissue abutting surgical clips now measures 1.7 x 1.0 cm with SUV 13.9; measured 1.0 x 0.9 cm with SUV 6.0 in 10/2021; 1.2 x 0.8 cm in 3/2023 and 1.4 x 1.2 cm in 5/2023.\par -A node in the subcarinal region between the gastric pull-through and descending aorta (image 93) that is difficult to assess on low-dose CT shows a smaller area of activity at SUV 4.1 (previously a 1.0 x 0.8 cm node with SUV 7.3). This is also difficult to assess on the most recent CT due to streak artifacts from the orthopedic hardware.\par \par LUNGS: Similar chronic type of subpleural interstitial changes in the paravertebral regions of both lower lobes most likely related to prior radiotherapy nonspecific patches of activity in the left side (image 96) showing up to SUV 3.2 previously 2.1).\par -Nonspecific activity in the left perihilar region (image 100) showing up to SUV 2.0 without distinct nodular changes.\par \par PLEURA/PERICARDIUM: No abnormal FDG activity. No effusion.\par \par HEPATOBILIARY/PANCREAS: Physiologic FDG activity. For reference, normal liver demonstrates SUV mean 2.0, similar to prior exam.\par \par SPLEEN: Physiologic FDG activity. Normal in size.\par \par ADRENAL GLANDS: No abnormal FDG activity. No nodule.\par \par KIDNEYS/URINARY BLADDER: Physiologic excreted FDG activity.\par \par REPRODUCTIVE ORGANS: No abnormal FDG activity.\par \par ABDOMINOPELVIC LYMPH NODES/RETROPERITONEUM: No FDG-avid lymph node below the diaphragm including a left upper quadrant node (image 134) measuring 1.1 x 1.0 cm that is unchanged from the most recent CT.\par \par ESOPHAGUS/STOMACH/BOWEL/PERITONEUM/MESENTERY: Nonspecific activity within the gastric wall at the hiatus without wall abnormality (image 117).. Nonspecific activity could also be seen in nonobstructed bowel loops within the left inguinal hernia as well as in bowel loops in the pelvis.\par \par VESSELS: Few scattered atherosclerotic changes in the aorta. No aneurysm.\par \par BONES/SOFT TISSUES: Lytic lesions in the right side of T11 and anterior aspect of T12 are associated with increased activity extending to the T11 right pedicle and surrounding soft tissues in the right side and anteriorly up to the level of T9 with up to SUV 11.4 (image 110). Right laminar edge of T11 appears intact; however extension into the right neural foramina is difficult to assess at T11 and T12 level on this low-dose CT. T10 and T9 are without bone changes.\par \par IMPRESSION:\par 1. New hypermetabolic lytic lesions in T11 and T12 with extension of activity to the right and anterior paravertebral tissues up to T9. Evaluation of any extension into the neural foramina is difficult to assess on this low-dose CT.\par \par 2. Soft tissue posterior to the esophagogastric anastomotic site in the right subcarinal region shows minimal increase in size and increased intensity of activity. Other mediastinal nodes are either stable or slightly improved from prior PET/CT.\par \par Secure email sent to Dr. Long at 10:59 AM, 6/29/2023.\par \par --- End of Report ---\par \par The repeat PET/CT now shows new hypermetabolic lytic lesions in T11 and T12 with extension of activity to the right and anterior paravertebral tissue up to T9.  There is also soft tissue posterior to the EEG anastomotic site showing minimal activity.  Otherwise her disease remains stable.\par \par We discussed bone directed therapy with Xgeva and as the patient has oral dentures he does not require dental clearance.  We also discussed adding capecitabine along with the Xgeva as the patient is reluctant to restart IV chemotherapy because of significant side effects.  We will also contact his radiation oncologist, Dr. Noyola to see if there is the ability to give more RT to t spine.  Once we have more information further recommendations will be made.  The side effects and toxicity of Xeloda were discussed in detail and informed consent was obtained.\par \par \par \par \par \par \par \par \par  DANIEL GARDUNO MD; Attending Radiologist\par This document has been electronically signed. Jun 29 2023 11:01AM\par \par \par \par \par \par    \par \par \par \par \par \par \par \par \par \par \par \par \par \par \par   \par  \par  \par \par \par Notes [Palliative] : Goals of care discussed with patient: Palliative [Palliative Care Plan] : not applicable at this time

## 2023-07-07 NOTE — REASON FOR VISIT
[Follow-Up Visit] : a follow-up [Spouse] : spouse [Family Member] : family member [FreeTextEntry2] : ca GE junction

## 2023-07-14 PROBLEM — I70.90 ATHEROSCLEROSIS: Status: ACTIVE | Noted: 2022-09-09

## 2023-07-14 PROBLEM — I42.7 CARDIOMYOPATHY DUE TO CHEMOTHERAPY: Status: ACTIVE | Noted: 2022-09-09

## 2023-07-14 PROBLEM — R06.09 DYSPNEA ON EXERTION: Status: ACTIVE | Noted: 2023-01-01

## 2023-07-14 NOTE — REVIEW OF SYSTEMS
[Dyspnea on exertion] : dyspnea during exertion [Chest Discomfort] : no chest discomfort [Negative] : Constitutional [de-identified] : back pain, neuropathy

## 2023-07-14 NOTE — COUNSELING
[Use of nicotine replacement therapies and other medications discussed] : Use of nicotine replacement therapies and other medications discussed [Encouraged to pick a quit date and identify support needed to quit] : Encouraged to pick a quit date and identify support needed to quit [Yes] : Willing to quit smoking [FreeTextEntry3] : 12

## 2023-07-14 NOTE — REASON FOR VISIT
[FreeTextEntry3] : Dr. Long [FreeTextEntry1] : ANGIE ZAMORA is a 68 year old man smoker with metastatic HER2+ esophagogastric carcinoma here for follow up of decline in LVEF during HER2 targeted therapy.\par \par Prior Cancer Treatments:\par ------------------------------------------------------------------------\par Chemo/targeted therapy:\par 2018: neoadjuvant carboplatin/Taxol\par 1/2021-3/2023: FOLFIRI and trastuzumab\par 7/2023: to start capecitabine\par ------------------------------------------------------------------------\par Surgery:\par 8/2018 esophagogastrectomy\par 2020: laminectomy T4-T6 resection of epidural metastasis with T3-T12 fusion\par ------------------------------------------------------------------------\par Radiation:\par 12/28/2020: neoadjuvant 24 Gy/3 fxns to T4-T8\par 7/247956-5/8/2022: 4500 cGy to Mediastinal nodes

## 2023-07-14 NOTE — HISTORY OF PRESENT ILLNESS
[FreeTextEntry1] : Interval History:\par FOLFIRI/Trastuzumab was discontinued in March due to side effects (fatigue).\par Now to start capecitabine with denosumab for spinal lesions.\par He feels fine aside from back pain, chronic. He has cut down to 2 puffs of a cigarette about once an hour.\par Ran out of bisoprolol 5 days ago, otherwise adherent to cardiac meds and denies side effects.\par EF normal on Echo one month ago.\par \par History:\par ANGIE ZAMORA is a 67 year old smoker with metastatic GE junction adenocarcinoma diagnosed in 2018. He received neoadjuvant taxol/carboplatin and radiation followed by esophagogastrectomy. In 2020, he developed rib and back pain in the setting of new skeletal metastasis with spinal cord compression. He had laminectomy of T4-T6 resection of the epidural tumor with T3-T12 fusion and endovascular embolization of the spinal tumor, followed by adjuvant radiation to the spine and mediastinal lymph nodes. \par \par He began FOLFIRI with trastuzumab in January 2021, monitored by serial echocardiograms. Baseline LVEF was 62 %. On the most recent study, in  August 2022, there was new LV systolic dysfunction with a decline in EF from 56 % to 45 %. GLS was -18 (from 19.2 on the prior study, and 21 at baseline).\par \par He denies any chest pain, palpitations, or corresponding new symptoms. He notes increased fatigue, corresponding with mediastinal radiation therapy, around the same time as the abnormal echocardiogram. He denies any CHF symptoms like edema, orthopnea. \par \par He is not aware of any history of HTN, HLD, or diabetes, but may have had borderline diabetes. (Type 2 Diabetes runs in his family.) He continues to smoke cigarettes - down to about 2-3 per day. Is trying to quit.\par \par There is no family history of premature/early CAD or cardiomyopathy.\par \par Otherwise he complains of pain related to an inguinal hernia.\par \par \par Cardiovascular Summary:\par ---------------------------------------------------------------------\par ECG:\par 7/14/2023: sinus 99 bpm, axis -30, no sig change from prior\par 10/10/2022: sinus tachycardia 104 bpm, otherwise normal\par 9/9/2022: NSR 65 bpm, normal ECG\par ---------------------------------------------------------------------\par Echo:\par 5/8/2023: EF 62 %, -19.9 %, mod-severe TR\par 2/10/2023: EF 55 %, GLS -20.4, mod-severe TR\par 11/1/2022: Normal LV systolic function, EF 58 %, improved from prior\par 8/15/2022: EF 45 %, GLS - 18\par 5/4/2022: EF 56%, GLS - 19.2\par 1/18/2022: EF 57.4 %, GLS -18\par 10/11/2021: EF 55%, GLS -19.2\par 7/9/2021: EF 57 %, GLS -21.5\par 4/16/2021: EF 58 %, GLS -18.8\par 1/4/2021: EF 62 %, GLS -21\par ------------------------------------------------------------------------\par CT chest\par 6/7/2022: atherosclerotic calcifications of aorta and coronary arteries\par -------------------------------------------------------------------------\par Stress:\par 10/12/2022: small apical scar, moderate mid-inferior/inferolateral scar with mild milly-infarct ischemia\par -------------------------------------------------------------------------

## 2023-07-14 NOTE — ASSESSMENT
[FreeTextEntry1] : 68 year old man, smoker, with new LVEF decline during adjuvant HER2 targeted therapy with FOLFOX for metastatic esophageal cancer, myocardial perfusion stress test with mild-moderate apical and inferior scar. LVEF improved on follow up echo with GDMT and interruption of HER2 targeted therapy. Blood pressure on the lower end of normal. He denies any orthostasis symptoms.\par \par A1c 5.5 %\par Lipids: LDL 77, HDL 55\par Pro- pg/mL, HsTnT 14 ng/L\par \par \par # Treatment associated cardiomyopathy - asymptomatic, but poor functional capacity at baseline. EF recovered. No longer on HER2 targeted therapy.\par - continue lisinopril 2.5 and bisoprolol 5 mg daily\par - Emphasized the crucial importance of smoking cessation with him.\par - since he is mainly smoking now due to nicotine dependence (2 puffs at a time), advised him to try nicotine replacement again. He is willing to do so.\par - Rx for nicotine transdermal patch provided\par \par # Atherosclerosis, scar on myocardial perfusion stress test\par - Continue aspirin 81\par - continue rosuvastatin 5 mg daily\par - lipids at target, A1c 5.5 %\par \par We discussed capecitabine related vasospasm symptoms and approach to management. He will let us know if he experiences chest pain.\par \par Otherwise to follow up in 6 months with me.\par \par Will continue low dose medical therapy for cardiomyopathy with recovered EF\par \par \par \par Above recommendations discussed with the patient and his significant other and all questions were answered to the best of my ability and to their apparent satisfaction.

## 2023-07-22 NOTE — PROGRESS NOTE ADULT - PROBLEM SELECTOR PROBLEM 2
Pt slammed left thumb in car door. Left thumb red, swollen and painful to touch. Nail intact.    Esophageal cancer

## 2023-08-03 NOTE — REVIEW OF SYSTEMS
[Fatigue] : fatigue [Negative] : Allergic/Immunologic [FreeTextEntry7] : occ heartburn [FreeTextEntry9] : has backaches since spinal surgery 5

## 2023-08-15 NOTE — HISTORY OF PRESENT ILLNESS
[Disease: _____________________] : Disease: [unfilled] [M: ___] : M[unfilled] [AJCC Stage: ____] : AJCC Stage: [unfilled] [de-identified] : This is a 65-year-old man with history of esophagogastric carcinoma who presents for evaluation.  His history dates back to March 2018 when he developed a cough and decreased appetite.  A CAT scan of his chest revealed a mass in the distal esophagus with periesophageal and portacaval adenopathy.  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  The patient underwent neoadjuvant chemotherapy and radiation therapy with Taxol carboplatin.  A follow-up CAT scan in June 2018 revealed decreased mass in the esophagus.  In August 2018 the patient underwent [place] [facility] esophagogastrectomy with Dr. Cloud and Dr. Arciniega.  The pathology revealed no residual carcinoma with negative margins, with12 - lymph nodes (0/12).\par  \par  The patient then was well until November 2020 when he developed right rib pain and upper back pain.  Imaging testing revealed metastases to the thoracic spine and possible cord compression at T5-T6.  The patient underwent laminectomy of T4-T6 resection of the epidural tumor with T3-T12 fusion and endovascular embolization of the spinal tumor T5-T6.  The pathology revealed poorly differentiated adenocarcinoma.  Postoperatively his pain was relieved.  He has undergone radiation oncology consultation and is planned to receive SBRT treatment.  He now presents for discussion regarding further chemotherapy. [de-identified] : Her2 pos. [de-identified] : adenoca, Her2 pos [de-identified] : s/p C1 Xeloda 500 m tabs bid x 14 days. He feels okay except decreased appetite. He denies any abdominal pain, vomiting or constipation. He pain is controlled with present

## 2023-08-15 NOTE — ASSESSMENT
[Palliative] : Goals of care discussed with patient: Palliative [Palliative Care Plan] : not applicable at this time [FreeTextEntry1] : S/p C1 Xeloda 500 m tabs bid x 14 days. He has tolerated medication fine. We will increase dose of Xeloda 500 mg: 3 tabs in am/ 2 tabs in pm X 14 days on/7 days off for cycle 2. He states he continues to lose weight despite eating. Patient has been advised to follow with Dr. Jonny Noble for Cannabis oil liquid drops to increase his appetite. He is encouraged to small frequent meals. We will continue to monitor for patient for side effects/toxicities. We will repeat CT-Scan of abdomen/pelvis/chest after 3 months to assess for progression vs regression of disease. I have reviewed labs with [facility]. CA 19-9 and ordered repeats labs, f/u. RTO in 3 weeks.

## 2023-08-15 NOTE — HISTORY OF PRESENT ILLNESS
[Disease: _____________________] : Disease: [unfilled] [M: ___] : M[unfilled] [AJCC Stage: ____] : AJCC Stage: [unfilled] [de-identified] : This is a 65-year-old man with history of esophagogastric carcinoma who presents for evaluation.  His history dates back to March 2018 when he developed a cough and decreased appetite.  A CAT scan of his chest revealed a mass in the distal esophagus with periesophageal and portacaval adenopathy.  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  The patient underwent neoadjuvant chemotherapy and radiation therapy with Taxol carboplatin.  A follow-up CAT scan in June 2018 revealed decreased mass in the esophagus.  In August 2018 the patient underwent [place] [facility] esophagogastrectomy with Dr. Cloud and Dr. Arciniega.  The pathology revealed no residual carcinoma with negative margins, with12 - lymph nodes (0/12).\par  \par  The patient then was well until November 2020 when he developed right rib pain and upper back pain.  Imaging testing revealed metastases to the thoracic spine and possible cord compression at T5-T6.  The patient underwent laminectomy of T4-T6 resection of the epidural tumor with T3-T12 fusion and endovascular embolization of the spinal tumor T5-T6.  The pathology revealed poorly differentiated adenocarcinoma.  Postoperatively his pain was relieved.  He has undergone radiation oncology consultation and is planned to receive SBRT treatment.  He now presents for discussion regarding further chemotherapy. [de-identified] : Her2 pos. [de-identified] : adenoca, Her2 pos [de-identified] : s/p C1 Xeloda 500 m tabs bid x 14 days. He feels okay except decreased appetite. He denies any abdominal pain, vomiting or constipation. He pain is controlled with present

## 2023-08-15 NOTE — REVIEW OF SYSTEMS
[SOB on Exertion] : shortness of breath during exertion [Diarrhea: Grade 0] : Diarrhea: Grade 0 [Negative] : Allergic/Immunologic [FreeTextEntry7] : He takes Famotide 20 mg hs for reflux.  [FreeTextEntry9] : has backache

## 2023-09-10 NOTE — REVIEW OF SYSTEMS
[SOB on Exertion] : shortness of breath during exertion [Diarrhea: Grade 0] : Diarrhea: Grade 0 [Negative] : Allergic/Immunologic [FreeTextEntry7] : He takes Famotidine 20 mg hs for reflux. discomfort to left groin with walking [FreeTextEntry9] : chronic back pain.

## 2023-09-10 NOTE — ASSESSMENT
[Palliative] : Goals of care discussed with patient: Palliative [Palliative Care Plan] : not applicable at this time [FreeTextEntry1] : S/p C2 Xeloda 500 mg: 3 tabs in am/2 tabs in pm x 14 days on/7 days off. He has tolerated medication fine. We will He states he continues to lose weight despite eating. Patient has been advised to follow with Dr continue present dose. His appetite is still decreased, and he encouraged to eat frequent small meals. We will continue to monitor for patient for side effects/toxicities. We will repeat CT-Scan of abdomen/pelvis/chest after 3 months to assess for progression vs regression of disease. I have reviewed labs with [facility]. CA 19-9 and ordered repeats labs, f/u. RTO in 3 weeks.

## 2023-09-10 NOTE — PHYSICAL EXAM
[Ambulatory and capable of all self care but unable to carry out any work activities] : Status 2- Ambulatory and capable of all self care but unable to carry out any work activities. Up and about more than 50% of waking hours [Thin] : thin [Normal] : affect appropriate [de-identified] : large left side inguinal hernia

## 2023-09-10 NOTE — HISTORY OF PRESENT ILLNESS
[Disease: _____________________] : Disease: [unfilled] [M: ___] : M[unfilled] [AJCC Stage: ____] : AJCC Stage: [unfilled] [de-identified] : This is a 65-year-old man with history of esophagogastric carcinoma who presents for evaluation.  His history dates back to March 2018 when he developed a cough and decreased appetite.  A CAT scan of his chest revealed a mass in the distal esophagus with periesophageal and portacaval adenopathy.  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  The patient underwent neoadjuvant chemotherapy and radiation therapy with Taxol carboplatin.  A follow-up CAT scan in June 2018 revealed decreased mass in the esophagus.  In August 2018 the patient underwent [place] Raghu esophagogastrectomy with Dr. Cloud and Dr. Arciniega.  The pathology revealed no residual carcinoma with negative margins, with12 - lymph nodes (0/12).\par  \par  The patient then was well until November 2020 when he developed right rib pain and upper back pain.  Imaging testing revealed metastases to the thoracic spine and possible cord compression at T5-T6.  The patient underwent laminectomy of T4-T6 resection of the epidural tumor with T3-T12 fusion and endovascular embolization of the spinal tumor T5-T6.  The pathology revealed poorly differentiated adenocarcinoma.  Postoperatively his pain was relieved.  He has undergone radiation oncology consultation and is planned to receive SBRT treatment.  He now presents for discussion regarding further chemotherapy. [de-identified] : adenoca, Her2 pos [de-identified] : Her2 pos. [de-identified] : s/p C2 Xeloda 500 mg: 3 tabs in am/2 tabs in pm 14 days on/7 days off. He feels okay except decreased appetite. He denies any abdominal pain, nausea, vomiting, diarrhea or constipation. He has some pain on/off to back and groin area with walking 2nd to inguinal hernia. He takes Oxycodone with positive relief.

## 2023-09-19 PROBLEM — K40.90 INGUINAL HERNIA, LEFT: Status: ACTIVE | Noted: 2021-04-30

## 2023-10-01 PROBLEM — Z92.3 HISTORY OF RADIATION THERAPY: Status: RESOLVED | Noted: 2020-11-23 | Resolved: 2023-01-01

## 2023-10-05 PROBLEM — Z51.5 ENCOUNTER FOR PALLIATIVE CARE: Status: ACTIVE | Noted: 2021-01-16

## 2023-10-06 NOTE — H&P PST ADULT - NSICDXPASTMEDICALHX_GEN_ALL_CORE_FT
PAST MEDICAL HISTORY:  Chronic obstructive pulmonary disease     Malignant neoplasm of esophagus, unspecified     Non-recurrent unilateral inguinal hernia without obstruction or gangrene

## 2023-10-06 NOTE — H&P PST ADULT - NSICDXPASTSURGICALHX_GEN_ALL_CORE_FT
PAST SURGICAL HISTORY:  H/O ventral hernia repair     History of bilateral inguinal hernia repair     History of cholecystectomy     History of esophageal surgery     History of lumbar laminectomy     S/P laminectomy with spinal fusion

## 2023-10-06 NOTE — H&P PST ADULT - PROBLEM SELECTOR PLAN 1
Pt with long history of smoking over 50 yrs now at 3 cigarettes per day. Encouraged to stop for three days prior to surgery. O2sat 95% on room air. Pt encourage to bring Trelegy Ellipta day of surgery. Pt to F/U with PCP

## 2023-10-06 NOTE — H&P PST ADULT - ASSESSMENT
68 PMH esophageal cancer (2018) chemo / radiation s/p esophagogastrectomy 8/17/2018. 2020 imaging revealed metastases to thoracic spine Laminectomy T4-T6 resection of tumor with T3 -T12 fusion endovascular embolization tumor T5-T6 pt presently off chemo until after surgery. Pt HDL COPD ( smoker since age of 12) smoking must less today to only 3 cigarettes/day. Pt c/o of pain and swelling left groin and scrotum for 2 years worse walking and standing and having BM. Pt has a medi port used now for blood and flushed for patency. Pt scheduled for open large left inguinal hernia with mesh on 10/16/2023. All blood work EKG done today and Dr. Livingston for clearance.

## 2023-10-06 NOTE — H&P PST ADULT - PROBLEM SELECTOR PLAN 3
pt dx 2018 with esophageal cancer chemo/RT than s/p oesophagogastrectomy Right subclavian medi port in place not used presently for chemo but labs and flushed for patency. Pt on imaging 2020 revealed metastatic disease on thoracic Laminectomy T4-T6 resection epidural tumor T3-T12 fusion and endovascular embolization of spinal tumor T5-T6. Pt was told by oncologist to stop oral chemo medication until after this surgery.

## 2023-10-06 NOTE — H&P PST ADULT - GASTROINTESTINAL COMMENTS
s/p esophageal cancer had chemo/ rt s/p esophagogastrectomy 8/17/2018 partial esophagogastrectomy 2018

## 2023-10-06 NOTE — H&P PST ADULT - NSICDXPROCEDURE_GEN_ALL_CORE_FT
PROCEDURES:  Open repair of inguinal hernia using mesh in adult 06-Oct-2023 10:43:05  Ana Lilia Guadalupe

## 2023-10-06 NOTE — H&P PST ADULT - MUSCULOSKELETAL COMMENTS
Laminectomy thoracic and lumbar areas with rods kyphotic spine due to tumor s/p thoracic laminectomy

## 2023-10-06 NOTE — H&P PST ADULT - PROBLEM SELECTOR PLAN 2
Scheduled fro open large left inguinal hernia repair with mesh      Pt instructed to be NPO the night before and the morning of surgery . Pt to use inhaler if needed. Provided with chlorhexidine 4% solution to wash for 3 days including the morning of surgery. Written  instructions given to pt and reviewed for understanding. Escort required post surgery. Tylenol only to be used one week prior to surgery.

## 2023-10-16 NOTE — ASU PREOP CHECKLIST - BLOOD AVAILABLE
Spoke with patient reminding her to go for lab work and to bring her BP readings and machine to her appt on 6/8  She expressed understanding and thanked us for the reminder call  n/a

## 2023-10-16 NOTE — ASU DISCHARGE PLAN (ADULT/PEDIATRIC) - CARE PROVIDER_API CALL
Shahzad Shepard [facility]  Surgery  [address] [place] [address]  Phone: [phone]  Fax: [phone]  Follow Up Time:

## 2023-10-16 NOTE — ASU PATIENT PROFILE, ADULT - FALL HARM RISK - UNIVERSAL INTERVENTIONS
Bed in lowest position, wheels locked, appropriate side rails in place/Call bell, personal items and telephone in reach/Instruct patient to call for assistance before getting out of bed or chair/Non-slip footwear when patient is out of bed/[place] to call system/Physically safe environment - no spills, clutter or unnecessary equipment/Purposeful Proactive Rounding/Room/bathroom lighting operational, light cord in reach

## 2023-10-16 NOTE — ASU DISCHARGE PLAN (ADULT/PEDIATRIC) - PROCEDURE
Elective robotic assisted right inguinal hernia repair with mesh, open incisional hernia repair with mesh

## 2023-10-16 NOTE — ASU DISCHARGE PLAN (ADULT/PEDIATRIC) - MEDICATION INSTRUCTIONS
PAIN CONTROL: You may take Motrin 600mg-800mg (with food) every 6 hours or Tylenol 650mg-1000mg every 6 hours as needed for pain. Stagger one medication 3 hours after the other for maximum pain control. Maximum daily dose of Tylenol should not exceed 4000mg/day.

## 2023-10-16 NOTE — ASU PATIENT PROFILE, ADULT - PRO ARRIVE FROM
S/w pt who states she is still feeling relief of pain, stiffness and spasms, rates current pain 3/10. Pt asks if she should continue with trigger point injections as scheduled 9/10/19.  Pt would prefer to hold off on injections to see if the dosepak relieve home

## 2023-10-24 PROBLEM — K40.90 UNILATERAL INGUINAL HERNIA, WITHOUT OBSTRUCTION OR GANGRENE, NOT SPECIFIED AS RECURRENT: Chronic | Status: ACTIVE | Noted: 2023-01-01

## 2023-12-14 NOTE — ED ADULT TRIAGE NOTE - CHIEF COMPLAINT QUOTE
pt was a Esophogeal CA with mets to the Spine, sent in by Onc MD to r/o cord compression 2/2 back pain. pt c/o weakness to b/l legs L>R. chemo port to right arm

## 2023-12-14 NOTE — ED PROVIDER NOTE - CLINICAL SUMMARY MEDICAL DECISION MAKING FREE TEXT BOX
69 yo male PMH COPD, esophagogastric carcinoma resected 2018 w/ chemo/radiation, had metastasis to spine  2020 with compression  of cord with laminectomy and resection of epidural tumor, presents to ED complaining of worsening of chronic back pain over the last week with progressively worsening bilateral lower extremity weakness, worse in the left leg. On exam, +5 strength bilat extremities full ROM without pain, +3RLE, +2LLE, no pain with ROM, sensation and pulses intact, unable to ambulate, no spinal tenderness on palpation. Given hx and exam, will order Lumbar MRI to assess cord compression 2/2 mass. Based on exam, less likely cauda equina. Will send labs to assess infection/electrolyte abnormalities. Dispo likely admit pending results of labs and imaging. 67 yo male PMH COPD, esophagogastric carcinoma resected 2018 w/ chemo/radiation, had metastasis to spine  2020 with compression  of cord with laminectomy and resection of epidural tumor, presents to ED complaining of worsening of chronic back pain over the last week with progressively worsening bilateral lower extremity weakness, worse in the left leg. On exam, +5 strength bilat extremities full ROM without pain, +3RLE, +2LLE, no pain with ROM, sensation and pulses intact, unable to ambulate, no spinal tenderness on palpation. Given hx and exam, will order Lumbar MRI to assess cord compression 2/2 mass. Based on exam, less likely cauda equina. Will send labs to assess infection/electrolyte abnormalities. Dispo likely admit pending results of labs and imaging. 67 yo male PMH COPD, esophagogastric carcinoma resected 2018 w/ chemo/radiation, had metastasis to spine  2020 with compression  of cord with laminectomy and resection of epidural tumor, presents to ED complaining of worsening of chronic back pain over the last week with progressively worsening bilateral lower extremity weakness, worse in the left leg. On exam, +5 strength bilat extremities full ROM without pain, +3RLE, +2LLE, no pain with ROM, sensation and pulses intact, unable to ambulate, no spinal tenderness on palpation. Given hx and exam, will order Lumbar MRI to assess cord compression 2/2 mass. Based on exam, less likely cauda equina. Will send labs to assess infection/electrolyte abnormalities. Dispo likely admit pending results of labs and imaging.  Parrish: 68-year-old who presents to the emergency department with lower extremity weakness.  Patient with history of laminectomy and resection of tumor.  Patient now with pain and progressive bilateral lower extremity weakness.  Sent in for possible cord compression.  Will arrange for imaging will treat symptoms will get basic labs.  Patient will need to be admitted as the weakness precludes him walking.  Patient's bowel and bladder are okay cauda equina less likely. Lab studies ordered, independently reviewed and acted on as appropriate. 69 yo male PMH COPD, esophagogastric carcinoma resected 2018 w/ chemo/radiation, had metastasis to spine  2020 with compression  of cord with laminectomy and resection of epidural tumor, presents to ED complaining of worsening of chronic back pain over the last week with progressively worsening bilateral lower extremity weakness, worse in the left leg. On exam, +5 strength bilat extremities full ROM without pain, +3RLE, +2LLE, no pain with ROM, sensation and pulses intact, unable to ambulate, no spinal tenderness on palpation. Given hx and exam, will order Lumbar MRI to assess cord compression 2/2 mass. Based on exam, less likely cauda equina. Will send labs to assess infection/electrolyte abnormalities. Dispo likely admit pending results of labs and imaging.  Prarish: 68-year-old who presents to the emergency department with lower extremity weakness.  Patient with history of laminectomy and resection of tumor.  Patient now with pain and progressive bilateral lower extremity weakness.  Sent in for possible cord compression.  Will arrange for imaging will treat symptoms will get basic labs.  Patient will need to be admitted as the weakness precludes him walking.  Patient's bowel and bladder are okay cauda equina less likely. Lab studies ordered, independently reviewed and acted on as appropriate.

## 2023-12-14 NOTE — ED PROVIDER NOTE - PROGRESS NOTE DETAILS
MM NP: Patient lab results unremarkable, MR Lumbar pending. Radiologist spoken to via phone and aware. No change in patient status, admission pending MR results.

## 2023-12-14 NOTE — ED ADULT NURSE NOTE - OBJECTIVE STATEMENT
pt complains of generalized weakness hx of esophageus cancer with mets to the spine. states he fell past and complains of protruding bone to rt rib. current everyday smoker. left ac 20 g inserted. blood work collected and sent to lab. pt complains of generalized weakness hx of esophageus cancer with mets to the spine. hernia operation in oct 16, 2023. COPD. states he fell past and complains of protruding bone to rt rib. current everyday smoker. left ac 20 g inserted. blood work collected and sent to lab.

## 2023-12-14 NOTE — REVIEW OF SYSTEMS
[Diarrhea: Grade 0] : Diarrhea: Grade 0 [Negative] : Allergic/Immunologic [Muscle Weakness] : muscle weakness [FreeTextEntry9] : Mid to low back pain with new onset lower extremities weakness

## 2023-12-14 NOTE — PHYSICAL EXAM
[Ambulatory and capable of all self care but unable to carry out any work activities] : Status 2- Ambulatory and capable of all self care but unable to carry out any work activities. Up and about more than 50% of waking hours [Normal] : affect appropriate [de-identified] : Hernia incisional wounds are healing well. No erythema, no drainage.

## 2023-12-14 NOTE — ASSESSMENT
[Palliative] : Goals of care discussed with patient: Palliative [Palliative Care Plan] : not applicable at this time [FreeTextEntry1] : 68 year male with PMHX esophageal cancer, s/p [place] Raghu Esophagogastrectomy in 2018, s/p laminectomy of T4-T6, s/p resection of the epidural tumor with T3-T12 fusion and s/p endovascular embolization of the spinal tumor T5-T6 in November 2020, patient has bilateral lower extremities weakness left >right.  Also, he is incontinence of stools.  Patient will be sent to [facility] to r/o cord compression.

## 2023-12-14 NOTE — HISTORY OF PRESENT ILLNESS
[Disease: _____________________] : Disease: [unfilled] [M: ___] : M[unfilled] [AJCC Stage: ____] : AJCC Stage: [unfilled] [de-identified] : This is a 65-year-old man with history of esophagogastric carcinoma who presents for evaluation.  His history dates back to March 2018 when he developed a cough and decreased appetite.  A CAT scan of his chest revealed a mass in the distal esophagus with periesophageal and portacaval adenopathy.  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  The patient underwent neoadjuvant chemotherapy and radiation therapy with Taxol carboplatin.  A follow-up CAT scan in June 2018 revealed decreased mass in the esophagus.  In August 2018 the patient underwent [place] Raghu esophagogastrectomy with Dr. Cloud and Dr. Arciniega.  The pathology revealed no residual carcinoma with negative margins, with12 - lymph nodes (0/12).  The patient then was well until November 2020 when he developed right rib pain and upper back pain.  Imaging testing revealed metastases to the thoracic spine and possible cord compression at T5-T6.  The patient underwent laminectomy of T4-T6 resection of the epidural tumor with T3-T12 fusion and endovascular embolization of the spinal tumor T5-T6.  The pathology revealed poorly differentiated adenocarcinoma.  Postoperatively his pain was relieved.  He has undergone radiation oncology consultation and is planned to receive SBRT treatment.  He now presents for discussion regarding further chemotherapy. [de-identified] : adenoca, Her2 pos [de-identified] : Her2 pos. [de-identified] : Patient with PMHX esophageal cancer, s/p laminectomy of T4-T6, s/p resection of the epidural tumor with T3-T12 fusion and s/p endovascular embolization of the spinal tumor T5-T6 in November 2020, patient presents today c/o bilateral lower extremities weakness. He is unable stand on his own. The left leg feels numb and he unable to lift the leg. Also, he has constipated, and he has trouble controlling bowels. Patient is s/p C4 Xeloda 500 mg: 3 tabs in am/2 tabs in pm.

## 2023-12-14 NOTE — ED PROVIDER NOTE - PHYSICAL EXAMINATION
CONSTITUTIONAL: Appears well, in no apparent distress  HEAD: Normocephalic, no obvious signs of trauma  EENT: PERRL, EOMI, nares patent no drainage, no pharyngeal erythema, swelling, or exudates  NECK: Trachea midline, no goiter  RESP: L/S equal clr, bilat, diminished through out, no accessory muscle use, chronic cough, speaking full sentences  CARDIC: RRR, +S1/S2, no peripheral edema, peripheral radial and pedal pulses equal strong and regular bilat   GI: ABD soft, nondistended, nontender on palpation, no palpable masses, -Sr's Sign  : No CVA Tenderness  MSK: +5 strength bilat extremities full ROM without pain, +3RLE, +2LLE, no pain with ROM, no spinal tenderness on palpation  NEURO: A&OX4, weakness bilat LE, sensation intact, no slurred speech, unable to ambulate

## 2023-12-14 NOTE — ED PROVIDER NOTE - OBJECTIVE STATEMENT
67 yo male PMH COPD, esophagogastric carcinoma resected 2018 w/ chemo/radiation, had metastasis to spine  2020 with compression  of cord with laminectomy and resection of epidural tumor, presents to ED complaining of worsening of chronic back pain over the last week with progressively worsening bilateral lower extremity weakness, worse in the left leg. Denies weakness to upper extremities. Patient denies urinary retention, bowel incontinence, fevers/chills. Patient states is normally able to ambulate, has not been able to. Patient denies chest pain, SOB, LOC, abd pain, n/v/d/dizziness. 69 yo male PMH COPD, esophagogastric carcinoma resected 2018 w/ chemo/radiation, had metastasis to spine  2020 with compression  of cord with laminectomy and resection of epidural tumor, presents to ED complaining of worsening of chronic back pain over the last week with progressively worsening bilateral lower extremity weakness, worse in the left leg. Denies weakness to upper extremities. Patient denies urinary retention, bowel incontinence, fevers/chills. Patient states is normally able to ambulate, has not been able to. Patient denies chest pain, SOB, LOC, abd pain, n/v/d/dizziness.

## 2023-12-14 NOTE — ED PROVIDER NOTE - ATTENDING APP SHARED VISIT CONTRIBUTION OF CARE
I performed a history and physical exam of the patient and discussed their management with the resident and /or advanced care provider. I reviewed the resident and /or ACP's note and agree with the documented findings and plan of care. My medical decision making and observations are found above.  Lungs clear abd soft, awake alert

## 2023-12-14 NOTE — ED ADULT NURSE NOTE - NSFALLRISKFACTORS_ED_ALL_ED
cancer to spine/Surgery: Recent surgery, recent lower limb amputation, major abdominal or thoracic surgery/Other

## 2023-12-15 NOTE — H&P ADULT - ASSESSMENT
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

## 2023-12-15 NOTE — CONSULT NOTE ADULT - ASSESSMENT
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 A CAT scan of his chest revealed a mass in the distal esophagus with periesophageal and portacaval adenopathy. 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 The patient underwent neoadjuvant chemotherapy and radiation therapy with Taxol carboplatin. A follow-up CAT scan in June 2018 revealed decreased mass in the esophagus. In August 2018 the patient underwent [place] Raghu esophagogastrectomy with Dr. Cloud and Dr. Arciniega. The pathology revealed no residual carcinoma with negative margins, with12 - lymph nodes (0/12).  - he was then well until November 2020 when he developed right rib pain and upper back pain. Imaging testing revealed metastases to the thoracic spine and possible cord compression at T5-T6. The patient underwent laminectomy of T4-T6 resection of the epidural tumor with T3-T12 fusion and endovascular embolization of the spinal tumor T5-T6. The pathology revealed poorly differentiated adenocarcinoma. Postoperatively his pain was relieved. 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 A CAT scan of his chest revealed a mass in the distal esophagus with periesophageal and portacaval adenopathy. 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 The patient underwent neoadjuvant chemotherapy and radiation therapy with Taxol carboplatin. A follow-up CAT scan in June 2018 revealed decreased mass in the esophagus. In August 2018 the patient underwent [place] Raghu esophagogastrectomy with Dr. Cloud and Dr. Arciniega. The pathology revealed no residual carcinoma with negative margins, with12 - lymph nodes (0/12).  - he was then well until November 2020 when he developed right rib pain and upper back pain. Imaging testing revealed metastases to the thoracic spine and possible cord compression at T5-T6. The patient underwent laminectomy of T4-T6 resection of the epidural tumor with T3-T12 fusion and endovascular embolization of the spinal tumor T5-T6. The pathology revealed poorly differentiated adenocarcinoma. Postoperatively his pain was relieved. 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 Long  - esophageal cancer, s/p [place] [facility] Esophagogastrectomy in 2018, s/p laminectomy of T4-T6, s/p resection of the epidural tumor with T3-T12 fusion and s/p endovascular embolization of the spinal tumor T5-T6 in November 2020,  - history dates back to March 2018 when he developed a cough and decreased appetite. A CAT scan of his chest revealed a mass in the distal esophagus with periesophageal and portacaval adenopathy. 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 The patient underwent neoadjuvant chemotherapy and radiation therapy with Taxol carboplatin. A follow-up CAT scan in June 2018 revealed decreased mass in the esophagus. In August 2018 the patient underwent [place] Raghu esophagogastrectomy with Dr. Cloud and Dr. Arciniega. The pathology revealed no residual carcinoma with negative margins, with12 - lymph nodes (0/12).  - he was then well until November 2020 when he developed right rib pain and upper back pain. Imaging testing revealed metastases to the thoracic spine and possible cord compression at T5-T6. The patient underwent laminectomy of T4-T6 resection of the epidural tumor with T3-T12 fusion and endovascular embolization of the spinal tumor T5-T6. The pathology revealed poorly differentiated adenocarcinoma. Postoperatively his pain was relieved. He has undergone radiation oncology consultation and is planned to receive SBRT treatment  - s/p C4 Xeloda 500 mg  - pt then presented to outpatient office on 12/14 with bilateral lower extremity weakness and inability to stand, L leg numbness, constipation, bowel incontinence -> sent to ED for this    Recommend:  - f/u neurosurgery recs  - f/u MRI CTL spine wwo contrast  - agree with decadron for now  - please contact inpatient oncology fellow prior to discharge to help coordinate outpatient f/u with Dr. 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 Long  - esophageal cancer, s/p [place] [facility] Esophagogastrectomy in 2018, s/p laminectomy of T4-T6, s/p resection of the epidural tumor with T3-T12 fusion and s/p endovascular embolization of the spinal tumor T5-T6 in November 2020,  - history dates back to March 2018 when he developed a cough and decreased appetite. A CAT scan of his chest revealed a mass in the distal esophagus with periesophageal and portacaval adenopathy. 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 The patient underwent neoadjuvant chemotherapy and radiation therapy with Taxol carboplatin. A follow-up CAT scan in June 2018 revealed decreased mass in the esophagus. In August 2018 the patient underwent [place] Raghu esophagogastrectomy with Dr. Cloud and Dr. Arciniega. The pathology revealed no residual carcinoma with negative margins, with12 - lymph nodes (0/12).  - he was then well until November 2020 when he developed right rib pain and upper back pain. Imaging testing revealed metastases to the thoracic spine and possible cord compression at T5-T6. The patient underwent laminectomy of T4-T6 resection of the epidural tumor with T3-T12 fusion and endovascular embolization of the spinal tumor T5-T6. The pathology revealed poorly differentiated adenocarcinoma. Postoperatively his pain was relieved. He has undergone radiation oncology consultation and is planned to receive SBRT treatment  - s/p C4 Xeloda 500 mg  - pt then presented to outpatient office on 12/14 with bilateral lower extremity weakness and inability to stand, L leg numbness, constipation, bowel incontinence -> sent to ED for this    Recommend:  - f/u neurosurgery recs  - f/u MRI CTL spine wwo contrast  - agree with decadron for now  - please contact inpatient oncology fellow prior to discharge to help coordinate outpatient f/u with Dr. Long  - hold inpatient chemotherapy for now; was also due for xgeva on 12/22  - pt active smoker - please offer nicotine replacement therapy

## 2023-12-15 NOTE — H&P ADULT - HISTORY OF PRESENT ILLNESS
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 States that it has gotten to the point where he is unable to ambulate because of weakness and numbness in legs L > R. States that there his back pain feels worse as if her "back were being wrung out". Patient takes oxycodone at home (prescribed since 2020) up to BID. Patient also endorses constipation that has been ongoing for the past two weeks and relieved with fleet enema. Denies weakness of UE. Denies urinary retention, bowel incontinence, fevers, chills. Denies radicular pain and saddle anesthesia. Denies CP, SOB, LOC, abd, NVD.     Wife at bedside, concerned that patient continues to lose weight. Patient was > 200 lbs before his cancer diagnosis. Concerned that pt continues to lose weight, has lost 15 pounds since Summer, now at 115. Patient follows with oncology, continues to take oral chemotherapy and currently on off-cycle week.

## 2023-12-15 NOTE — CONSULT NOTE ADULT - PROBLEM SELECTOR RECOMMENDATION 9
- MRI T spine wwo contrast     [id number]    Case discussed with attending neurosurgeon Dr. Brown - MRI T spine wwo contrast     [id number]    Case discussed with attending neurosurgeon Dr. Brown - MRI CTL spine wwo contrast     [id number]    Case discussed with attending neurosurgeon Dr. Brown - MRI CTL spine wwo contrast     [id number]    Case discussed with attending neurosurgeon Dr. Brown - MRI CTL spine wwo contrast   - oncology needs to weigh in on prognosis, given patients fragility and comorbidities, may not be a great surgical candidate given extent of disease and other comorbidities     [id number]    Case discussed with attending neurosurgeon Dr. Brown - MRI CTL spine wwo contrast   - oncology needs to weigh in on prognosis, given patients fragility and comorbidities, may not be a great surgical candidate given extent of disease and other comorbidities     [id number]    Case discussed with attending neurosurgeon Dr. Brown

## 2023-12-15 NOTE — H&P ADULT - NSHPPHYSICALEXAM_GEN_ALL_CORE
LOS:     VITALS:   T(C): 36.5 (12-15-23 @ 05:00), Max: 36.7 (12-15-23 @ 03:15)  HR: 80 (12-15-23 @ 05:00) (70 - 93)  BP: 101/54 (12-15-23 @ 05:00) (98/56 - 116/72)  RR: 17 (12-15-23 @ 05:00) (16 - 18)  SpO2: 97% (12-15-23 @ 05:00) (95% - 97%)    GENERAL: NAD, lying in bed comfortably, cachectic  HEAD:  Atraumatic, Normocephalic  EYES: EOMI, PERRLA, conjunctiva and sclera clear  ENT: Moist mucous membranes  NECK: Supple, No JVD  CHEST/LUNG: Clear to auscultation bilaterally; No rales, rhonchi, wheezing, or rubs. Unlabored respirations  HEART: Regular rate and rhythm; No murmurs, rubs, or gallops  ABDOMEN: BSx4; Soft, nontender, nondistended  EXTREMITIES:  2+ Peripheral Pulses, brisk capillary refill. No clubbing, cyanosis, or edema  NERVOUS SYSTEM:  A&Ox3, CNs Intact. UE strength and sensation intact. LE: 3/5 strength LLE, 5/5 strength RLE. No pain with ROM. LE sensation intact. No spianl tenderness.   SKIN: No rashes or lesions

## 2023-12-15 NOTE — H&P ADULT - PROBLEM SELECTOR PLAN 3
baseline 10-11 likely AoCD i/s/o malignancy, no evidence of mary [place] bleeding   will CTM   transfuse if hgb <7

## 2023-12-15 NOTE — PATIENT PROFILE ADULT - FALL HARM RISK - HARM RISK INTERVENTIONS
Assistance with ambulation/Assistance OOB with selected safe patient handling equipment/Communicate Risk of Fall with Harm to all staff/Discuss with provider need for PT consult/Monitor gait and stability/Provide patient with walking aids - walker, cane, crutches/Reinforce activity limits and safety measures with patient and family/Reorient to person, place and time as needed/Review medications for side effects contributing to fall risk/Sit up slowly, dangle for a short time, stand at bedside before walking/Tailored Fall Risk Interventions/Toileting schedule using arm’s reach rule for commode and bathroom/Use of alarms - bed, chair and/or voice tab/Visual Cue: Yellow wristband and red socks/Bed in lowest position, wheels locked, appropriate side rails in place/Call bell, personal items and telephone in reach/Instruct patient to call for assistance before getting out of bed or chair/Non-slip footwear when patient is out of bed/[place] to call system/Physically safe environment - no spills, clutter or unnecessary equipment/Purposeful Proactive Rounding/Room/bathroom lighting operational, light cord in reach Assistance with ambulation/Assistance OOB with selected safe patient handling equipment/Communicate Risk of Fall with Harm to all staff/Discuss with provider need for PT consult/Monitor gait and stability/Provide patient with walking aids - walker, cane, crutches/Reinforce activity limits and safety measures with patient and family/Reorient to person, place and time as needed/Review medications for side effects contributing to fall risk/Sit up slowly, dangle for a short time, stand at bedside before walking/Tailored Fall Risk Interventions/Toileting schedule using arm’s reach rule for commode and bathroom/Use of alarms - bed, chair and/or voice tab/Visual Cue: Yellow wristband and red socks/Bed in lowest position, wheels locked, appropriate side rails in place/Call bell, personal items and telephone in reach/Instruct patient to call for assistance before getting out of bed or chair/Non-slip footwear when patient is out of bed/[place] to call system/Physically safe environment - no spills, clutter or unnecessary equipment/Purposeful Proactive Rounding/Room/bathroom lighting operational, light cord in reach

## 2023-12-15 NOTE — H&P ADULT - PROBLEM SELECTOR PLAN 6
Per outpatient cardiology. EF recovered. No longer on HER2 targeted therapy.  will hold outpatient med, lisinopril 2.5 and bisoprolol 5 mg

## 2023-12-15 NOTE — H&P ADULT - ATTENDING COMMENTS
Patient seen and examined, care plan discussed with house staff as above.    Mr. Ferrer is presenting after eval in Oncology clinic with sudden LE weakness starting 8 days prior to admission. PMHX esophageal cancer s/p esophogastrectomy in 2018, s/p laminectomy of T4-T6, s/p resection of the epidural tumor with T3-T12 fusion and s/p endovascular embolization of the spinal tumor T5-T6 in November 2020, currently on capecitabine. Exam notable for LLE quadriceps flexor weakness 3/5, compared to 4+/5 quad strength on the RLE. He does have rectal tone on TETO on 12/15 exam. Numbness and tingling bilaterally of LEs. Reports no fecal or urinary incontinence, though he did come in wearing adult depends. Unable to walk for the past week, prior to this was ambulating without any assistive devices. Does endorse recent constipation, relieved by enemas.   MRI wo of lumbar spine ordered by ED. Pt needs urgent MRI wwo thoracic and lumbar spine to r/o acute cord compression. Start Decadron (12/15- ), consult NSG given new focal motor and sensory deficits.   Remainder as above.

## 2023-12-15 NOTE — H&P ADULT - PROBLEM SELECTOR PLAN 1
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 No symptoms on history suggestive of cauda equina syndrome   MRI Lumbar Spine with no evidence of fractures, osseous lesions, spinal canal stenosis, cauda equina syndrome or nerve root impingement   will order MRI Head to further evaluate focal deficit   will consult neuro 1 week hx of R>L motor weakness of LE with decreased sensation i/s/o hx of metastasis to spine 2/2 chord compression w/ laminectomy in 2020. No symptoms on history suggestive of cauda equina syndrome. Cord compression likely given history and exam.   MRI Lumbar Spine w/o contrast ordered and resulted   will need to reorder MRI Thoracic and Lumbar spine - expedited   IV decadron 10 mg stat and 4 mg q6 1 week hx of R>L motor weakness of LE with decreased sensation i/s/o hx of metastasis to spine 2/2 chord compression w/ laminectomy in 2020. No symptoms on history suggestive of cauda equina syndrome. Cord compression likely given history and exam.   MRI Lumbar Spine w/o contrast ordered and resulted   will need to reorder MRI Thoracic and Lumbar spine - expedited   IV decadron 10 mg stat and 4 mg q6  neurosurgery consulted

## 2023-12-15 NOTE — H&P ADULT - PROBLEM SELECTOR PLAN 7
Diet: Regular  Nutrition Consult   DVT ppx: Lovenox 40 qD subQ Diet: Regular  Nutrition Consult   DVT ppx: Heparin 5000U q12

## 2023-12-15 NOTE — H&P ADULT - NSHPLABSRESULTS_GEN_ALL_CORE
LABS:                        10.0   6.26  )-----------( 271      ( 14 Dec 2023 15:54 )             30.9     12-14    141  |  105  |  13  ----------------------------<  85  3.6   |  27  |  0.52    Ca    8.6      14 Dec 2023 15:54    TPro  6.0  /  Alb  3.3  /  TBili  2.0<H>  /  DBili  x   /  AST  17  /  ALT  10  /  AlkPhos  94  12-14    PT/INR - ( 14 Dec 2023 15:54 )   PT: 13.6 sec;   INR: 1.21 ratio         PTT - ( 14 Dec 2023 15:54 )  PTT:29.8 sec      Urinalysis Basic - ( 14 Dec 2023 15:54 )    Color: x / Appearance: x / SG: x / pH: x  Gluc: 85 mg/dL / Ketone: x  / Bili: x / Urobili: x   Blood: x / Protein: x / Nitrite: x   Leuk Esterase: x / RBC: x / WBC x   Sq Epi: x / Non Sq Epi: x / Bacteria: x    MR Lumbar Spine 12/15/23  IMPRESSION:  No MRI evidence of lumbar spine fracture, traumatic malalignment or   suspicious osseous lesion.    Mild lumbar spine degenerative changes as discussed above.  No clinically   significant spinal canal stenosis.  Mild neural foraminal narrowing.  No   evidence of cauda equina compression or nerve root impingement.

## 2023-12-15 NOTE — H&P ADULT - PROBLEM SELECTOR PLAN 4
diagnosed in 2018, not on home O2, Treligy qD at home   Will start Symbicort 2 puffs daily, nebs PRN   Will resume Treligy when wife can bring inhaler in hx of atherosclerosis   EKG wnl   actively following outpatient cardiology   will resume aspirin 81 and rosuvastatin 5

## 2023-12-15 NOTE — CONSULT NOTE ADULT - SUBJECTIVE AND OBJECTIVE BOX
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 States that it has gotten to the point where he is unable to ambulate because of weakness and numbness in legs L > R. States that there his back pain feels worse as if her "back were being wrung out". Patient takes oxycodone at home (prescribed since 2020) up to BID. Patient also endorses constipation that has been ongoing for the past two weeks and relieved with fleet enema. Denies weakness of UE. Denies urinary retention, bowel incontinence, fevers, chills. Denies radicular pain and saddle anesthesia. Denies CP, SOB, LOC, abd, NVD.     Wife at bedside, concerned that patient continues to lose weight. Patient was > 200 lbs before his cancer diagnosis. Concerned that pt continues to lose weight, has lost 15 pounds since Summer, now at 115. Patient follows with oncology, continues to take oral chemotherapy and currently on off-cycle week.      (15 Dec 2023 07:23)      REVIEW OF SYSTEMS:  As per HPI, otherwise negative for Constitutional, Eyes, Ears/Nose/Mouth/Throat, Neck, Cardiovascular, Respiratory, Gastrointestinal, Genitourinary, Skin, Endocrine, Musculoskeletal, Psychiatric, and Hematologic/Lymphatic.    PAST MEDICAL & SURGICAL HISTORY:  Malignant neoplasm of esophagus, unspecified      Chronic obstructive pulmonary disease      Non-recurrent unilateral inguinal hernia without obstruction or gangrene      History of bilateral inguinal hernia repair      History of cholecystectomy      H/O ventral hernia repair      History of esophageal surgery      History of lumbar laminectomy      S/P laminectomy with spinal fusion          MEDICATIONS  (STANDING):  aspirin enteric coated 81 milliGRAM(s) Oral daily  budesonide  80 MICROgram(s)/formoterol 4.5 MICROgram(s) Inhaler 2 Puff(s) Inhalation daily  dexAMETHasone  Injectable 4 milliGRAM(s) IV Push every 6 hours  famotidine    Tablet 20 milliGRAM(s) Oral two times a day  gabapentin 200 milliGRAM(s) Oral two times a day  heparin   Injectable 5000 Unit(s) SubCutaneous every 12 hours  influenza  Vaccine (HIGH DOSE) 0.7 milliLiter(s) IntraMuscular once  polyethylene glycol 3350 17 Gram(s) Oral two times a day  senna 2 Tablet(s) Oral at bedtime  sodium chloride 0.9%. 1000 milliLiter(s) (75 mL/Hr) IV Continuous <Continuous>    MEDICATIONS  (PRN):  acetaminophen     Tablet .. 650 milliGRAM(s) Oral every 6 hours PRN Temp greater or equal to 38C (100.4F), Mild Pain (1 - 3)  albuterol/ipratropium for Nebulization 3 milliLiter(s) Nebulizer every 6 hours PRN COPD  aluminum hydroxide/magnesium hydroxide/simethicone Suspension 30 milliLiter(s) Oral every 4 hours PRN Dyspepsia  melatonin 3 milliGRAM(s) Oral at bedtime PRN Insomnia  morphine  - Injectable 2 milliGRAM(s) IV Push once PRN Moderate Pain (4 - 6)  ondansetron Injectable 4 milliGRAM(s) IV Push every 8 hours PRN Nausea and/or Vomiting  oxyCODONE    IR 5 milliGRAM(s) Oral every 4 hours PRN Moderate Pain (4 - 6)      Allergies    albuterol (Other (Mild))    Intolerances        FAMILY HISTORY:  Family history of diabetes mellitus (DM) (Father)        SOCIAL HISTORY:      VITAL SIGNS:  Vital Signs Last 24 Hrs  T(C): 36.5 (15 Dec 2023 05:00), Max: 36.7 (15 Dec 2023 03:15)  T(F): 97.7 (15 Dec 2023 05:00), Max: 98 (15 Dec 2023 03:15)  HR: 80 (15 Dec 2023 05:00) (80 - 93)  BP: 101/54 (15 Dec 2023 05:00) (98/56 - 116/72)  BP(mean): --  RR: 17 (15 Dec 2023 05:00) (16 - 18)  SpO2: 97% (15 Dec 2023 05:00) (95% - 97%)    Parameters below as of 15 Dec 2023 05:00  Patient On (Oxygen Delivery Method): room air        PHYSICAL EXAMINATION:      LABS:                        10.0   6.26  )-----------( 271      ( 14 Dec 2023 15:54 )             30.9     12-14    141  |  105  |  13  ----------------------------<  85  3.6   |  27  |  0.52    Ca    8.6      14 Dec 2023 15:54    TPro  6.0  /  Alb  3.3  /  TBili  2.0<H>  /  DBili  x   /  AST  17  /  ALT  10  /  AlkPhos  94  12-14    PT/INR - ( 14 Dec 2023 15:54 )   PT: 13.6 sec;   INR: 1.21 ratio         PTT - ( 14 Dec 2023 15:54 )  PTT:29.8 sec  Urinalysis Basic - ( 14 Dec 2023 15:54 )    Color: x / Appearance: x / SG: x / pH: x  Gluc: 85 mg/dL / Ketone: x  / Bili: x / Urobili: x   Blood: x / Protein: x / Nitrite: x   Leuk Esterase: x / RBC: x / WBC x   Sq Epi: x / Non Sq Epi: x / Bacteria: x        RADIOLOGY & ADDITIONAL STUDIES:    MR lumbar spine noncontrast 12/14/23: IMPRESSION: No MRI evidence of lumbar spine fracture, traumatic malalignment or suspicious osseous lesion. Mild lumbar spine degenerative changes as discussed above.  No clinically significant spinal canal stenosis.  Mild neural foraminal narrowing.  No evidence of cauda equina compression or nerve root impingement.    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 States that it has gotten to the point where he is unable to ambulate because of weakness and numbness in legs L > R. States that there his back pain feels worse as if her "back were being wrung out". Patient takes oxycodone at home (prescribed since 2020) up to BID. Patient also endorses constipation that has been ongoing for the past two weeks and relieved with fleet enema. Denies weakness of UE. Denies urinary retention, bowel incontinence, fevers, chills. Denies radicular pain and saddle anesthesia. Denies CP, SOB, LOC, abd, NVD.     Wife at bedside, concerned that patient continues to lose weight. Patient was > 200 lbs before his cancer diagnosis. Concerned that pt continues to lose weight, has lost 15 pounds since Summer, now at 115. Patient follows with oncology, continues to take oral chemotherapy and currently on off-cycle week.      (15 Dec 2023 07:23)      REVIEW OF SYSTEMS:  As per HPI, otherwise negative for Constitutional, Eyes, Ears/Nose/Mouth/Throat, Neck, Cardiovascular, Respiratory, Gastrointestinal, Genitourinary, Skin, Endocrine, Musculoskeletal, Psychiatric, and Hematologic/Lymphatic.    PAST MEDICAL & SURGICAL HISTORY:  Malignant neoplasm of esophagus, unspecified      Chronic obstructive pulmonary disease      Non-recurrent unilateral inguinal hernia without obstruction or gangrene      History of bilateral inguinal hernia repair      History of cholecystectomy      H/O ventral hernia repair      History of esophageal surgery      History of lumbar laminectomy      S/P laminectomy with spinal fusion          MEDICATIONS  (STANDING):  aspirin enteric coated 81 milliGRAM(s) Oral daily  budesonide  80 MICROgram(s)/formoterol 4.5 MICROgram(s) Inhaler 2 Puff(s) Inhalation daily  dexAMETHasone  Injectable 4 milliGRAM(s) IV Push every 6 hours  famotidine    Tablet 20 milliGRAM(s) Oral two times a day  gabapentin 200 milliGRAM(s) Oral two times a day  heparin   Injectable 5000 Unit(s) SubCutaneous every 12 hours  influenza  Vaccine (HIGH DOSE) 0.7 milliLiter(s) IntraMuscular once  polyethylene glycol 3350 17 Gram(s) Oral two times a day  senna 2 Tablet(s) Oral at bedtime  sodium chloride 0.9%. 1000 milliLiter(s) (75 mL/Hr) IV Continuous <Continuous>    MEDICATIONS  (PRN):  acetaminophen     Tablet .. 650 milliGRAM(s) Oral every 6 hours PRN Temp greater or equal to 38C (100.4F), Mild Pain (1 - 3)  albuterol/ipratropium for Nebulization 3 milliLiter(s) Nebulizer every 6 hours PRN COPD  aluminum hydroxide/magnesium hydroxide/simethicone Suspension 30 milliLiter(s) Oral every 4 hours PRN Dyspepsia  melatonin 3 milliGRAM(s) Oral at bedtime PRN Insomnia  morphine  - Injectable 2 milliGRAM(s) IV Push once PRN Moderate Pain (4 - 6)  ondansetron Injectable 4 milliGRAM(s) IV Push every 8 hours PRN Nausea and/or Vomiting  oxyCODONE    IR 5 milliGRAM(s) Oral every 4 hours PRN Moderate Pain (4 - 6)      Allergies    albuterol (Other (Mild))    Intolerances        FAMILY HISTORY:  Family history of diabetes mellitus (DM) (Father)        SOCIAL HISTORY:      VITAL SIGNS:  Vital Signs Last 24 Hrs  T(C): 36.5 (15 Dec 2023 05:00), Max: 36.7 (15 Dec 2023 03:15)  T(F): 97.7 (15 Dec 2023 05:00), Max: 98 (15 Dec 2023 03:15)  HR: 80 (15 Dec 2023 05:00) (80 - 93)  BP: 101/54 (15 Dec 2023 05:00) (98/56 - 116/72)  BP(mean): --  RR: 17 (15 Dec 2023 05:00) (16 - 18)  SpO2: 97% (15 Dec 2023 05:00) (95% - 97%)    Parameters below as of 15 Dec 2023 05:00  Patient On (Oxygen Delivery Method): room air        PHYSICAL EXAMINATION:      LABS:                        10.0   6.26  )-----------( 271      ( 14 Dec 2023 15:54 )             30.9     12-14    141  |  105  |  13  ----------------------------<  85  3.6   |  27  |  0.52    Ca    8.6      14 Dec 2023 15:54    TPro  6.0  /  Alb  3.3  /  TBili  2.0<H>  /  DBili  x   /  AST  17  /  ALT  10  /  AlkPhos  94  12-14    PT/INR - ( 14 Dec 2023 15:54 )   PT: 13.6 sec;   INR: 1.21 ratio         PTT - ( 14 Dec 2023 15:54 )  PTT:29.8 sec  Urinalysis Basic - ( 14 Dec 2023 15:54 )    Color: x / Appearance: x / SG: x / pH: x  Gluc: 85 mg/dL / Ketone: x  / Bili: x / Urobili: x   Blood: x / Protein: x / Nitrite: x   Leuk Esterase: x / RBC: x / WBC x   Sq Epi: x / Non Sq Epi: x / Bacteria: x        RADIOLOGY & ADDITIONAL STUDIES:    MR lumbar spine noncontrast 12/14/23: IMPRESSION: No MRI evidence of lumbar spine fracture, traumatic malalignment or suspicious osseous lesion. Mild lumbar spine degenerative changes as discussed above.  No clinically significant spinal canal stenosis.  Mild neural foraminal narrowing.  No evidence of cauda equina compression or nerve root impingement.    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 States that it has gotten to the point where he is unable to ambulate because of weakness and numbness in legs L > R. States that there his back pain feels worse as if her "back were being wrung out". Patient takes oxycodone at home (prescribed since 2020) up to BID. Patient also endorses constipation that has been ongoing for the past two weeks and relieved with fleet enema. Denies weakness of UE. Denies urinary retention, bowel incontinence, fevers, chills. Denies radicular pain and saddle anesthesia. Denies CP, SOB, LOC, abd, NVD.     Wife at bedside, concerned that patient continues to lose weight. Patient was > 200 lbs before his cancer diagnosis. Concerned that pt continues to lose weight, has lost 15 pounds since Summer, now at 115. Patient follows with oncology, continues to take oral chemotherapy and currently on off-cycle week.      (15 Dec 2023 07:23)    Interval Heme/Onc history:  On interview today, pt notes he has had numbness in his feet for years, but has always been able to ambulate. However, he could walk last ~7-8 days ago, but less than usual due to recent L hernia surgery on 10/16/23. He then developed worsening L leg weakness and legs had decreased movement. He also devloped low back pain which started 8 days ago at his waist line, and has increased from baseline pain he has. Moving/twisting worsens the pain. He has normal BMs and urination and has actually recently felt constipated. Denies saddle anesthesia.    Has been on Xeloda for 3-4 months. Had RT, radiosurgery years ago.       REVIEW OF SYSTEMS:  As per HPI as well as:  General: (+) decreased PO, (+) wt loss 2/2 decreased PO, (+) fatigue  Neuro: (+) leg numbness and tingling, pain, per HPI      PAST MEDICAL & SURGICAL HISTORY:  Malignant neoplasm of esophagus, unspecified      Chronic obstructive pulmonary disease      Non-recurrent unilateral inguinal hernia without obstruction or gangrene      History of bilateral inguinal hernia repair      History of cholecystectomy      H/O ventral hernia repair      History of esophageal surgery      History of lumbar laminectomy      S/P laminectomy with spinal fusion          MEDICATIONS  (STANDING):  aspirin enteric coated 81 milliGRAM(s) Oral daily  budesonide  80 MICROgram(s)/formoterol 4.5 MICROgram(s) Inhaler 2 Puff(s) Inhalation daily  dexAMETHasone  Injectable 4 milliGRAM(s) IV Push every 6 hours  famotidine    Tablet 20 milliGRAM(s) Oral two times a day  gabapentin 200 milliGRAM(s) Oral two times a day  heparin   Injectable 5000 Unit(s) SubCutaneous every 12 hours  influenza  Vaccine (HIGH DOSE) 0.7 milliLiter(s) IntraMuscular once  polyethylene glycol 3350 17 Gram(s) Oral two times a day  senna 2 Tablet(s) Oral at bedtime  sodium chloride 0.9%. 1000 milliLiter(s) (75 mL/Hr) IV Continuous <Continuous>    MEDICATIONS  (PRN):  acetaminophen     Tablet .. 650 milliGRAM(s) Oral every 6 hours PRN Temp greater or equal to 38C (100.4F), Mild Pain (1 - 3)  albuterol/ipratropium for Nebulization 3 milliLiter(s) Nebulizer every 6 hours PRN COPD  aluminum hydroxide/magnesium hydroxide/simethicone Suspension 30 milliLiter(s) Oral every 4 hours PRN Dyspepsia  melatonin 3 milliGRAM(s) Oral at bedtime PRN Insomnia  morphine  - Injectable 2 milliGRAM(s) IV Push once PRN Moderate Pain (4 - 6)  ondansetron Injectable 4 milliGRAM(s) IV Push every 8 hours PRN Nausea and/or Vomiting  oxyCODONE    IR 5 milliGRAM(s) Oral every 4 hours PRN Moderate Pain (4 - 6)      Allergies    albuterol (Other (Mild))    Intolerances        FAMILY HISTORY:  Family history of diabetes mellitus (DM) (Father)        SOCIAL HISTORY:  - active smoker    VITAL SIGNS:  Vital Signs Last 24 Hrs  T(C): 36.5 (15 Dec 2023 05:00), Max: 36.7 (15 Dec 2023 03:15)  T(F): 97.7 (15 Dec 2023 05:00), Max: 98 (15 Dec 2023 03:15)  HR: 80 (15 Dec 2023 05:00) (80 - 93)  BP: 101/54 (15 Dec 2023 05:00) (98/56 - 116/72)  BP(mean): --  RR: 17 (15 Dec 2023 05:00) (16 - 18)  SpO2: 97% (15 Dec 2023 05:00) (95% - 97%)    Parameters below as of 15 Dec 2023 05:00  Patient On (Oxygen Delivery Method): room air        PHYSICAL EXAMINATION:  Constitutional: resting comfortably in bed; NAD  HEENT: NC/AT, EOMI, anicteric sclera, no nasal discharge   Respiratory: breathing comfortably  Neurologic: alert and oriented; L > R lower extremity weakness; sensation intact in lower extremities  Psychiatric: affect and characteristics of appearance, verbalizations, behaviors are appropriate    LABS:                        10.0   6.26  )-----------( 271      ( 14 Dec 2023 15:54 )             30.9     12-14    141  |  105  |  13  ----------------------------<  85  3.6   |  27  |  0.52    Ca    8.6      14 Dec 2023 15:54    TPro  6.0  /  Alb  3.3  /  TBili  2.0<H>  /  DBili  x   /  AST  17  /  ALT  10  /  AlkPhos  94  12-14    PT/INR - ( 14 Dec 2023 15:54 )   PT: 13.6 sec;   INR: 1.21 ratio         PTT - ( 14 Dec 2023 15:54 )  PTT:29.8 sec  Urinalysis Basic - ( 14 Dec 2023 15:54 )    Color: x / Appearance: x / SG: x / pH: x  Gluc: 85 mg/dL / Ketone: x  / Bili: x / Urobili: x   Blood: x / Protein: x / Nitrite: x   Leuk Esterase: x / RBC: x / WBC x   Sq Epi: x / Non Sq Epi: x / Bacteria: x        RADIOLOGY & ADDITIONAL STUDIES:    MR lumbar spine noncontrast 12/14/23: IMPRESSION: No MRI evidence of lumbar spine fracture, traumatic malalignment or suspicious osseous lesion. Mild lumbar spine degenerative changes as discussed above.  No clinically significant spinal canal stenosis.  Mild neural foraminal narrowing.  No evidence of cauda equina compression or nerve root impingement.    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 States that it has gotten to the point where he is unable to ambulate because of weakness and numbness in legs L > R. States that there his back pain feels worse as if her "back were being wrung out". Patient takes oxycodone at home (prescribed since 2020) up to BID. Patient also endorses constipation that has been ongoing for the past two weeks and relieved with fleet enema. Denies weakness of UE. Denies urinary retention, bowel incontinence, fevers, chills. Denies radicular pain and saddle anesthesia. Denies CP, SOB, LOC, abd, NVD.     Wife at bedside, concerned that patient continues to lose weight. Patient was > 200 lbs before his cancer diagnosis. Concerned that pt continues to lose weight, has lost 15 pounds since Summer, now at 115. Patient follows with oncology, continues to take oral chemotherapy and currently on off-cycle week.      (15 Dec 2023 07:23)    Interval Heme/Onc history:  On interview today, pt notes he has had numbness in his feet for years, but has always been able to ambulate. However, he could walk last ~7-8 days ago, but less than usual due to recent L hernia surgery on 10/16/23. He then developed worsening L leg weakness and legs had decreased movement. He also devloped low back pain which started 8 days ago at his waist line, and has increased from baseline pain he has. Moving/twisting worsens the pain. He has normal BMs and urination and has actually recently felt constipated. Denies saddle anesthesia.    Has been on Xeloda for 3-4 months. Had RT, radiosurgery years ago.       REVIEW OF SYSTEMS:  As per HPI as well as:  General: (+) decreased PO, (+) wt loss 2/2 decreased PO, (+) fatigue  Neuro: (+) leg numbness and tingling, pain, per HPI      PAST MEDICAL & SURGICAL HISTORY:  Malignant neoplasm of esophagus, unspecified      Chronic obstructive pulmonary disease      Non-recurrent unilateral inguinal hernia without obstruction or gangrene      History of bilateral inguinal hernia repair      History of cholecystectomy      H/O ventral hernia repair      History of esophageal surgery      History of lumbar laminectomy      S/P laminectomy with spinal fusion          MEDICATIONS  (STANDING):  aspirin enteric coated 81 milliGRAM(s) Oral daily  budesonide  80 MICROgram(s)/formoterol 4.5 MICROgram(s) Inhaler 2 Puff(s) Inhalation daily  dexAMETHasone  Injectable 4 milliGRAM(s) IV Push every 6 hours  famotidine    Tablet 20 milliGRAM(s) Oral two times a day  gabapentin 200 milliGRAM(s) Oral two times a day  heparin   Injectable 5000 Unit(s) SubCutaneous every 12 hours  influenza  Vaccine (HIGH DOSE) 0.7 milliLiter(s) IntraMuscular once  polyethylene glycol 3350 17 Gram(s) Oral two times a day  senna 2 Tablet(s) Oral at bedtime  sodium chloride 0.9%. 1000 milliLiter(s) (75 mL/Hr) IV Continuous <Continuous>    MEDICATIONS  (PRN):  acetaminophen     Tablet .. 650 milliGRAM(s) Oral every 6 hours PRN Temp greater or equal to 38C (100.4F), Mild Pain (1 - 3)  albuterol/ipratropium for Nebulization 3 milliLiter(s) Nebulizer every 6 hours PRN COPD  aluminum hydroxide/magnesium hydroxide/simethicone Suspension 30 milliLiter(s) Oral every 4 hours PRN Dyspepsia  melatonin 3 milliGRAM(s) Oral at bedtime PRN Insomnia  morphine  - Injectable 2 milliGRAM(s) IV Push once PRN Moderate Pain (4 - 6)  ondansetron Injectable 4 milliGRAM(s) IV Push every 8 hours PRN Nausea and/or Vomiting  oxyCODONE    IR 5 milliGRAM(s) Oral every 4 hours PRN Moderate Pain (4 - 6)      Allergies    albuterol (Other (Mild))    Intolerances        FAMILY HISTORY:  Family history of diabetes mellitus (DM) (Father)        SOCIAL HISTORY:  - active smoker    VITAL SIGNS:  Vital Signs Last 24 Hrs  T(C): 36.5 (15 Dec 2023 05:00), Max: 36.7 (15 Dec 2023 03:15)  T(F): 97.7 (15 Dec 2023 05:00), Max: 98 (15 Dec 2023 03:15)  HR: 80 (15 Dec 2023 05:00) (80 - 93)  BP: 101/54 (15 Dec 2023 05:00) (98/56 - 116/72)  BP(mean): --  RR: 17 (15 Dec 2023 05:00) (16 - 18)  SpO2: 97% (15 Dec 2023 05:00) (95% - 97%)    Parameters below as of 15 Dec 2023 05:00  Patient On (Oxygen Delivery Method): room air        PHYSICAL EXAMINATION:  Constitutional: resting comfortably in bed; NAD  HEENT: NC/AT, EOMI, anicteric sclera, no nasal discharge   Respiratory: breathing comfortably  Neurologic: alert and oriented; L > R lower extremity weakness; sensation intact in lower extremities  Psychiatric: affect and characteristics of appearance, verbalizations, behaviors are appropriate    LABS:                        10.0   6.26  )-----------( 271      ( 14 Dec 2023 15:54 )             30.9     12-14    141  |  105  |  13  ----------------------------<  85  3.6   |  27  |  0.52    Ca    8.6      14 Dec 2023 15:54    TPro  6.0  /  Alb  3.3  /  TBili  2.0<H>  /  DBili  x   /  AST  17  /  ALT  10  /  AlkPhos  94  12-14    PT/INR - ( 14 Dec 2023 15:54 )   PT: 13.6 sec;   INR: 1.21 ratio         PTT - ( 14 Dec 2023 15:54 )  PTT:29.8 sec  Urinalysis Basic - ( 14 Dec 2023 15:54 )    Color: x / Appearance: x / SG: x / pH: x  Gluc: 85 mg/dL / Ketone: x  / Bili: x / Urobili: x   Blood: x / Protein: x / Nitrite: x   Leuk Esterase: x / RBC: x / WBC x   Sq Epi: x / Non Sq Epi: x / Bacteria: x        RADIOLOGY & ADDITIONAL STUDIES:    MR lumbar spine noncontrast 12/14/23: IMPRESSION: No MRI evidence of lumbar spine fracture, traumatic malalignment or suspicious osseous lesion. Mild lumbar spine degenerative changes as discussed above.  No clinically significant spinal canal stenosis.  Mild neural foraminal narrowing.  No evidence of cauda equina compression or nerve root impingement.    IMPRESSION & RECOMMENDATIONS:

## 2023-12-15 NOTE — H&P ADULT - PROBLEM SELECTOR PLAN 2
on oxycodone at home for lower back pain with hx of spinal mets. Physical exam and MR Lumbar Spine reassuring   will c/t oxycodone 5 mg PRN q4hr  will start bowel regimen for chronic constipation

## 2023-12-15 NOTE — CONSULT NOTE ADULT - ASSESSMENT
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 Martins at [facility] Nov 2020, presents to ED complaining of worsening of chronic back pain over the last 1-2 weeks with worsening LE weakness worse L>R. Patient states he has had back pain since his surgery but the LLE weakness is new x 1-2 weeks, progressive. 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 Martins at [facility] Nov 2020, presents to ED complaining of worsening of chronic back pain over the last 1-2 weeks with worsening LE weakness worse L>R. Patient states he has had back pain since his surgery but the LLE weakness is new x 1-2 weeks, progressive. MRI Lumbar spine noncon unimpressive

## 2023-12-15 NOTE — H&P ADULT - PROBLEM SELECTOR PLAN 5
Diet: Regular  Nutrition Consult   DVT ppx: Lovenox 40 qD subQ diagnosed in 2018, not on home O2, Treligy qD at home   Will start Symbicort 2 puffs daily, nebs PRN   Will resume Treligy when wife can bring inhaler in

## 2023-12-15 NOTE — CONSULT NOTE ADULT - SUBJECTIVE AND OBJECTIVE BOX
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 Martins at [facility], presents to ED complaining of worsening of chronic back pain over the last week with worsening b/l LE weakness worse in left than right leg. States that it has gotten to the point where he is unable to ambulate because of weakness and numbness in legs L > R. States that there his back pain feels worse as if her "back were being wrung out". Patient takes oxycodone at home (prescribed since 2020) up to BID. Patient also endorses constipation that has been ongoing for the past two weeks and relieved with fleet enema. Denies weakness of UE. Denies urinary retention, bowel incontinence, fevers, chills. Denies radicular pain and saddle anesthesia. Denies CP, SOB, LOC, abd, NVD.     Wife at bedside, concerned that patient continues to lose weight. Patient was > 200 lbs before his cancer diagnosis. Concerned that pt continues to lose weight, has lost 15 pounds since Summer, now at 115. Patient follows with oncology, continues to take oral chemotherapy and currently on off-cycle week. (15 Dec 2023 07:23)      RADIOLOGY:   < from: MR Lumbar Spine No Cont (12.14.23 @ 21:22) >  IMPRESSION:  No MRI evidence of lumbar spine fracture, traumatic malalignment or   suspicious osseous lesion.    Mild lumbar spine degenerative changes as discussed above.  No clinically   significant spinal canal stenosis.  Mild neural foraminal narrowing.  No   evidence of cauda equina compression or nerve root impingement.    MEDS:  acetaminophen     Tablet .. 650 milliGRAM(s) Oral every 6 hours PRN  albuterol/ipratropium for Nebulization 3 milliLiter(s) Nebulizer every 6 hours PRN  aluminum hydroxide/magnesium hydroxide/simethicone Suspension 30 milliLiter(s) Oral every 4 hours PRN  aspirin enteric coated 81 milliGRAM(s) Oral daily  budesonide  80 MICROgram(s)/formoterol 4.5 MICROgram(s) Inhaler 2 Puff(s) Inhalation daily  dexAMETHasone  Injectable 4 milliGRAM(s) IV Push every 6 hours  famotidine    Tablet 20 milliGRAM(s) Oral two times a day  gabapentin 200 milliGRAM(s) Oral two times a day  heparin   Injectable 5000 Unit(s) SubCutaneous every 12 hours  influenza  Vaccine (HIGH DOSE) 0.7 milliLiter(s) IntraMuscular once  melatonin 3 milliGRAM(s) Oral at bedtime PRN  ondansetron Injectable 4 milliGRAM(s) IV Push every 8 hours PRN  oxyCODONE    IR 5 milliGRAM(s) Oral every 4 hours PRN  polyethylene glycol 3350 17 Gram(s) Oral two times a day  senna 2 Tablet(s) Oral at bedtime  sodium chloride 0.9%. 1000 milliLiter(s) IV Continuous <Continuous>      Vital Signs Last 24 Hrs  T(C): 36.5 (15 Dec 2023 05:00), Max: 36.7 (15 Dec 2023 03:15)  T(F): 97.7 (15 Dec 2023 05:00), Max: 98 (15 Dec 2023 03:15)  HR: 80 (15 Dec 2023 05:00) (70 - 93)  BP: 101/54 (15 Dec 2023 05:00) (98/56 - 116/72)  BP(mean): --  RR: 17 (15 Dec 2023 05:00) (16 - 18)  SpO2: 97% (15 Dec 2023 05:00) (95% - 97%)    Parameters below as of 15 Dec 2023 05:00  Patient On (Oxygen Delivery Method): room air        LABS:                        10.0   6.26  )-----------( 271      ( 14 Dec 2023 15:54 )             30.9     12-14    141  |  105  |  13  ----------------------------<  85  3.6   |  27  |  0.52    Ca    8.6      14 Dec 2023 15:54    TPro  6.0  /  Alb  3.3  /  TBili  2.0<H>  /  DBili  x   /  AST  17  /  ALT  10  /  AlkPhos  94  12-14    PT/INR - ( 14 Dec 2023 15:54 )   PT: 13.6 sec;   INR: 1.21 ratio         PTT - ( 14 Dec 2023 15:54 )  PTT:29.8 sec      PHYSICAL EXAM:  AOx3, Following Commands, Face symmetrical  EOMI, PERRL, CN 2-12 intact   Motor:          Upper extremity                             Delt      Bicep     Tricep     HG                                                 L                5/5        5/5          5/5        5/5                                               R                 5/5       5/5          5/5        5/5            Lower extremity                                  HF          KF         KE            DF        PF                                                  L                4-/5         4+/5        4+/5       5/5      4/5                                               R                5/5         5/5        5/5       5/5        5/5  Sensation decreased in left anterior thigh to light touch   No clonus  No Hoffmans     NEUROSURGERY CONSULT    HPI: 69 yo male PMH COPD (dx in 2018, not on home O2), 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 Martins at [facility], presents to ED complaining of worsening of chronic back pain over the last week with worsening b/l LE weakness worse in left than right leg. States that it has gotten to the point where he is unable to ambulate because of weakness and numbness in legs L > R. States that there his back pain feels worse as if her "back were being wrung out". Patient takes oxycodone at home (prescribed since 2020) up to BID. Patient also endorses constipation that has been ongoing for the past two weeks and relieved with fleet enema. Denies weakness of UE. Denies urinary retention, bowel incontinence, fevers, chills. Denies radicular pain and saddle anesthesia. Denies CP, SOB, LOC, abd, NVD.     Wife at bedside, concerned that patient continues to lose weight. Patient was > 200 lbs before his cancer diagnosis. Concerned that pt continues to lose weight, has lost 15 pounds since Summer, now at 115. Patient follows with oncology, continues to take oral chemotherapy and currently on off-cycle week. (15 Dec 2023 07:23)      RADIOLOGY:   < from: MR Lumbar Spine No Cont (12.14.23 @ 21:22) >  IMPRESSION:  No MRI evidence of lumbar spine fracture, traumatic malalignment or   suspicious osseous lesion.    Mild lumbar spine degenerative changes as discussed above.  No clinically   significant spinal canal stenosis.  Mild neural foraminal narrowing.  No   evidence of cauda equina compression or nerve root impingement.    MEDS:  acetaminophen     Tablet .. 650 milliGRAM(s) Oral every 6 hours PRN  albuterol/ipratropium for Nebulization 3 milliLiter(s) Nebulizer every 6 hours PRN  aluminum hydroxide/magnesium hydroxide/simethicone Suspension 30 milliLiter(s) Oral every 4 hours PRN  aspirin enteric coated 81 milliGRAM(s) Oral daily  budesonide  80 MICROgram(s)/formoterol 4.5 MICROgram(s) Inhaler 2 Puff(s) Inhalation daily  dexAMETHasone  Injectable 4 milliGRAM(s) IV Push every 6 hours  famotidine    Tablet 20 milliGRAM(s) Oral two times a day  gabapentin 200 milliGRAM(s) Oral two times a day  heparin   Injectable 5000 Unit(s) SubCutaneous every 12 hours  influenza  Vaccine (HIGH DOSE) 0.7 milliLiter(s) IntraMuscular once  melatonin 3 milliGRAM(s) Oral at bedtime PRN  ondansetron Injectable 4 milliGRAM(s) IV Push every 8 hours PRN  oxyCODONE    IR 5 milliGRAM(s) Oral every 4 hours PRN  polyethylene glycol 3350 17 Gram(s) Oral two times a day  senna 2 Tablet(s) Oral at bedtime  sodium chloride 0.9%. 1000 milliLiter(s) IV Continuous <Continuous>      Vital Signs Last 24 Hrs  T(C): 36.5 (15 Dec 2023 05:00), Max: 36.7 (15 Dec 2023 03:15)  T(F): 97.7 (15 Dec 2023 05:00), Max: 98 (15 Dec 2023 03:15)  HR: 80 (15 Dec 2023 05:00) (70 - 93)  BP: 101/54 (15 Dec 2023 05:00) (98/56 - 116/72)  BP(mean): --  RR: 17 (15 Dec 2023 05:00) (16 - 18)  SpO2: 97% (15 Dec 2023 05:00) (95% - 97%)    Parameters below as of 15 Dec 2023 05:00  Patient On (Oxygen Delivery Method): room air        LABS:                        10.0   6.26  )-----------( 271      ( 14 Dec 2023 15:54 )             30.9     12-14    141  |  105  |  13  ----------------------------<  85  3.6   |  27  |  0.52    Ca    8.6      14 Dec 2023 15:54    TPro  6.0  /  Alb  3.3  /  TBili  2.0<H>  /  DBili  x   /  AST  17  /  ALT  10  /  AlkPhos  94  12-14    PT/INR - ( 14 Dec 2023 15:54 )   PT: 13.6 sec;   INR: 1.21 ratio         PTT - ( 14 Dec 2023 15:54 )  PTT:29.8 sec      PHYSICAL EXAM:  AOx3, Following Commands, Face symmetrical  EOMI, PERRL, CN 2-12 intact   Motor:          Upper extremity                             Delt      Bicep     Tricep     HG                                                 L                5/5        5/5          5/5        5/5                                               R                 5/5       5/5          5/5        5/5            Lower extremity                                  HF          KF         KE            DF        PF                                                  L                4-/5         4+/5        4+/5       5/5      4/5                                               R                5/5         5/5        5/5       5/5        5/5  Sensation decreased in left anterior thigh to light touch   No clonus  No Hoffmans

## 2023-12-15 NOTE — CONSULT NOTE ADULT - ATTENDING COMMENTS
Pt seen and examined. pt of Dr. Lockhart who has received extensive cancer tx including spine surgeries who presents with worsesning b/l LE weakness. We appreciate neurosurgery recs. Please also follow up with rad onc.   Symptom management per primary team  A/w MRI CTL spine wwo contrast  - agree with decadron   -suggest pall care consult to help with symptom management    -No plans for inpatient chemo  -Please let us know when pt is ready for discharge

## 2023-12-16 NOTE — DISCHARGE NOTE PROVIDER - HOSPITAL COURSE
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    Hospital Course:  Patient transferred to medical floor for further management of cord compression. Patient started on IV Decadron with prompt improvement in symptoms. Neurosurg was consulted for possible intervention. MRI of C/T/L spine was significant for.     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    Hospital Course:  Patient transferred to medical floor for further management of cord compression. Patient started on IV Decadron with prompt improvement in symptoms. Neurosurg was consulted for possible intervention. MRI of C/T/L spine was significant for.      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    Hospital Course:  Patient transferred to medical floor for further management of cord compression. Patient started on IV Decadron with prompt improvement in symptoms. Neurosurg was consulted for possible intervention. MRI of C/T/L spine was significant for .      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    Hospital Course:  Patient transferred to medical floor for further management of cord compression. Patient started on IV Decadron with prompt improvement in symptoms. Neurosurg was consulted for possible intervention. MRI of C/T/L spine was significant for .      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    Hospital Course:  Patient transferred to medical floor for further management of cord compression. Patient started on IV Decadron with prompt improvement in symptoms. Neurosurg was consulted for possible intervention. MRI of C/T/L spine was significant for findings compatible with multifocal osseous thoracic metastases involving the T4, T5, T10, and T11 vertebral bodies as well as the posterior sixth, seventh, eighth, ninth, and 10th ribs. There is extraosseous extension of tumor involving the ventral epidural space from the level of T9-T11 which results in severe spinal canal stenosis. There is also extraosseous extension of tumor into the neuroforamina and adjacent soft tissues at the levels of T9-T10, T10-T11, and T11-T12 as   detailed above. It was determined that patient could receive benefit from radiation oncology regarding palliative radiation. However upon discussion with patient regarding goals of care it was determined that patient would not further care and proceeding via route that involves pain control. Patient would not want any following treatments and is accepting of implications.     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    Hospital Course:  Patient transferred to medical floor for further management of cord compression. Patient started on IV Decadron with prompt improvement in symptoms. Neurosurg was consulted for possible intervention. MRI of C/T/L spine was significant for findings compatible with multifocal osseous thoracic metastases involving the T4, T5, T10, and T11 vertebral bodies as well as the posterior sixth, seventh, eighth, ninth, and 10th ribs. There is extraosseous extension of tumor involving the ventral epidural space from the level of T9-T11 which results in severe spinal canal stenosis. There is also extraosseous extension of tumor into the neuroforamina and adjacent soft tissues at the levels of T9-T10, T10-T11, and T11-T12 as   detailed above. It was determined that patient could receive benefit from radiation oncology regarding palliative radiation. However upon discussion with patient regarding goals of care it was determined that patient would not further care and proceeding via route that involves pain control. Patient would not want any following treatments and is accepting of implications.     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    Hospital Course:  Patient transferred to medical floor for further management of cord compression. Patient started on IV Decadron with prompt improvement in symptoms. Neurosurg was consulted for possible intervention. MRI of C/T/L spine was significant for findings compatible with multifocal osseous thoracic metastases involving the T4, T5, T10, and T11 vertebral bodies as well as the posterior sixth, seventh, eighth, ninth, and 10th ribs. There is extraosseous extension of tumor involving the ventral epidural space from the level of T9-T11 which results in severe spinal canal stenosis. There is also extraosseous extension of tumor into the neuroforamina and adjacent soft tissues at the levels of T9-T10, T10-T11, and T11-T12 as   detailed above. It was determined that patient could receive benefit from radiation oncology regarding palliative radiation. However upon discussion with patient regarding goals of care it was determined that patient would not further care and proceeding via route that involves pain control. Patient would not want any following treatments and is accepting of implications.     Important Medication Changes and Reason:  Medrol Dose Alvarado  changed oxyCODONE    IR 5 milliGRAM(s) Oral every 4 hours PRN Moderate Pain (4 - 6) to oxyCODONE   IR 10 milliGRAM(s) Oral every 4 hours PRN Severe Pain (7 - 10)  I-Stop checked ref: #[id number], no early refills    Active or Pending Issues Requiring Follow-up:  6 day Medrol dose pack for steroid taper    Advanced Directives:   [] Full code  [ x] DNR  [ ] Hospice

## 2023-12-16 NOTE — PROGRESS NOTE ADULT - PROBLEM SELECTOR PLAN 1
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 No symptoms on history suggestive of cauda equina syndrome. Cord compression likely given history and exam.   MRI Lumbar Spine w/o contrast ordered and resulted   will need to reorder MRI Thoracic and Lumbar spine - expedited   IV decadron 10 mg stat and 4 mg q6  neurosurgery consulted 1 week hx of R>L motor weakness of LE with decreased sensation i/s/o hx of metastasis to spine 2/2 chord compression w/ laminectomy in 2020. No symptoms on history suggestive of cauda equina syndrome. Cord compression likely given history and exam. Sx improving on steroids.  IV decadron 10 mg stat and 4 mg q6  neurosurgery consulted  MRI C/T/L spine RESULTS PENDING   will consult rad-onc if positive results

## 2023-12-16 NOTE — DISCHARGE NOTE PROVIDER - DETAILS OF MALNUTRITION DIAGNOSIS/DIAGNOSES
This patient has been assessed with a concern for Malnutrition and was treated during this hospitalization for the following Nutrition diagnosis/diagnoses:     -  12/17/2023: Severe protein-calorie malnutrition   -  12/17/2023: Underweight (BMI < 19)

## 2023-12-16 NOTE — PROGRESS NOTE ADULT - ASSESSMENT
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 On IV steroids, LE weakness improved on exam. MRI spine pending. Will consult rad-onc if positive findings.  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 On IV steroids, LE weakness improved on exam. MRI spine pending. Will consult rad-onc if positive findings.

## 2023-12-16 NOTE — PROGRESS NOTE ADULT - PROBLEM SELECTOR PLAN 5
diagnosed in 2018, not on home O2, Treligy qD at home   Will start Symbicort 2 puffs daily, nebs PRN   Will resume Treligy when wife can bring inhaler in

## 2023-12-16 NOTE — DISCHARGE NOTE PROVIDER - CARE PROVIDER_API CALL
Rosey Negro  Internal Medicine  [address], [address]  [place] [address]  Phone: [phone]  Fax: [phone]  Follow Up Time:    Rosey Negro  Internal Medicine  [address]  [place] [address]  Phone: [phone]  Fax: [phone]  Follow Up Time:

## 2023-12-16 NOTE — DISCHARGE NOTE PROVIDER - NSDCCPCAREPLAN_GEN_ALL_CORE_FT
PRINCIPAL DISCHARGE DIAGNOSIS  Diagnosis: Cord compression  Assessment and Plan of Treatment: Your spinal cord has nerves that send signals or messages back and forth between your brain and the rest of your body. Spinal cord compression occurs when a mass places pressure on the cord. A mass can include a tumor or bone fragment. Compression can develop anywhere along the spinal cord from the neck to the lower spine. You were diagnosed with cord compression on physical exam and MRI. You were treated with IV steroids.     PRINCIPAL DISCHARGE DIAGNOSIS  Diagnosis: Cord compression  Assessment and Plan of Treatment: Your spinal cord has nerves that send signals or messages back and forth between your brain and the rest of your body. Spinal cord compression occurs when a mass places pressure on the cord. A mass can include a tumor or bone fragment. Compression can develop anywhere along the spinal cord from the neck to the lower spine. You were diagnosed with cord compression on physical exam and MRI. You were treated with IV steroids. Neurosurgery was consulted with recommendations for involvement of radiation oncology as palliative treatment. It was determined that you preferred having your pain treated without further involvement of radiation therapy. You were discharged on an appropriate pain medication regimen.

## 2023-12-16 NOTE — DISCHARGE NOTE PROVIDER - CARE PROVIDERS DIRECT ADDRESSES
[email or web address]

## 2023-12-16 NOTE — DISCHARGE NOTE PROVIDER - NSDCFUSCHEDAPPT_GEN_ALL_CORE_FT
[facility] Physician [facility]  Jennifer HENDRICKS Infusio  Scheduled Appointment: 12/22/2023    Gigi Renteria  [facility]  CARDIOLOGY [address] [place] [address]   Scheduled Appointment: 01/08/2024     [facility] Physician [facility]  Jennifer HENDRICKS Infusio  Scheduled Appointment: 12/22/2023    Gigi Renteria  [facility]  CARDIOLOGY [address] [place] [address]   Scheduled Appointment: 01/08/2024

## 2023-12-16 NOTE — DISCHARGE NOTE PROVIDER - NSDCMRMEDTOKEN_GEN_ALL_CORE_FT
Aspir 81 oral delayed release tablet: 1 tab(s) orally once a day  gabapentin 100 mg oral tablet: 2 orally 2 times a day  oxyCODONE 5 mg oral tablet: 1 -2 tab(s) orally every 4 hours, As needed pain MDD:6  Pepcid 20 mg oral tablet: 1 tab(s) orally 2 times a day  Trelegy Ellipta 100 mcg-62.5 mcg-25 mcg/inh inhalation powder: 1 puff(s) inhaled once a day   Aspir 81 oral delayed release tablet: 1 tab(s) orally once a day  gabapentin 100 mg oral tablet: 2 orally 2 times a day  Medrol 4 mg oral tablet: 1 tab(s) orally Take medication orally. STEROID TAPER: On the first day, take six 4 mg tablets (total of 24mg), Before breakfast: take one 4mg tablet for 5 doses, After Lunch: take one 4mg tablet for 3 doses, After Dinner take one 4mg tablet for 2 doses, Day 2 AT BEDTIME, take 8mg (2 tablets), Day 3-5 (AT BEDTIME) take one 4mg tablet for 3 doses  oxyCODONE 5 mg oral tablet: 1 -2 tab(s) orally every 4 hours, As needed pain MDD:6  Pepcid 20 mg oral tablet: 1 tab(s) orally 2 times a day  Trelegy Ellipta 100 mcg-62.5 mcg-25 mcg/inh inhalation powder: 1 puff(s) inhaled once a day   Aspir 81 oral delayed release tablet: 1 tab(s) orally once a day  gabapentin 100 mg oral tablet: 2 orally 2 times a day  Medrol 4 mg oral tablet: 1 tab(s) orally Take medication orally. STEROID TAPER: On the first day, take six 4 mg tablets (total of 24mg), Before breakfast: take one 4mg tablet for 5 doses, After Lunch: take one 4mg tablet for 3 doses, After Dinner take one 4mg tablet for 2 doses, Day 2 AT BEDTIME, take 8mg (2 tablets), Day 3-5 (AT BEDTIME) take one 4mg tablet for 3 doses  naloxone 4 mg/0.1 mL nasal spray: 4 milligram(s) intranasally  oxyCODONE 10 mg oral tablet: 1 tab(s) orally every 4 hours as needed for Severe Pain (7 - 10) MDD: 60  Pepcid 20 mg oral tablet: 1 tab(s) orally 2 times a day  Trelegy Ellipta 100 mcg-62.5 mcg-25 mcg/inh inhalation powder: 1 puff(s) inhaled once a day   Aspir 81 oral delayed release tablet: 1 tab(s) orally once a day  gabapentin 100 mg oral tablet: 2 orally 2 times a day  Medrol 4 mg oral tablet: 1 tab(s) orally once a day Take medication orally. STEROID TAPER: On the first day, take six 4 mg tablets (total of 24mg), Before breakfast: take one 4mg tablet for 5 doses, After Lunch: take one 4mg tablet for 3 doses, After Dinner take one 4mg tablet for 2 doses, Day 2 AT BEDTIME, take 8mg (2 tablets), Day 3-5 (AT BEDTIME) take one 4mg tablet for 3 doses  naloxone 4 mg/0.1 mL nasal spray: 4 milligram(s) intranasally prn as needed for  shortness of breath and/or wheezing  oxyCODONE 10 mg oral tablet: 1 tab(s) orally every 4 hours as needed for Severe Pain (7 - 10) MDD: 60  Pepcid 20 mg oral tablet: 1 tab(s) orally 2 times a day  Trelegy Ellipta 100 mcg-62.5 mcg-25 mcg/inh inhalation powder: 1 puff(s) inhaled once a day

## 2023-12-16 NOTE — PROGRESS NOTE ADULT - PROBLEM SELECTOR PLAN 2
on oxycodone at home for lower back pain with hx of spinal mets. Physical exam and MR Lumbar Spine reassuring   will c/t oxycodone 5 mg PRN q4hr  will start bowel regimen for chronic constipation on oxycodone at home for lower back pain with hx of spinal mets.   will c/t oxycodone 5 mg and 10 mg q4 PRN for moderate and severe pain respectively  will start bowel regimen for chronic constipation  cyclobenzaprine PRN  increase home gabapentin to 400 mg BID

## 2023-12-16 NOTE — PROGRESS NOTE ADULT - SUBJECTIVE AND OBJECTIVE BOX
ANGIE ZAMORA  68y  Male      Patient is a 68y old  Male who presents with a chief complaint of 1 week lower back pain (15 Dec 2023 15:03)      INTERVAL HPI/OVERNIGHT EVENTS:  Chart reviewed. Case d/w interdisciplinary team. Patient seen and examined. No acute overnight events, no PRNs required/requested. Compliant with standing medications. No physical complaints.     REVIEW OF SYSTEMS:  CONSTITUTIONAL: No fever, weight loss, or fatigue  ENMT: No sinus or throat pain  NECK: No pain or stiffness  RESPIRATORY: No cough, wheezing, chills or hemoptysis; No shortness of breath  CARDIOVASCULAR: No chest pain, palpitations, dizziness, or leg swelling  GASTROINTESTINAL: No abdominal or epigastric pain. No nausea, vomiting, or hematemesis; No diarrhea or constipation. No melena or hematochezia.  GENITOURINARY: No dysuria, frequency, hematuria, or incontinence  NEUROLOGICAL: No headaches,, loss of strength, numbness, or tremors  SKIN: No itching, burning, rashes, or lesions   MUSCULOSKELETAL: No joint pain or swelling; No muscle, back, or extremity pain    FAMILY HISTORY:  Family history of diabetes mellitus (DM) (Father)      T(C): 36.3 (12-16-23 @ 07:05), Max: 36.7 (12-15-23 @ 12:45)  HR: 70 (12-16-23 @ 07:05) (70 - 92)  BP: 105/74 (12-16-23 @ 07:05) (102/73 - 110/60)  RR: 17 (12-16-23 @ 07:05) (17 - 18)  SpO2: 99% (12-16-23 @ 07:05) (95% - 99%)  Wt(kg): --Vital Signs Last 24 Hrs  T(C): 36.3 (16 Dec 2023 07:05), Max: 36.7 (15 Dec 2023 12:45)  T(F): 97.3 (16 Dec 2023 07:05), Max: 98 (15 Dec 2023 12:45)  HR: 70 (16 Dec 2023 07:05) (70 - 92)  BP: 105/74 (16 Dec 2023 07:05) (102/73 - 110/60)  BP(mean): --  RR: 17 (16 Dec 2023 07:05) (17 - 18)  SpO2: 99% (16 Dec 2023 07:05) (95% - 99%)    Parameters below as of 16 Dec 2023 07:05  Patient On (Oxygen Delivery Method): room air      albuterol (Other (Mild))      PHYSICAL EXAM:  GENERAL: NAD, lying in bed comfortably, cachectic  HEAD:  Atraumatic, Normocephalic  EYES: EOMI, PERRLA, conjunctiva and sclera clear  ENT: Moist mucous membranes  NECK: Supple, No JVD  CHEST/LUNG: Clear to auscultation bilaterally; No rales, rhonchi, wheezing, or rubs. Unlabored respirations  HEART: Regular rate and rhythm; No murmurs, rubs, or gallops  ABDOMEN: BSx4; Soft, nontender, nondistended  EXTREMITIES:  2+ Peripheral Pulses, brisk capillary refill. No clubbing, cyanosis, or edema  NERVOUS SYSTEM:  A&Ox3, CNs Intact. UE strength and sensation intact. LE: 3/5 strength LLE, 5/5 strength RLE. No pain with ROM. LE sensation intact. No spianl tenderness.   SKIN: No rashes or lesions    Consultant(s) Notes Reviewed:  [x ] YES  [ ] NO  Care Discussed with Consultants/Other Providers [ x] YES  [ ] NO    LABS:    LABS:                        10.3   4.59  )-----------( 286      ( 16 Dec 2023 06:20 )             31.6     12-14    141  |  105  |  13  ----------------------------<  85  3.6   |  27  |  0.52    Ca    8.6      14 Dec 2023 15:54    TPro  6.0  /  Alb  3.3  /  TBili  2.0<H>  /  DBili  x   /  AST  17  /  ALT  10  /  AlkPhos  94  12-14    PT/INR - ( 14 Dec 2023 15:54 )   PT: 13.6 sec;   INR: 1.21 ratio         PTT - ( 14 Dec 2023 15:54 )  PTT:29.8 sec      Urinalysis Basic - ( 14 Dec 2023 15:54 )    Color: x / Appearance: x / SG: x / pH: x  Gluc: 85 mg/dL / Ketone: x  / Bili: x / Urobili: x   Blood: x / Protein: x / Nitrite: x   Leuk Esterase: x / RBC: x / WBC x   Sq Epi: x / Non Sq Epi: x / Bacteria: x        RADIOLOGY & ADDITIONAL TESTS:    Imaging Personally Reviewed:  [ ] YES  [ ] NO  acetaminophen     Tablet .. 650 milliGRAM(s) Oral every 6 hours PRN  aluminum hydroxide/magnesium hydroxide/simethicone Suspension 30 milliLiter(s) Oral every 4 hours PRN  aspirin enteric coated 81 milliGRAM(s) Oral daily  budesonide  80 MICROgram(s)/formoterol 4.5 MICROgram(s) Inhaler 2 Puff(s) Inhalation daily  chlorhexidine 2% Cloths 1 Application(s) Topical daily  dexAMETHasone  Injectable 4 milliGRAM(s) IV Push every 6 hours  famotidine    Tablet 20 milliGRAM(s) Oral two times a day  gabapentin 200 milliGRAM(s) Oral two times a day  heparin   Injectable 5000 Unit(s) SubCutaneous every 12 hours  influenza  Vaccine (HIGH DOSE) 0.7 milliLiter(s) IntraMuscular once  melatonin 3 milliGRAM(s) Oral at bedtime PRN  ondansetron Injectable 4 milliGRAM(s) IV Push every 8 hours PRN  oxyCODONE    IR 5 milliGRAM(s) Oral every 4 hours PRN  polyethylene glycol 3350 17 Gram(s) Oral two times a day  senna 2 Tablet(s) Oral at bedtime  sodium chloride 0.9%. 1000 milliLiter(s) IV Continuous <Continuous>      HEALTH ISSUES - PROBLEM Dx:  Lower extremity weakness    Lower back pain    Anemia    CAD (coronary atherosclerotic disease)    COPD without exacerbation    Chemotherapy induced cardiomyopathy    Encounter for preventive measure    Metastatic cancer to spine             ANGIE ZAMORA  68y  Male      Patient is a 68y old  Male who presents with a chief complaint of 1 week lower back pain (15 Dec 2023 15:03)      INTERVAL HPI/OVERNIGHT EVENTS:  Chart reviewed. Case d/w interdisciplinary team. Patient seen and examined. No acute overnight events, 2x 2mg IV morphine requested yesterday for back pain. Compliant with standing medications. No new physical complaints.      FAMILY HISTORY:  Family history of diabetes mellitus (DM) (Father)      T(C): 36.3 (12-16-23 @ 07:05), Max: 36.7 (12-15-23 @ 12:45)  HR: 70 (12-16-23 @ 07:05) (70 - 92)  BP: 105/74 (12-16-23 @ 07:05) (102/73 - 110/60)  RR: 17 (12-16-23 @ 07:05) (17 - 18)  SpO2: 99% (12-16-23 @ 07:05) (95% - 99%)  Wt(kg): --Vital Signs Last 24 Hrs  T(C): 36.3 (16 Dec 2023 07:05), Max: 36.7 (15 Dec 2023 12:45)  T(F): 97.3 (16 Dec 2023 07:05), Max: 98 (15 Dec 2023 12:45)  HR: 70 (16 Dec 2023 07:05) (70 - 92)  BP: 105/74 (16 Dec 2023 07:05) (102/73 - 110/60)  BP(mean): --  RR: 17 (16 Dec 2023 07:05) (17 - 18)  SpO2: 99% (16 Dec 2023 07:05) (95% - 99%)    Parameters below as of 16 Dec 2023 07:05  Patient On (Oxygen Delivery Method): room air      albuterol (Other (Mild))      PHYSICAL EXAM:  GENERAL: NAD, lying in bed comfortably, cachectic  HEAD:  Atraumatic, Normocephalic  EYES: EOMI, PERRLA, conjunctiva and sclera clear  ENT: Moist mucous membranes  NECK: Supple, No JVD  CHEST/LUNG: Clear to auscultation bilaterally; No rales, rhonchi, wheezing, or rubs. Unlabored respirations  HEART: Regular rate and rhythm; No murmurs, rubs, or gallops  ABDOMEN: BSx4; Soft, nontender, nondistended  EXTREMITIES:  2+ Peripheral Pulses, brisk capillary refill. No clubbing, cyanosis, or edema  NERVOUS SYSTEM:  A&Ox3, CNs Intact. UE strength and sensation intact. LE: 4/5 strength LLE, 5/5 strength RLE. No pain with ROM. LE sensation intact. No spinal tenderness.   SKIN: No rashes or lesions    Consultant(s) Notes Reviewed:  [x ] YES  [ ] NO  Care Discussed with Consultants/Other Providers [ x] YES  [ ] NO    LABS:    LABS:                        10.3   4.59  )-----------( 286      ( 16 Dec 2023 06:20 )             31.6     12-14    141  |  105  |  13  ----------------------------<  85  3.6   |  27  |  0.52    Ca    8.6      14 Dec 2023 15:54    TPro  6.0  /  Alb  3.3  /  TBili  2.0<H>  /  DBili  x   /  AST  17  /  ALT  10  /  AlkPhos  94  12-14    PT/INR - ( 14 Dec 2023 15:54 )   PT: 13.6 sec;   INR: 1.21 ratio         PTT - ( 14 Dec 2023 15:54 )  PTT:29.8 sec      Urinalysis Basic - ( 14 Dec 2023 15:54 )    Color: x / Appearance: x / SG: x / pH: x  Gluc: 85 mg/dL / Ketone: x  / Bili: x / Urobili: x   Blood: x / Protein: x / Nitrite: x   Leuk Esterase: x / RBC: x / WBC x   Sq Epi: x / Non Sq Epi: x / Bacteria: x        RADIOLOGY & ADDITIONAL TESTS:    Imaging Personally Reviewed:  [ ] YES  [ ] NO  acetaminophen     Tablet .. 650 milliGRAM(s) Oral every 6 hours PRN  aluminum hydroxide/magnesium hydroxide/simethicone Suspension 30 milliLiter(s) Oral every 4 hours PRN  aspirin enteric coated 81 milliGRAM(s) Oral daily  budesonide  80 MICROgram(s)/formoterol 4.5 MICROgram(s) Inhaler 2 Puff(s) Inhalation daily  chlorhexidine 2% Cloths 1 Application(s) Topical daily  dexAMETHasone  Injectable 4 milliGRAM(s) IV Push every 6 hours  famotidine    Tablet 20 milliGRAM(s) Oral two times a day  gabapentin 200 milliGRAM(s) Oral two times a day  heparin   Injectable 5000 Unit(s) SubCutaneous every 12 hours  influenza  Vaccine (HIGH DOSE) 0.7 milliLiter(s) IntraMuscular once  melatonin 3 milliGRAM(s) Oral at bedtime PRN  ondansetron Injectable 4 milliGRAM(s) IV Push every 8 hours PRN  oxyCODONE    IR 5 milliGRAM(s) Oral every 4 hours PRN  polyethylene glycol 3350 17 Gram(s) Oral two times a day  senna 2 Tablet(s) Oral at bedtime  sodium chloride 0.9%. 1000 milliLiter(s) IV Continuous <Continuous>      HEALTH ISSUES - PROBLEM Dx:  Lower extremity weakness    Lower back pain    Anemia    CAD (coronary atherosclerotic disease)    COPD without exacerbation    Chemotherapy induced cardiomyopathy    Encounter for preventive measure    Metastatic cancer to spine             ANGIE ZAMORA  68y  Male      Patient is a 68y old  Male who presents with a chief complaint of 1 week lower back pain (15 Dec 2023 15:03)      INTERVAL HPI/OVERNIGHT EVENTS:  Chart reviewed. Case d/w interdisciplinary team. Patient seen and examined. No acute overnight events, 2x 2mg IV morphine requested yesterday for back pain. Compliant with standing medications. No new physical complaints.      FAMILY HISTORY:  Family history of diabetes mellitus (DM) (Father)      T(C): 36.3 (12-16-23 @ 07:05), Max: 36.7 (12-15-23 @ 12:45)  HR: 70 (12-16-23 @ 07:05) (70 - 92)  BP: 105/74 (12-16-23 @ 07:05) (102/73 - 110/60)  RR: 17 (12-16-23 @ 07:05) (17 - 18)  SpO2: 99% (12-16-23 @ 07:05) (95% - 99%)  Wt(kg): --Vital Signs Last 24 Hrs  T(C): 36.3 (16 Dec 2023 07:05), Max: 36.7 (15 Dec 2023 12:45)  T(F): 97.3 (16 Dec 2023 07:05), Max: 98 (15 Dec 2023 12:45)  HR: 70 (16 Dec 2023 07:05) (70 - 92)  BP: 105/74 (16 Dec 2023 07:05) (102/73 - 110/60)  BP(mean): --  RR: 17 (16 Dec 2023 07:05) (17 - 18)  SpO2: 99% (16 Dec 2023 07:05) (95% - 99%)    Parameters below as of 16 Dec 2023 07:05  Patient On (Oxygen Delivery Method): room air      albuterol (Other (Mild))      PHYSICAL EXAM:  GENERAL: NAD, lying in bed comfortably, cachectic  HEAD:  Atraumatic, Normocephalic  EYES: EOMI, PERRLA, conjunctiva and sclera clear  ENT: Moist mucous membranes  NECK: Supple, No JVD  CHEST/LUNG: Clear to auscultation bilaterally; No rales, rhonchi, wheezing, or rubs. Unlabored respirations  HEART: Regular rate and rhythm; No murmurs, rubs, or gallops  ABDOMEN: BSx4; Soft, nontender, nondistended  EXTREMITIES:  2+ Peripheral Pulses, brisk capillary refill. No clubbing, cyanosis, or edema  NERVOUS SYSTEM:  A&Ox3, CNs Intact. UE strength and sensation intact. LE: 3/5 strength LLE, 4/5 strength RLE. No pain with ROM. LE sensation intact. No spinal tenderness.   SKIN: No rashes or lesions    Consultant(s) Notes Reviewed:  [x ] YES  [ ] NO  Care Discussed with Consultants/Other Providers [ x] YES  [ ] NO    LABS:    LABS:                        10.3   4.59  )-----------( 286      ( 16 Dec 2023 06:20 )             31.6     12-14    141  |  105  |  13  ----------------------------<  85  3.6   |  27  |  0.52    Ca    8.6      14 Dec 2023 15:54    TPro  6.0  /  Alb  3.3  /  TBili  2.0<H>  /  DBili  x   /  AST  17  /  ALT  10  /  AlkPhos  94  12-14    PT/INR - ( 14 Dec 2023 15:54 )   PT: 13.6 sec;   INR: 1.21 ratio         PTT - ( 14 Dec 2023 15:54 )  PTT:29.8 sec      Urinalysis Basic - ( 14 Dec 2023 15:54 )    Color: x / Appearance: x / SG: x / pH: x  Gluc: 85 mg/dL / Ketone: x  / Bili: x / Urobili: x   Blood: x / Protein: x / Nitrite: x   Leuk Esterase: x / RBC: x / WBC x   Sq Epi: x / Non Sq Epi: x / Bacteria: x        RADIOLOGY & ADDITIONAL TESTS:    Imaging Personally Reviewed:  [ ] YES  [ ] NO  acetaminophen     Tablet .. 650 milliGRAM(s) Oral every 6 hours PRN  aluminum hydroxide/magnesium hydroxide/simethicone Suspension 30 milliLiter(s) Oral every 4 hours PRN  aspirin enteric coated 81 milliGRAM(s) Oral daily  budesonide  80 MICROgram(s)/formoterol 4.5 MICROgram(s) Inhaler 2 Puff(s) Inhalation daily  chlorhexidine 2% Cloths 1 Application(s) Topical daily  dexAMETHasone  Injectable 4 milliGRAM(s) IV Push every 6 hours  famotidine    Tablet 20 milliGRAM(s) Oral two times a day  gabapentin 200 milliGRAM(s) Oral two times a day  heparin   Injectable 5000 Unit(s) SubCutaneous every 12 hours  influenza  Vaccine (HIGH DOSE) 0.7 milliLiter(s) IntraMuscular once  melatonin 3 milliGRAM(s) Oral at bedtime PRN  ondansetron Injectable 4 milliGRAM(s) IV Push every 8 hours PRN  oxyCODONE    IR 5 milliGRAM(s) Oral every 4 hours PRN  polyethylene glycol 3350 17 Gram(s) Oral two times a day  senna 2 Tablet(s) Oral at bedtime  sodium chloride 0.9%. 1000 milliLiter(s) IV Continuous <Continuous>      HEALTH ISSUES - PROBLEM Dx:  Lower extremity weakness    Lower back pain    Anemia    CAD (coronary atherosclerotic disease)    COPD without exacerbation    Chemotherapy induced cardiomyopathy    Encounter for preventive measure    Metastatic cancer to spine

## 2023-12-17 NOTE — DIETITIAN INITIAL EVALUATION ADULT - PERTINENT LABORATORY DATA
12-17    139  |  105  |  14  ----------------------------<  150<H>  3.8   |  24  |  0.44<L>    Ca    7.7<L>      17 Dec 2023 06:30  Phos  1.8     12-17  Mg     2.10     12-17    TPro  5.4<L>  /  Alb  3.2<L>  /  TBili  0.8  /  DBili  x   /  AST  12  /  ALT  9   /  AlkPhos  81  12-17

## 2023-12-17 NOTE — DIETITIAN INITIAL EVALUATION ADULT - PERTINENT MEDS FT
MEDICATIONS  (STANDING):  aspirin enteric coated 81 milliGRAM(s) Oral daily  budesonide  80 MICROgram(s)/formoterol 4.5 MICROgram(s) Inhaler 2 Puff(s) Inhalation daily  chlorhexidine 2% Cloths 1 Application(s) Topical daily  dexAMETHasone  Injectable 4 milliGRAM(s) IV Push every 6 hours  famotidine    Tablet 20 milliGRAM(s) Oral two times a day  gabapentin 400 milliGRAM(s) Oral two times a day  heparin   Injectable 5000 Unit(s) SubCutaneous every 12 hours  influenza  Vaccine (HIGH DOSE) 0.7 milliLiter(s) IntraMuscular once  naloxone Injectable 0.4 milliGRAM(s) IV Push once  polyethylene glycol 3350 17 Gram(s) Oral two times a day  senna 2 Tablet(s) Oral at bedtime  sodium chloride 0.9%. 1000 milliLiter(s) (75 mL/Hr) IV Continuous <Continuous>    MEDICATIONS  (PRN):  acetaminophen     Tablet .. 650 milliGRAM(s) Oral every 6 hours PRN Temp greater or equal to 38C (100.4F), Mild Pain (1 - 3)  aluminum hydroxide/magnesium hydroxide/simethicone Suspension 30 milliLiter(s) Oral every 4 hours PRN Dyspepsia  bisacodyl 5 milliGRAM(s) Oral daily PRN Constipation  cyclobenzaprine 5 milliGRAM(s) Oral three times a day PRN Muscle Spasm  lidocaine   4% Patch 1 Patch Transdermal every 24 hours PRN back pain  melatonin 3 milliGRAM(s) Oral at bedtime PRN Insomnia  ondansetron Injectable 4 milliGRAM(s) IV Push every 8 hours PRN Nausea and/or Vomiting  oxyCODONE    IR 10 milliGRAM(s) Oral every 4 hours PRN Severe Pain (7 - 10)  oxyCODONE    IR 5 milliGRAM(s) Oral every 4 hours PRN Moderate Pain (4 - 6)

## 2023-12-17 NOTE — PROGRESS NOTE ADULT - PROBLEM SELECTOR PLAN 5
-diagnosed in 2018, not on home O2, Treligy qD at home   -Will start Symbicort 2 puffs daily, nebs PRN   -Will resume Treligy when wife can bring inhaler in

## 2023-12-17 NOTE — DIETITIAN INITIAL EVALUATION ADULT - NAME AND PHONE
Starr Rodriguez RD  [place] Teams or Pager [phone]  Starr Rodriguez RD  Referral.[facility] Teams or Pager [phone]

## 2023-12-17 NOTE — PROGRESS NOTE ADULT - ASSESSMENT
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 On IV steroids, LE weakness improved on exam. MRI spine pending. Will consult rad-onc if positive findings.

## 2023-12-17 NOTE — PROGRESS NOTE ADULT - PROBLEM SELECTOR PLAN 1
- Case discussed with spine attending neurosurgeon Dr. Walker Brown with review of all imaging. Tumor progression at T9-11 which is inbetween previous fusion of T4-12. Surgical resection would require an extensive operation that requires removal of all hardware, blood loss, nerve injury, and risks paralysis, removal of tumor and replacement of new hardware and extension of fusion to lumbar spine. Operation would be minimum 8-10 hours long - all prone.  - Patient does not appear to be medically tuned to withstand this type of operation.   - I discussed surgery with his wife and him at bedside and she would not want to pursue any invasive surgery of this nature given his "frailty" and current condition.   - patient and wife understandably frustrated that the metastasis disease of the spine has progressed and is very amendable to symptom/pain control   - It does not appear patient is a candidate for further RT but would defer to RadOnc  - Can taper off  decadron over the course of 2 weeks (can be changed to oral)    - Will remain available as needed  - Consider palliative consult to assist with pain control.       Case d/w Dr. Brown

## 2023-12-17 NOTE — DIETITIAN INITIAL EVALUATION ADULT - NUTRITIONGOAL OUTCOME1
Adequate oral intake(>75% at most meals, 100% oral nutrition supplement)   Maintain wt (+-2%)/Gradual wt gain (+2%)  Minimize s/s malnutrition

## 2023-12-17 NOTE — DIETITIAN INITIAL EVALUATION ADULT - OTHER INFO
Pt seen bedside. NKFA. Denied N/V/D/C. Last BM: last evening. Denies current documented wt (45.5kg), UBW closer to 115#. Since CT/RT, pt hovered 130-135# 2-3 mo ago however is 115# recently (-11.5% x 2-3 mo, significant). Pt is unclear why he has unintentional rapid wt loss.

## 2023-12-17 NOTE — PROGRESS NOTE ADULT - SUBJECTIVE AND OBJECTIVE BOX
Progress notes: feels improvement with strenth  with steroids  Continues to have LE paresthesias  Denies bowel or bladder incontinence    (15 Dec 2023 07:23)    PAST MEDICAL & SURGICAL HISTORY:  Malignant neoplasm of esophagus, unspecified      Chronic obstructive pulmonary disease      Non-recurrent unilateral inguinal hernia without obstruction or gangrene      History of bilateral inguinal hernia repair      History of cholecystectomy      H/O ventral hernia repair      History of esophageal surgery      History of lumbar laminectomy      S/P laminectomy with spinal fusion        Allergies    albuterol (Other (Mild))    Intolerances      acetaminophen     Tablet .. 650 milliGRAM(s) Oral every 6 hours PRN  aluminum hydroxide/magnesium hydroxide/simethicone Suspension 30 milliLiter(s) Oral every 4 hours PRN  aspirin enteric coated 81 milliGRAM(s) Oral daily  bisacodyl 5 milliGRAM(s) Oral daily PRN  budesonide  80 MICROgram(s)/formoterol 4.5 MICROgram(s) Inhaler 2 Puff(s) Inhalation daily  chlorhexidine 2% Cloths 1 Application(s) Topical daily  cyclobenzaprine 5 milliGRAM(s) Oral three times a day PRN  dexAMETHasone  Injectable 4 milliGRAM(s) IV Push every 6 hours  famotidine    Tablet 20 milliGRAM(s) Oral two times a day  gabapentin 400 milliGRAM(s) Oral two times a day  heparin   Injectable 5000 Unit(s) SubCutaneous every 12 hours  influenza  Vaccine (HIGH DOSE) 0.7 milliLiter(s) IntraMuscular once  lidocaine   4% Patch 1 Patch Transdermal every 24 hours PRN  melatonin 3 milliGRAM(s) Oral at bedtime PRN  naloxone Injectable 0.4 milliGRAM(s) IV Push once  ondansetron Injectable 4 milliGRAM(s) IV Push every 8 hours PRN  oxyCODONE    IR 10 milliGRAM(s) Oral every 4 hours PRN  oxyCODONE    IR 5 milliGRAM(s) Oral every 4 hours PRN  polyethylene glycol 3350 17 Gram(s) Oral two times a day  senna 2 Tablet(s) Oral at bedtime  sodium chloride 0.9%. 1000 milliLiter(s) IV Continuous <Continuous>    SOCIAL HISTORY:  FAMILY HISTORY:  Family history of diabetes mellitus (DM) (Father)      Vital Signs Last 24 Hrs  T(C): 36.6 (17 Dec 2023 13:37), Max: 36.7 (17 Dec 2023 05:53)  T(F): 97.9 (17 Dec 2023 13:37), Max: 98 (17 Dec 2023 05:53)  HR: 99 (17 Dec 2023 13:37) (74 - 99)  BP: 107/66 (17 Dec 2023 13:37) (100/63 - 107/66)  BP(mean): --  RR: 18 (17 Dec 2023 13:37) (16 - 18)  SpO2: 99% (17 Dec 2023 13:37) (99% - 100%)    Parameters below as of 17 Dec 2023 13:37  Patient On (Oxygen Delivery Method): room air        PHYSICAL EXAM:  Awake Alert Attentive Affect appropriate Ox3 pleasant  Frail, slightly cachectic  Wife at beside,   PERRL EOMI  Tone: normal.                  Strength:     [X] Upper extremity                      Delt       Bicep    Tricep                                                  R         5/5        5/5        5/5       5/5                                               L          5/5        5/5        5/5       5/5  [X] Lower extremity                       HF          KE          KF        DF         PF    EHL                                               R       4/5        5/5        5/5       5/5       5/5      5/5                                               L         3/5        5/5       5/5       5/5        5/5       5/5  Sensation intact to Light touch   Reflexes WNL, No clonus, Toes down going    LABS:                          9.1    9.82  )-----------( 283      ( 17 Dec 2023 06:30 )             27.5     12-17    139  |  105  |  14  ----------------------------<  150<H>  3.8   |  24  |  0.44<L>    Ca    7.7<L>      17 Dec 2023 06:30  Phos  1.8     12-17  Mg     2.10     12-17    TPro  5.4<L>  /  Alb  3.2<L>  /  TBili  0.8  /  DBili  x   /  AST  12  /  ALT  9   /  AlkPhos  81  12-17      Urinalysis Basic - ( 17 Dec 2023 06:30 )    Color: x / Appearance: x / SG: x / pH: x  Gluc: 150 mg/dL / Ketone: x  / Bili: x / Urobili: x   Blood: x / Protein: x / Nitrite: x   Leuk Esterase: x / RBC: x / WBC x   Sq Epi: x / Non Sq Epi: x / Bacteria: x        RADIOLOGY & ADDITIONAL STUDIES:  < from: MR Thoracic Spine w/wo IV Cont (12.15.23 @ 15:47) >    ACC: [id number] EXAM:  MR SPINE THORACIC WAW IC   ORDERED BY: HUGO CHEN     ACC: [id number] EXAM:  MR SPINE CERVICAL WAW IC   ORDERED BY: HUGO CHEN     ACC: [id number] EXAM:  MR SPINE LUMBAR IC   ORDERED BY: HUGO CHEN     *** ADDENDUM # 1 ***    Signal abnormality in the T4 and T5 vertebral bodies may be related to   evolving changes from subacute to chronic compression fractures rather   than metastatic disease. Follow-up MR imaging without and with contrast   of the thoracic spine in approximately 4-6 weeks would be useful.    --- End of Report ---    *** END OF ADDENDUM # 1 ***      PROCEDURE DATE:  12/15/2023          INTERPRETATION:  MR CERVICAL, THORACIC, AND LUMBAR SPINE WITHOUT AND WITH   CONTRAST.      TECHNIQUE: Multiplanar multisequence imaging of the cervical, thoracic,   and lumbar spine was performed before and after the administration of   intravenous contrast.    CONTRAST: 4.5 mL gadavist was administered. 3 mL was discarded.    CLINICAL INDICATION: Concern for cord compression. 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  COMPARISON: PET/CT 6/27/2023. CT of the chest abdomen and pelvis with   contrast 9/18/2023. MR of the thoracic and lumbar spine 11/12/2020. MR of   the thoracic spine 11/10/2020 MR of the lumbar spine without contrast   12/14/2023.  ____________________  FINDINGS:    CERVICAL SPINE:  Mildly to moderately degraded by motion artifact.  Mild cervical levoscoliosis with apex near C3-C4. Very mild   retrolisthesis of C6 on C7 and C5 on C6. Cervical alignment otherwise   maintained. Vertebral body heights are maintained. No acute fracture. No   aggressive osseous lesions. Multilevel endplate degenerative changes.    Normal cord caliber and signal. No suspicious foci of intrathecal   enhancement.    No significant abnormality of the visualized intracranial contents.    C2-C3: Disc osteophyte complex. No significant spinal canal or   neuroforaminal stenosis.Minimal hypertrophic facet joint degeneration.    C3-C4: Disc osteophyte complex. No significant spinal canal stenosis.   Moderate to severe left neuroforaminal stenosis. Moderate right   neuroforaminal stenosis. Mild hypertrophic facet joint degeneration.    C4-C5: Disc osteophyte complex. Moderate spinal canal stenosis. Moderate   right neuroforaminal stenosis. Severe left neuroforaminal stenosis. Mild   to moderate hypertrophic facet joint degeneration.    C5-C6: Disc osteophyte complex. Severe spinal canal stenosis. Severe   bilateral neuroforaminal stenosis. Mild to moderate hypertrophic facet   joint degeneration.    C6-C7: Disc osteophyte complex. Mild spinal canal stenosis. Severe   bilateral neuroforaminal stenosis mild to moderate hypertrophic facet   joint degeneration.    C7-T1: Disc osteophyte complex. No significant spinal canal stenosis. No   significant right neuroforaminal stenosis. Mild left neuroforaminal   stenosis. Mild hypertrophic facet joint degeneration.    Paraspinal Tissues: Unremarkable.      THORACIC SPINE:  Mildly to moderately degraded by motion artifact.    Susceptibility artifact from the hardware limits assessment of the   adjacent soft tissues.    12 thoracic type vertebral bodies. Mild thoracic dextroscoliosis with   apex near T8. Mild lower thoracic lordosis. Moderate mid to upper   thoracic kyphosis. Minimal anterolisthesis of T4 on T5. Very mild   anterolisthesis of C7 on T1. Thoracic alignment otherwise maintained.    Status post bilateral pedicle screw placement at T3, T4, T8, T11, and T12   with bilateral paraspinal rods and associated postsurgical changes of the   posterior elements. Decompressive laminectomy extending from T3-T7-8   better demonstrated on the recent CT.    Multilevelunchanged thoracic compression deformities compared to   9/18/2023 including:  -Moderate wedge T12.  -Mild wedge/inferior endplate T11.  -Severe wedge T10.  -Severe wedge T9.  -Moderate to severe wedge T7.  -Mild to moderate wedge at T5 with suggestion of a fracture line   paralleling the superior endplate as on the CT from 9/18/2023.  -Mild to moderate wedge at T4 with suggestion of a fracture line   paralleling the superior endplate as on the CT from 9/18/2023.    Remainder of the thoracic vertebral body heights are maintained. No   findings convincing for an acute or subacute thoracic compression   fracture.    T1 hypointense signal is present throughout the T4, T5, T9, T10, and T11   vertebral bodies with heterogeneous enhancement in the T4, T5, T10, and   T11 vertebral bodies. Increased T2/STIR signal is present in the T4, T5,   T7, T10 and T11 vertebral bodies and to a lesser extent in the T9   vertebral body.    There is suspicious T1 hypointense signal and enhancement in the   posterior medial left seventh rib and the posterior medial right sixth,   seventh, eighth, ninth, and 10th posterior medial ribs. There is   suspicious increased T2/STIR signal in the left seventh and eighth   posterior medial ribs and in the posterior medial right sixth, seventh,   eighth, ninth, and 10th posterior medial ribs.    There is enhancement of the neuroforamina and adjacent soft tissues on   the right extending from the level of T9-T10 inferiorly through T11-T12.   There is enhancement ofthe neuroforamina and adjacent soft tissues on   the left at T9-T10 and T10-T11.    Susceptibility artifact from the hardware mildly limits assessment of the   spinal canal. There is severe spinal canal stenosis extending from the   level of T9 inferiorly through T11 that is apparent on the axial T2   sequence. There is suggestion of extraosseous extension of tumor into the   ventral epidural space at the level of T9, T10, and T11 measuring up to 7   mm image 13 of series 5, image 12 of series 12, image 33 of series 9,   image 36 of series 9, image 37 of series 9, image 38 of series 9.    The visualized thoracic cord has calibar and signal. No suspicious foci   of intrathecal enhancement.    Postsurgical changes of the thoracic dorsal paraspinal soft tissues.   Status post gastric pull-through with postsurgical changes as on the   recent CT. Mild right pleural effusion.      LUMBAR SPINE:  Mildly degraded by motion artifact.  L5-S1 is at image 30 of series 6. There are 5 lumbar type vertebral   bodies.    Mild lumbar levoscoliosis with apex near the L3-L4. Very mild   anterolisthesis of L4 on L5. Very mild anterolisthesis of L5 on S1.   Lumbar alignment otherwise maintained. Visualized vertebral body heights   are maintained. No acutefractures. No aggressive osseous lesions. Very   mild multilevel endplate degenerative changes    Conus has a normal appearance and terminates at L1. No suspicious foci of   intrathecal enhancement.    No significant lumbar spinal canal stenosis.    Paraspinal Soft Tissues/Retroperitoneum: Unremarkable.    ____________________  IMPRESSION:    1.  Findings compatible with multifocal osseous thoracic metastases   involving the T4, T5, T10, and T11 vertebral bodies as well as the   posterior sixth, seventh, eighth, ninth, and 10th ribs. There is   extraosseous extension of tumor involving the ventral epidural space from   the level of T9-T11 which results in severe spinal canal stenosis. There   is also extraosseous extension of tumor into the neuroforamina and   adjacent soft tissues at the levels of T9-T10, T10-T11, and T11-T12 as   detailed above.  2.  Multilevel thoracic compression deformities as detailed above, not   significantly changed in comparison to 9/18/2023.  3.  Postsurgical changes of the thoracic spine as detailed above. Note   that MR is suboptimal for the assessment of hardware integrity. Consider   further evaluation with CT of the thoracic spine without contrast as   clinically indicated.  4.  Multilevel cervical spondylosis as detailed above with at most severe   spinal canal stenosis and severe neuroforaminal stenosis.      Critical findings discussed with Dr. Louie by Dr. Michael Bhagat at   12/17/2023 2:23 PM.    --- End of Report ---    ***Please see the addendum at the top of this report. It may contain   additional important information or changes.****    < end of copied text >           Progress notes: feels improvement with strenth  with steroids  Continues to have LE paresthesias  Denies bowel or bladder incontinence    (15 Dec 2023 07:23)    PAST MEDICAL & SURGICAL HISTORY:  Malignant neoplasm of esophagus, unspecified      Chronic obstructive pulmonary disease      Non-recurrent unilateral inguinal hernia without obstruction or gangrene      History of bilateral inguinal hernia repair      History of cholecystectomy      H/O ventral hernia repair      History of esophageal surgery      History of lumbar laminectomy      S/P laminectomy with spinal fusion        Allergies    albuterol (Other (Mild))    Intolerances      acetaminophen     Tablet .. 650 milliGRAM(s) Oral every 6 hours PRN  aluminum hydroxide/magnesium hydroxide/simethicone Suspension 30 milliLiter(s) Oral every 4 hours PRN  aspirin enteric coated 81 milliGRAM(s) Oral daily  bisacodyl 5 milliGRAM(s) Oral daily PRN  budesonide  80 MICROgram(s)/formoterol 4.5 MICROgram(s) Inhaler 2 Puff(s) Inhalation daily  chlorhexidine 2% Cloths 1 Application(s) Topical daily  cyclobenzaprine 5 milliGRAM(s) Oral three times a day PRN  dexAMETHasone  Injectable 4 milliGRAM(s) IV Push every 6 hours  famotidine    Tablet 20 milliGRAM(s) Oral two times a day  gabapentin 400 milliGRAM(s) Oral two times a day  heparin   Injectable 5000 Unit(s) SubCutaneous every 12 hours  influenza  Vaccine (HIGH DOSE) 0.7 milliLiter(s) IntraMuscular once  lidocaine   4% Patch 1 Patch Transdermal every 24 hours PRN  melatonin 3 milliGRAM(s) Oral at bedtime PRN  naloxone Injectable 0.4 milliGRAM(s) IV Push once  ondansetron Injectable 4 milliGRAM(s) IV Push every 8 hours PRN  oxyCODONE    IR 10 milliGRAM(s) Oral every 4 hours PRN  oxyCODONE    IR 5 milliGRAM(s) Oral every 4 hours PRN  polyethylene glycol 3350 17 Gram(s) Oral two times a day  senna 2 Tablet(s) Oral at bedtime  sodium chloride 0.9%. 1000 milliLiter(s) IV Continuous <Continuous>    SOCIAL HISTORY:  FAMILY HISTORY:  Family history of diabetes mellitus (DM) (Father)      Vital Signs Last 24 Hrs  T(C): 36.6 (17 Dec 2023 13:37), Max: 36.7 (17 Dec 2023 05:53)  T(F): 97.9 (17 Dec 2023 13:37), Max: 98 (17 Dec 2023 05:53)  HR: 99 (17 Dec 2023 13:37) (74 - 99)  BP: 107/66 (17 Dec 2023 13:37) (100/63 - 107/66)  BP(mean): --  RR: 18 (17 Dec 2023 13:37) (16 - 18)  SpO2: 99% (17 Dec 2023 13:37) (99% - 100%)    Parameters below as of 17 Dec 2023 13:37  Patient On (Oxygen Delivery Method): room air        PHYSICAL EXAM:  Awake Alert Attentive Affect appropriate Ox3 pleasant  Frail, slightly cachectic  Wife at beside,   PERRL EOMI  Tone: normal.                  Strength:     [X] Upper extremity                      Delt       Bicep    Tricep                                                  R         5/5        5/5        5/5       5/5                                               L          5/5        5/5        5/5       5/5  [X] Lower extremity                       HF          KE          KF        DF         PF    EHL                                               R       4/5        5/5        5/5       5/5       5/5      5/5                                               L         3/5        5/5       5/5       5/5        5/5       5/5  Sensation intact to Light touch   Reflexes WNL, No clonus, Toes down going    LABS:                          9.1    9.82  )-----------( 283      ( 17 Dec 2023 06:30 )             27.5     12-17    139  |  105  |  14  ----------------------------<  150<H>  3.8   |  24  |  0.44<L>    Ca    7.7<L>      17 Dec 2023 06:30  Phos  1.8     12-17  Mg     2.10     12-17    TPro  5.4<L>  /  Alb  3.2<L>  /  TBili  0.8  /  DBili  x   /  AST  12  /  ALT  9   /  AlkPhos  81  12-17      Urinalysis Basic - ( 17 Dec 2023 06:30 )    Color: x / Appearance: x / SG: x / pH: x  Gluc: 150 mg/dL / Ketone: x  / Bili: x / Urobili: x   Blood: x / Protein: x / Nitrite: x   Leuk Esterase: x / RBC: x / WBC x   Sq Epi: x / Non Sq Epi: x / Bacteria: x        RADIOLOGY & ADDITIONAL STUDIES:  < from: MR Thoracic Spine w/wo IV Cont (12.15.23 @ 15:47) >    ACC: [id number] EXAM:  MR SPINE THORACIC WAW IC   ORDERED BY: HUGO CHEN     ACC: [id number] EXAM:  MR SPINE CERVICAL WAW IC   ORDERED BY: HUGO CHEN     ACC: [id number] EXAM:  MR SPINE LUMBAR IC   ORDERED BY: HUGO CHEN     *** ADDENDUM # 1 ***    Signal abnormality in the T4 and T5 vertebral bodies may be related to   evolving changes from subacute to chronic compression fractures rather   than metastatic disease. Follow-up MR imaging without and with contrast   of the thoracic spine in approximately 4-6 weeks would be useful.    --- End of Report ---    *** END OF ADDENDUM # 1 ***      PROCEDURE DATE:  12/15/2023          INTERPRETATION:  MR CERVICAL, THORACIC, AND LUMBAR SPINE WITHOUT AND WITH   CONTRAST.      TECHNIQUE: Multiplanar multisequence imaging of the cervical, thoracic,   and lumbar spine was performed before and after the administration of   intravenous contrast.    CONTRAST: 4.5 mL gadavist was administered. 3 mL was discarded.    CLINICAL INDICATION: Concern for cord compression. 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  COMPARISON: PET/CT 6/27/2023. CT of the chest abdomen and pelvis with   contrast 9/18/2023. MR of the thoracic and lumbar spine 11/12/2020. MR of   the thoracic spine 11/10/2020 MR of the lumbar spine without contrast   12/14/2023.  ____________________  FINDINGS:    CERVICAL SPINE:  Mildly to moderately degraded by motion artifact.  Mild cervical levoscoliosis with apex near C3-C4. Very mild   retrolisthesis of C6 on C7 and C5 on C6. Cervical alignment otherwise   maintained. Vertebral body heights are maintained. No acute fracture. No   aggressive osseous lesions. Multilevel endplate degenerative changes.    Normal cord caliber and signal. No suspicious foci of intrathecal   enhancement.    No significant abnormality of the visualized intracranial contents.    C2-C3: Disc osteophyte complex. No significant spinal canal or   neuroforaminal stenosis.Minimal hypertrophic facet joint degeneration.    C3-C4: Disc osteophyte complex. No significant spinal canal stenosis.   Moderate to severe left neuroforaminal stenosis. Moderate right   neuroforaminal stenosis. Mild hypertrophic facet joint degeneration.    C4-C5: Disc osteophyte complex. Moderate spinal canal stenosis. Moderate   right neuroforaminal stenosis. Severe left neuroforaminal stenosis. Mild   to moderate hypertrophic facet joint degeneration.    C5-C6: Disc osteophyte complex. Severe spinal canal stenosis. Severe   bilateral neuroforaminal stenosis. Mild to moderate hypertrophic facet   joint degeneration.    C6-C7: Disc osteophyte complex. Mild spinal canal stenosis. Severe   bilateral neuroforaminal stenosis mild to moderate hypertrophic facet   joint degeneration.    C7-T1: Disc osteophyte complex. No significant spinal canal stenosis. No   significant right neuroforaminal stenosis. Mild left neuroforaminal   stenosis. Mild hypertrophic facet joint degeneration.    Paraspinal Tissues: Unremarkable.      THORACIC SPINE:  Mildly to moderately degraded by motion artifact.    Susceptibility artifact from the hardware limits assessment of the   adjacent soft tissues.    12 thoracic type vertebral bodies. Mild thoracic dextroscoliosis with   apex near T8. Mild lower thoracic lordosis. Moderate mid to upper   thoracic kyphosis. Minimal anterolisthesis of T4 on T5. Very mild   anterolisthesis of C7 on T1. Thoracic alignment otherwise maintained.    Status post bilateral pedicle screw placement at T3, T4, T8, T11, and T12   with bilateral paraspinal rods and associated postsurgical changes of the   posterior elements. Decompressive laminectomy extending from T3-T7-8   better demonstrated on the recent CT.    Multilevelunchanged thoracic compression deformities compared to   9/18/2023 including:  -Moderate wedge T12.  -Mild wedge/inferior endplate T11.  -Severe wedge T10.  -Severe wedge T9.  -Moderate to severe wedge T7.  -Mild to moderate wedge at T5 with suggestion of a fracture line   paralleling the superior endplate as on the CT from 9/18/2023.  -Mild to moderate wedge at T4 with suggestion of a fracture line   paralleling the superior endplate as on the CT from 9/18/2023.    Remainder of the thoracic vertebral body heights are maintained. No   findings convincing for an acute or subacute thoracic compression   fracture.    T1 hypointense signal is present throughout the T4, T5, T9, T10, and T11   vertebral bodies with heterogeneous enhancement in the T4, T5, T10, and   T11 vertebral bodies. Increased T2/STIR signal is present in the T4, T5,   T7, T10 and T11 vertebral bodies and to a lesser extent in the T9   vertebral body.    There is suspicious T1 hypointense signal and enhancement in the   posterior medial left seventh rib and the posterior medial right sixth,   seventh, eighth, ninth, and 10th posterior medial ribs. There is   suspicious increased T2/STIR signal in the left seventh and eighth   posterior medial ribs and in the posterior medial right sixth, seventh,   eighth, ninth, and 10th posterior medial ribs.    There is enhancement of the neuroforamina and adjacent soft tissues on   the right extending from the level of T9-T10 inferiorly through T11-T12.   There is enhancement ofthe neuroforamina and adjacent soft tissues on   the left at T9-T10 and T10-T11.    Susceptibility artifact from the hardware mildly limits assessment of the   spinal canal. There is severe spinal canal stenosis extending from the   level of T9 inferiorly through T11 that is apparent on the axial T2   sequence. There is suggestion of extraosseous extension of tumor into the   ventral epidural space at the level of T9, T10, and T11 measuring up to 7   mm image 13 of series 5, image 12 of series 12, image 33 of series 9,   image 36 of series 9, image 37 of series 9, image 38 of series 9.    The visualized thoracic cord has calibar and signal. No suspicious foci   of intrathecal enhancement.    Postsurgical changes of the thoracic dorsal paraspinal soft tissues.   Status post gastric pull-through with postsurgical changes as on the   recent CT. Mild right pleural effusion.      LUMBAR SPINE:  Mildly degraded by motion artifact.  L5-S1 is at image 30 of series 6. There are 5 lumbar type vertebral   bodies.    Mild lumbar levoscoliosis with apex near the L3-L4. Very mild   anterolisthesis of L4 on L5. Very mild anterolisthesis of L5 on S1.   Lumbar alignment otherwise maintained. Visualized vertebral body heights   are maintained. No acutefractures. No aggressive osseous lesions. Very   mild multilevel endplate degenerative changes    Conus has a normal appearance and terminates at L1. No suspicious foci of   intrathecal enhancement.    No significant lumbar spinal canal stenosis.    Paraspinal Soft Tissues/Retroperitoneum: Unremarkable.    ____________________  IMPRESSION:    1.  Findings compatible with multifocal osseous thoracic metastases   involving the T4, T5, T10, and T11 vertebral bodies as well as the   posterior sixth, seventh, eighth, ninth, and 10th ribs. There is   extraosseous extension of tumor involving the ventral epidural space from   the level of T9-T11 which results in severe spinal canal stenosis. There   is also extraosseous extension of tumor into the neuroforamina and   adjacent soft tissues at the levels of T9-T10, T10-T11, and T11-T12 as   detailed above.  2.  Multilevel thoracic compression deformities as detailed above, not   significantly changed in comparison to 9/18/2023.  3.  Postsurgical changes of the thoracic spine as detailed above. Note   that MR is suboptimal for the assessment of hardware integrity. Consider   further evaluation with CT of the thoracic spine without contrast as   clinically indicated.  4.  Multilevel cervical spondylosis as detailed above with at most severe   spinal canal stenosis and severe neuroforaminal stenosis.      Critical findings discussed with Dr. Louie by Dr. Michael Bhagat at   12/17/2023 2:23 PM.    --- End of Report ---    ***Please see the addendum at the top of this report. It may contain   additional important information or changes.****    < end of copied text >

## 2023-12-17 NOTE — PROGRESS NOTE ADULT - PROBLEM SELECTOR PLAN 1
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 No symptoms on history suggestive of cauda equina syndrome. Cord compression likely given history and exam. Sx improving on steroids.  -IV decadron 10 mg stat and 4 mg q6  -neurosurgery consulted  -MRI C/T/L spine -> preliminary read found no cord compression  -will consult rad-onc if positive results

## 2023-12-17 NOTE — PROGRESS NOTE ADULT - SUBJECTIVE AND OBJECTIVE BOX
Jani Louie MD  PGY 2 Department of Internal Medicine        Patient is a 68y old  Male who presents with a chief complaint of 1 week lower back pain (16 Dec 2023 13:01)      SUBJECTIVE / OVERNIGHT EVENTS: Pt seen and examined. No acute overnight events. Denies fevers, chills, CP, SOB, Abdominal pain, N/V, Constipation, Diarrhea        MEDICATIONS  (STANDING):  aspirin enteric coated 81 milliGRAM(s) Oral daily  budesonide  80 MICROgram(s)/formoterol 4.5 MICROgram(s) Inhaler 2 Puff(s) Inhalation daily  chlorhexidine 2% Cloths 1 Application(s) Topical daily  dexAMETHasone  Injectable 4 milliGRAM(s) IV Push every 6 hours  famotidine    Tablet 20 milliGRAM(s) Oral two times a day  gabapentin 400 milliGRAM(s) Oral two times a day  heparin   Injectable 5000 Unit(s) SubCutaneous every 12 hours  influenza  Vaccine (HIGH DOSE) 0.7 milliLiter(s) IntraMuscular once  naloxone Injectable 0.4 milliGRAM(s) IV Push once  polyethylene glycol 3350 17 Gram(s) Oral two times a day  senna 2 Tablet(s) Oral at bedtime  sodium chloride 0.9%. 1000 milliLiter(s) (75 mL/Hr) IV Continuous <Continuous>    MEDICATIONS  (PRN):  acetaminophen     Tablet .. 650 milliGRAM(s) Oral every 6 hours PRN Temp greater or equal to 38C (100.4F), Mild Pain (1 - 3)  aluminum hydroxide/magnesium hydroxide/simethicone Suspension 30 milliLiter(s) Oral every 4 hours PRN Dyspepsia  bisacodyl 5 milliGRAM(s) Oral daily PRN Constipation  cyclobenzaprine 5 milliGRAM(s) Oral three times a day PRN Muscle Spasm  lidocaine   4% Patch 1 Patch Transdermal every 24 hours PRN back pain  melatonin 3 milliGRAM(s) Oral at bedtime PRN Insomnia  ondansetron Injectable 4 milliGRAM(s) IV Push every 8 hours PRN Nausea and/or Vomiting  oxyCODONE    IR 10 milliGRAM(s) Oral every 4 hours PRN Severe Pain (7 - 10)  oxyCODONE    IR 5 milliGRAM(s) Oral every 4 hours PRN Moderate Pain (4 - 6)      I&O's Summary    16 Dec 2023 07:01  -  17 Dec 2023 07:00  --------------------------------------------------------  IN: 0 mL / OUT: 750 mL / NET: -750 mL        Vital Signs Last 24 Hrs  T(C): 36.7 (17 Dec 2023 05:53), Max: 36.7 (17 Dec 2023 05:53)  T(F): 98 (17 Dec 2023 05:53), Max: 98 (17 Dec 2023 05:53)  HR: 74 (17 Dec 2023 05:53) (74 - 91)  BP: 107/63 (17 Dec 2023 05:53) (100/63 - 107/63)  BP(mean): --  RR: 17 (17 Dec 2023 05:53) (16 - 17)  SpO2: 100% (17 Dec 2023 05:53) (98% - 100%)    Parameters below as of 17 Dec 2023 05:53  Patient On (Oxygen Delivery Method): room air        CAPILLARY BLOOD GLUCOSE          PHYSICAL EXAM:  GENERAL: NAD, lying in bed comfortably  HEAD:  Atraumatic, normocephalic  EYES: EOMI, PERRLA, conjunctiva clear, no conjunctival pallor, anicteric sclera  NECK: Supple, trachea midline, no JVD  HEART: Regular rate and rhythm, Normal S1 S2, no murmurs, rubs, or gallops  LUNGS: Unlabored respirations. Clear to ascultation bilaterally, no crackles, wheezing, or rhonchi  ABDOMEN: Soft, nondistended, nontender, no rebound or guarding, bowel sounds presents  EXTREMITIES: Warm extremities, no clubbing, cyanosis, or edema, peripheral pulses 2+ bilaterally  MUSCULOSKELETAL: No joint swelling or tenderness to palpation  NEURO: CN 2-12 grossly intact, moves all limbs spontaneously  SKIN: No rashes or lesions         LABS:                        9.1    9.82  )-----------( 283      ( 17 Dec 2023 06:30 )             27.5     Auto Eosinophil # 0.00  / Auto Eosinophil % 0.0   / Auto Neutrophil # 9.01  / Auto Neutrophil % 91.7  / BANDS % x                            10.3   4.59  )-----------( 286      ( 16 Dec 2023 06:20 )             31.6     Auto Eosinophil # 0.00  / Auto Eosinophil % 0.0   / Auto Neutrophil # 4.10  / Auto Neutrophil % 89.4  / BANDS % x        12-16    138  |  103  |  13  ----------------------------<  137<H>  4.4   |  27  |  0.52    Ca    8.5      16 Dec 2023 06:20  Mg     2.20     12-16  Phos  2.2     12-16  TPro  6.2  /  Alb  3.6  /  TBili  1.4<H>  /  DBili  x   /  AST  17  /  ALT  8   /  AlkPhos  102  12-16          Urinalysis Basic - ( 16 Dec 2023 06:20 )    Color: x / Appearance: x / SG: x / pH: x  Gluc: 137 mg/dL / Ketone: x  / Bili: x / Urobili: x   Blood: x / Protein: x / Nitrite: x   Leuk Esterase: x / RBC: x / WBC x   Sq Epi: x / Non Sq Epi: x / Bacteria: x            RADIOLOGY & ADDITIONAL TESTS:    Imaging Personally Reviewed: Yes    Consultant(s) Notes Reviewed:  Yes    Care Discussed with Consultants/Other Providers: Yes   Jani Louie MD  PGY 2 Department of Internal Medicine        Patient is a 68y old  Male who presents with a chief complaint of 1 week lower back pain (16 Dec 2023 13:01)      SUBJECTIVE / OVERNIGHT EVENTS: Pt seen and examined. No acute overnight events. Reports improvement in LLE weakness, has been doing exercises in bed. Denies fevers, chills, CP, SOB, Abdominal pain, N/V, Constipation, Diarrhea        MEDICATIONS  (STANDING):  aspirin enteric coated 81 milliGRAM(s) Oral daily  budesonide  80 MICROgram(s)/formoterol 4.5 MICROgram(s) Inhaler 2 Puff(s) Inhalation daily  chlorhexidine 2% Cloths 1 Application(s) Topical daily  dexAMETHasone  Injectable 4 milliGRAM(s) IV Push every 6 hours  famotidine    Tablet 20 milliGRAM(s) Oral two times a day  gabapentin 400 milliGRAM(s) Oral two times a day  heparin   Injectable 5000 Unit(s) SubCutaneous every 12 hours  influenza  Vaccine (HIGH DOSE) 0.7 milliLiter(s) IntraMuscular once  naloxone Injectable 0.4 milliGRAM(s) IV Push once  polyethylene glycol 3350 17 Gram(s) Oral two times a day  senna 2 Tablet(s) Oral at bedtime  sodium chloride 0.9%. 1000 milliLiter(s) (75 mL/Hr) IV Continuous <Continuous>    MEDICATIONS  (PRN):  acetaminophen     Tablet .. 650 milliGRAM(s) Oral every 6 hours PRN Temp greater or equal to 38C (100.4F), Mild Pain (1 - 3)  aluminum hydroxide/magnesium hydroxide/simethicone Suspension 30 milliLiter(s) Oral every 4 hours PRN Dyspepsia  bisacodyl 5 milliGRAM(s) Oral daily PRN Constipation  cyclobenzaprine 5 milliGRAM(s) Oral three times a day PRN Muscle Spasm  lidocaine   4% Patch 1 Patch Transdermal every 24 hours PRN back pain  melatonin 3 milliGRAM(s) Oral at bedtime PRN Insomnia  ondansetron Injectable 4 milliGRAM(s) IV Push every 8 hours PRN Nausea and/or Vomiting  oxyCODONE    IR 10 milliGRAM(s) Oral every 4 hours PRN Severe Pain (7 - 10)  oxyCODONE    IR 5 milliGRAM(s) Oral every 4 hours PRN Moderate Pain (4 - 6)      I&O's Summary    16 Dec 2023 07:01  -  17 Dec 2023 07:00  --------------------------------------------------------  IN: 0 mL / OUT: 750 mL / NET: -750 mL        Vital Signs Last 24 Hrs  T(C): 36.7 (17 Dec 2023 05:53), Max: 36.7 (17 Dec 2023 05:53)  T(F): 98 (17 Dec 2023 05:53), Max: 98 (17 Dec 2023 05:53)  HR: 74 (17 Dec 2023 05:53) (74 - 91)  BP: 107/63 (17 Dec 2023 05:53) (100/63 - 107/63)  BP(mean): --  RR: 17 (17 Dec 2023 05:53) (16 - 17)  SpO2: 100% (17 Dec 2023 05:53) (98% - 100%)    Parameters below as of 17 Dec 2023 05:53  Patient On (Oxygen Delivery Method): room air        CAPILLARY BLOOD GLUCOSE          PHYSICAL EXAM:  GENERAL: NAD, lying in bed comfortably  HEAD:  Atraumatic, normocephalic  EYES: EOMI, PERRLA, conjunctiva clear, no conjunctival pallor, anicteric sclera  NECK: Supple, trachea midline, no JVD  HEART: Regular rate and rhythm, Normal S1 S2, no murmurs, rubs, or gallops  LUNGS: Unlabored respirations. Clear to ascultation bilaterally, no crackles, wheezing, or rhonchi  ABDOMEN: Soft, nondistended, nontender, no rebound or guarding, bowel sounds presents  EXTREMITIES: Warm extremities, no clubbing, cyanosis, or edema, peripheral pulses 2+ bilaterally  MUSCULOSKELETAL: No joint swelling or tenderness to palpation  NEURO: CN 2-12 grossly intact, moves all limbs spontaneously (RLE 4+/5 @ level of hip, LLE 4-/5 @ level of hip)  SKIN: No rashes or lesions         LABS:                        9.1    9.82  )-----------( 283      ( 17 Dec 2023 06:30 )             27.5     Auto Eosinophil # 0.00  / Auto Eosinophil % 0.0   / Auto Neutrophil # 9.01  / Auto Neutrophil % 91.7  / BANDS % x                            10.3   4.59  )-----------( 286      ( 16 Dec 2023 06:20 )             31.6     Auto Eosinophil # 0.00  / Auto Eosinophil % 0.0   / Auto Neutrophil # 4.10  / Auto Neutrophil % 89.4  / BANDS % x        12-16    138  |  103  |  13  ----------------------------<  137<H>  4.4   |  27  |  0.52    Ca    8.5      16 Dec 2023 06:20  Mg     2.20     12-16  Phos  2.2     12-16  TPro  6.2  /  Alb  3.6  /  TBili  1.4<H>  /  DBili  x   /  AST  17  /  ALT  8   /  AlkPhos  102  12-16          Urinalysis Basic - ( 16 Dec 2023 06:20 )    Color: x / Appearance: x / SG: x / pH: x  Gluc: 137 mg/dL / Ketone: x  / Bili: x / Urobili: x   Blood: x / Protein: x / Nitrite: x   Leuk Esterase: x / RBC: x / WBC x   Sq Epi: x / Non Sq Epi: x / Bacteria: x            RADIOLOGY & ADDITIONAL TESTS:  < from: MR Cervical Spine w/wo IV Cont (12.15.23 @ 15:45) >  INTERPRETATION:  Susceptibility artifact due to posterior fusion in the   spine limits evaluation of the spinal cord at these levels.    No evidence for spinal cord compression or cord signal abnormality.    No cauda equina nerve root compression.    There are disc osteophyte complexes in the cervical spine at C4-C5 and   C5-C6 resulting in mild to moderate spinal canal stenosis and mild   ventral spinal cord contour deformity.    Official report to follow.    Imaging Personally Reviewed: Yes    Consultant(s) Notes Reviewed:  Yes    Care Discussed with Consultants/Other Providers: Yes

## 2023-12-17 NOTE — PROGRESS NOTE ADULT - TIME BILLING
- Ordering, reviewing, and interpreting labs, testing, and imaging.  - Independently obtaining a review of systems and performing a physical exam  - Reviewing prior hospitalization and where necessary, outpatient records.  - Reviewing consultant recommendations/communicating with consultants  - Counselling and educating patient and family regarding interpretation of aforementioned items and plan of care.
- Ordering, reviewing, and interpreting labs, testing, and imaging.  - Independently obtaining a review of systems and performing a physical exam  - Reviewing prior hospitalization and where necessary, outpatient records.  - Reviewing consultant recommendations/communicating with consultants  - Counselling and educating patient and family regarding interpretation of aforementioned items and plan of care.

## 2023-12-17 NOTE — DIETITIAN INITIAL EVALUATION ADULT - DIET TYPE
[facility] Standard 1.0 2x/d/regular/supplement (specify) [facility] Standard 1.0 2x/d/regular/supplement (specify)

## 2023-12-17 NOTE — DIETITIAN INITIAL EVALUATION ADULT - ORAL INTAKE PTA/DIET HISTORY
Reported improved appetite since CT/RT but endorsed wt loss despite similar PO intake. OTC Supplements: Ensure Max Chocolate 1-2x/d, Balance of nutrition fruit and vegetable capsules, and vitamin E. Trailed Ensure Plus but did not prefer taste.

## 2023-12-17 NOTE — DIETITIAN INITIAL EVALUATION ADULT - ORAL NUTRITION SUPPLEMENTS
[employer] Standard 1.0 2x/d (325 kcal, 16g pro each)  [employer] Standard 1.0 2x/d (325 kcal, 16g pro each)

## 2023-12-17 NOTE — DIETITIAN INITIAL EVALUATION ADULT - PERSON TAUGHT/METHOD
verbal instruction/skill demonstration/individual instruction/teach back - (Patient repeats in own words)/patient instructed

## 2023-12-17 NOTE — DIETITIAN INITIAL EVALUATION ADULT - ADD RECOMMEND
1. Monitor PO intake/tolerance, labs, weights, BMs, and skin integrity  2. Please obtain updated wt/weekly wt

## 2023-12-17 NOTE — DIETITIAN INITIAL EVALUATION ADULT - OTHER CALCULATIONS
Fluids per MD. IBW calculation using 6'9 - 10% (144#). Kcal/protein calculations with considerations for malnutrition and cancer

## 2023-12-17 NOTE — PROGRESS NOTE ADULT - ASSESSMENT
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 Martins at [facility] Nov 2020, also completed spinal radiation  presents to ED complaining of worsening of chronic back pain over the last 1-2 weeks with worsening LE weakness worse L>R. Patient states he has had back pain since his surgery but the LLE weakness is new x 1-2 weeks, progressive.   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 Martins at [facility] Nov 2020, also completed spinal radiation  presents to ED complaining of worsening of chronic back pain over the last 1-2 weeks with worsening LE weakness worse L>R. Patient states he has had back pain since his surgery but the LLE weakness is new x 1-2 weeks, progressive.   MRI Lumbar spine WNL  MRI Thoracic spine demonstarted post op changes, severe wedge deformity stable T9, T11, T12/ Limited by hardware motion but T9/10/11 tumor extension suspected in the epidural space causing severe cord compression   MRI Cervical spine- degenerative changes      Overall feels better with steroids but continues to feel unsteady walking

## 2023-12-17 NOTE — DIETITIAN NUTRITION RISK NOTIFICATION - ADDITIONAL COMMENTS/DIETITIAN RECOMMENDATIONS
Regular diet, [email or web address] Standard 1.0 2x/d (325 kcal, 16g pro each)  Regular diet, [employer] Standard 1.0 2x/d (325 kcal, 16g pro each)

## 2023-12-17 NOTE — PROGRESS NOTE ADULT - PROBLEM SELECTOR PLAN 2
-on oxycodone at home for lower back pain with hx of spinal mets.   -will c/t oxycodone 5 mg and 10 mg q4 PRN for moderate and severe pain respectively  -will start bowel regimen for chronic constipation  -cyclobenzaprine PRN  -increased home gabapentin to 400 mg BID -on oxycodone at home for lower back pain with hx of spinal mets.   -will c/t oxycodone 5 mg and 10 mg q4 PRN for moderate and severe pain respectively  -bowel regimen for chronic constipation  -cyclobenzaprine PRN  -increased home gabapentin to 400 mg BID

## 2023-12-18 NOTE — PROGRESS NOTE ADULT - PROBLEM SELECTOR PLAN 2
-on oxycodone at home for lower back pain with hx of spinal mets.   -will c/t oxycodone 5 mg and 10 mg q4 PRN for moderate and severe pain respectively  -bowel regimen for chronic constipation  -cyclobenzaprine PRN  -increased home gabapentin to 400 mg BID

## 2023-12-18 NOTE — PROGRESS NOTE ADULT - SUBJECTIVE AND OBJECTIVE BOX
ANGIE ZAMORA  68y  Male      Patient is a 68y old  Male who presents with a chief complaint of 1 week lower back pain (18 Dec 2023 13:38)      INTERVAL HPI/OVERNIGHT EVENTS:      REVIEW OF SYSTEMS:  CONSTITUTIONAL: No fever, weight loss, or fatigue  ENMT: No sinus or throat pain  NECK: No pain or stiffness  RESPIRATORY: No cough, wheezing, chills or hemoptysis; No shortness of breath  CARDIOVASCULAR: No chest pain, palpitations, dizziness, or leg swelling  GASTROINTESTINAL: No abdominal or epigastric pain. No nausea, vomiting, or hematemesis; No diarrhea or constipation. No melena or hematochezia.  GENITOURINARY: No dysuria, frequency, hematuria, or incontinence  NEUROLOGICAL: No headaches,, loss of strength, numbness, or tremors  SKIN: No itching, burning, rashes, or lesions   MUSCULOSKELETAL: No joint pain or swelling; No muscle, back, or extremity pain    FAMILY HISTORY:  Family history of diabetes mellitus (DM) (Father)      T(C): 36.6 (12-18-23 @ 13:55), Max: 36.6 (12-17-23 @ 22:41)  HR: 86 (12-18-23 @ 13:55) (75 - 88)  BP: 101/63 (12-18-23 @ 13:55) (101/63 - 110/67)  RR: 19 (12-18-23 @ 13:55) (18 - 20)  SpO2: 96% (12-18-23 @ 13:55) (96% - 98%)  Wt(kg): --Vital Signs Last 24 Hrs  T(C): 36.6 (18 Dec 2023 13:55), Max: 36.6 (17 Dec 2023 22:41)  T(F): 97.8 (18 Dec 2023 13:55), Max: 97.8 (17 Dec 2023 22:41)  HR: 86 (18 Dec 2023 13:55) (75 - 88)  BP: 101/63 (18 Dec 2023 13:55) (101/63 - 110/67)  BP(mean): --  RR: 19 (18 Dec 2023 13:55) (18 - 20)  SpO2: 96% (18 Dec 2023 13:55) (96% - 98%)    Parameters below as of 18 Dec 2023 13:55  Patient On (Oxygen Delivery Method): room air      albuterol (Other (Mild))      PHYSICAL EXAM:  GENERAL: NAD, lying in bed comfortably  HEAD:  Atraumatic, normocephalic  EYES: EOMI, PERRLA, conjunctiva clear, no conjunctival pallor, anicteric sclera  NECK: Supple, trachea midline, no JVD  HEART: Regular rate and rhythm, Normal S1 S2, no murmurs, rubs, or gallops  LUNGS: Unlabored respirations. Clear to ascultation bilaterally, no crackles, or rhonchi, mild wheezing b/l LLBs  ABDOMEN: Soft, nondistended, nontender, no rebound or guarding, bowel sounds presents  EXTREMITIES: Warm extremities, no clubbing, cyanosis, or edema, peripheral pulses 2+ bilaterally  MUSCULOSKELETAL: No joint swelling or tenderness to palpation  NEURO: CN 2-12 grossly intact, moves all limbs spontaneously (RLE 4+/5 @ level of hip, LLE 4-/5 @ level of hip)  SKIN: No rashes or lesions      Consultant(s) Notes Reviewed:  [x ] YES  [ ] NO  Care Discussed with Consultants/Other Providers [ x] YES  [ ] NO    LABS:                        9.5    12.07 )-----------( 303      ( 18 Dec 2023 06:45 )             30.2     12-18    138  |  103  |  18  ----------------------------<  113<H>  4.0   |  22  |  0.42<L>    Ca    7.3<L>      18 Dec 2023 06:45  Phos  1.3     12-18  Mg     2.20     12-18    TPro  5.3<L>  /  Alb  3.3  /  TBili  0.7  /  DBili  x   /  AST  13  /  ALT  8   /  AlkPhos  82  12-18      Urinalysis Basic - ( 18 Dec 2023 06:45 )    Color: x / Appearance: x / SG: x / pH: x  Gluc: 113 mg/dL / Ketone: x  / Bili: x / Urobili: x   Blood: x / Protein: x / Nitrite: x   Leuk Esterase: x / RBC: x / WBC x   Sq Epi: x / Non Sq Epi: x / Bacteria: x          RADIOLOGY & ADDITIONAL TESTS:    Imaging Personally Reviewed:  [ ] YES  [ ] NO  acetaminophen     Tablet .. 650 milliGRAM(s) Oral every 6 hours PRN  aluminum hydroxide/magnesium hydroxide/simethicone Suspension 30 milliLiter(s) Oral every 4 hours PRN  aspirin enteric coated 81 milliGRAM(s) Oral daily  bisacodyl 5 milliGRAM(s) Oral daily PRN  budesonide  80 MICROgram(s)/formoterol 4.5 MICROgram(s) Inhaler 2 Puff(s) Inhalation daily  chlorhexidine 2% Cloths 1 Application(s) Topical daily  cyclobenzaprine 5 milliGRAM(s) Oral three times a day PRN  dexAMETHasone  Injectable 4 milliGRAM(s) IV Push every 6 hours  famotidine    Tablet 20 milliGRAM(s) Oral two times a day  gabapentin 400 milliGRAM(s) Oral two times a day  heparin   Injectable 5000 Unit(s) SubCutaneous every 12 hours  influenza  Vaccine (HIGH DOSE) 0.7 milliLiter(s) IntraMuscular once  lidocaine   4% Patch 1 Patch Transdermal every 24 hours PRN  melatonin 3 milliGRAM(s) Oral at bedtime PRN  naloxone Injectable 0.4 milliGRAM(s) IV Push once  ondansetron Injectable 4 milliGRAM(s) IV Push every 8 hours PRN  oxyCODONE    IR 10 milliGRAM(s) Oral every 4 hours PRN  oxyCODONE    IR 5 milliGRAM(s) Oral every 4 hours PRN  polyethylene glycol 3350 17 Gram(s) Oral two times a day  senna 2 Tablet(s) Oral at bedtime  sodium chloride 0.9%. 1000 milliLiter(s) IV Continuous <Continuous>      HEALTH ISSUES - PROBLEM Dx:  Lower extremity weakness    Lower back pain    Anemia    COPD without exacerbation    Encounter for preventive measure    CAD (coronary atherosclerotic disease)    Chemotherapy induced cardiomyopathy    Metastatic cancer to spine             ANGIE ZAMORA  68y  Male      Patient is a 68y old  Male who presents with a chief complaint of 1 week lower back pain (18 Dec 2023 13:38)      INTERVAL HPI/OVERNIGHT EVENTS:  No acute events. Endorses good appetite and good control of pain. no c/d    REVIEW OF SYSTEMS:  CONSTITUTIONAL: No fever, weight loss, or fatigue  ENMT: No sinus or throat pain  NECK: No pain or stiffness  RESPIRATORY: No cough, wheezing, chills or hemoptysis; No shortness of breath  CARDIOVASCULAR: No chest pain, palpitations, dizziness, or leg swelling  GASTROINTESTINAL: No abdominal or epigastric pain. No nausea, vomiting, or hematemesis; No diarrhea or constipation. No melena or hematochezia.  GENITOURINARY: No dysuria, frequency, hematuria, or incontinence  NEUROLOGICAL: No headaches,, loss of strength, numbness, or tremors  SKIN: No itching, burning, rashes, or lesions   MUSCULOSKELETAL: No joint pain or swelling; No muscle, back, or extremity pain    FAMILY HISTORY:  Family history of diabetes mellitus (DM) (Father)      T(C): 36.6 (12-18-23 @ 13:55), Max: 36.6 (12-17-23 @ 22:41)  HR: 86 (12-18-23 @ 13:55) (75 - 88)  BP: 101/63 (12-18-23 @ 13:55) (101/63 - 110/67)  RR: 19 (12-18-23 @ 13:55) (18 - 20)  SpO2: 96% (12-18-23 @ 13:55) (96% - 98%)  Wt(kg): --Vital Signs Last 24 Hrs  T(C): 36.6 (18 Dec 2023 13:55), Max: 36.6 (17 Dec 2023 22:41)  T(F): 97.8 (18 Dec 2023 13:55), Max: 97.8 (17 Dec 2023 22:41)  HR: 86 (18 Dec 2023 13:55) (75 - 88)  BP: 101/63 (18 Dec 2023 13:55) (101/63 - 110/67)  BP(mean): --  RR: 19 (18 Dec 2023 13:55) (18 - 20)  SpO2: 96% (18 Dec 2023 13:55) (96% - 98%)    Parameters below as of 18 Dec 2023 13:55  Patient On (Oxygen Delivery Method): room air      albuterol (Other (Mild))      PHYSICAL EXAM:  GENERAL: NAD, lying in bed comfortably  HEAD:  Atraumatic, normocephalic  EYES: EOMI, PERRLA, conjunctiva clear, no conjunctival pallor, anicteric sclera  NECK: Supple, trachea midline, no JVD  HEART: Regular rate and rhythm, Normal S1 S2, no murmurs, rubs, or gallops  LUNGS: Unlabored respirations. Clear to ascultation bilaterally, no crackles, or rhonchi, mild wheezing b/l LLBs  ABDOMEN: Soft, nondistended, nontender, no rebound or guarding, bowel sounds presents  EXTREMITIES: Warm extremities, no clubbing, cyanosis, or edema, peripheral pulses 2+ bilaterally  MUSCULOSKELETAL: No joint swelling or tenderness to palpation  NEURO: CN 2-12 grossly intact, moves all limbs spontaneously (RLE 4+/5 @ level of hip, LLE 4-/5 @ level of hip)  SKIN: No rashes or lesions      Consultant(s) Notes Reviewed:  [x ] YES  [ ] NO  Care Discussed with Consultants/Other Providers [ x] YES  [ ] NO    LABS:                        9.5    12.07 )-----------( 303      ( 18 Dec 2023 06:45 )             30.2     12-18    138  |  103  |  18  ----------------------------<  113<H>  4.0   |  22  |  0.42<L>    Ca    7.3<L>      18 Dec 2023 06:45  Phos  1.3     12-18  Mg     2.20     12-18    TPro  5.3<L>  /  Alb  3.3  /  TBili  0.7  /  DBili  x   /  AST  13  /  ALT  8   /  AlkPhos  82  12-18      Urinalysis Basic - ( 18 Dec 2023 06:45 )    Color: x / Appearance: x / SG: x / pH: x  Gluc: 113 mg/dL / Ketone: x  / Bili: x / Urobili: x   Blood: x / Protein: x / Nitrite: x   Leuk Esterase: x / RBC: x / WBC x   Sq Epi: x / Non Sq Epi: x / Bacteria: x          RADIOLOGY & ADDITIONAL TESTS:    Imaging Personally Reviewed:  [ ] YES  [ ] NO  acetaminophen     Tablet .. 650 milliGRAM(s) Oral every 6 hours PRN  aluminum hydroxide/magnesium hydroxide/simethicone Suspension 30 milliLiter(s) Oral every 4 hours PRN  aspirin enteric coated 81 milliGRAM(s) Oral daily  bisacodyl 5 milliGRAM(s) Oral daily PRN  budesonide  80 MICROgram(s)/formoterol 4.5 MICROgram(s) Inhaler 2 Puff(s) Inhalation daily  chlorhexidine 2% Cloths 1 Application(s) Topical daily  cyclobenzaprine 5 milliGRAM(s) Oral three times a day PRN  dexAMETHasone  Injectable 4 milliGRAM(s) IV Push every 6 hours  famotidine    Tablet 20 milliGRAM(s) Oral two times a day  gabapentin 400 milliGRAM(s) Oral two times a day  heparin   Injectable 5000 Unit(s) SubCutaneous every 12 hours  influenza  Vaccine (HIGH DOSE) 0.7 milliLiter(s) IntraMuscular once  lidocaine   4% Patch 1 Patch Transdermal every 24 hours PRN  melatonin 3 milliGRAM(s) Oral at bedtime PRN  naloxone Injectable 0.4 milliGRAM(s) IV Push once  ondansetron Injectable 4 milliGRAM(s) IV Push every 8 hours PRN  oxyCODONE    IR 10 milliGRAM(s) Oral every 4 hours PRN  oxyCODONE    IR 5 milliGRAM(s) Oral every 4 hours PRN  polyethylene glycol 3350 17 Gram(s) Oral two times a day  senna 2 Tablet(s) Oral at bedtime  sodium chloride 0.9%. 1000 milliLiter(s) IV Continuous <Continuous>      HEALTH ISSUES - PROBLEM Dx:  Lower extremity weakness    Lower back pain    Anemia    COPD without exacerbation    Encounter for preventive measure    CAD (coronary atherosclerotic disease)    Chemotherapy induced cardiomyopathy    Metastatic cancer to spine

## 2023-12-18 NOTE — PROGRESS NOTE ADULT - PROBLEM SELECTOR PLAN 5
-diagnosed in 2018, not on home O2, Treligy qD at home   -Will start Symbicort 2 puffs daily, nebs PRN   -Will resume Treligy when wife can bring inhaler in -diagnosed in 2018, not on home O2, Treligy qD at home   -c/w Symbicort 2 puffs daily, nebs PRN   -Will resume Treligy when wife can bring inhaler in

## 2023-12-18 NOTE — PROGRESS NOTE ADULT - PROBLEM SELECTOR PLAN 1
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 No symptoms on history suggestive of cauda equina syndrome. Cord compression likely given history and exam. Sx improving on steroids.  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 No symptoms on history suggestive of cauda equina syndrome. Cord compression likely given history and exam. Sx improving on steroids.  -IV decadron 10 mg stat and 4 mg q6  -neurosurgery consulted, do not recommend surgery due to patient's physical condition  -MRI C/T/L spine -> preliminary read found no cord compression  -rad-onc consult done, will appreciate recs

## 2023-12-18 NOTE — PROVIDER CONTACT NOTE (OTHER) - BACKGROUND
Pt admitted with other symptom or sign involving musculoskeletal system.

## 2023-12-18 NOTE — CONSULT NOTE ADULT - SUBJECTIVE AND OBJECTIVE BOX
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  He is on decadron now also takes oxycodone at home (prescribed since 2020) up to BID. Patient also endorses constipation that has been ongoing for the past two weeks and relieved with fleet enema. Denies weakness of UE. Denies urinary retention, bowel incontinence, fevers, chills. Denies radicular pain and saddle anesthesia. Denies CP, SOB, LOC, abd, NVD.     Wife concerned about his weight loss and treatment options.     prior RT:   SBRT T4-T8 800 x 3 27gy, Dr. Noyola  mediastinal nodes 45 gy 7/2022.    prior surgery:   esophageal resectoin 2018  T4-T6 laminectomy, T4-T12 fusion Dr. Martins 11/2020      [facility] note below:  high risk of any surgery, would have to remove all hardware, and "Does not appear a candidate for any more Rt."   Problem/Plan - 1:  ·  Problem: Metastatic cancer to spine.   ·  Plan: - Case discussed with spine attending neurosurgeon Dr. Walker Brown with review of all imaging. Tumor progression at T9-11 which is inbetween previous fusion of T4-12. Surgical resection would require an extensive operation that requires removal of all hardware, blood loss, nerve injury, and risks paralysis, removal of tumor and replacement of new hardware and extension of fusion to lumbar spine. Operation would be minimum 8-10 hours long - all prone.  - Patient does not appear to be medically tuned to withstand this type of operation.   - I discussed surgery with his wife and him at bedside and she would not want to pursue any invasive surgery of this nature given his "frailty" and current condition.   - patient and wife understandably frustrated that the metastasis disease of the spine has progressed and is very amendable to symptom/pain control   - It does not appear patient is a candidate for further RT but would defer to RadOnc  - Can taper off  decadron over the course of 2 weeks (can be changed to oral)    - Will remain available as needed  - Consider palliative consult to assist with pain control.       < from: MR Thoracic Spine w/wo IV Cont (12.15.23 @ 15:47) >  IMPRESSION:    1.  Findings compatible with multifocal osseous thoracic metastases   involving the T4, T5, T10, and T11 vertebral bodies as well as the   posterior sixth, seventh, eighth, ninth, and 10th ribs. There is   extraosseous extension of tumor involving the ventral epidural space from   the level of T9-T11 which results in severe spinal canal stenosis. There   is also extraosseous extension of tumor into the neuroforamina and   adjacent soft tissues at the levels of T9-T10, T10-T11, and T11-T12 as   detailed above.  2.  Multilevel thoracic compression deformities as detailed above, not   significantly changed in comparison to 9/18/2023.  3.  Postsurgical changes of the thoracic spine as detailed above. Note   that MR is suboptimal for the assessment of hardware integrity. Consider   further evaluation with CT of the thoracic spine without contrast as   clinically indicated.  4.  Multilevel cervical spondylosis as detailed above with at most severe   spinal canal stenosis and severe neuroforaminal stenosis.      < end of copied text >      Allergies    albuterol (Other (Mild))    Intolerances        ROS: [  ] Fever  [  ] Chills  [  ]Chest Pain [  ] SOB  [  ]Cough [  ] N/V  [  ] Diarrhea [  ]Constipation [  ]Other ROS:  [  ] ROS otherwise negative    PAST MEDICAL & SURGICAL HISTORY:  Malignant neoplasm of esophagus, unspecified    Chronic obstructive pulmonary disease    Non-recurrent unilateral inguinal hernia without obstruction or gangrene    History of bilateral inguinal hernia repair    History of cholecystectomy    H/O ventral hernia repair    History of esophageal surgery    History of lumbar laminectomy    S/P laminectomy with spinal fusion        FAMILY HISTORY:  Family history of diabetes mellitus (DM) (Father)        MEDICATIONS  (STANDING):  aspirin enteric coated 81 milliGRAM(s) Oral daily  budesonide  80 MICROgram(s)/formoterol 4.5 MICROgram(s) Inhaler 2 Puff(s) Inhalation daily  chlorhexidine 2% Cloths 1 Application(s) Topical daily  dexAMETHasone  Injectable 4 milliGRAM(s) IV Push every 6 hours  famotidine    Tablet 20 milliGRAM(s) Oral two times a day  gabapentin 400 milliGRAM(s) Oral two times a day  heparin   Injectable 5000 Unit(s) SubCutaneous every 12 hours  influenza  Vaccine (HIGH DOSE) 0.7 milliLiter(s) IntraMuscular once  naloxone Injectable 0.4 milliGRAM(s) IV Push once  polyethylene glycol 3350 17 Gram(s) Oral two times a day  senna 2 Tablet(s) Oral at bedtime  sodium chloride 0.9%. 1000 milliLiter(s) (75 mL/Hr) IV Continuous <Continuous>  sodium phosphate 15 milliMole(s)/250 mL IVPB 15 milliMole(s) IV Intermittent once    MEDICATIONS  (PRN):  acetaminophen     Tablet .. 650 milliGRAM(s) Oral every 6 hours PRN Temp greater or equal to 38C (100.4F), Mild Pain (1 - 3)  aluminum hydroxide/magnesium hydroxide/simethicone Suspension 30 milliLiter(s) Oral every 4 hours PRN Dyspepsia  bisacodyl 5 milliGRAM(s) Oral daily PRN Constipation  cyclobenzaprine 5 milliGRAM(s) Oral three times a day PRN Muscle Spasm  lidocaine   4% Patch 1 Patch Transdermal every 24 hours PRN back pain  melatonin 3 milliGRAM(s) Oral at bedtime PRN Insomnia  ondansetron Injectable 4 milliGRAM(s) IV Push every 8 hours PRN Nausea and/or Vomiting  oxyCODONE    IR 10 milliGRAM(s) Oral every 4 hours PRN Severe Pain (7 - 10)  oxyCODONE    IR 5 milliGRAM(s) Oral every 4 hours PRN Moderate Pain (4 - 6)      PHYSICAL EXAM  Vital Signs Last 24 Hrs  T(C): 35.9 (18 Dec 2023 07:05), Max: 36.6 (17 Dec 2023 22:41)  T(F): 96.7 (18 Dec 2023 07:05), Max: 97.8 (17 Dec 2023 22:41)  HR: 75 (18 Dec 2023 05:25) (75 - 88)  BP: 102/65 (18 Dec 2023 05:25) (102/65 - 110/67)  BP(mean): --  RR: 20 (18 Dec 2023 05:25) (18 - 20)  SpO2: 98% (18 Dec 2023 05:25) (97% - 98%)    Parameters below as of 18 Dec 2023 05:25  Patient On (Oxygen Delivery Method): room air      kps 50- appears stated age.        Lower extremity                                  HF          KF         KE            DF        PF                                                  L                4-/5         4+/5        4+/5       5/5      4/5                                               R                5/5         5/5        5/5       5/5        5/5  Sensation decreased in left anterior thigh to light touch       IMAGING/LABS/PATHOLOGY: I have personally reviewed the relevant labs, pathology, and imaging as noted in the HPI.  In addition,                          9.5    12.07 )-----------( 303      ( 18 Dec 2023 06:45 )             30.2     12-18    138  |  103  |  18  ----------------------------<  113<H>  4.0   |  22  |  0.42<L>    Ca    7.3<L>      18 Dec 2023 06:45  Phos  1.3     12-18  Mg     2.20     12-18    TPro  5.3<L>  /  Alb  3.3  /  TBili  0.7  /  DBili  x   /  AST  13  /  ALT  8   /  AlkPhos  82  12-18        ASSESSMENT/PLAN    ANGIE ZAMORA is a 68y man with h/o metastatic esophageal ca to the spine s/p prior esophageal resection, s/p chemo/RT, s/p T4-T6 laminectomy,  T4-T12 fusion 11/2020 (Latefi), s/p RT SBRT T4-T8 12/2020 and RT to mediastinal nodes 45gy/7/2022, now has one week of back pain, MrI shows  diffuse compression deformities at T4, GT5, G10, T11, 6-t0th ribs, T9-T11 canal stenosis.  Case d/w Dr. Noyola, longitudinally with Dr. Marquez and also at Tumor Board with Sofi Preston.  CT myelogram is requested inpatient if patient agree to see if there is any role left for any palliative RT considering the disease extent, prior surgery,  prior RT given.        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  He is on decadron now also takes oxycodone at home (prescribed since 2020) up to BID. Patient also endorses constipation that has been ongoing for the past two weeks and relieved with fleet enema. Denies weakness of UE. Denies urinary retention, bowel incontinence, fevers, chills. Denies radicular pain and saddle anesthesia. Denies CP, SOB, LOC, abd, NVD.     Wife concerned about his weight loss and treatment options.     prior RT:   SBRT T4-T8 800 x 3 27gy, Dr. Noyola  mediastinal nodes 45 gy 7/2022.    prior surgery:   esophageal resectoin 2018  T4-T6 laminectomy, T4-T12 fusion Dr. Martins 11/2020      [facility] [place]:  high risk of any surgery, would have to remove all hardware, and "Does not appear a candidate for any more Rt."   Problem/Plan - 1:  ·  Problem: Metastatic cancer to spine.   ·  Plan: - Case discussed with spine attending neurosurgeon Dr. Walker Brown with review of all imaging. Tumor progression at T9-11 which is inbetween previous fusion of T4-12. Surgical resection would require an extensive operation that requires removal of all hardware, blood loss, nerve injury, and risks paralysis, removal of tumor and replacement of new hardware and extension of fusion to lumbar spine. Operation would be minimum 8-10 hours long - all prone.  - Patient does not appear to be medically tuned to withstand this type of operation.   - I discussed surgery with his wife and him at bedside and she would not want to pursue any invasive surgery of this nature given his "frailty" and current condition.   - patient and wife understandably frustrated that the metastasis disease of the spine has progressed and is very amendable to symptom/pain control   - It does not appear patient is a candidate for further RT but would defer to RadOnc  - Can taper off  decadron over the course of 2 weeks (can be changed to oral)    - Will remain available as needed  - Consider palliative consult to assist with pain control.       < from: MR Thoracic Spine w/wo IV Cont (12.15.23 @ 15:47) >  IMPRESSION:    1.  Findings compatible with multifocal osseous thoracic metastases   involving the T4, T5, T10, and T11 vertebral bodies as well as the   posterior sixth, seventh, eighth, ninth, and 10th ribs. There is   extraosseous extension of tumor involving the ventral epidural space from   the level of T9-T11 which results in severe spinal canal stenosis. There   is also extraosseous extension of tumor into the neuroforamina and   adjacent soft tissues at the levels of T9-T10, T10-T11, and T11-T12 as   detailed above.  2.  Multilevel thoracic compression deformities as detailed above, not   significantly changed in comparison to 9/18/2023.  3.  Postsurgical changes of the thoracic spine as detailed above. Note   that MR is suboptimal for the assessment of hardware integrity. Consider   further evaluation with CT of the thoracic spine without contrast as   clinically indicated.  4.  Multilevel cervical spondylosis as detailed above with at most severe   spinal canal stenosis and severe neuroforaminal stenosis.      < end of copied text >      Allergies    albuterol (Other (Mild))    Intolerances        ROS: [  ] Fever  [  ] Chills  [  ]Chest Pain [  ] SOB  [  ]Cough [  ] N/V  [  ] Diarrhea [  ]Constipation [  ]Other ROS:  [  ] ROS otherwise negative    PAST MEDICAL & SURGICAL HISTORY:  Malignant neoplasm of esophagus, unspecified    Chronic obstructive pulmonary disease    Non-recurrent unilateral inguinal hernia without obstruction or gangrene    History of bilateral inguinal hernia repair    History of cholecystectomy    H/O ventral hernia repair    History of esophageal surgery    History of lumbar laminectomy    S/P laminectomy with spinal fusion        FAMILY HISTORY:  Family history of diabetes mellitus (DM) (Father)        MEDICATIONS  (STANDING):  aspirin enteric coated 81 milliGRAM(s) Oral daily  budesonide  80 MICROgram(s)/formoterol 4.5 MICROgram(s) Inhaler 2 Puff(s) Inhalation daily  chlorhexidine 2% Cloths 1 Application(s) Topical daily  dexAMETHasone  Injectable 4 milliGRAM(s) IV Push every 6 hours  famotidine    Tablet 20 milliGRAM(s) Oral two times a day  gabapentin 400 milliGRAM(s) Oral two times a day  heparin   Injectable 5000 Unit(s) SubCutaneous every 12 hours  influenza  Vaccine (HIGH DOSE) 0.7 milliLiter(s) IntraMuscular once  naloxone Injectable 0.4 milliGRAM(s) IV Push once  polyethylene glycol 3350 17 Gram(s) Oral two times a day  senna 2 Tablet(s) Oral at bedtime  sodium chloride 0.9%. 1000 milliLiter(s) (75 mL/Hr) IV Continuous <Continuous>  sodium phosphate 15 milliMole(s)/250 mL IVPB 15 milliMole(s) IV Intermittent once    MEDICATIONS  (PRN):  acetaminophen     Tablet .. 650 milliGRAM(s) Oral every 6 hours PRN Temp greater or equal to 38C (100.4F), Mild Pain (1 - 3)  aluminum hydroxide/magnesium hydroxide/simethicone Suspension 30 milliLiter(s) Oral every 4 hours PRN Dyspepsia  bisacodyl 5 milliGRAM(s) Oral daily PRN Constipation  cyclobenzaprine 5 milliGRAM(s) Oral three times a day PRN Muscle Spasm  lidocaine   4% Patch 1 Patch Transdermal every 24 hours PRN back pain  melatonin 3 milliGRAM(s) Oral at bedtime PRN Insomnia  ondansetron Injectable 4 milliGRAM(s) IV Push every 8 hours PRN Nausea and/or Vomiting  oxyCODONE    IR 10 milliGRAM(s) Oral every 4 hours PRN Severe Pain (7 - 10)  oxyCODONE    IR 5 milliGRAM(s) Oral every 4 hours PRN Moderate Pain (4 - 6)      PHYSICAL EXAM  Vital Signs Last 24 Hrs  T(C): 35.9 (18 Dec 2023 07:05), Max: 36.6 (17 Dec 2023 22:41)  T(F): 96.7 (18 Dec 2023 07:05), Max: 97.8 (17 Dec 2023 22:41)  HR: 75 (18 Dec 2023 05:25) (75 - 88)  BP: 102/65 (18 Dec 2023 05:25) (102/65 - 110/67)  BP(mean): --  RR: 20 (18 Dec 2023 05:25) (18 - 20)  SpO2: 98% (18 Dec 2023 05:25) (97% - 98%)    Parameters below as of 18 Dec 2023 05:25  Patient On (Oxygen Delivery Method): room air      kps 50- appears stated age.        Lower extremity                                  HF          KF         KE            DF        PF                                                  L                4-/5         4+/5        4+/5       5/5      4/5                                               R                5/5         5/5        5/5       5/5        5/5  Sensation decreased in left anterior thigh to light touch       IMAGING/LABS/PATHOLOGY: I have personally reviewed the relevant labs, pathology, and imaging as noted in the HPI.  In addition,                          9.5    12.07 )-----------( 303      ( 18 Dec 2023 06:45 )             30.2     12-18    138  |  103  |  18  ----------------------------<  113<H>  4.0   |  22  |  0.42<L>    Ca    7.3<L>      18 Dec 2023 06:45  Phos  1.3     12-18  Mg     2.20     12-18    TPro  5.3<L>  /  Alb  3.3  /  TBili  0.7  /  DBili  x   /  AST  13  /  ALT  8   /  AlkPhos  82  12-18        ASSESSMENT/PLAN    ANGIE ZAMORA is a 68y man with h/o metastatic esophageal ca to the spine s/p prior esophageal resection, s/p chemo/RT, s/p T4-T6 laminectomy,  T4-T12 fusion 11/2020 (Latefi), s/p RT SBRT T4-T8 12/2020 and RT to mediastinal nodes 45gy/7/2022, now has one week of back pain, MrI shows  diffuse compression deformities at T4, GT5, G10, T11, 6-t0th ribs, T9-T11 canal stenosis.  Case d/w Dr. Noyola, longitudinally with Dr. Marquez and also at Tumor Board with Sofi Preston.  CT myelogram is requested inpatient if patient agree to see if there is any role left for any palliative RT considering the disease extent, prior surgery,  prior RT given.

## 2023-12-18 NOTE — PROVIDER CONTACT NOTE (OTHER) - ACTION/TREATMENT ORDERED:
No further treatments at this time, MD will assess at bedside.
No further treatments at this time, continue to monitor.
No further treatments at this time, MD will assess at bedside.

## 2023-12-18 NOTE — PROGRESS NOTE ADULT - ASSESSMENT
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 On IV steroids, LE weakness improved on exam. MRI spine pending. Will consult rad-onc if positive findings.  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 On IV steroids, LE weakness improved on exam. MRI spine done, neurosurgery consulted, rad/onc consult done, will appreciate recs.

## 2023-12-19 NOTE — CONSULT NOTE ADULT - PROBLEM SELECTOR RECOMMENDATION 2
I-stop reviewed. At home, patient has the oxycodone 5mg that he was taking sparingly but inpatient he reports oxycodone 10mg provides him with improved pain relief.   > Would continue oxycodone 10mg q4h prn mod-severe pain. Patient can cut the oxycodone 10mg tablets in half to 5mg if pain is moderate. Take the 10mg oxycodone for severe pain.   > ON gabapentin 400mg bid   > Has flexeril 4mg tid prn, lidocaine patch for multimodal pain management   > bowel regimen while on opioids  > narcan prn   > Primary team to prescribe discharge medications to cover patient’s needs between the date of discharge and the date of appointment with either the patient's PMD, the primary oncologist, or a palliative care provider.  Ideally medications are to be prescribed to [facility] pharmacy, so the patient can leave the hospital with those medications.  For pain being treated as a part of cancer care, hospice or other end-of-life care, or pain being treated as part of palliative care prescribing opioids beyond the 7-day and less than 28 days post-discharge window IS ACCEPTABLE as per [place] Department of Health (https://www.health.[place].gov/professionals/narcotic/laws_and_regulations/).  Primary team to  make sure that any pre-approvals are done before discharge.

## 2023-12-19 NOTE — DISCHARGE NOTE NURSING/CASE MANAGEMENT/SOCIAL WORK - NSDCVIVACCINE_GEN_ALL_CORE_FT
influenza, injectable, quadrivalent, preservative free; 18-Nov-2020 13:04; Gabrielle Waggoner (PRICILA); [employer]; 5D4H4 (Exp. Date: 30-Jun-2021); IntraMuscular; Deltoid Right.; 0.5 milliLiter(s); VIS (VIS Published: 15-Aug-2019, VIS Presented: 18-Nov-2020);    influenza, injectable, quadrivalent, preservative free; 18-Nov-2020 13:04; Gabrielle Waggoner (PRICILA); [employer]; 5D4H4 (Exp. Date: 30-Jun-2021); IntraMuscular; Deltoid Right.; 0.5 milliLiter(s); VIS (VIS Published: 15-Aug-2019, VIS Presented: 18-Nov-2020);

## 2023-12-19 NOTE — PHYSICAL THERAPY INITIAL EVALUATION ADULT - MANUAL MUSCLE TESTING RESULTS, REHAB EVAL
at least 3/5 throughout, left LE slightly weaker, left hip flexion 3-/5 ; hip abduction/adduction 3-/5 b/l

## 2023-12-19 NOTE — CONSULT NOTE ADULT - SUBJECTIVE AND OBJECTIVE BOX
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 States that it has gotten to the point where he is unable to ambulate because of weakness and numbness in legs L > R. States that there his back pain feels worse as if her "back were being wrung out". Patient takes oxycodone at home (prescribed since 2020) up to BID. Patient also endorses constipation that has been ongoing for the past two weeks and relieved with fleet enema. Denies weakness of UE. Denies urinary retention, bowel incontinence, fevers, chills. Denies radicular pain and saddle anesthesia. Denies CP, SOB, LOC, abd, NVD.     Wife at bedside, concerned that patient continues to lose weight. Patient was > 200 lbs before his cancer diagnosis. Concerned that pt continues to lose weight, has lost 15 pounds since Summer, now at 115. Patient follows with oncology, continues to take oral chemotherapy and currently on off-cycle week.      (15 Dec 2023 07:23)    PERTINENT PM/SXH:   Malignant neoplasm of esophagus, unspecified  Chronic obstructive pulmonary disease  Non-recurrent unilateral inguinal hernia without obstruction or gangrene  History of bilateral inguinal hernia repair  History of cholecystectomy  H/O ventral hernia repair  History of esophageal surgery  History of lumbar laminectomy  S/P laminectomy with spinal fusion    FAMILY HISTORY:  Family history of diabetes mellitus (DM) (Father)    Family Hx substance abuse [ ]yes [ ]no  ITEMS NOT CHECKED ARE NOT PRESENT    SOCIAL HISTORY: Patient is a musician.   Significant other/partner[x- Olivia ]  Children[ x-34 y/o daughter Anne]  Christian/Spirituality:  Substance hx:  [ ]   Tobacco hx:  [ ]   Alcohol hx: [ ]   Home Opioid hx:  [ ] I-Stop Reference No:  This report was requested by: Raquel Watts | Reference #: [id number]    Practitioner Count: 1  Pharmacy Count: 1  Current Opioid Prescriptions: 0  Current Benzodiazepine Prescriptions: 0  Current Stimulant Prescriptions: 0      Patient Demographic Information (PDI)       PDI	First Name	Last Name	Birth Date	Gender	Street Address	City	State	Zip Code  A	Sánchez Ferrer	1955	Male	[address] [place] [phone]  B	Sánchez Ferrer	1955	Male	[address] [phone]    Prescription Information      PDI Filter:    PDI	My Rx	Current Rx	Drug Type	Rx Written	Rx Dispensed	Drug	Quantity	Days Supply	Prescriber Name	Prescriber FABIOLA #	Payment Method	Dispenser  A	N	N	O	11/17/2023	11/30/2023	oxycodone hcl (ir) 5 mg tablet	90	15	PeoplesJoelle	[id number]	[facility]  A	N	N	O	06/13/2023	06/20/2023	oxycodone hcl (ir) 5 mg tablet	90	15	PeoplesJoelle	[id number]	[facility]  A	N	N	O	03/16/2023	03/22/2023	oxycodone hcl (ir) 5 mg tablet	42	7	Harish Long MD	[id number]	[facility]  B	N	N	O	09/08/2023	09/08/2023	oxycodone hcl (ir) 5 mg tablet	90	15	Elvi Joelle	[id number]	Medicare	[facility]  Living Situation: [ ]Home  [ ]Long term care  [ ]Rehab [ ]Other    ADVANCE DIRECTIVES:    DNR/MOLST  [x ]  Living Will  [ ]   DECISION MAKER(s): Patient   [ ] Health Care Proxy(s)  [ ] Surrogate(s)  [ ] Guardian           Name(s): Phone Number(s):    BASELINE (I)ADL(s) (prior to admission): Patient had been having difficulty ambulating for the past 8-9 days.   [place]: [ ]Total  [ x] Moderate [ ]Dependent    Allergies    albuterol (Other (Mild))    Intolerances    MEDICATIONS  (STANDING):  aspirin enteric coated 81 milliGRAM(s) Oral daily  budesonide  80 MICROgram(s)/formoterol 4.5 MICROgram(s) Inhaler 2 Puff(s) Inhalation daily  chlorhexidine 2% Cloths 1 Application(s) Topical daily  dexAMETHasone  Injectable 4 milliGRAM(s) IV Push every 6 hours  famotidine    Tablet 20 milliGRAM(s) Oral two times a day  gabapentin 400 milliGRAM(s) Oral two times a day  heparin   Injectable 5000 Unit(s) SubCutaneous every 12 hours  influenza  Vaccine (HIGH DOSE) 0.7 milliLiter(s) IntraMuscular once  naloxone Injectable 0.4 milliGRAM(s) IV Push once  polyethylene glycol 3350 17 Gram(s) Oral two times a day  senna 2 Tablet(s) Oral at bedtime    MEDICATIONS  (PRN):  acetaminophen     Tablet .. 650 milliGRAM(s) Oral every 6 hours PRN Temp greater or equal to 38C (100.4F), Mild Pain (1 - 3)  aluminum hydroxide/magnesium hydroxide/simethicone Suspension 30 milliLiter(s) Oral every 4 hours PRN Dyspepsia  bisacodyl 5 milliGRAM(s) Oral daily PRN Constipation  cyclobenzaprine 5 milliGRAM(s) Oral three times a day PRN Muscle Spasm  lidocaine   4% Patch 1 Patch Transdermal every 24 hours PRN back pain  melatonin 3 milliGRAM(s) Oral at bedtime PRN Insomnia  ondansetron Injectable 4 milliGRAM(s) IV Push every 8 hours PRN Nausea and/or Vomiting  oxyCODONE    IR 5 milliGRAM(s) Oral every 4 hours PRN Moderate Pain (4 - 6)  oxyCODONE    IR 10 milliGRAM(s) Oral every 4 hours PRN Severe Pain (7 - 10)    PRESENT SYMPTOMS: [ ]Unable to self-report  [ ] CPOT [ ] PAINADs [ ] RDOS  Source if other than patient:  [ ]Family   [ ]Team     Pain: [ x]yes [ ]no  QOL impact - difficulty ambulating and performing ADLs  Location - lower back that radiates to his b/l groin    Aggravating factors - positional   Quality - ache  Radiation - to b/l groin   Timing- intermittent   Severity (0-10 scale): 8  Minimal acceptable level/pain goal (0-10 scale): 2-3/10     CPOT:    https://www.sccm.org/getattachment/[email or web address]-[email or web address]/Critical-Care-Pain-Observation-Tool-(CPOT)    Dyspnea:                           [ ]Mild [ ]Moderate [ ]Severe  Anxiety:                             [ ]Mild [ ]Moderate [ x]Severe Wife shares that she feels he is finally accepting his diagnosis and mortality and feels that he is very anxious. Patient did not disagree but feels that going home will resolve the anxiety.   Fatigue:                             [ ]Mild [ ]Moderate [ ]Severe  Nausea:                             [ ]Mild [ ]Moderate [ ]Severe  Loss of appetite:              [ ]Mild [ ]Moderate [ ]Severe  Constipation:                    [ ]Mild [ ]Moderate [ ]Severe    Other Symptoms:   [x ]All other review of systems negative     PCSSQ[Palliative Care Spiritual Screening Question]   Severity (0-10):  Chaplaincy Referral: [ ] yes [ ] refused [ ] following [x ] deferred     Caregiver Lead? : [ ] yes [ x] no [ ] Deferred [ ] Declined             Social work referral [ ] Patient & Family Centered Care Referral [ ]  Anticipatory Grief present?:  [ ] yes [x ] no  [ ] Deferred                  Social work referral [ ] Patient & Family Centered Care Referral [ ]    PHYSICAL EXAM:  Vital Signs Last 24 Hrs  T(C): 36.6 (19 Dec 2023 07:40), Max: 36.7 (18 Dec 2023 21:49)  T(F): 97.8 (19 Dec 2023 07:40), Max: 98 (18 Dec 2023 21:49)  HR: 92 (19 Dec 2023 07:40) (84 - 92)  BP: 98/58 (19 Dec 2023 07:40) (98/58 - 99/63)  BP(mean): --  RR: 18 (19 Dec 2023 07:40) (18 - 18)  SpO2: 100% (19 Dec 2023 07:40) (99% - 100%)    Parameters below as of 19 Dec 2023 07:40  Patient On (Oxygen Delivery Method): room air     I&O's Summary    18 Dec 2023 07:01  -  19 Dec 2023 07:00  --------------------------------------------------------  IN: 1550 mL / OUT: 800 mL / NET: 750 mL      GENERAL: [ x]Cachexia    [x ]Alert  [x ]Oriented x 4  [ ]Lethargic  [ ]Unarousable  [x ]Verbal  [ ]Non-Verbal  Behavioral:   [ ] Anxiety  [ ] Delirium [ ] Agitation [x ] Other  HEENT:  [ x]Normal   [ ]Dry mouth   [ ]ET Tube/Trach  [ ]Oral lesions  PULMONARY:   [ x]Clear [ ]Tachypnea  [ ]Audible excessive secretions   [ ]Rhonchi        [ ]Right [ ]Left [ ]Bilateral  [ ]Crackles        [ ]Right [ ]Left [ ]Bilateral  [ ]Wheezing     [ ]Right [ ]Left [ ]Bilateral  [ ]Diminished breath sounds [ ]right [ ]left [ ]bilateral  CARDIOVASCULAR:    [ ]Regular [ ]Irregular [ x]Tachy  [ ]Travis [ ]Murmur [ ]Other  GASTROINTESTINAL:  [x ]Soft  [ ]Distended   [ ]+BS  [x ]Non tender [ ]Tender  [ ]Other [ ]PEG [ ]OGT/ NGT  Last BM: 12/18  GENITOURINARY:  [ ]Normal [x ] Incontinent   [ ]Oliguria/Anuria   [ ]Cantu  MUSCULOSKELETAL:   [ ]Normal   [ x]Weakness  [ ]Bed/Wheelchair bound [ ]Edema  NEUROLOGIC:   [ ]No focal deficits  [ ]Cognitive impairment  [ ]Dysphagia [ ]Dysarthria [ ]Paresis [ ]Other   SKIN: Please see flowsheets   [ x]Normal  [ ]Rash  [ ]Other  [ ]Pressure ulcer(s)       Present on admission [ ]y [ ]n    CRITICAL CARE:  [ ] Shock Present  [ ]Septic [ ]Cardiogenic [ ]Neurologic [ ]Hypovolemic  [ ]  Vasopressors [ ]  Inotropes   [ ]Respiratory failure present [ ]Mechanical ventilation [ ]Non-invasive ventilatory support [ ]High flow    [ ]Acute  [ ]Chronic [ ]Hypoxic  [ ]Hypercarbic [ ]Other  [ ]Other organ failure     LABS:                        9.2    9.46  )-----------( 273      ( 19 Dec 2023 05:48 )             28.6   12-19    139  |  106  |  18  ----------------------------<  112<H>  4.2   |  25  |  0.43<L>    Ca    6.9<L>      19 Dec 2023 05:48  Phos  1.5     12-19  Mg     2.20     12-19    TPro  5.4<L>  /  Alb  3.1<L>  /  TBili  0.6  /  DBili  x   /  AST  11  /  ALT  8   /  AlkPhos  78  12-19      Urinalysis Basic - ( 19 Dec 2023 05:48 )    Color: x / Appearance: x / SG: x / pH: x  Gluc: 112 mg/dL / Ketone: x  / Bili: x / Urobili: x   Blood: x / Protein: x / Nitrite: x   Leuk Esterase: x / RBC: x / WBC x   Sq Epi: x / Non Sq Epi: x / Bacteria: x      RADIOLOGY & ADDITIONAL STUDIES:  < from: MR Thoracic Spine w/wo IV Cont (12.15.23 @ 15:47) >    *** ADDENDUM # 1 ***    Signal abnormality in the T4 and T5 vertebral bodies may be related to   evolving changes from subacute to chronic compression fractures rather   than metastatic disease. Follow-up MR imaging without and with contrast   of the thoracic spine in approximately 4-6 weeks would be useful.    < end of copied text >  < from: MR Thoracic Spine w/wo IV Cont (12.15.23 @ 15:47) >  IMPRESSION:    1.  Findings compatible with multifocal osseous thoracic metastases   involving the T4, T5, T10, and T11 vertebral bodies as well as the   posterior sixth, seventh, eighth, ninth, and 10th ribs. There is   extraosseous extension of tumor involving the ventral epidural space from   the level of T9-T11 which results in severe spinal canal stenosis. There   is also extraosseous extension of tumor into the neuroforamina and   adjacent soft tissues at the levels of T9-T10, T10-T11, and T11-T12 as   detailed above.  2.  Multilevel thoracic compression deformities as detailed above, not   significantly changed in comparison to 9/18/2023.  3.  Postsurgical changes of the thoracic spine as detailed above. Note   that MR is suboptimal for the assessment of hardware integrity. Consider   further evaluation with CT of the thoracic spine without contrast as   clinically indicated.  4.  Multilevel cervical spondylosis as detailed above with at most severe   spinal canal stenosis and severe neuroforaminal stenosis.    < end of copied text >      PROTEIN CALORIE MALNUTRITION PRESENT: [ ]mild [ ]moderate [x ]severe [ ]underweight [ ]morbid obesity  https://www.andeal.org/vault/2364/web/files/ONC/Table_Clinical%20Characteristics%20to%20Document%20Malnutrition-White%20JV%20et%20al%202455.pdf    Height (cm): 175.3 (12-15-23 @ 05:00), 170.2 (12-14-23 @ 13:00), 170.2 (10-16-23 @ 10:54)  Weight (kg): 45.4 (12-15-23 @ 05:00), 53.64 (12-14-23 @ 13:00), 55.8 (10-16-23 @ 10:54)  BMI (kg/m2): 14.8 (12-15-23 @ 05:00), 18.5 (12-14-23 @ 13:00), 19.3 (10-16-23 @ 10:54)    [ ]PPSV2 < or = to 30% [ ]significant weight loss  [ ]poor nutritional intake  [ ]anasarca[ ]Artificial Nutrition      Other REFERRALS:  [ ]Hospice  [ ]Child Life  [ ]Social Work  [ ]Case management [ ]Holistic Therapy    [facility]  Raquel Watts, Palliative Care Attending  Contact Info: Page [phone] (including Nights/Weekends), message on Microsoft Teams (Raquel Watts), or leave VM at Palliative Office [phone] (non-urgent)     Date of [id number] @ 14:24  HPI:   69 yo male PMH COPD (dx in 2018, not on home O2), 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 States that it has gotten to the point where he is unable to ambulate because of weakness and numbness in legs L > R. States that there his back pain feels worse as if her "back were being wrung out". Patient takes oxycodone at home (prescribed since 2020) up to BID. Patient also endorses constipation that has been ongoing for the past two weeks and relieved with fleet enema. Denies weakness of UE. Denies urinary retention, bowel incontinence, fevers, chills. Denies radicular pain and saddle anesthesia. Denies CP, SOB, LOC, abd, NVD.     Wife at bedside, concerned that patient continues to lose weight. Patient was > 200 lbs before his cancer diagnosis. Concerned that pt continues to lose weight, has lost 15 pounds since Summer, now at 115. Patient follows with oncology, continues to take oral chemotherapy and currently on off-cycle week.      (15 Dec 2023 07:23)    PERTINENT PM/SXH:   Malignant neoplasm of esophagus, unspecified  Chronic obstructive pulmonary disease  Non-recurrent unilateral inguinal hernia without obstruction or gangrene  History of bilateral inguinal hernia repair  History of cholecystectomy  H/O ventral hernia repair  History of esophageal surgery  History of lumbar laminectomy  S/P laminectomy with spinal fusion    FAMILY HISTORY:  Family history of diabetes mellitus (DM) (Father)    Family Hx substance abuse [ ]yes [ ]no  ITEMS NOT CHECKED ARE NOT PRESENT    SOCIAL HISTORY: Patient is a musician.   Significant other/partner[x- Olivia ]  Children[ x-34 y/o daughter Anne]  Oriental orthodox/Spirituality:  Substance hx:  [ ]   Tobacco hx:  [ ]   Alcohol hx: [ ]   Home Opioid hx:  [ ] I-Stop Reference No:  This report was requested by: Raquel Watts | Reference #: [id number]    Practitioner Count: 1  Pharmacy Count: 1  Current Opioid Prescriptions: 0  Current Benzodiazepine Prescriptions: 0  Current Stimulant Prescriptions: 0      Patient Demographic Information (PDI)       PDI	First Name	Last Name	Birth Date	Gender	Street Address	City	State	Zip Code  A	Sánchez Ferrer	1955	Male	[address] [place] [phone]  B	Sánchez Ferrer	1955	Male	[address] [phone]    Prescription Information      PDI Filter:    PDI	My Rx	Current Rx	Drug Type	Rx Written	Rx Dispensed	Drug	Quantity	Days Supply	Prescriber Name	Prescriber FABIOLA #	Payment Method	Dispenser  A	N	N	O	11/17/2023	11/30/2023	oxycodone hcl (ir) 5 mg tablet	90	15	PeoplesJoelle	[id number]	[facility]  A	N	N	O	06/13/2023	06/20/2023	oxycodone hcl (ir) 5 mg tablet	90	15	PeoplesJoelle	[id number]	[facility]  A	N	N	O	03/16/2023	03/22/2023	oxycodone hcl (ir) 5 mg tablet	42	7	Harish Long MD	[id number]	[facility]  B	N	N	O	09/08/2023	09/08/2023	oxycodone hcl (ir) 5 mg tablet	90	15	Elvi Joelle	[id number]	Medicare	[facility]  Living Situation: [ ]Home  [ ]Long term care  [ ]Rehab [ ]Other    ADVANCE DIRECTIVES:    DNR/MOLST  [x ]  Living Will  [ ]   DECISION MAKER(s): Patient   [ ] Health Care Proxy(s)  [ ] Surrogate(s)  [ ] Guardian           Name(s): Phone Number(s):    BASELINE (I)ADL(s) (prior to admission): Patient had been having difficulty ambulating for the past 8-9 days.   [place]: [ ]Total  [ x] Moderate [ ]Dependent    Allergies    albuterol (Other (Mild))    Intolerances    MEDICATIONS  (STANDING):  aspirin enteric coated 81 milliGRAM(s) Oral daily  budesonide  80 MICROgram(s)/formoterol 4.5 MICROgram(s) Inhaler 2 Puff(s) Inhalation daily  chlorhexidine 2% Cloths 1 Application(s) Topical daily  dexAMETHasone  Injectable 4 milliGRAM(s) IV Push every 6 hours  famotidine    Tablet 20 milliGRAM(s) Oral two times a day  gabapentin 400 milliGRAM(s) Oral two times a day  heparin   Injectable 5000 Unit(s) SubCutaneous every 12 hours  influenza  Vaccine (HIGH DOSE) 0.7 milliLiter(s) IntraMuscular once  naloxone Injectable 0.4 milliGRAM(s) IV Push once  polyethylene glycol 3350 17 Gram(s) Oral two times a day  senna 2 Tablet(s) Oral at bedtime    MEDICATIONS  (PRN):  acetaminophen     Tablet .. 650 milliGRAM(s) Oral every 6 hours PRN Temp greater or equal to 38C (100.4F), Mild Pain (1 - 3)  aluminum hydroxide/magnesium hydroxide/simethicone Suspension 30 milliLiter(s) Oral every 4 hours PRN Dyspepsia  bisacodyl 5 milliGRAM(s) Oral daily PRN Constipation  cyclobenzaprine 5 milliGRAM(s) Oral three times a day PRN Muscle Spasm  lidocaine   4% Patch 1 Patch Transdermal every 24 hours PRN back pain  melatonin 3 milliGRAM(s) Oral at bedtime PRN Insomnia  ondansetron Injectable 4 milliGRAM(s) IV Push every 8 hours PRN Nausea and/or Vomiting  oxyCODONE    IR 5 milliGRAM(s) Oral every 4 hours PRN Moderate Pain (4 - 6)  oxyCODONE    IR 10 milliGRAM(s) Oral every 4 hours PRN Severe Pain (7 - 10)    PRESENT SYMPTOMS: [ ]Unable to self-report  [ ] CPOT [ ] PAINADs [ ] RDOS  Source if other than patient:  [ ]Family   [ ]Team     Pain: [ x]yes [ ]no  QOL impact - difficulty ambulating and performing ADLs  Location - lower back that radiates to his b/l groin    Aggravating factors - positional   Quality - ache  Radiation - to b/l groin   Timing- intermittent   Severity (0-10 scale): 8  Minimal acceptable level/pain goal (0-10 scale): 2-3/10     CPOT:    https://www.sccm.org/getattachment/[email or web address]-8462p0712k4h/Critical-Care-Pain-Observation-Tool-(CPOT)    Dyspnea:                           [ ]Mild [ ]Moderate [ ]Severe  Anxiety:                             [ ]Mild [ ]Moderate [ x]Severe Wife shares that she feels he is finally accepting his diagnosis and mortality and feels that he is very anxious. Patient did not disagree but feels that going home will resolve the anxiety.   Fatigue:                             [ ]Mild [ ]Moderate [ ]Severe  Nausea:                             [ ]Mild [ ]Moderate [ ]Severe  Loss of appetite:              [ ]Mild [ ]Moderate [ ]Severe  Constipation:                    [ ]Mild [ ]Moderate [ ]Severe    Other Symptoms:   [x ]All other review of systems negative     PCSSQ[Palliative Care Spiritual Screening Question]   Severity (0-10):  Chaplaincy Referral: [ ] yes [ ] refused [ ] following [x ] deferred     Caregiver [place]? : [ ] yes [ x] no [ ] Deferred [ ] Declined             Social work referral [ ] Patient & Family Centered Care Referral [ ]  Anticipatory Grief present?:  [ ] yes [x ] no  [ ] Deferred                  Social work referral [ ] Patient & Family Centered Care Referral [ ]    PHYSICAL EXAM:  Vital Signs Last 24 Hrs  T(C): 36.6 (19 Dec 2023 07:40), Max: 36.7 (18 Dec 2023 21:49)  T(F): 97.8 (19 Dec 2023 07:40), Max: 98 (18 Dec 2023 21:49)  HR: 92 (19 Dec 2023 07:40) (84 - 92)  BP: 98/58 (19 Dec 2023 07:40) (98/58 - 99/63)  BP(mean): --  RR: 18 (19 Dec 2023 07:40) (18 - 18)  SpO2: 100% (19 Dec 2023 07:40) (99% - 100%)    Parameters below as of 19 Dec 2023 07:40  Patient On (Oxygen Delivery Method): room air     I&O's Summary    18 Dec 2023 07:01  -  19 Dec 2023 07:00  --------------------------------------------------------  IN: 1550 mL / OUT: 800 mL / NET: 750 mL      GENERAL: [ x]Cachexia    [x ]Alert  [x ]Oriented x 4  [ ]Lethargic  [ ]Unarousable  [x ]Verbal  [ ]Non-Verbal  Behavioral:   [ ] Anxiety  [ ] Delirium [ ] Agitation [x ] Other  HEENT:  [ x]Normal   [ ]Dry mouth   [ ]ET Tube/Trach  [ ]Oral lesions  PULMONARY:   [ x]Clear [ ]Tachypnea  [ ]Audible excessive secretions   [ ]Rhonchi        [ ]Right [ ]Left [ ]Bilateral  [ ]Crackles        [ ]Right [ ]Left [ ]Bilateral  [ ]Wheezing     [ ]Right [ ]Left [ ]Bilateral  [ ]Diminished breath sounds [ ]right [ ]left [ ]bilateral  CARDIOVASCULAR:    [ ]Regular [ ]Irregular [ x]Tachy  [ ]Travis [ ]Murmur [ ]Other  GASTROINTESTINAL:  [x ]Soft  [ ]Distended   [ ]+BS  [x ]Non tender [ ]Tender  [ ]Other [ ]PEG [ ]OGT/ NGT  Last BM: 12/18  GENITOURINARY:  [ ]Normal [x ] Incontinent   [ ]Oliguria/Anuria   [ ]Cantu  MUSCULOSKELETAL:   [ ]Normal   [ x]Weakness  [ ]Bed/Wheelchair bound [ ]Edema  NEUROLOGIC:   [ ]No focal deficits  [ ]Cognitive impairment  [ ]Dysphagia [ ]Dysarthria [ ]Paresis [ ]Other   SKIN: Please see flowsheets   [ x]Normal  [ ]Rash  [ ]Other  [ ]Pressure ulcer(s)       Present on admission [ ]y [ ]n    CRITICAL CARE:  [ ] Shock Present  [ ]Septic [ ]Cardiogenic [ ]Neurologic [ ]Hypovolemic  [ ]  Vasopressors [ ]  Inotropes   [ ]Respiratory failure present [ ]Mechanical ventilation [ ]Non-invasive ventilatory support [ ]High flow    [ ]Acute  [ ]Chronic [ ]Hypoxic  [ ]Hypercarbic [ ]Other  [ ]Other organ failure     LABS:                        9.2    9.46  )-----------( 273      ( 19 Dec 2023 05:48 )             28.6   12-19    139  |  106  |  18  ----------------------------<  112<H>  4.2   |  25  |  0.43<L>    Ca    6.9<L>      19 Dec 2023 05:48  Phos  1.5     12-19  Mg     2.20     12-19    TPro  5.4<L>  /  Alb  3.1<L>  /  TBili  0.6  /  DBili  x   /  AST  11  /  ALT  8   /  AlkPhos  78  12-19      Urinalysis Basic - ( 19 Dec 2023 05:48 )    Color: x / Appearance: x / SG: x / pH: x  Gluc: 112 mg/dL / Ketone: x  / Bili: x / Urobili: x   Blood: x / Protein: x / Nitrite: x   Leuk Esterase: x / RBC: x / WBC x   Sq Epi: x / Non Sq Epi: x / Bacteria: x      RADIOLOGY & ADDITIONAL STUDIES:  < from: MR Thoracic Spine w/wo IV Cont (12.15.23 @ 15:47) >    *** ADDENDUM # 1 ***    Signal abnormality in the T4 and T5 vertebral bodies may be related to   evolving changes from subacute to chronic compression fractures rather   than metastatic disease. Follow-up MR imaging without and with contrast   of the thoracic spine in approximately 4-6 weeks would be useful.    < end of copied text >  < from: MR Thoracic Spine w/wo IV Cont (12.15.23 @ 15:47) >  IMPRESSION:    1.  Findings compatible with multifocal osseous thoracic metastases   involving the T4, T5, T10, and T11 vertebral bodies as well as the   posterior sixth, seventh, eighth, ninth, and 10th ribs. There is   extraosseous extension of tumor involving the ventral epidural space from   the level of T9-T11 which results in severe spinal canal stenosis. There   is also extraosseous extension of tumor into the neuroforamina and   adjacent soft tissues at the levels of T9-T10, T10-T11, and T11-T12 as   detailed above.  2.  Multilevel thoracic compression deformities as detailed above, not   significantly changed in comparison to 9/18/2023.  3.  Postsurgical changes of the thoracic spine as detailed above. Note   that MR is suboptimal for the assessment of hardware integrity. Consider   further evaluation with CT of the thoracic spine without contrast as   clinically indicated.  4.  Multilevel cervical spondylosis as detailed above with at most severe   spinal canal stenosis and severe neuroforaminal stenosis.    < end of copied text >      PROTEIN CALORIE MALNUTRITION PRESENT: [ ]mild [ ]moderate [x ]severe [ ]underweight [ ]morbid obesity  https://www.andeal.org/vault/3774/web/files/ONC/Table_Clinical%20Characteristics%20to%20Document%20Malnutrition-White%20JV%20et%20al%004697.pdf    Height (cm): 175.3 (12-15-23 @ 05:00), 170.2 (12-14-23 @ 13:00), 170.2 (10-16-23 @ 10:54)  Weight (kg): 45.4 (12-15-23 @ 05:00), 53.64 (12-14-23 @ 13:00), 55.8 (10-16-23 @ 10:54)  BMI (kg/m2): 14.8 (12-15-23 @ 05:00), 18.5 (12-14-23 @ 13:00), 19.3 (10-16-23 @ 10:54)    [ ]PPSV2 < or = to 30% [ ]significant weight loss  [ ]poor nutritional intake  [ ]anasarca[ ]Artificial Nutrition      Other REFERRALS:  [ ]Hospice  [ ]Child Life  [ ]Social Work  [ ]Case management [ ]Holistic Therapy

## 2023-12-19 NOTE — PROGRESS NOTE ADULT - PROBLEM SELECTOR PROBLEM 1
Metastatic cancer to spine
Lower extremity weakness

## 2023-12-19 NOTE — PROGRESS NOTE ADULT - PROBLEM SELECTOR PLAN 1
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 No symptoms on history suggestive of cauda equina syndrome. Cord compression likely given history and exam. Sx improving on steroids.  -IV decadron 10 mg stat and 4 mg q6  -neurosurgery consulted, do not recommend surgery due to patient's physical condition  -MRI C/T/L spine -> preliminary read found no cord compression  -rad-onc consult done, will appreciate recs

## 2023-12-19 NOTE — PHYSICAL THERAPY INITIAL EVALUATION ADULT - PERTINENT HX OF CURRENT PROBLEM, REHAB EVAL
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

## 2023-12-19 NOTE — DISCHARGE NOTE NURSING/CASE MANAGEMENT/SOCIAL WORK - NSDCPNINST_GEN_ALL_CORE
Patient alert and oriented, no s/s of acute distress noted, complained of back pain Oxycodone 10mgs given as ordered, IV access discontinued per discharge policy, wife at bedside, patient ready for discharge.

## 2023-12-19 NOTE — DISCHARGE NOTE NURSING/CASE MANAGEMENT/SOCIAL WORK - NSDCPEFALRISK_GEN_ALL_CORE
For information on Fall & Injury Prevention, visit: [email or web address] OR  [email or web address] OR  https://www.cdc.gov/steadi/patient.html For information on Fall & Injury Prevention, visit: [email or web address] OR  [email or web address] OR  https://www.cdc.gov/steadi/patient.html

## 2023-12-19 NOTE — CONSULT NOTE ADULT - CONVERSATION DETAILS
Referral for complex decision making and symptom management in setting of advanced malignancy. Introduced role of GaP team to patient and his wife at bedside this afternoon who were amenable to continuing goc discussions. Reviewed patient's oncological course including initial diagnosis in 2018 and prior surgeries and DMT. Patient now with difficulty ambulating and is aware that cancer has progressed causing cord compression. He and wife both understand that neurosurgery team evaluated patient and performing surgery would carry very high risks and thus are not interested in pursuing surgery. He also states that rad/onc team evaluated him and discussed CT myelogram to evaluate patient's candidacy for further radiation. Patient states that he "knows he will die and can't live forever but he doesn't want to die in the hospital". He feels that going through more invasive workup with CT myelogram is too risky and would prefer to go home with PT and home care and just continue his oral chemo medications. Patient and wife will speak to Dr. Long in the outpatient regarding continued PO chemo meds. We did introduce the philosophy of hospice should patient require this service in the future. Educated family on the philosophy of hospice care and explained the various settings and criteria in which hospice can be delivered (home vs. nursing home vs. inpatient hospice). Discussed the services provided under hospice services emphasizing the goal of elevating patient's quality of life and optimizing symptom management and avoiding unnecessary future hospitalizations. In addition to symptom management expertise, hospice provides supportive counseling services, nutritional support and chaplaincy services. Olivia shared that she is familiar with the services as her mother passed away while receiving hospice services. Both patient and wife have lost siblings to cancer and are aware of how cancer can progress and will continue to discuss amongst themselves with oncology when they are ready to pursue hospice.     Discussed advanced directives including CPR and mechanical ventilation in setting of malignancy. Reviewed the risks and benefits of these interventions at the EOL in setting of malignancy sharing that these interventions might pose more burden than benefit. Patient shared that he would want a natural passing without interventions of CPR and mechanical ventilation or non-invasive ventilation. Both were amenable to completion of MOLST form DNR/DNI.

## 2023-12-19 NOTE — PROGRESS NOTE ADULT - NUTRITIONAL ASSESSMENT
This patient has been assessed with a concern for Malnutrition and has been determined to have a diagnosis/diagnoses of Severe protein-calorie malnutrition and Underweight (BMI < 19).    This patient is being managed with:   Diet Regular-  Entered: Dec 17 2023  4:35PM  
This patient has been assessed with a concern for Malnutrition and has been determined to have a diagnosis/diagnoses of Severe protein-calorie malnutrition and Underweight (BMI < 19).    This patient is being managed with:   Diet Regular-  Entered: Dec 17 2023  4:35PM

## 2023-12-19 NOTE — CHART NOTE - NSCHARTNOTEFT_GEN_A_CORE
Patient with esophagogastric cancer with spinal mets, now refusing all interventions per primary team. Defer pain management per palliative care team.
Seen in stretcher, wife? at bedside.    We discussed options:   Dr. Noyola recommended a CT myelogram to r/o any further chance of RT, SBRT.  NSG indicated no plans for any further surgeries and that RT is no longer indicated either.     Mr. Ferrer understands his prognosis and wishes to go home and be "comfortable."  While he wants to have IR consult about myelogram and understand the procedure and risks,  he also wishes to see palliative care for consult about being home and comfortable.    He is able to walk, uses a walker but "only a few steps" are possible he indicated.  He is interested in his pain being managed and will think about CT myelogram, palliative care  options after both consultants see him.      KPS 50

## 2023-12-19 NOTE — PROGRESS NOTE ADULT - PROBLEM SELECTOR PLAN 4
-hx of atherosclerosis   -EKG wnl   -actively following outpatient cardiology   -will resume aspirin 81 and rosuvastatin 5
hx of atherosclerosis   EKG wnl   actively following outpatient cardiology   will resume aspirin 81 and rosuvastatin 5
-hx of atherosclerosis   -EKG wnl   -actively following outpatient cardiology   -will resume aspirin 81 and rosuvastatin 5
-hx of atherosclerosis   -EKG wnl   -actively following outpatient cardiology   -will resume aspirin 81 and rosuvastatin 5

## 2023-12-19 NOTE — PROGRESS NOTE ADULT - PROBLEM SELECTOR PLAN 6
-Per outpatient cardiology. EF recovered. No longer on HER2 targeted therapy.  -will hold outpatient med, lisinopril 2.5 and bisoprolol 5 mg
-Per outpatient cardiology. EF recovered. No longer on HER2 targeted therapy.  -will hold outpatient med, lisinopril 2.5 and bisoprolol 5 mg
Per outpatient cardiology. EF recovered. No longer on HER2 targeted therapy.  will hold outpatient med, lisinopril 2.5 and bisoprolol 5 mg
-Per outpatient cardiology. EF recovered. No longer on HER2 targeted therapy.  -will hold outpatient med, lisinopril 2.5 and bisoprolol 5 mg

## 2023-12-19 NOTE — PROGRESS NOTE ADULT - ASSESSMENT
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 On IV steroids, LE weakness improved on exam. MRI spine done, neurosurgery consulted, rad/onc consult done, will appreciate recs.

## 2023-12-19 NOTE — PROGRESS NOTE ADULT - PROBLEM SELECTOR PLAN 7
Diet: Regular  Nutrition Consult   DVT ppx: Heparin 5000U q12

## 2023-12-19 NOTE — CONSULT NOTE ADULT - PROBLEM SELECTOR RECOMMENDATION 3
PPSV 50%   Patient was independent of ADLs up until about 2 weeks ago. Now requiring ambulation with walker.   PT recs appreciated- inpatient rehab but patient prefers to go home with home PT. Patient amenable to home PT   Please assist with ADLs

## 2023-12-19 NOTE — CONSULT NOTE ADULT - PROBLEM SELECTOR RECOMMENDATION 6
Thank you for allowing us to participate in your patient's care. We will continue to follow with you. Please page [phone] for any q's or c's. The Geriatric and Palliative Medicine service has coverage 24 hours a day/ 7 days a week to provide medical recommendations regarding symptom management needs via telephone.    Raquel Watts D.O.   Palliative Medicine Thank you for allowing us to participate in your patient's care. We will continue to follow with you. Please page [phone] for any q's or c's. The Geriatric and Palliative Medicine service has coverage 24 hours a day/ 7 days a week to provide medical recommendations regarding symptom management needs via telephone.    Raquel Watts D.O.   Palliative Medicine

## 2023-12-19 NOTE — CONSULT NOTE ADULT - ASSESSMENT
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 Patient had MRI MRI Thoracic spine demonstarted post op changes, severe wedge deformity stable T9, T11, T12/ Limited by hardware motion but T9/10/11 tumor extension suspected in the epidural space causing severe cord compression. Palliative consulted for complex decision making and goc in setting of advanced malignancy.

## 2023-12-19 NOTE — PROGRESS NOTE ADULT - PROBLEM SELECTOR PLAN 3
-baseline 10-11 likely AoCD i/s/o malignancy, no evidence of mary kay bleeding   -will CTM   -transfuse if hgb <7
baseline 10-11 likely AoCD i/s/o malignancy, no evidence of mary kay bleeding   will CTM   transfuse if hgb <7

## 2023-12-19 NOTE — PROVIDER CONTACT NOTE (OTHER) - ASSESSMENT
Received call from Romie MCNULTY asking to arranged S transport. SW arranged [facility] [phone]. Trip# [id number]. P/U 1:30 AM on 12/20/23. Per Romie pt has no Home Care or social service needs, just needs transportation. Received call from Romie MCNULTY asking to arranged S transport. SW arranged [facility] [phone]. Trip# [id number]. P/U 1:30 AM on 12/20/23. Per Romie pt has no Home Care or social service needs, just needs transportation.

## 2023-12-19 NOTE — PROGRESS NOTE ADULT - PROBLEM SELECTOR PROBLEM 4
CAD (coronary atherosclerotic disease)

## 2023-12-19 NOTE — PROGRESS NOTE ADULT - REASON FOR ADMISSION
1 week lower back pain

## 2023-12-19 NOTE — CONSULT NOTE ADULT - PROBLEM SELECTOR RECOMMENDATION 4
Patient reports anxiety from "being in the hospital" and wife sharing her concerns that patient is "finally processing and accepting his diagnosis and his mortality".  Can consider increasing gabapentin to 400mg tid for off label use for anxiety   For acute episodes of anxiety, can consider adding low dose xanax 0.25mg bid prn   Wife provides a lot of support to patient   Patient's goal is to go home and play his Bass.

## 2023-12-19 NOTE — PHYSICAL THERAPY INITIAL EVALUATION ADULT - ADDITIONAL COMMENTS
due to recent LE weakness ~ few weeks since admission pt needing walker to ambulate  Pt owns a walker, rollator, transport chair

## 2023-12-19 NOTE — PHYSICAL THERAPY INITIAL EVALUATION ADULT - NSPTDISCHREC_GEN_A_CORE
Pt may benefit from inpatient rehab however pt would like to return home upon discharge, if so pt will then benefit from home PT to improve strength, balance, and overall functional mobility

## 2023-12-19 NOTE — DISCHARGE NOTE NURSING/CASE MANAGEMENT/SOCIAL WORK - PATIENT PORTAL LINK FT
You can access the FollowMyHealth Patient Portal offered by [facility] by registering at the following website: [email or web address]. By joining [facility]’s FollowMyHealth portal, you will also be able to view your health information using other applications (apps) compatible with our system. You can access the FollowMyHealth Patient Portal offered by [facility] by registering at the following website: [email or web address]. By joining [facility]’s FollowMyHealth portal, you will also be able to view your health information using other applications (apps) compatible with our system.

## 2023-12-19 NOTE — CONSULT NOTE ADULT - PROBLEM SELECTOR PROBLEM 2
EXAMINATION TYPE: CT thor lumbar spine w con

 

DATE OF EXAM: 9/28/2020

 

COMPARISON: None

 

HISTORY: Trauma. Pain.

 

CT DLP: mGycm

Automated exposure control for dose reduction was used.

 

CONTRAST: 

The contrast was Isovue 100 mL.

 

Images were obtained from the level of T1-S3 vertebra with IV contrast.

 

The thoracic and lumbar vertebra have fairly normal spacing and alignment. There is no subluxation. T
here is minimal vacuum disc at L5-S1. There is mild anterior spurring at L5-S1. There is no evidence 
of thoracic or lumbar compression fracture. Facet joints are intact. There is bilateral posterior pul
monary infiltrates. The costovertebral junctions are intact. Posterior elements of the thoracic and l
umbar spine are intact. There is no spondylolysis. The sacroiliac joints appear normal. The sacrum is
 intact. There is no lumbar paraspinal mass. There is no thoracic paraspinal mass. There is no pathol
ogic enhancement.

 

IMPRESSION:

No evidence of traumatic injury of the thoracic and lumbar spine. Posterior bilateral pulmonary infil
trates and atelectasis. No fracture. Neoplasm related pain

## 2023-12-19 NOTE — PROGRESS NOTE ADULT - PROBLEM SELECTOR PLAN 5
-diagnosed in 2018, not on home O2, Treligy qD at home   -c/w Symbicort 2 puffs daily, nebs PRN   -Will resume Treligy when wife can bring inhaler in

## 2023-12-19 NOTE — CONSULT NOTE ADULT - PROBLEM SELECTOR RECOMMENDATION 9
Pt diagnosed in 2018 with esophageal cancer s/p resection/radiation and chemo.   Outpatient oncologist: Dr. Long at [facility]   Patient currently on PO chemo and would like to continue with PO chemo for now until he speaks to Dr. Long.   Outpatient onc follow up  Appreciate neurosurgery and rad/onc recs for cord compression. Patient understands that performing surgery would be too risky and is not interested in pursuing that. He is declining CT myelogram and understands that the cancer may continue to progress without ongoing workup for palliative RT. He does not want to pursue further radiation. Pt diagnosed in 2018 with esophageal cancer s/p resection/radiation and chemo.   Outpatient oncologist: Dr. Long at [facility]   Patient currently on PO chemo and would like to continue with PO chemo for now until he speaks to Dr. Long.   Outpatient onc follow up  Appreciate neurosurgery and rad/onc recs for cord compression. Patient understands that performing surgery would be too risky and is not interested in pursuing that. He is declining CT myelogram and understands that the cancer may continue to progress without ongoing workup for palliative RT. He does not want to pursue further radiation. Pt diagnosed in 2018 with esophageal cancer s/p resection/radiation and chemo.   Outpatient oncologist: Dr. Long at [facility]   Patient currently on PO chemo and would like to continue with PO chemo for now until he speaks to Dr. Long.   Outpatient onc follow up  Appreciate neurosurgery and rad/onc recs for cord compression. Patient understands that performing surgery would be too risky and is not interested in pursuing that. He is declining CT myelogram and understands that the cancer may continue to progress without ongoing workup for palliative RT. He does not want to pursue further radiation.  continue steroids outpatient Pt diagnosed in 2018 with esophageal cancer s/p resection/radiation and chemo.   Outpatient oncologist: Dr. Long at [facility]   Patient currently on PO chemo and would like to continue with PO chemo for now until he speaks to Dr. Long.   Outpatient onc follow up  Appreciate neurosurgery and rad/onc recs for cord compression. Patient understands that performing surgery would be too risky and is not interested in pursuing that. He is declining CT myelogram and understands that the cancer may continue to progress without ongoing workup for palliative RT. He does not want to pursue further radiation.  continue steroids outpatient

## 2023-12-19 NOTE — PROGRESS NOTE ADULT - ATTENDING COMMENTS
68M with LLE weakness concerning for cord compression. MRI read still pending. prelim is w/o acute cord compression. seems to be responding to steroid, able to regain some function in his LLE, but not back to baseline.  awaiting oncology and neurosurgery input for next step in treatment.  PT christopher. pain controlled.
Patient seen and examined, care plan discussed with house staff as above.    Mr. Ferrer is presenting after eval in Oncology clinic with sudden LE weakness starting 8 days prior to admission. PMHX esophageal cancer s/p esophogastrectomy in 2018, s/p laminectomy of T4-T6, s/p resection of the epidural tumor with T3-T12 fusion and s/p endovascular embolization of the spinal tumor T5-T6 in November 2020, currently on capecitabine. On admission, Exam notable for LLE quadriceps flexor weakness 3/5, compared to 4+/5 quad strength on the RLE. He does have rectal tone on TETO on 12/15 exam. Numbness and tingling bilaterally of LEs. Unable to walk for the past week, prior to this was ambulating without any assistive devices. Does endorse recent constipation, relieved by enemas.     MRI CTL wwo notable for acute cord compression. NSG consulted, does not recommend surgical decompression given elevated risk of having to remove patient's thoracic hardware. Stat Rad Onc consult on 12/18, they recommend CT mylegram, pt does not want RT, wanting to go home, accepting of the risks of going on home on a steroid taper (further neurologic compromise, worsening tumor burden), does not want to follow up wit Rad Onc. Continues on Decadron (12/15- ), prep for d/c home w 2 wk steroid taper, as recommended by NSG.
Patient seen and examined, care plan discussed with house staff as above.    Mr. Ferrer is presenting after eval in Oncology clinic with sudden LE weakness starting 8 days prior to admission. PMHX esophageal cancer s/p esophogastrectomy in 2018, s/p laminectomy of T4-T6, s/p resection of the epidural tumor with T3-T12 fusion and s/p endovascular embolization of the spinal tumor T5-T6 in November 2020, currently on capecitabine. On admission, Exam notable for LLE quadriceps flexor weakness 3/5, compared to 4+/5 quad strength on the RLE. He does have rectal tone on TETO on 12/15 exam. Numbness and tingling bilaterally of LEs. Unable to walk for the past week, prior to this was ambulating without any assistive devices. Does endorse recent constipation, relieved by enemas.     MRI CTL wwo notable for acute cord compression. NSG consulted, does not recommend surgical decompression given elevated risk of having to remove patient's thoracic hardware. Stat Rad Onc consult on 12/18, they recommend CT mylegram (Rad Onc to order). Continues on Decadron (12/15- ), cont q 4 hr neuro checks. PT eval 12/18.
68M with h/oesophageal ca with spine mets in the past who presented with acute onset LLE weakness. concerning for cord compression. MRI spine is completed, awaiting report. Neurosurgery following. on empiric decadron, seems to be responding, patient is now able to move his LLE against gravity. c/w meds as above. pain control for back pain. further plan pending MRI result.

## 2023-12-19 NOTE — CONSULT NOTE ADULT - CONSULT REASON
esophageal cancer
goc and symptoms
r/o cord compression
back pain x 1 week prior RT to spine, h/o mets esophageal ca

## 2023-12-19 NOTE — PROGRESS NOTE ADULT - PROBLEM SELECTOR PROBLEM 7
Encounter for preventive measure

## 2023-12-19 NOTE — PROGRESS NOTE ADULT - PROBLEM SELECTOR PROBLEM 6
Chemotherapy induced cardiomyopathy

## 2023-12-19 NOTE — PROGRESS NOTE ADULT - SUBJECTIVE AND OBJECTIVE BOX
ANGIE ZAMORA  68y  Male      Patient is a 68y old  Male who presents with a chief complaint of 1 week lower back pain (18 Dec 2023 15:17)      INTERVAL HPI/OVERNIGHT EVENTS:  EMILIANO. Pain well controlled. Discharge focused. Will follow up on MRI sim as recommended by rad-onc.    REVIEW OF SYSTEMS:  CONSTITUTIONAL: No fever, weight loss, or fatigue  ENMT: No sinus or throat pain  NECK: No pain or stiffness  RESPIRATORY: No cough, wheezing, chills or hemoptysis; No shortness of breath  CARDIOVASCULAR: No chest pain, palpitations, dizziness, or leg swelling  GASTROINTESTINAL: No abdominal or epigastric pain. No nausea, vomiting, or hematemesis; No diarrhea or constipation. No melena or hematochezia.  GENITOURINARY: No dysuria, frequency, hematuria, or incontinence  NEUROLOGICAL: No headaches,, loss of strength, numbness, or tremors  SKIN: No itching, burning, rashes, or lesions   MUSCULOSKELETAL: No joint pain or swelling; No muscle, back, or extremity pain    FAMILY HISTORY:  Family history of diabetes mellitus (DM) (Father)      T(C): 36.7 (12-18-23 @ 21:49), Max: 36.7 (12-18-23 @ 21:49)  HR: 84 (12-18-23 @ 21:49) (84 - 86)  BP: 99/63 (12-18-23 @ 21:49) (99/63 - 101/63)  RR: 18 (12-18-23 @ 21:49) (18 - 19)  SpO2: 99% (12-18-23 @ 21:49) (96% - 99%)  Wt(kg): --Vital Signs Last 24 Hrs  T(C): 36.7 (18 Dec 2023 21:49), Max: 36.7 (18 Dec 2023 21:49)  T(F): 98 (18 Dec 2023 21:49), Max: 98 (18 Dec 2023 21:49)  HR: 84 (18 Dec 2023 21:49) (84 - 86)  BP: 99/63 (18 Dec 2023 21:49) (99/63 - 101/63)  BP(mean): --  RR: 18 (18 Dec 2023 21:49) (18 - 19)  SpO2: 99% (18 Dec 2023 21:49) (96% - 99%)    Parameters below as of 18 Dec 2023 21:49  Patient On (Oxygen Delivery Method): room air      albuterol (Other (Mild))      PHYSICAL EXAM:  GENERAL: NAD, lying in bed comfortably  HEAD:  Atraumatic, normocephalic  EYES: EOMI, PERRLA, conjunctiva clear, no conjunctival pallor, anicteric sclera  NECK: Supple, trachea midline, no JVD  HEART: Regular rate and rhythm, Normal S1 S2, no murmurs, rubs, or gallops  LUNGS: Unlabored respirations. Clear to ascultation bilaterally, no crackles, or rhonchi, mild wheezing b/l LLBs  ABDOMEN: Soft, nondistended, nontender, no rebound or guarding, bowel sounds presents  EXTREMITIES: Warm extremities, no clubbing, cyanosis, or edema, peripheral pulses 2+ bilaterally  MUSCULOSKELETAL: No joint swelling or tenderness to palpation  NEURO: CN 2-12 grossly intact, moves all limbs spontaneously (RLE 4+/5 @ level of hip, LLE 4-/5 @ level of hip)  SKIN: No rashes or lesions    Consultant(s) Notes Reviewed:  [x ] YES  [ ] NO  Care Discussed with Consultants/Other Providers [ x] YES  [ ] NO    LABS:  Lab Results                        9.2    9.46  )-----------( 273      ( 19 Dec 2023 05:48 )             28.6     12-19    139  |  106  |  18  ----------------------------<  112<H>  4.2   |  25  |  0.43<L>    Ca    6.9<L>      19 Dec 2023 05:48  Phos  1.5     12-19  Mg     2.20     12-19    TPro  5.4<L>  /  Alb  3.1<L>  /  TBili  0.6  /  DBili  x   /  AST  11  /  ALT  8   /  AlkPhos  78  12-19      Urinalysis Basic - ( 19 Dec 2023 05:48 )    Color: x / Appearance: x / SG: x / pH: x  Gluc: 112 mg/dL / Ketone: x  / Bili: x / Urobili: x   Blood: x / Protein: x / Nitrite: x   Leuk Esterase: x / RBC: x / WBC x   Sq Epi: x / Non Sq Epi: x / Bacteria: x          RADIOLOGY & ADDITIONAL TESTS:    Imaging Personally Reviewed:  [ ] YES  [ ] NO  acetaminophen     Tablet .. 650 milliGRAM(s) Oral every 6 hours PRN  aluminum hydroxide/magnesium hydroxide/simethicone Suspension 30 milliLiter(s) Oral every 4 hours PRN  aspirin enteric coated 81 milliGRAM(s) Oral daily  bisacodyl 5 milliGRAM(s) Oral daily PRN  budesonide  80 MICROgram(s)/formoterol 4.5 MICROgram(s) Inhaler 2 Puff(s) Inhalation daily  chlorhexidine 2% Cloths 1 Application(s) Topical daily  cyclobenzaprine 5 milliGRAM(s) Oral three times a day PRN  dexAMETHasone  Injectable 4 milliGRAM(s) IV Push every 6 hours  famotidine    Tablet 20 milliGRAM(s) Oral two times a day  gabapentin 400 milliGRAM(s) Oral two times a day  heparin   Injectable 5000 Unit(s) SubCutaneous every 12 hours  influenza  Vaccine (HIGH DOSE) 0.7 milliLiter(s) IntraMuscular once  lidocaine   4% Patch 1 Patch Transdermal every 24 hours PRN  melatonin 3 milliGRAM(s) Oral at bedtime PRN  naloxone Injectable 0.4 milliGRAM(s) IV Push once  ondansetron Injectable 4 milliGRAM(s) IV Push every 8 hours PRN  oxyCODONE    IR 10 milliGRAM(s) Oral every 4 hours PRN  oxyCODONE    IR 5 milliGRAM(s) Oral every 4 hours PRN  polyethylene glycol 3350 17 Gram(s) Oral two times a day  senna 2 Tablet(s) Oral at bedtime      HEALTH ISSUES - PROBLEM Dx:  Lower extremity weakness    Lower back pain    Anemia    COPD without exacerbation    Encounter for preventive measure    CAD (coronary atherosclerotic disease)    Chemotherapy induced cardiomyopathy    Metastatic cancer to spine

## 2023-12-19 NOTE — CONSULT NOTE ADULT - WHAT MATTERS MOST
Patient wants to go home and play his Bass guitar. he wants his pain controlled. He does want to continue with PO chemo meds at this time.

## 2023-12-19 NOTE — CONSULT NOTE ADULT - TIME BILLING
Time spent for extensive review of the physical chart, electronic medical record, and documentation to obtain collateral information including but not limited to:  [x ] Inpatient records (ED, H&P, primary team, and consultants if applicable, care coordination)  [x ] Inpatient values/results (biomarkers, immunoassays, imaging, and microbiology results)  [x ] Current or proposed treatment plans  [ x ] Discussion with the primary team, radiation team   [x ] Discussion with the patient, surrogate decision maker, or family  [x] Spent 46 minutes discussing goals of care separately     Time spent: >120

## 2024-01-01 ENCOUNTER — APPOINTMENT (OUTPATIENT)
Dept: HEMATOLOGY ONCOLOGY | Facility: CLINIC | Age: 69
End: 2024-01-01

## 2024-01-01 ENCOUNTER — APPOINTMENT (OUTPATIENT)
Dept: HEMATOLOGY ONCOLOGY | Facility: CLINIC | Age: 69
End: 2024-01-01
Payer: MEDICARE

## 2024-01-01 ENCOUNTER — OUTPATIENT (OUTPATIENT)
Dept: OUTPATIENT SERVICES | Facility: HOSPITAL | Age: 69
LOS: 1 days | Discharge: HOME | End: 2024-01-01

## 2024-01-01 ENCOUNTER — NON-APPOINTMENT (OUTPATIENT)
Age: 69
End: 2024-01-01

## 2024-01-01 ENCOUNTER — APPOINTMENT (OUTPATIENT)
Dept: CARDIOLOGY | Facility: CLINIC | Age: 69
End: 2024-01-01

## 2024-01-01 ENCOUNTER — APPOINTMENT (OUTPATIENT)
Dept: INFUSION THERAPY | Facility: HOSPITAL | Age: 69
End: 2024-01-01

## 2024-01-01 DIAGNOSIS — Z98.890 OTHER SPECIFIED POSTPROCEDURAL STATES: Chronic | ICD-10-CM

## 2024-01-01 DIAGNOSIS — C79.51 SECONDARY MALIGNANT NEOPLASM OF BONE: ICD-10-CM

## 2024-01-01 DIAGNOSIS — G83.9 PARALYTIC SYNDROME, UNSPECIFIED: ICD-10-CM

## 2024-01-01 DIAGNOSIS — Z90.49 ACQUIRED ABSENCE OF OTHER SPECIFIED PARTS OF DIGESTIVE TRACT: Chronic | ICD-10-CM

## 2024-01-01 DIAGNOSIS — C18.9 MALIGNANT NEOPLASM OF COLON, UNSPECIFIED: ICD-10-CM

## 2024-01-01 DIAGNOSIS — C16.0 MALIGNANT NEOPLASM OF CARDIA: ICD-10-CM

## 2024-01-01 DIAGNOSIS — Z98.1 ARTHRODESIS STATUS: Chronic | ICD-10-CM

## 2024-01-01 DIAGNOSIS — Z79.899 OTHER LONG TERM (CURRENT) DRUG THERAPY: ICD-10-CM

## 2024-01-01 PROCEDURE — 99214 OFFICE O/P EST MOD 30 MIN: CPT | Mod: 95

## 2024-01-04 PROBLEM — C79.51 METASTATIC CANCER TO SPINE: Status: ACTIVE | Noted: 2020-11-24

## 2024-01-04 PROBLEM — G83.9 PARALYSIS: Status: ACTIVE | Noted: 2024-01-01

## 2024-01-04 PROBLEM — C16.0: Status: ACTIVE | Noted: 2020-12-20

## 2024-01-04 PROBLEM — Z79.899 ON ANTINEOPLASTIC CHEMOTHERAPY: Status: ACTIVE | Noted: 2022-04-04

## 2024-01-04 NOTE — REVIEW OF SYSTEMS
[Fatigue] : fatigue [Recent Change In Weight] : ~T recent weight change [Cough] : cough [Constipation] : constipation [Diarrhea: Grade 0] : Diarrhea: Grade 0 [Negative] : Allergic/Immunologic [FreeTextEntry9] : Intermittent mid to low back pain requiring oxycodone.

## 2024-01-04 NOTE — ASSESSMENT
[FreeTextEntry1] : A discussion took place with the patient and his wife regarding further management.  This was done on telehealth conference.  The patient has been sleeping more often and spends most of the day in bed as he is developed full paralysis of lower extremities.  He continues to have a cough and has difficulty bringing up sputum so it is recommended that he receive Robitussin to loosen up his secretions.  He has been sleeping much of the day because of increasing weakness.  He continues to have pain in the back for which she uses oxycodone.  He also has burning in the coccyx area from irritation on lying on his back.  He currently has no leg movement but does note tingling sensation in his back.  The patient has been using laxatives with MiraLAX and Colace for constipation.  His wife has been managing him and is asking for some type of back brace to strengthen his back and prevent pain.  The patient was evaluated in the hospital for his loss of lower extremity function and MRIs showed progression of metastases in the thoracic spine from T9-T12.  There was also increasing bone lesions in his ribs consistent with metastases.  The patient had been on Xeloda which will be stopped at this time.  The patient has been evaluated by radiation oncology Dr. Lynn and also spinal surgery who did not feel that the patient was a candidate and would benefit from surgical resection of the tumor because of the extent of disease.  The plan at this time is to bring home care and for assistance at home with consideration for hospice intervention.  We will schedule repeat telehealth in 2 weeks to make sure that maximal effort is being given to comfort care as the patient is not a candidate for further chemotherapy intervention. [Palliative] : Goals of care discussed with patient: Palliative

## 2024-01-04 NOTE — REASON FOR VISIT
[Home] : at home, [unfilled] , at the time of the visit. [Other Location: e.g. Home (Enter Location, City,State)___] : at [unfilled] [Patient] : the patient [Self] : self [Follow-Up Visit] : a follow-up [Spouse] : spouse [FreeTextEntry2] : adenocaof GE junction, bone metastases

## 2024-01-04 NOTE — HISTORY OF PRESENT ILLNESS
[Disease: _____________________] : Disease: [unfilled] [M: ___] : M[unfilled] [AJCC Stage: ____] : AJCC Stage: [unfilled] [de-identified] : This is a 68-year-old man with history of esophagogastric carcinoma who presents for evaluation.  His history dates back to March 2018 when he developed a cough and decreased appetite.  A CAT scan of his chest revealed a mass in the distal esophagus with periesophageal and portacaval adenopathy.  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  The patient underwent neoadjuvant chemotherapy and radiation therapy with Taxol carboplatin.  A follow-up CAT scan in June 2018 revealed decreased mass in the esophagus.  In August 2018 the patient underwent [place] [facility] esophagogastrectomy with Dr. Cloud and Dr. Arciniega.  The pathology revealed no residual carcinoma with negative margins, with12 - lymph nodes (0/12).  The patient then was well until November 2020 when he developed right rib pain and upper back pain.  Imaging testing revealed metastases to the thoracic spine and possible cord compression at T5-T6.  The patient underwent laminectomy of T4-T6 resection of the epidural tumor with T3-T12 fusion and endovascular embolization of the spinal tumor T5-T6.  The pathology revealed poorly differentiated adenocarcinoma.  Postoperatively his pain was relieved.  He has undergone radiation oncology consultation and is planned to receive SBRT treatment.  He now presents for discussion regarding further chemotherapy. [de-identified] : adenoca, Her2 pos [de-identified] : Her2 pos. [de-identified] : Since the last visit the patient was hospitalized for leg weakness and was found to have progression of disease causing paralysis.  The patient was evaluated by surgery and radiation oncology but could not receive further treatment as he was not a candidate.  The patient is being evaluated for comfort care at this point.

## 2024-03-05 NOTE — H&P ADULT - NSICDXPASTMEDICALHX_GEN_ALL_CORE_FT
Obtain a PET/CT scan in the next week or two - call central scheduling at [phone] to schedule the scan.  I will plan to call you once the test is done  Obtain a breathing test and walking test  Continue to use ipratropium-albuterol one vial every 6 hours as needed for your breathing or cough  Call/message with questions/concerns         PAST MEDICAL HISTORY:  Chronic obstructive pulmonary disease     Malignant neoplasm of esophagus, unspecified

## 2024-09-24 NOTE — PATIENT PROFILE ADULT. - TEACHING/LEARNING FACTORS INFLUENCE READINESS TO LEARN
Detail Level: Zone
Render Risk Assessment In Note?: no
Additional Notes: Right clavicular neck 2014
none

## 2024-12-27 NOTE — END OF VISIT
In-basket message from [facility]  12/27/2024    Sw spoke to granddaughter to get clarification which mental health provider patient would like to keep. Pt wants to continue with [facility].     [facility] will close the case at this time as patient does not want to continue with Pursue application or Pursue MH providers.     Case close by [facility].   
[Time Spent: ___ minutes] : I have spent [unfilled] minutes of time on the encounter.

## 2025-01-13 NOTE — PATIENT PROFILE ADULT - ARRIVAL FROM
ESTABLISHED PATIENT VISIT    CHIEF COMPLAINT:   Chief Complaint   Patient presents with    Office Visit    Follow-up     Type 2 diabetes mellitus without complication, with long-term current use of insulin        PRIMARY CARE PROVIDER:  Aba Luther MD    HISTORY OF PRESENT ILLNESS  Patient presents for follow up of diabetes. PMHx significant for ?TIA.     Diagnosed as a type 2 diabetic in his 30's.   GENESIS Ab negative  Current regimen is tresiba 32U daily, metformin 2000mg daily, novolog 3-4units with breakfast and dinner, amaryl 1mg daily  Discontinued soliqua due to GERD  Hypoglycemic events?  []  YES    [x]  NO   Compliant with his diet and exercise plan? [x]  YES    []  NO     Complications:  Retinopathy: no diabetic retinopathy, dot hemorrhage last eye exam:up to date  Nephropathy: none, last up to date  Neuropathy: none   LDL up to date  CVA: none   CAD: none    Diabetes Health Maintenance  Is patient on an ACE/ARB?  [x]  YES  []  NO    Is patient on Statin/Fibrate therapy? [x] YES  []  NO    Is patient on Aspirin therapy? [x]  YES  []  NO    Has patient had foot care education? [x]  YES  []  NO    REVIEW OF SYSTEMS    Constitutional: negative for unintentional weight changes and fatigue  Eyes: negative for change in vision  HEENT: negative for headaches and dryness of mouth  Cardiovascular: negative for palpitations, chest pain and lower extremity swelling  Respiratory: negative for SOB and cough  Gastrointestinal: negative for nausea/vomiting, constipation and diarrhea  Genitourinary: negative for polydipsia and polyuria   Musculoskeletal: negative for arthralgias and myalgias    OBJECTIVE:  PAST HISTORIES:  I have reviewed the past medical history, family history, social history, medications and allergies listed in the medical record as obtained by my nursing staff and support staff and agree with their documentation.    PHYSICAL EXAM  Visit Vitals  /70 (BP Location: RUE - Right upper extremity,  Patient Position: Sitting, Cuff Size: Regular)   Pulse 66   Ht 5' 9\" (1.753 m)   Wt 85.7 kg (189 lb)   SpO2 96%   BMI 27.91 kg/m²         Wt Readings from Last 4 Encounters:   01/13/25 85.7 kg (189 lb)   09/09/24 83.1 kg (183 lb 4.8 oz)   06/22/24 78 kg (172 lb)   02/19/24 78.4 kg (172 lb 12.8 oz)     General: Oriented to person, place, and time.  Appears well developed and well nourished.  No distress. Normal gait.  Eyes:  Conjunctivae and extraocular movements are intact.   Psychiatric: Normal mood and affect.    LAB RESULTS  HDL (mg/dL)   Date Value   09/10/2024 55       Triglycerides (mg/dL)   Date Value   09/10/2024 79       Cholesterol (mg/dL)   Date Value   09/10/2024 117     Creatinine (mg/dL)   Date Value   09/10/2024 1.08       GPT/ALT (Units/L)   Date Value   09/10/2024 30       GOT/AST (Units/L)   Date Value   09/10/2024 15      Microalbumin/ Creatinine Ratio (mg/g)   Date Value   08/11/2023 5.9       TSH (mcUnits/mL)   Date Value   04/29/2022 3.250         Hemoglobin A1C (%)   Date Value   08/08/2024 7.6 (H)   08/11/2023 7.3 (H)   11/23/2022 7.4 (H)       Continuous Glucose Monitor Interpretation  CGMS Interpretation Date: 1/13/2025    Data from 12/31/2024-1/13/2024 interpreted.  Average glucose 162.  67% of time within goal, 33% of time above target, 1% of time below goal.    Postbreakfast pattern within target  Postlunch pattern hyperglycemia  Postdinner pattern hyperglycemia  Overnight pattern within target range    Current regimen: see recent visit note  Recommendations for regimen change: see A/P in recent visit note     ASSESSMENT/PLAN:  DM2  - uncontrolled, POC HbA1c 7.6%  - continue current metformin  - decrease tresiba to 28 units daily  - no change to novolog  - encouraged carb counting, eating consistent carbs with meals  - discussed lifestyle modifications  - HbA1c q 3 months     Follow-up - Return in about 3 months (around 4/13/2025).      Over 45 minutes was spent with the patient     Home

## 2025-07-08 NOTE — ED ADULT NURSE NOTE - SUICIDE SCREENING QUESTION 1
0757: Patient returns to [facility] following procedure, bedside report received from Zandra RN, VSS, family at bedside, call light in reach, beverage of choice provided.   0819: patient up to restroom with spouse assistance  0828: VSS, Discharge instructions reviewed with patient and spouse tad, both verbalized understanding. IV removed  0835: Patient dressing, call light in reach  0839: Patient taken via wheelchair to DC to car w family.      No

## (undated) DEVICE — DRSG TEGADERM 4X4.75"

## (undated) DEVICE — DRAPE LAPAROTOMY W POUCHES

## (undated) DEVICE — GLV 7.5 PROTEXIS (WHITE)

## (undated) DEVICE — DRSG CURITY GAUZE SPONGE 4 X 4" 12-PLY

## (undated) DEVICE — XI CORD MONOPOLAR CAUTERY (GREEN)

## (undated) DEVICE — SUT BIOSYN 4-0 18" P-12

## (undated) DEVICE — XI ARM FORCEP MARYLAND BIPOLAR

## (undated) DEVICE — GLV 7 PROTEXIS (WHITE)

## (undated) DEVICE — XI ARM FORCEP PROGRASP 8MM

## (undated) DEVICE — SPONGE DISSECTOR PEANUT

## (undated) DEVICE — PACK MINOR NO DRAPE

## (undated) DEVICE — PACK ROBOTIC

## (undated) DEVICE — DRAPE LIGHT HANDLE COVER (BLUE)

## (undated) DEVICE — WARMING BLANKET UPPER ADULT

## (undated) DEVICE — SUT PDS PLUS 2-0 27" SH

## (undated) DEVICE — DRAIN PENROSE 5/8" X 18" LATEX

## (undated) DEVICE — DRSG MASTISOL

## (undated) DEVICE — NDL HYPO SAFE 25G X 1.5" (ORANGE)

## (undated) DEVICE — SOL IRR POUR NS 0.9% 1500ML

## (undated) DEVICE — VENODYNE/SCD SLEEVE CALF MEDIUM

## (undated) DEVICE — ELCTR GROUNDING PAD ADULT COVIDIEN

## (undated) DEVICE — XI ARM NEEDLE DRIVER SUTURECUT MEGA 8MM

## (undated) DEVICE — PREP CHLORAPREP HI-LITE ORANGE 26ML